# Patient Record
Sex: FEMALE | Race: WHITE | NOT HISPANIC OR LATINO | Employment: OTHER | URBAN - NONMETROPOLITAN AREA
[De-identification: names, ages, dates, MRNs, and addresses within clinical notes are randomized per-mention and may not be internally consistent; named-entity substitution may affect disease eponyms.]

---

## 2017-05-22 ENCOUNTER — OFFICE VISIT (OUTPATIENT)
Dept: PODIATRY | Facility: CLINIC | Age: 82
End: 2017-05-22

## 2017-05-22 VITALS
HEART RATE: 84 BPM | SYSTOLIC BLOOD PRESSURE: 122 MMHG | DIASTOLIC BLOOD PRESSURE: 80 MMHG | HEIGHT: 60 IN | BODY MASS INDEX: 29.84 KG/M2 | WEIGHT: 152 LBS

## 2017-05-22 DIAGNOSIS — M20.5X2 HALLUX LIMITUS, ACQUIRED, LEFT: ICD-10-CM

## 2017-05-22 DIAGNOSIS — E11.9 CONTROLLED TYPE 2 DIABETES MELLITUS WITHOUT COMPLICATION, WITHOUT LONG-TERM CURRENT USE OF INSULIN (HCC): ICD-10-CM

## 2017-05-22 DIAGNOSIS — M20.5X1 HALLUX LIMITUS, ACQUIRED, RIGHT: ICD-10-CM

## 2017-05-22 DIAGNOSIS — G57.62 PLANTAR NEUROMA, LEFT: Primary | ICD-10-CM

## 2017-05-22 PROCEDURE — 99213 OFFICE O/P EST LOW 20 MIN: CPT | Performed by: PODIATRIST

## 2017-05-22 RX ORDER — DILTIAZEM HYDROCHLORIDE 240 MG/1
240 CAPSULE, COATED, EXTENDED RELEASE ORAL DAILY
COMMUNITY

## 2017-08-30 PROCEDURE — G0123 SCREEN CERV/VAG THIN LAYER: HCPCS | Performed by: OBSTETRICS & GYNECOLOGY

## 2017-08-31 ENCOUNTER — LAB REQUISITION (OUTPATIENT)
Dept: LAB | Facility: HOSPITAL | Age: 82
End: 2017-08-31

## 2017-08-31 DIAGNOSIS — Z12.72 ENCOUNTER FOR SCREENING FOR MALIGNANT NEOPLASM OF VAGINA: ICD-10-CM

## 2017-09-05 LAB
GEN CATEG CVX/VAG CYTO-IMP: NORMAL
LAB AP CASE REPORT: NORMAL
LAB AP GYN ADDITIONAL INFORMATION: NORMAL
LAB AP GYN OTHER FINDINGS: NORMAL
Lab: NORMAL
PATH INTERP SPEC-IMP: NORMAL
STAT OF ADQ CVX/VAG CYTO-IMP: NORMAL

## 2018-06-08 ENCOUNTER — TELEPHONE (OUTPATIENT)
Dept: PODIATRY | Facility: CLINIC | Age: 83
End: 2018-06-08

## 2018-06-11 ENCOUNTER — OFFICE VISIT (OUTPATIENT)
Dept: PODIATRY | Facility: CLINIC | Age: 83
End: 2018-06-11

## 2018-06-11 VITALS
WEIGHT: 150 LBS | HEART RATE: 68 BPM | BODY MASS INDEX: 29.45 KG/M2 | DIASTOLIC BLOOD PRESSURE: 76 MMHG | OXYGEN SATURATION: 95 % | SYSTOLIC BLOOD PRESSURE: 120 MMHG | HEIGHT: 60 IN

## 2018-06-11 DIAGNOSIS — B35.3 TINEA PEDIS OF LEFT FOOT: ICD-10-CM

## 2018-06-11 DIAGNOSIS — E11.9 CONTROLLED TYPE 2 DIABETES MELLITUS WITHOUT COMPLICATION, WITHOUT LONG-TERM CURRENT USE OF INSULIN (HCC): Primary | ICD-10-CM

## 2018-06-11 PROCEDURE — 99213 OFFICE O/P EST LOW 20 MIN: CPT | Performed by: PODIATRIST

## 2018-06-11 RX ORDER — PRENATAL VIT 91/IRON/FOLIC/DHA 28-975-200
COMBINATION PACKAGE (EA) ORAL 2 TIMES DAILY
Qty: 36 G | Refills: 2 | Status: SHIPPED | OUTPATIENT
Start: 2018-06-11 | End: 2018-06-13 | Stop reason: SDUPTHER

## 2018-06-11 NOTE — PATIENT INSTRUCTIONS
Diabetes and Foot Care  Diabetes may cause you to have problems because of poor blood supply (circulation) to your feet and legs. This may cause the skin on your feet to become thinner, break easier, and heal more slowly. Your skin may become dry, and the skin may peel and crack. You may also have nerve damage in your legs and feet causing decreased feeling in them. You may not notice minor injuries to your feet that could lead to infections or more serious problems. Taking care of your feet is one of the most important things you can do for yourself.  Follow these instructions at home:  · Wear shoes at all times, even in the house. Do not go barefoot. Bare feet are easily injured.  · Check your feet daily for blisters, cuts, and redness. If you cannot see the bottom of your feet, use a mirror or ask someone for help.  · Wash your feet with warm water (do not use hot water) and mild soap. Then pat your feet and the areas between your toes until they are completely dry. Do not soak your feet as this can dry your skin.  · Apply a moisturizing lotion or petroleum jelly (that does not contain alcohol and is unscented) to the skin on your feet and to dry, brittle toenails. Do not apply lotion between your toes.  · Trim your toenails straight across. Do not dig under them or around the cuticle. File the edges of your nails with an emery board or nail file.  · Do not cut corns or calluses or try to remove them with medicine.  · Wear clean socks or stockings every day. Make sure they are not too tight. Do not wear knee-high stockings since they may decrease blood flow to your legs.  · Wear shoes that fit properly and have enough cushioning. To break in new shoes, wear them for just a few hours a day. This prevents you from injuring your feet. Always look in your shoes before you put them on to be sure there are no objects inside.  · Do not cross your legs. This may decrease the blood flow to your feet.  · If you find a  minor scrape, cut, or break in the skin on your feet, keep it and the skin around it clean and dry. These areas may be cleansed with mild soap and water. Do not cleanse the area with peroxide, alcohol, or iodine.  · When you remove an adhesive bandage, be sure not to damage the skin around it.  · If you have a wound, look at it several times a day to make sure it is healing.  · Do not use heating pads or hot water bottles. They may burn your skin. If you have lost feeling in your feet or legs, you may not know it is happening until it is too late.  · Make sure your health care provider performs a complete foot exam at least annually or more often if you have foot problems. Report any cuts, sores, or bruises to your health care provider immediately.  Contact a health care provider if:  · You have an injury that is not healing.  · You have cuts or breaks in the skin.  · You have an ingrown nail.  · You notice redness on your legs or feet.  · You feel burning or tingling in your legs or feet.  · You have pain or cramps in your legs and feet.  · Your legs or feet are numb.  · Your feet always feel cold.  Get help right away if:  · There is increasing redness, swelling, or pain in or around a wound.  · There is a red line that goes up your leg.  · Pus is coming from a wound.  · You develop a fever or as directed by your health care provider.  · You notice a bad smell coming from an ulcer or wound.  This information is not intended to replace advice given to you by your health care provider. Make sure you discuss any questions you have with your health care provider.  Document Released: 12/15/2001 Document Revised: 05/25/2017 Document Reviewed: 05/27/2014  [employer] Interactive Patient Education © 2017 [employer]

## 2018-06-11 NOTE — PROGRESS NOTES
Psychiatric - PODIATRY    Today's Date: 06/11/18    Patient Name: Davida Echols  MRN: [id number]  CSN: [id number]  PCP: Jer Chang MD  Referring Provider: No ref. provider found    SUBJECTIVE     Chief Complaint   Patient presents with   • Left Foot - Follow-up     PT is here for 1 year f/u. PT c/o new problem with left foot - patient states it feels like a fresh cut in between toes, a burning/itching feeling, admits this has been going on for a few months. PT denies pain at present time.    • Right Foot - Follow-up   • Diabetes     PT does not check blood sugar regularly. PCP: Dr. Jer Chang last visit 05/11/2018     HPI: Davida Echols, a 84 y.o.female, comes to clinic as a(n) established patient presenting for diabetic foot exam and treatment of plantar neuroma of left foot. Pt has h/o Arthritis, Carotid Stenosis, DM2, HLD, HTN, Psoriasis. Patient is NIDDM and unsure of last BG level. Denies pain. Denies significant changes in feet since last visit. Notes occasional itching between her left 4th and 5th toes. Denies numbness to feet. Denies open wounds or sores. Has been applying OTC antifungal with mild improvement. Denies any constitutional symptoms. No other pedal complaints at this time.    Past Medical History:   Diagnosis Date   • Arthritis    • Breast cancer    • Carotid stenosis    • Constipation    • Diabetes mellitus     type   2   • Hyperlipidemia    • Hypertension    • Hyponatremia    • Obesity    • Osteopenia    • Ovarian cyst    • Psoriasis      Past Surgical History:   Procedure Laterality Date   • APPENDECTOMY     • BREAST SURGERY     • CHOLECYSTECTOMY     • COLONOSCOPY     • MASTECTOMY Bilateral      Family History   Problem Relation Age of Onset   • Heart disease Mother    • Heart disease Father    • Cancer Father      Social History     Social History   • Marital status:      Spouse name: N/A   • Number of children: N/A   • Years of education: N/A     Occupational  History   • Not on file.     Social History Main Topics   • Smoking status: Never Smoker   • Smokeless tobacco: Not on file   • Alcohol use No   • Drug use: No   • Sexual activity: Not on file     Other Topics Concern   • Not on file     Social History Narrative   • No narrative on file     Allergies   Allergen Reactions   • Other      Molds and oak trees     Current Outpatient Prescriptions   Medication Sig Dispense Refill   • aspirin  MG tablet Take 325 mg by mouth Every 6 (Six) Hours As Needed.     • Cyanocobalamin 1000 MCG/ML kit Inject  as directed.     • diltiaZEM CD (CARDIZEM CD) 240 MG 24 hr capsule Take 240 mg by mouth Daily.     • fexofenadine (ALLEGRA) 180 MG tablet Take 180 mg by mouth Daily.     • glimepiride (AMARYL) 1 MG tablet Take 1 mg by mouth Every Morning Before Breakfast.     • lisinopril (PRINIVIL,ZESTRIL) 5 MG tablet Take 5 mg by mouth Daily.     • metFORMIN (GLUCOPHAGE) 500 MG tablet Take 500 mg by mouth 2 (Two) Times a Day With Meals. Half a tablet daily.     • Multiple Vitamins-Minerals (MULTIVITAMIN ADULT PO) Take  by mouth. Every other day     • Omega-3 Fatty Acids (FISH OIL PO) Take  by mouth.     • pravastatin (PRAVACHOL) 80 MG tablet Take 80 mg by mouth Daily.     • terbinafine (lamISIL) 1 % cream Apply  topically 2 (Two) Times a Day. 36 g 2     No current facility-administered medications for this visit.      Review of Systems   Constitutional: Negative for chills and fever.   HENT: Negative for congestion.    Respiratory: Negative for shortness of breath.    Cardiovascular: Negative for chest pain and leg swelling.   Gastrointestinal: Negative for constipation, diarrhea, nausea and vomiting.   Musculoskeletal: Positive for arthralgias.   Skin: Negative for wound.   Neurological: Negative for numbness.       OBJECTIVE     Vitals:    06/11/18 1037   BP: 120/76   Pulse: 68   SpO2: 95%       PHYSICAL EXAM  GEN:   A&Ox3, NAD. Pt presents to clinic ambulating without assistance and  wearing Diabetic Shoes.      NEURO:   Protective sensation intact to 10/10 sites Right foot, 10/10 site Left foot using [place] monofilament  Light touch sensation present  No Tinel's or Villeux sign.    VASC:  Skin temperature Warm to Warm proximal to distal nancy  DP pulses 1/4 Right, 1/4 Left  PT pulses 1/4 Right, 1/4 Left  CFT 4 sec nancy  Pedal hair growth absent  no edema noted nancy  Varicosities absent nancy  Spider veins noted nancy    MUSC/SKEL:  Muscle Strength Right foot Dorsiflexors 5/5, Plantarflexors 5/5, Evertors 5/5, Invertors 5/5  Muscle Strength Left foot Dorsiflexors 5/5, Plantarflexors 5/5, Evertors 5/5, Invertors 5/5  Decrease ROM of the 1st MTP with bony enlargement dorsally and medially nancy  ROM of the MTJ is full without pain or crepitus    ROM of the STJ is full without pain or crepitus    ROM of the ankle joint is full without pain or crepitus    Rectus foot type   Gait pattern: Normal  No gross pedal musculoskeletal deformities noted.     DERM:  Pedal nails x10 are within normal limits of length and thickness  Webspaces are Clean, Dry, and Intact  Skin is normal in turgor and texture with no open wounds or sores appreciated.      RADIOLOGY/NUCLEAR:  No results found.    LABORATORY/CULTURE RESULTS:      PATHOLOGY RESULTS:       ASSESSMENT/PLAN     Dvaida was seen today for diabetes, follow-up and follow-up.    Diagnoses and all orders for this visit:    Controlled type 2 diabetes mellitus without complication, without long-term current use of insulin    Tinea pedis of left foot    Other orders  -     terbinafine (lamISIL) 1 % cream; Apply  topically 2 (Two) Times a Day.      Comprehensive lower extremity examination and evaluation was performed.  Discussed findings and treatment plan including risks, benefits, and treatment options with patient in detail. Patient agreed with treatment plan.  Patient states that she feels comfortable cutting her own nails at home  Patient may maintain nails  and calluses at home utilizing emery board or pumice stone between visits as needed  Reviewed at home diabetic foot care including daily foot checks   Use antifungal cream BID x3 weeks or until resolution  An After Visit Summary was printed and given to the patient at discharge, including (if requested) any available informative/educational handouts regarding diagnosis, treatment, or medications. All questions were answered to patient/family satisfaction. Should symptoms fail to improve or worsen they agree to call or return to clinic or to go to the Emergency Department. Discussed the importance of following up with any needed screening tests/labs/specialist appointments and any requested follow-up recommended by me today. Importance of maintaining follow-up discussed and patient accepts that missed appointments can delay diagnosis and potentially lead to worsening of conditions.  Return if symptoms worsen or fail to improve., or sooner if acute issues arise.        This document has been electronically signed by Ethan Arteaga DPM on June 11, 2018 10:59 AM

## 2018-06-13 RX ORDER — PRENATAL VIT 91/IRON/FOLIC/DHA 28-975-200
COMBINATION PACKAGE (EA) ORAL 2 TIMES DAILY
Qty: 36 G | Refills: 2 | Status: SHIPPED | OUTPATIENT
Start: 2018-06-13

## 2018-06-13 NOTE — TELEPHONE ENCOUNTER
Called and notified Davida Echols that her prescription for Terbafine has been sent to [facility] pharmacy in [place], [place] per patient request. Patient gave verbal understanding at this time.

## 2019-11-17 PROBLEM — D09.9 CARCINOMA IN SITU: Status: ACTIVE | Noted: 2019-11-17

## 2019-11-17 PROBLEM — D05.12 BREAST NEOPLASM, TIS (DCIS), LEFT: Status: ACTIVE | Noted: 2019-11-17

## 2019-11-17 PROBLEM — D05.11 BREAST NEOPLASM, TIS (DCIS), RIGHT: Status: ACTIVE | Noted: 2019-11-17

## 2019-11-18 ENCOUNTER — OFFICE VISIT (OUTPATIENT)
Dept: ONCOLOGY | Facility: CLINIC | Age: 84
End: 2019-11-18

## 2019-11-18 VITALS
BODY MASS INDEX: 26.78 KG/M2 | HEART RATE: 80 BPM | HEIGHT: 60 IN | SYSTOLIC BLOOD PRESSURE: 132 MMHG | RESPIRATION RATE: 16 BRPM | OXYGEN SATURATION: 95 % | DIASTOLIC BLOOD PRESSURE: 82 MMHG | WEIGHT: 136.4 LBS | TEMPERATURE: 97.6 F

## 2019-11-18 DIAGNOSIS — D50.9 IRON DEFICIENCY ANEMIA, UNSPECIFIED IRON DEFICIENCY ANEMIA TYPE: Primary | ICD-10-CM

## 2019-11-18 DIAGNOSIS — C50.912 MALIGNANT NEOPLASM OF LEFT BREAST IN FEMALE, ESTROGEN RECEPTOR POSITIVE, UNSPECIFIED SITE OF BREAST (HCC): ICD-10-CM

## 2019-11-18 DIAGNOSIS — Z17.0 MALIGNANT NEOPLASM OF LEFT BREAST IN FEMALE, ESTROGEN RECEPTOR POSITIVE, UNSPECIFIED SITE OF BREAST (HCC): ICD-10-CM

## 2019-11-18 PROCEDURE — 99214 OFFICE O/P EST MOD 30 MIN: CPT | Performed by: NURSE PRACTITIONER

## 2019-11-18 NOTE — PROGRESS NOTES
[facility] ONC [facility]  [facility]  [address] [place] [address]  [phone]    Patient Name: Davida Echols  Encounter Date: 11/18/2019  YOB: 1933  Patient Number: [id number]       REASON FOR VISIT:  Ms. Echols is an 86-year-old female who returns in follow-up of Stage I right breast cancer. 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  It has now been 213 months since definitive right mastectomy, and 150 months since discontinuation of adjuvant hormonal therapy with Arimidex (received 5 years).   The patient is here alone.  History is obtained from the patient who is considered to be a reliable historian.    She presents today feeling well. She has no complaints.  She is tolerating the iron tablets without complication.  She has no N/V/D, shortness of breath or chest pain.  She has no fever or chills.      DIAGNOSTIC ABNORMALITIES:  DCIS left breast. Mammogram, 06/27/2014:  1. There is a small cluster of calcifications in the upper-outer quadrant of the left breast at approximately the 3 o'clock position.  When compared to a more remote magnification view from 2 years earlier, I feel these calcifications have increased in number and heterogeneity and would warrant further evaluation with stereotactic biopsy. This will be discussed with the patient and Dr. Arteaga's office will also be notified by the nurse navigator who discussed the case with them under my direction. 2. ACR BI-RADS category 4B. Increased calcifications within the upper-outer quadrant of the left breast. 8/01/2014. Breast biopsy at [facility] by Dr. Zamora, revealed: IMPRESSION: Apparently successful stereotactic-guided biopsy of microcalcifications in the upper-outer left breast. Biopsy clip deployed in good position. Clip and biopsy in good alignment and position. There are decreased microcalcifications on the post clip mammogram. Pathology received on 08/14/2014, HER-2/CEP17 ratio was  1.0, negative. H&E and estrogen receptor were both positive. (09/29/2014/[facility])\[id number] breast biopsy.  DCIS:  ER + ( 97 %); TN+ (21 %); HER-2: Negative (By FISH Amplification) \[id number]: Left breast specimen type: Left simple mastectomy and left axillary sentinel lymphadenectomy tumor size: 14 mm in largest dimension. Tumor type: Ductal carcinoma in situ differentiation; Intermediate grade, solid type with multifocal necrosis and calcification invasive tumor: Not present DCIS margins: Not Involved (closest margin 16 mm; side - deep) lymph nodes: Sampling type: Left axillary sentinel lymphadenectomy 0/2 (positive/total) pTis pN0 MX  Predictive/Prognostic marker assays (on biopsy specimen.  -I):  ER + ( 97%); TN+ (21 %); HER-2: Negative (By FISH Amplification).  Left simple mastectomy. Pathology, 09/15/2014: 1. Lymph nodes, left axillary sentinel lymphadenectomy: Mild nonspecific reactive lymphoid hyperplasia (negative for metastatic tumor)  2. Breast, left simple mastectomy. Focal ductal carcinoma in situ, intermediate grade, solid type, with multifocal  necrosis and calcification (margins of resection free of involvement) fibroadenoma.  Focal healing wound consistent with previous biopsy site.    PREVIOUS INTERVENTIONS:Left simple mastectomy.  Pathology, 09/15/2014: Lymph nodes, left axillary sentinel lymphadenectomy:  Mild nonspecific reactive lymphoid hyperplasia (negative for metastatic tumor)  Breast, left simple mastectomy, focal ductal carcinoma in situ, intermediate grade, solid type, with multifocal necrosis and calcification (margins of resection free of involvement) fibroadenoma .  Focal healing wound, consistent with previous biopsy site.      DIAGNOSTIC ABNORMALITIES:  IDC right breast.  Mammogram, 01/25/2002. A 15 mm lobular mass in the upper outer region of the right breast. Ultrasound confirmed that the mass was solid. Excisional biopsy, 01/30/2002. Pathology consistent with infiltrating  ductal carcinoma, grade 2, tumor diameter of 1.5 cm. ER 70%, PA 90%, HER-2 negative, aneuploid with a high S-phase fraction. Modified radical mastectomy, 02/21/2002. No residual disease. All margins free, 0/8 axillary nodes. Chest x-ray, bone scan, CT of the chest, abdomen, pelvis, and head, 02/22/2002. All unrevealing for metastases.    PREVIOUS INTERVENTIONS:  IDC right breast.  Modified radical mastectomy, right breast, 02/21/2002. Pathologic AJCC Stage: T1c, N0 (Stage I). Arimidex, 1 mg daily 05/01/2002 through 05/02/2007.    LABS - scanned in chart from [facility]  11/6/19 at [facility] shows WBC 7.8, Hgb 13.7, Hct 42.4, Platelets 243,000. CMP shows a GFR of 53ml/min. Ferritin 55, iron saturation 18%.     PAST MEDICAL HISTORY:  ALLERGIES:  Allergies   Allergen Reactions   • Other      Molds and oak trees     CURRENT MEDICATIONS:  Outpatient Encounter Medications as of 11/18/2019   Medication Sig Dispense Refill   • aspirin  MG tablet Take 325 mg by mouth Every 6 (Six) Hours As Needed.     • diltiaZEM CD (CARDIZEM CD) 240 MG 24 hr capsule Take 240 mg by mouth Daily.     • fexofenadine (ALLEGRA) 180 MG tablet Take 180 mg by mouth Daily.     • glimepiride (AMARYL) 1 MG tablet Take 1 mg by mouth Every Morning Before Breakfast.     • lisinopril (PRINIVIL,ZESTRIL) 5 MG tablet Take 5 mg by mouth Daily.     • metFORMIN (GLUCOPHAGE) 500 MG tablet Take 500 mg by mouth 2 (Two) Times a Day With Meals. Half a tablet daily.     • Multiple Vitamins-Minerals (MULTIVITAMIN ADULT PO) Take  by mouth. Every other day     • Omega-3 Fatty Acids (FISH OIL PO) Take  by mouth.     • pravastatin (PRAVACHOL) 80 MG tablet Take 80 mg by mouth Daily.     • terbinafine (lamISIL) 1 % cream Apply  topically 2 (Two) Times a Day. 36 g 2   • [DISCONTINUED] Cyanocobalamin 1000 MCG/ML kit Inject  as directed.       No facility-administered encounter medications on file as of 11/18/2019.      ADULT ILLNESSES:  Patient  Active Problem List   Diagnosis Code   • Breast neoplasm, Tis (DCIS), right D05.11   • Breast neoplasm, Tis (DCIS), left D05.12     SURGERIES:  Past Surgical History:   Procedure Laterality Date   • APPENDECTOMY     • BREAST SURGERY     • CHOLECYSTECTOMY     • COLONOSCOPY     • MASTECTOMY Bilateral      HEALTH MAINTENANCE ITEMS:  Health Maintenance Due   Topic Date Due   • URINE MICROALBUMIN  09/26/1933   • TDAP/TD VACCINES (1 - Tdap) 09/26/1952   • ZOSTER VACCINE (1 of 2) 09/26/1983   • PNEUMOCOCCAL VACCINES (65+ LOW/MEDIUM RISK) (1 of 2 - PCV13) 09/26/1998   • MEDICARE ANNUAL WELLNESS  05/22/2017   • LIPID PANEL  05/22/2017   • HEMOGLOBIN A1C  05/22/2017   • DIABETIC EYE EXAM  05/22/2017   • DXA SCAN  06/15/2018   • INFLUENZA VACCINE  08/01/2019       <no information>  Last Completed Colonoscopy     Patient has no health maintenance due at this time          There is no immunization history on file for this patient.  Last Completed Mammogram     Not indicated due to bilateral mastectomy          FAMILY HISTORY:  Family History   Problem Relation Age of Onset   • Heart disease Mother    • Heart disease Father    • Cancer Father      SOCIAL HISTORY:  Social History     Socioeconomic History   • Marital status:      Spouse name: Not on file   • Number of children: Not on file   • Years of education: Not on file   • Highest education level: Not on file   Tobacco Use   • Smoking status: Never Smoker   Substance and Sexual Activity   • Alcohol use: No   • Drug use: No       REVIEW OF SYSTEMS:  Review of Systems   Constitutional: Negative for activity change, appetite change, chills, diaphoresis, fatigue, fever and unexpected weight loss.   HENT: Negative for ear pain, nosebleeds, sinus pressure, sore throat and voice change.    Eyes: Negative for blurred vision, double vision, pain and visual disturbance.   Respiratory: Negative for cough and shortness of breath.    Cardiovascular: Negative for chest pain,  "palpitations and leg swelling.   Gastrointestinal: Negative for abdominal pain, anal bleeding, blood in stool, constipation, diarrhea, nausea and vomiting.   Endocrine: Negative for heat intolerance, polydipsia and polyuria.   Genitourinary: Negative for dysuria, frequency, hematuria, urgency and urinary incontinence.   Musculoskeletal: Negative for arthralgias and myalgias.   Skin: Negative for rash and skin lesions.   Neurological: Negative for dizziness, tremors, seizures, syncope, speech difficulty, weakness and headache.   Hematological: Negative for adenopathy. Does not bruise/bleed easily.   Psychiatric/Behavioral: Negative for dysphoric mood, sleep disturbance, suicidal ideas and depressed mood.       /82   Pulse 80   Temp 97.6 °F (36.4 °C) (Temporal)   Resp 16   Ht 152.4 cm (60\")   Wt 61.9 kg (136 lb 6.4 oz)   SpO2 95%   Breastfeeding? No   BMI 26.64 kg/m²  Body surface area is 1.59 meters squared.  Pain Score    11/18/19 0927   PainSc: 0-No pain       Physical Exam:  General:   She is a pleasant, well-nourished, heavyset, well-appearing elderly female in no distress. ECOG PS = 0.  Head/Neck   The patient is anicteric and atraumatic. The trachea is midline. The neck is supple without evidence of jugular venous distention or cervical adenopathy.  Eyes:   The extraocular movements are full. There is no scleral jaundice.  Chest:   The respiratory efforts are normal and unhindered. The chest is clear to auscultation. There are no wheezes, rhonchi, rales, or asymmetry of breath sounds.  Breasts:   The right breast is surgically absent. The left breast is also surgically absent. The underlying chest wall is without nodularity, masses, or tenderness.   Cardiovascular:   The patient has a regular cardiac rate and rhythm without murmurs, rubs, or gallops.  Abdomen:   The belly is soft and obese. There is no rebound, guarding, or tenderness. Bowel sounds are active and of normal character.  Extremities: "   There is no evidence of cyanosis or clubbing or edema. Varicose veins again noted.   Rheumatologic:   There is evidence of osteoarthritic deformities of the hands. There is no sausaging of the fingers. There is no sign of active synovitis. The gait is normal.  Cutaneous:   There are no overt rashes, disseminated lesions, purpura, or petechiae.  Lymphatics:   There is no evidence of adenopathy in the cervical, supraclavicular, or bilateral axillary areas.   Neurologic:   The patient is alert, oriented, cooperative, and pleasant. She is appropriately conversant. She ambulated into the exam room without assistance and transferred from chair to exam table unaided. There is no overt dysfunction of the motor, sensory, or cerebellar systems.  Psych:   Mood and affect are appropriate for circumstance. Eye contact is appropriate.      Patient's Body mass index is 26.64 kg/m². BMI is within normal parameters. No follow-up required..      ASSESSMENT  1. 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 ER + ( 97%); MI+ (21%); HER-2: Negative (FISH Amplification)   Tumor burden: 14 mm in largest dimension. Differentiation; Intermediate grade, solid type with multifocal necrosis and calcification: Invasive tumor: Not present. 0/2 left axillary sentinel nodes.   Tumor status: No evidence of disease since left simple mastectomy.  2. Right breast cancer, infiltrating ductal carcinoma. ER/MI (+), HER-2 (-).   Stage: I (T1c N0 M0).   Tumor Status: No evidence of malignancy.  3. Mildly elevated CEA. Stable, 4.2 on 11/6/2019 (prior range: 2.1 - 4.6).   4. Normocytic anemia. No longer evident, HGB 13.7 on 11/6/2019 (prior range: HGB 11.5 - 14).   5. Iron deficiency (ferritin 15, sat 16%, 03/25/2015). Received Venofer 200 mg x3 beginning 04/06/2015 through 04/27/2015 (600 mg). Last colonoscopy, 07/17/2014. Oral iron started in May 2019. Ferritin now 55 as of 11/6/19 and iron saturation 18%. Hgb stable at 13.7.  6.  "Type 2 diabetes.   7. Hypertension, followed by Dr. Chang   8. Osteopenia, bone density, 04/21/2008. Tolerating Fosamax, calcium (Tums), and vitamin D.   9. Psoriasis, quiescent. Followed by Dr. Etienne.   10. Lower back pains, \"arthritis.\"   11. Chronic kidney disease, Stage III. Baseline GFR = 49 mL/min, 12/10/2012. GFR = 60 mL/min, 05/03/2019; 47 mL/min, 11/01/2018; 53 mL/min, 07/18/2018; 42 mL/min, 07/20/2017, 53ml/min 11/6/19. Followed by Dr. Pink.     PLAN:   1.  Re: Apprise of labs from 11/6/2019 with (stable) heme, current (stable) renal function, stable tumor markers, slightly depressed Fe sat (less than 20%) and ferritin < 100.   2. Fax labs from 11/6/2019 to [facility] (Dr. Pink) Re: Current GFR.   3. Dr Watson previously counseled Re: Ductal carcinoma in situ (DCIS). I noted that many, but not all cases of DCIS progress to invasive carcinoma within a woman's lifetime. The available therapies include:   Mastectomy is curative 98-99% of the time (Given the information available, I feel that this is the procedure the patient will benefit the most from, and the procedure she favors anyway).   Localized DCIS may be treated with breast sparing surgery and irradiation (recurrence rate = 9-17%).   Excision alone may be considered in those with less than 1-2 cm low grade lesions.   Tamoxifen should be considered to reduce (from 13% to 8%) the risk of ipsilateral breast recurrence after breast sparing surgery, and to reduce the risk of contralateral breast cancer in all women.  4. Continue ferrous sulfate 325mg po daily, will increase to bid to see if ferritin will rise closer to 100.  5. Continue currently identified medications.  6. Continue ongoing management per primary care.   7. Advance Directive discussed per Dr Watson, questions answered, forms given - initially discussed on 07/27/2017.  8. Return to [place] office in 6 months with pre-office (at [facility]) CBC, CMP, CA27-29, and " CEA, TIBC, iron, iron sat, ferritin. If stable, will revert back to 12 month intervals.      I have spent a total of  __35__  minutes in face-to-face encounter with the patient, out of which more than 50% was counseling the patient and family regarding coordination of care.  Counseling included but not limited to time spent reviewing labs, treatment plan as well as answering questions.     Velia Levy, APRN  11/18/2019   10:44 AM

## 2020-03-09 ENCOUNTER — OFFICE VISIT (OUTPATIENT)
Dept: FAMILY MEDICINE CLINIC | Age: 85
End: 2020-03-09
Payer: MEDICARE

## 2020-03-09 VITALS
BODY MASS INDEX: 26.5 KG/M2 | RESPIRATION RATE: 16 BRPM | WEIGHT: 135 LBS | HEIGHT: 60 IN | OXYGEN SATURATION: 95 % | DIASTOLIC BLOOD PRESSURE: 76 MMHG | HEART RATE: 105 BPM | SYSTOLIC BLOOD PRESSURE: 132 MMHG | TEMPERATURE: 98.4 F

## 2020-03-09 DIAGNOSIS — I10 ESSENTIAL HYPERTENSION: ICD-10-CM

## 2020-03-09 DIAGNOSIS — E78.2 MIXED HYPERLIPIDEMIA: ICD-10-CM

## 2020-03-09 DIAGNOSIS — E11.9 TYPE 2 DIABETES MELLITUS WITHOUT COMPLICATION, WITHOUT LONG-TERM CURRENT USE OF INSULIN (HCC): ICD-10-CM

## 2020-03-09 LAB
ALBUMIN SERPL-MCNC: 4.3 G/DL (ref 3.5–5.2)
ALP BLD-CCNC: 63 U/L (ref 35–104)
ALT SERPL-CCNC: 18 U/L (ref 5–33)
ANION GAP SERPL CALCULATED.3IONS-SCNC: 14 MMOL/L (ref 7–19)
AST SERPL-CCNC: 25 U/L (ref 5–32)
BASOPHILS ABSOLUTE: 0 K/UL (ref 0–0.2)
BASOPHILS RELATIVE PERCENT: 0.4 % (ref 0–1)
BILIRUB SERPL-MCNC: 0.3 MG/DL (ref 0.2–1.2)
BUN BLDV-MCNC: 12 MG/DL (ref 8–23)
CALCIUM SERPL-MCNC: 10.1 MG/DL (ref 8.8–10.2)
CHLORIDE BLD-SCNC: 102 MMOL/L (ref 98–111)
CHOLESTEROL, TOTAL: 160 MG/DL (ref 160–199)
CO2: 24 MMOL/L (ref 22–29)
CREAT SERPL-MCNC: 0.8 MG/DL (ref 0.5–0.9)
CREATININE URINE: 194.9 MG/DL (ref 4.2–622)
EOSINOPHILS ABSOLUTE: 0 K/UL (ref 0–0.6)
EOSINOPHILS RELATIVE PERCENT: 0.5 % (ref 0–5)
GFR NON-AFRICAN AMERICAN: >60
GLUCOSE BLD-MCNC: 174 MG/DL (ref 74–109)
HBA1C MFR BLD: 6.7 % (ref 4–6)
HCT VFR BLD CALC: 43.9 % (ref 37–47)
HDLC SERPL-MCNC: 80 MG/DL (ref 65–121)
HEMOGLOBIN: 13.7 G/DL (ref 12–16)
IMMATURE GRANULOCYTES #: 0 K/UL
LDL CHOLESTEROL CALCULATED: 59 MG/DL
LYMPHOCYTES ABSOLUTE: 1.6 K/UL (ref 1.1–4.5)
LYMPHOCYTES RELATIVE PERCENT: 21.7 % (ref 20–40)
MCH RBC QN AUTO: 27.7 PG (ref 27–31)
MCHC RBC AUTO-ENTMCNC: 31.2 G/DL (ref 33–37)
MCV RBC AUTO: 88.7 FL (ref 81–99)
MICROALBUMIN UR-MCNC: 2.9 MG/DL (ref 0–19)
MICROALBUMIN/CREAT UR-RTO: 14.9 MG/G
MONOCYTES ABSOLUTE: 0.6 K/UL (ref 0–0.9)
MONOCYTES RELATIVE PERCENT: 8.1 % (ref 0–10)
NEUTROPHILS ABSOLUTE: 5.1 K/UL (ref 1.5–7.5)
NEUTROPHILS RELATIVE PERCENT: 69 % (ref 50–65)
PDW BLD-RTO: 14.7 % (ref 11.5–14.5)
PLATELET # BLD: 235 K/UL (ref 130–400)
PMV BLD AUTO: 10.9 FL (ref 9.4–12.3)
POTASSIUM SERPL-SCNC: 4.5 MMOL/L (ref 3.5–5)
RBC # BLD: 4.95 M/UL (ref 4.2–5.4)
SODIUM BLD-SCNC: 140 MMOL/L (ref 136–145)
TOTAL PROTEIN: 7.3 G/DL (ref 6.6–8.7)
TRIGL SERPL-MCNC: 107 MG/DL (ref 0–149)
WBC # BLD: 7.4 K/UL (ref 4.8–10.8)

## 2020-03-09 PROCEDURE — 4040F PNEUMOC VAC/ADMIN/RCVD: CPT | Performed by: FAMILY MEDICINE

## 2020-03-09 PROCEDURE — 1036F TOBACCO NON-USER: CPT | Performed by: FAMILY MEDICINE

## 2020-03-09 PROCEDURE — G8417 CALC BMI ABV UP PARAM F/U: HCPCS | Performed by: FAMILY MEDICINE

## 2020-03-09 PROCEDURE — 1090F PRES/ABSN URINE INCON ASSESS: CPT | Performed by: FAMILY MEDICINE

## 2020-03-09 PROCEDURE — 1123F ACP DISCUSS/DSCN MKR DOCD: CPT | Performed by: FAMILY MEDICINE

## 2020-03-09 PROCEDURE — G8484 FLU IMMUNIZE NO ADMIN: HCPCS | Performed by: FAMILY MEDICINE

## 2020-03-09 PROCEDURE — G8427 DOCREV CUR MEDS BY ELIG CLIN: HCPCS | Performed by: FAMILY MEDICINE

## 2020-03-09 PROCEDURE — 99204 OFFICE O/P NEW MOD 45 MIN: CPT | Performed by: FAMILY MEDICINE

## 2020-03-09 RX ORDER — CYANOCOBALAMIN (VITAMIN B-12) 500 MCG
500 TABLET ORAL DAILY
Status: ON HOLD | COMMUNITY
End: 2021-02-19 | Stop reason: SDUPTHER

## 2020-03-09 RX ORDER — ASPIRIN 325 MG
325 TABLET, DELAYED RELEASE (ENTERIC COATED) ORAL EVERY 6 HOURS PRN
COMMUNITY
End: 2021-01-14

## 2020-03-09 RX ORDER — GLIMEPIRIDE 1 MG/1
1 TABLET ORAL
COMMUNITY
Start: 2020-01-23 | End: 2020-06-09 | Stop reason: SDUPTHER

## 2020-03-09 RX ORDER — LISINOPRIL 5 MG/1
5 TABLET ORAL DAILY
COMMUNITY
End: 2020-06-09 | Stop reason: SDUPTHER

## 2020-03-09 RX ORDER — DILTIAZEM HYDROCHLORIDE 240 MG/1
240 CAPSULE, COATED, EXTENDED RELEASE ORAL DAILY
COMMUNITY
End: 2020-03-17 | Stop reason: SDUPTHER

## 2020-03-09 RX ORDER — CETIRIZINE HYDROCHLORIDE 10 MG/1
10 TABLET ORAL DAILY
Status: ON HOLD | COMMUNITY
End: 2021-02-19 | Stop reason: SDUPTHER

## 2020-03-09 RX ORDER — ROSUVASTATIN CALCIUM 20 MG/1
20 TABLET, COATED ORAL DAILY
COMMUNITY
Start: 2020-03-03 | End: 2020-06-09 | Stop reason: SDUPTHER

## 2020-03-09 RX ORDER — METFORMIN HYDROCHLORIDE 500 MG/1
500 TABLET, EXTENDED RELEASE ORAL
Qty: 90 TABLET | Refills: 1 | Status: SHIPPED | OUTPATIENT
Start: 2020-03-09 | End: 2020-06-09 | Stop reason: SDUPTHER

## 2020-03-09 SDOH — HEALTH STABILITY: MENTAL HEALTH: HOW OFTEN DO YOU HAVE A DRINK CONTAINING ALCOHOL?: NEVER

## 2020-03-09 ASSESSMENT — PATIENT HEALTH QUESTIONNAIRE - PHQ9
1. LITTLE INTEREST OR PLEASURE IN DOING THINGS: 0
2. FEELING DOWN, DEPRESSED OR HOPELESS: 1
SUM OF ALL RESPONSES TO PHQ9 QUESTIONS 1 & 2: 1
SUM OF ALL RESPONSES TO PHQ QUESTIONS 1-9: 1
SUM OF ALL RESPONSES TO PHQ QUESTIONS 1-9: 1

## 2020-03-09 NOTE — PROGRESS NOTES
cetirizine (ZYRTEC) 10 MG tablet Take 10 mg by mouth daily      metFORMIN (GLUCOPHAGE-XR) 500 MG extended release tablet Take 1 tablet by mouth daily (with breakfast) 90 tablet 1    glimepiride (AMARYL) 1 MG tablet       lisinopril (PRINIVIL;ZESTRIL) 5 MG tablet Take 5 mg by mouth daily      OMEGA-3 FATTY ACIDS PO Take by mouth      aspirin 325 MG EC tablet Take 325 mg by mouth every 6 hours as needed      rosuvastatin (CRESTOR) 20 MG tablet       dilTIAZem (CARDIZEM CD) 240 MG extended release capsule Take 240 mg by mouth daily       No current facility-administered medications for this visit. No Known Allergies    Past Surgical History:   Procedure Laterality Date    APPENDECTOMY      GALLBLADDER SURGERY      MASTECTOMY, BILATERAL         Social History     Tobacco Use    Smoking status: Former Smoker     Types: Cigarettes    Smokeless tobacco: Never Used    Tobacco comment: pt has not smoked in 25 years   Substance Use Topics    Alcohol use: Never     Frequency: Never    Drug use: Never       History reviewed. No pertinent family history. /76 (Site: Right Upper Arm, Position: Sitting, Cuff Size: Medium Adult)   Pulse 105   Temp 98.4 °F (36.9 °C) (Oral)   Resp 16   Ht 5' (1.524 m)   Wt 135 lb (61.2 kg)   SpO2 95%   BMI 26.37 kg/m²     Physical Exam  Vitals signs and nursing note reviewed. Constitutional:       General: She is not in acute distress. Appearance: Normal appearance. She is well-developed. She is not diaphoretic. HENT:      Head: Normocephalic and atraumatic. Right Ear: External ear normal.      Left Ear: External ear normal.      Nose: Nose normal.      Mouth/Throat:      Mouth: Mucous membranes are moist.      Pharynx: Oropharynx is clear. Eyes:      General: No scleral icterus. Right eye: No discharge. Left eye: No discharge.       Conjunctiva/sclera: Conjunctivae normal.   Neck:      Musculoskeletal: Normal range of motion and neck medication. Discussed signs and symptoms requiring medical attention. All questions were answered and patient voiced understanding and agreement with plan as discussed. Orders Placed This Encounter   Procedures    Hemoglobin A1C     Standing Status:   Future     Number of Occurrences:   1     Standing Expiration Date:   3/9/2021    CBC Auto Differential     Standing Status:   Future     Number of Occurrences:   1     Standing Expiration Date:   3/9/2021    Comprehensive Metabolic Panel     Standing Status:   Future     Number of Occurrences:   1     Standing Expiration Date:   3/9/2021    Lipid Panel     Standing Status:   Future     Number of Occurrences:   1     Standing Expiration Date:   3/9/2021     Order Specific Question:   Is Patient Fasting?/# of Hours     Answer:   yes    Microalbumin / Creatinine Urine Ratio     Standing Status:   Future     Number of Occurrences:   1     Standing Expiration Date:   3/9/2021     Orders Placed This Encounter   Medications    metFORMIN (GLUCOPHAGE-XR) 500 MG extended release tablet     Sig: Take 1 tablet by mouth daily (with breakfast)     Dispense:  90 tablet     Refill:  1     Medications Discontinued During This Encounter   Medication Reason    metFORMIN (GLUCOPHAGE) 500 MG tablet      Patient Instructions   Patient given educational handouts and has had all questions answered. Patient voices understanding and agrees to plans along with risks and benefits of plan. Patient is instructed to continue prior meds,diet, and exercise plans as instructed. Patient agrees to follow up as instructed and sooner if needed. Patient agrees to go to ER if condition becomes emergent. Return in about 3 months (around 6/9/2020), or if symptoms worsen or fail to improve.

## 2020-03-11 ASSESSMENT — ENCOUNTER SYMPTOMS
DIARRHEA: 0
NAUSEA: 0
RHINORRHEA: 0
EYE DISCHARGE: 0
ABDOMINAL PAIN: 0
CONSTIPATION: 0
SHORTNESS OF BREATH: 0
COUGH: 0
EYE PAIN: 0
VOMITING: 0
BACK PAIN: 0

## 2020-03-12 NOTE — PATIENT INSTRUCTIONS
Patient Education        Learning About Diabetes Food Guidelines  Your Care Instructions    Meal planning is important to manage diabetes. It helps keep your blood sugar at a target level (which you set with your doctor). You don't have to eat special foods. You can eat what your family eats, including sweets once in a while. But you do have to pay attention to how often you eat and how much you eat of certain foods. You may want to work with a dietitian or a certified diabetes educator (CDE) to help you plan meals and snacks. A dietitian or CDE can also help you lose weight if that is one of your goals. What should you know about eating carbs? Managing the amount of carbohydrate (carbs) you eat is an important part of healthy meals when you have diabetes. Carbohydrate is found in many foods. · Learn which foods have carbs. And learn the amounts of carbs in different foods. ? Bread, cereal, pasta, and rice have about 15 grams of carbs in a serving. A serving is 1 slice of bread (1 ounce), ½ cup of cooked cereal, or 1/3 cup of cooked pasta or rice. ? Fruits have 15 grams of carbs in a serving. A serving is 1 small fresh fruit, such as an apple or orange; ½ of a banana; ½ cup of cooked or canned fruit; ½ cup of fruit juice; 1 cup of melon or raspberries; or 2 tablespoons of dried fruit. ? Milk and no-sugar-added yogurt have 15 grams of carbs in a serving. A serving is 1 cup of milk or 2/3 cup of no-sugar-added yogurt. ? Starchy vegetables have 15 grams of carbs in a serving. A serving is ½ cup of mashed potatoes or sweet potato; 1 cup winter squash; ½ of a small baked potato; ½ cup of cooked beans; or ½ cup cooked corn or green peas. · Learn how much carbs to eat each day and at each meal. A dietitian or CDE can teach you how to keep track of the amount of carbs you eat. This is called carbohydrate counting. · If you are not sure how to count carbohydrate grams, use the Plate Method to plan meals.  It is a good, quick way to make sure that you have a balanced meal. It also helps you spread carbs throughout the day. ? Divide your plate by types of foods. Put non-starchy vegetables on half the plate, meat or other protein food on one-quarter of the plate, and a grain or starchy vegetable in the final quarter of the plate. To this you can add a small piece of fruit and 1 cup of milk or yogurt, depending on how many carbs you are supposed to eat at a meal.  · Try to eat about the same amount of carbs at each meal. Do not \"save up\" your daily allowance of carbs to eat at one meal.  · Proteins have very little or no carbs per serving. Examples of proteins are beef, chicken, turkey, fish, eggs, tofu, cheese, cottage cheese, and peanut butter. A serving size of meat is 3 ounces, which is about the size of a deck of cards. Examples of meat substitute serving sizes (equal to 1 ounce of meat) are 1/4 cup of cottage cheese, 1 egg, 1 tablespoon of peanut butter, and ½ cup of tofu. How can you eat out and still eat healthy? · Learn to estimate the serving sizes of foods that have carbohydrate. If you measure food at home, it will be easier to estimate the amount in a serving of restaurant food. · If the meal you order has too much carbohydrate (such as potatoes, corn, or baked beans), ask to have a low-carbohydrate food instead. Ask for a salad or green vegetables. · If you use insulin, check your blood sugar before and after eating out to help you plan how much to eat in the future. · If you eat more carbohydrate at a meal than you had planned, take a walk or do other exercise. This will help lower your blood sugar. What else should you know? · Limit saturated fat, such as the fat from meat and dairy products. This is a healthy choice because people who have diabetes are at higher risk of heart disease. So choose lean cuts of meat and nonfat or low-fat dairy products.  Use olive or canola oil instead of butter or shortening when cooking. · Don't skip meals. Your blood sugar may drop too low if you skip meals and take insulin or certain medicines for diabetes. · Check with your doctor before you drink alcohol. Alcohol can cause your blood sugar to drop too low. Alcohol can also cause a bad reaction if you take certain diabetes medicines. Follow-up care is a key part of your treatment and safety. Be sure to make and go to all appointments, and call your doctor if you are having problems. It's also a good idea to know your test results and keep a list of the medicines you take. Where can you learn more? Go to https://[employer]web.[employer] org and sign in to your [employer] account. Enter T159 in the [facility] box to learn more about \"Learning About Diabetes Food Guidelines. \"     If you do not have an account, please click on the \"Sign Up Now\" link. Current as of: April 16, 2019  Content Version: 12.3  © 3069-6266 [employer]. Care instructions adapted under license by [facility] ([facility]). If you have questions about a medical condition or this instruction, always ask your healthcare professional. [address] any warranty or liability for your use of this information.

## 2020-03-17 RX ORDER — DILTIAZEM HYDROCHLORIDE 240 MG/1
240 CAPSULE, COATED, EXTENDED RELEASE ORAL DAILY
Qty: 90 CAPSULE | Refills: 0 | Status: SHIPPED | OUTPATIENT
Start: 2020-03-17 | End: 2020-06-09 | Stop reason: SDUPTHER

## 2020-06-09 ENCOUNTER — OFFICE VISIT (OUTPATIENT)
Dept: FAMILY MEDICINE CLINIC | Age: 85
End: 2020-06-09
Payer: MEDICARE

## 2020-06-09 VITALS
OXYGEN SATURATION: 96 % | HEIGHT: 60 IN | DIASTOLIC BLOOD PRESSURE: 72 MMHG | RESPIRATION RATE: 18 BRPM | BODY MASS INDEX: 25.72 KG/M2 | SYSTOLIC BLOOD PRESSURE: 122 MMHG | HEART RATE: 83 BPM | WEIGHT: 131 LBS | TEMPERATURE: 97.4 F

## 2020-06-09 PROCEDURE — G8417 CALC BMI ABV UP PARAM F/U: HCPCS | Performed by: FAMILY MEDICINE

## 2020-06-09 PROCEDURE — 1036F TOBACCO NON-USER: CPT | Performed by: FAMILY MEDICINE

## 2020-06-09 PROCEDURE — 1123F ACP DISCUSS/DSCN MKR DOCD: CPT | Performed by: FAMILY MEDICINE

## 2020-06-09 PROCEDURE — 99214 OFFICE O/P EST MOD 30 MIN: CPT | Performed by: FAMILY MEDICINE

## 2020-06-09 PROCEDURE — 4040F PNEUMOC VAC/ADMIN/RCVD: CPT | Performed by: FAMILY MEDICINE

## 2020-06-09 PROCEDURE — 1090F PRES/ABSN URINE INCON ASSESS: CPT | Performed by: FAMILY MEDICINE

## 2020-06-09 PROCEDURE — G8427 DOCREV CUR MEDS BY ELIG CLIN: HCPCS | Performed by: FAMILY MEDICINE

## 2020-06-09 RX ORDER — GLIMEPIRIDE 1 MG/1
1 TABLET ORAL
Qty: 90 TABLET | Refills: 1 | Status: SHIPPED | OUTPATIENT
Start: 2020-06-09 | End: 2020-11-02

## 2020-06-09 RX ORDER — METFORMIN HYDROCHLORIDE 500 MG/1
500 TABLET, EXTENDED RELEASE ORAL
Qty: 90 TABLET | Refills: 1 | Status: SHIPPED | OUTPATIENT
Start: 2020-06-09 | End: 2020-11-19

## 2020-06-09 RX ORDER — LISINOPRIL 5 MG/1
5 TABLET ORAL DAILY
Qty: 90 TABLET | Refills: 1 | Status: SHIPPED | OUTPATIENT
Start: 2020-06-09 | End: 2020-11-02

## 2020-06-09 RX ORDER — FERROUS SULFATE 325(65) MG
325 TABLET ORAL
Status: ON HOLD | COMMUNITY
End: 2021-02-19 | Stop reason: SDUPTHER

## 2020-06-09 RX ORDER — ROSUVASTATIN CALCIUM 20 MG/1
20 TABLET, COATED ORAL DAILY
Qty: 90 TABLET | Refills: 1 | Status: SHIPPED | OUTPATIENT
Start: 2020-06-09 | End: 2021-01-22

## 2020-06-09 RX ORDER — DILTIAZEM HYDROCHLORIDE 240 MG/1
240 CAPSULE, COATED, EXTENDED RELEASE ORAL DAILY
Qty: 90 CAPSULE | Refills: 1 | Status: SHIPPED | OUTPATIENT
Start: 2020-06-09 | End: 2020-12-07 | Stop reason: SDUPTHER

## 2020-06-09 NOTE — PROGRESS NOTES
(PRINIVIL;ZESTRIL) 5 MG tablet REORDER    rosuvastatin (CRESTOR) 20 MG tablet REORDER     Patient Instructions   Patient given educational handouts and has had all questions answered. Patient voices understanding and agrees to plans along with risks and benefits of plan. Patient is instructed to continue prior meds,diet, and exercise plans as instructed. Patient agrees to follow up as instructed and sooner if needed. Patient agrees to go to ER if condition becomes emergent. Return in about 3 months (around 9/9/2020), or if symptoms worsen or fail to improve.

## 2020-06-30 ASSESSMENT — ENCOUNTER SYMPTOMS
EYE PAIN: 0
COUGH: 0
EYE DISCHARGE: 0
NAUSEA: 0
RHINORRHEA: 0
BACK PAIN: 0
CONSTIPATION: 0
DIARRHEA: 0
VOMITING: 0
SHORTNESS OF BREATH: 0
ABDOMINAL PAIN: 0

## 2020-09-03 ENCOUNTER — OFFICE VISIT (OUTPATIENT)
Dept: FAMILY MEDICINE CLINIC | Age: 85
End: 2020-09-03
Payer: MEDICARE

## 2020-09-03 VITALS
HEART RATE: 101 BPM | OXYGEN SATURATION: 98 % | WEIGHT: 124 LBS | TEMPERATURE: 98 F | BODY MASS INDEX: 24.35 KG/M2 | DIASTOLIC BLOOD PRESSURE: 84 MMHG | SYSTOLIC BLOOD PRESSURE: 130 MMHG | HEIGHT: 60 IN | RESPIRATION RATE: 16 BRPM

## 2020-09-03 DIAGNOSIS — E11.9 TYPE 2 DIABETES MELLITUS WITHOUT COMPLICATION, WITHOUT LONG-TERM CURRENT USE OF INSULIN (HCC): ICD-10-CM

## 2020-09-03 LAB — HBA1C MFR BLD: 5.8 % (ref 4–6)

## 2020-09-03 PROCEDURE — 90714 TD VACC NO PRESV 7 YRS+ IM: CPT | Performed by: FAMILY MEDICINE

## 2020-09-03 PROCEDURE — 96372 THER/PROPH/DIAG INJ SC/IM: CPT | Performed by: FAMILY MEDICINE

## 2020-09-03 PROCEDURE — G8420 CALC BMI NORM PARAMETERS: HCPCS | Performed by: FAMILY MEDICINE

## 2020-09-03 PROCEDURE — 90471 IMMUNIZATION ADMIN: CPT | Performed by: FAMILY MEDICINE

## 2020-09-03 PROCEDURE — 1090F PRES/ABSN URINE INCON ASSESS: CPT | Performed by: FAMILY MEDICINE

## 2020-09-03 PROCEDURE — G0009 ADMIN PNEUMOCOCCAL VACCINE: HCPCS | Performed by: FAMILY MEDICINE

## 2020-09-03 PROCEDURE — 90732 PPSV23 VACC 2 YRS+ SUBQ/IM: CPT | Performed by: FAMILY MEDICINE

## 2020-09-03 PROCEDURE — 1123F ACP DISCUSS/DSCN MKR DOCD: CPT | Performed by: FAMILY MEDICINE

## 2020-09-03 PROCEDURE — G8427 DOCREV CUR MEDS BY ELIG CLIN: HCPCS | Performed by: FAMILY MEDICINE

## 2020-09-03 PROCEDURE — 1036F TOBACCO NON-USER: CPT | Performed by: FAMILY MEDICINE

## 2020-09-03 PROCEDURE — 99214 OFFICE O/P EST MOD 30 MIN: CPT | Performed by: FAMILY MEDICINE

## 2020-09-03 PROCEDURE — 4040F PNEUMOC VAC/ADMIN/RCVD: CPT | Performed by: FAMILY MEDICINE

## 2020-09-03 RX ORDER — TRIAMCINOLONE ACETONIDE 40 MG/ML
60 INJECTION, SUSPENSION INTRA-ARTICULAR; INTRAMUSCULAR ONCE
Status: COMPLETED | OUTPATIENT
Start: 2020-09-03 | End: 2020-09-03

## 2020-09-03 RX ADMIN — TRIAMCINOLONE ACETONIDE 60 MG: 40 INJECTION, SUSPENSION INTRA-ARTICULAR; INTRAMUSCULAR at 11:33

## 2020-09-03 NOTE — PROGRESS NOTES
Rima Montgomery is a 80 y.o. female who presents today for   Chief Complaint   Patient presents with    3 Month Follow-Up    Diabetes       Chief Complaint: Follow-up    HPI  Patient reports that she has been having some left leg pain that is more so on the outside/back of her left hip. 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 She states that the hip pain is running down her leg with some numbness and burning type nerve pain. She denies any specific aggravating or alleviating factors other than seems to be worse with prolonged sitting and improved with getting up and moving around. She is otherwise not tried or done anything in efforts to improve her symptoms. She does have history of type 2 diabetes and reports that she is doing well with her current medications. She denies any issues with use of her medicine. She denies any symptoms from normal blood sugars. She denies any new symptoms associated with her diabetes. She does attempt to watch her diet. She has history of arterial hypertension is doing well with her current medications. She denies any issues with use of her medicine. She denies any symptoms of abnormal blood pressure. She does attempt avoid excess salt intake. She also has hyperlipidemia is tolerating Crestor without any myalgias or GI upsets. She is attempting watch her diet and try to stay physically active. Review of Systems   Constitutional: Negative for activity change, appetite change, fatigue and fever. HENT: Negative for congestion and rhinorrhea. Eyes: Negative for pain and discharge. Respiratory: Negative for cough and shortness of breath. Cardiovascular: Negative for chest pain and palpitations.    Gastrointestinal: Negative for abdominal pain, constipation, diarrhea, nausea and vomiting. Endocrine: Negative for cold intolerance and heat intolerance. Genitourinary: Negative for dysuria and hematuria. Musculoskeletal: Positive for back pain and myalgias. Negative for gait problem and neck pain. Skin: Negative for rash and wound. Neurological: Negative for syncope and weakness. Hematological: Negative for adenopathy. Does not bruise/bleed easily. Psychiatric/Behavioral: Negative for dysphoric mood and sleep disturbance. The patient is not nervous/anxious. Past Medical History:   Diagnosis Date    Diabetes mellitus ([facility] [address]     Hyperlipidemia     Hypertension        Current Outpatient Medications   Medication Sig Dispense Refill    metFORMIN (GLUCOPHAGE-XR) 500 MG extended release tablet Take 1 tablet by mouth daily (with breakfast) 90 tablet 1    dilTIAZem (CARDIZEM CD) 240 MG extended release capsule Take 1 capsule by mouth daily 90 capsule 1    glimepiride (AMARYL) 1 MG tablet Take 1 tablet by mouth every morning (before breakfast) 90 tablet 1    lisinopril (PRINIVIL;ZESTRIL) 5 MG tablet Take 1 tablet by mouth daily 90 tablet 1    rosuvastatin (CRESTOR) 20 MG tablet Take 1 tablet by mouth daily 90 tablet 1    ferrous sulfate (IRON 325) 325 (65 Fe) MG tablet Take 325 mg by mouth daily (with breakfast)      OMEGA-3 FATTY ACIDS PO Take by mouth      aspirin 325 MG EC tablet Take 325 mg by mouth every 6 hours as needed      Iron Combinations (IRON COMPLEX PO) Take by mouth      Cyanocobalamin (VITAMIN B 12) 500 MCG TABS Take 500 mcg by mouth daily       cetirizine (ZYRTEC) 10 MG tablet Take 10 mg by mouth daily       No current facility-administered medications for this visit.            Allergies   Allergen Reactions    Other      Molds and oak trees       Past Surgical History:   Procedure Laterality Date    APPENDECTOMY      GALLBLADDER SURGERY      MASTECTOMY, BILATERAL         Social History     Tobacco Use    Smoking status: Former Smoker     Types: Cigarettes    Smokeless tobacco: Never Used    Tobacco comment: pt has not smoked in 25 years   Substance Use Topics    Alcohol use: Never     Frequency: Never    Drug use: Never       No family history on file. /84 (Site: Right Upper Arm, Position: Sitting, Cuff Size: Medium Adult)   Pulse 101   Temp 98 °F (36.7 °C) (Oral)   Resp 16   Ht 5' (1.524 m)   Wt 124 lb (56.2 kg)   SpO2 98%   BMI 24.22 kg/m²     Physical Exam  Vitals signs and nursing note reviewed. Constitutional:       General: She is not in acute distress. Appearance: Normal appearance. She is well-developed. She is not diaphoretic. HENT:      Head: Normocephalic and atraumatic. Right Ear: External ear normal.      Left Ear: External ear normal.      Mouth/Throat:      Comments: Mask in place  Eyes:      General: No scleral icterus. Right eye: No discharge. Left eye: No discharge. Conjunctiva/sclera: Conjunctivae normal.   Neck:      Musculoskeletal: Normal range of motion and neck supple. No muscular tenderness. Trachea: No tracheal deviation. Cardiovascular:      Rate and Rhythm: Normal rate and regular rhythm. Heart sounds: Normal heart sounds. No murmur. No friction rub. No gallop. Pulmonary:      Effort: Pulmonary effort is normal. No respiratory distress. Breath sounds: Normal breath sounds. No wheezing or rales. Abdominal:      General: Bowel sounds are normal. There is no distension. Palpations: Abdomen is soft. Tenderness: There is no abdominal tenderness. Musculoskeletal:         General: No deformity (No gross deformities of upper or lower extremities) or signs of injury. Right lower leg: No edema. Left lower leg: No edema. Comments: No appreciable tenderness to lateral left hip. She does have some lower back muscle spasms with some tenderness. No appreciable bony tenderness. Lymphadenopathy:      Cervical: No cervical adenopathy. Skin:     General: Skin is warm and dry. Findings: No erythema or rash. Neurological:      Mental Status: She is alert and oriented to person, place, and time. Cranial Nerves: No cranial nerve deficit. Psychiatric:         Behavior: Behavior normal.         Thought Content: Thought content normal.         Assessment:       ICD-10-CM    1. Type 2 diabetes mellitus without complication, without long-term current use of insulin (HCC)  E11.9 Hemoglobin A1C    Discussed importance of DM diet and benefits of exercise. Discussed proper use of medication. Stressed proper foot care and monitoring. Discussed the importance of yearly eye exams. 2. Essential hypertension  I10  controlled current medications. Will continue at this time and continue to monitor. 3. Mixed hyperlipidemia  E78.2  tolerating Crestor. Will continue continue to monitor. 4. Sciatica of left side  M54.32 triamcinolone acetonide (KENALOG-40) injection 60 mg   5. Need for vaccination  Z23 Tetanus vaccine IM     PNEUMOVAX 23 subcutaneous/IM (Pneumococcal polysaccharide vaccine 23-valent >= 1yo)       Plan:  Discussed suggestive diagnosis, expected course, and management options, including OTC medications. Discussed lifestyle modifications that may be able to improve her symptoms. We will continue to monitor and adjust as course dictates. Discussed signs and symptoms requiring medical attention. All questions were answered and patient voiced understanding and agreement with plan as discussed. Orders Placed This Encounter   Procedures    Tetanus vaccine IM    PNEUMOVAX 23 subcutaneous/IM (Pneumococcal polysaccharide vaccine 23-valent >= 1yo)    Hemoglobin A1C     Standing Status:   Future     Number of Occurrences:   1     Standing Expiration Date:   9/3/2021     Orders Placed This Encounter   Medications    triamcinolone acetonide (KENALOG-40) injection 60 mg     There are no discontinued medications.   Patient Instructions   Patient given educational handouts and has had all questions answered. Patient voices understanding and agrees to plans along with risks and benefits of plan. Patient is instructed to continue prior meds,diet, and exercise plans as instructed. Patient agrees to follow up as instructed and sooner if needed. Patient agrees to go to ER if condition becomes emergent. Return in about 3 months (around 12/3/2020), or if symptoms worsen or fail to improve.

## 2020-09-16 ENCOUNTER — TELEPHONE (OUTPATIENT)
Dept: FAMILY MEDICINE CLINIC | Age: 85
End: 2020-09-16

## 2020-09-20 ASSESSMENT — ENCOUNTER SYMPTOMS
NAUSEA: 0
BACK PAIN: 1
SHORTNESS OF BREATH: 0
EYE DISCHARGE: 0
ABDOMINAL PAIN: 0
DIARRHEA: 0
COUGH: 0
CONSTIPATION: 0
RHINORRHEA: 0
EYE PAIN: 0
VOMITING: 0

## 2020-09-21 NOTE — PATIENT INSTRUCTIONS
link.  Current as of: March 2, 2020               Content Version: 12.5  © 6140-0824 [employer]. Care instructions adapted under license by [facility] ([facility]). If you have questions about a medical condition or this instruction, always ask your healthcare professional. [address] any warranty or liability for your use of this information.

## 2020-10-19 ENCOUNTER — NURSE TRIAGE (OUTPATIENT)
Dept: OTHER | Facility: CLINIC | Age: 85
End: 2020-10-19

## 2020-10-19 NOTE — TELEPHONE ENCOUNTER
Pt wanting to be seen tomorrow when daughter comes in symptoms started Saturday    Reason for Disposition   Patient wants to be seen    Answer Assessment - Initial Assessment Questions  1. SYMPTOM: \"What is the main symptom you are concerned about? \" (e.g., weakness, numbness)      Heaviness, weakness    2. ONSET: \"When did this start? \" (minutes, hours, days; while sleeping)      Saturday    3. LAST NORMAL: \"When was the last time you were normal (no symptoms)? \"      Saturday    4. PATTERN \"Does this come and go, or has it been constant since it started? \"  \"Is it present now? \"      *No Answer*  5. CARDIAC SYMPTOMS: \"Have you had any of the following symptoms: chest pain, difficulty breathing, palpitations? \"      *No Answer*  6. NEUROLOGIC SYMPTOMS: \"Have you had any of the following symptoms: headache, dizziness, vision loss, double vision, changes in speech, unsteady on your feet? \"      Headache this morning    7. OTHER SYMPTOMS: \"Do you have any other symptoms? \"      *No Answer*  8. PREGNANCY: \"Is there any chance you are pregnant? \" \"When was your last menstrual period? \"      *No Answer*    Protocols used: NEUROLOGIC DEFICIT-ADULT-[place]

## 2020-10-20 ENCOUNTER — OFFICE VISIT (OUTPATIENT)
Dept: FAMILY MEDICINE CLINIC | Age: 85
End: 2020-10-20
Payer: MEDICARE

## 2020-10-20 VITALS
TEMPERATURE: 96.4 F | RESPIRATION RATE: 16 BRPM | SYSTOLIC BLOOD PRESSURE: 128 MMHG | HEIGHT: 60 IN | HEART RATE: 90 BPM | DIASTOLIC BLOOD PRESSURE: 66 MMHG | OXYGEN SATURATION: 98 % | WEIGHT: 120 LBS | BODY MASS INDEX: 23.56 KG/M2

## 2020-10-20 PROCEDURE — G8427 DOCREV CUR MEDS BY ELIG CLIN: HCPCS | Performed by: FAMILY MEDICINE

## 2020-10-20 PROCEDURE — G0008 ADMIN INFLUENZA VIRUS VAC: HCPCS | Performed by: FAMILY MEDICINE

## 2020-10-20 PROCEDURE — 99214 OFFICE O/P EST MOD 30 MIN: CPT | Performed by: FAMILY MEDICINE

## 2020-10-20 PROCEDURE — 4040F PNEUMOC VAC/ADMIN/RCVD: CPT | Performed by: FAMILY MEDICINE

## 2020-10-20 PROCEDURE — 1123F ACP DISCUSS/DSCN MKR DOCD: CPT | Performed by: FAMILY MEDICINE

## 2020-10-20 PROCEDURE — 90694 VACC AIIV4 NO PRSRV 0.5ML IM: CPT | Performed by: FAMILY MEDICINE

## 2020-10-20 PROCEDURE — G8420 CALC BMI NORM PARAMETERS: HCPCS | Performed by: FAMILY MEDICINE

## 2020-10-20 PROCEDURE — 1090F PRES/ABSN URINE INCON ASSESS: CPT | Performed by: FAMILY MEDICINE

## 2020-10-20 PROCEDURE — 1036F TOBACCO NON-USER: CPT | Performed by: FAMILY MEDICINE

## 2020-10-20 PROCEDURE — G8484 FLU IMMUNIZE NO ADMIN: HCPCS | Performed by: FAMILY MEDICINE

## 2020-10-20 RX ORDER — GABAPENTIN 300 MG/1
300 CAPSULE ORAL NIGHTLY
Qty: 30 CAPSULE | Refills: 2 | Status: SHIPPED | OUTPATIENT
Start: 2020-10-20 | End: 2020-12-09 | Stop reason: SDUPTHER

## 2020-10-20 NOTE — PROGRESS NOTES
Soha Wells is a 80 y.o. female who presents today for   Chief Complaint   Patient presents with    Varicose Veins     both feet    Numbness     left leg    Other     spot on left leg    Constipation       Chief Complaint: Varicose veins    HPI  Patient reports that she is having pain out of her varicose veins on her lower extremities. She states that she has had varicose veins for quite some time now but is only more recently have been pain associated with them on a regular basis. She does also have issues with neuropathy and reports that her neuropathy has gotten to the point where she cannot even feel her feet and know that they were there without touching them. She states that this is also causing her more problems with her legs and for that reason is interested in pursuing management for her neuropathy. She also reports that she has been having some issues with constipation and states that she has been having bowel movements but is not having them as regular as previously and has also had more hard stools. She is not been do anything in efforts to improve her symptoms and denies any recent changes that she contributed to her constipation. She does have a history of type 2 diabetes and reports that she is doing well with her current medication. She denies any issues with use of medicine denies any new symptoms or problems from her diabetes. She has been compliant with her medication and does attempt to watch her diet. She denies any episodes of hypoglycemia. She also has hyperlipidemia tolerating Crestor without any myalgias or GI upsets. She is doing to watch her diet and try to stay physically active. Review of Systems   Constitutional: Negative for activity change, appetite change, fatigue and fever. HENT: Negative for congestion and rhinorrhea. Eyes: Negative for pain and discharge. Respiratory: Negative for cough and shortness of breath.     Cardiovascular: Negative for chest pain and palpitations. Gastrointestinal: Positive for constipation. Negative for abdominal pain, diarrhea, nausea and vomiting. Endocrine: Negative for cold intolerance and heat intolerance. Genitourinary: Negative for dysuria and hematuria. Musculoskeletal: Positive for back pain and myalgias. Negative for gait problem and neck pain. Skin: Negative for rash and wound. Neurological: Positive for numbness. Negative for syncope and weakness. Hematological: Negative for adenopathy. Does not bruise/bleed easily. Psychiatric/Behavioral: Negative for dysphoric mood and sleep disturbance. The patient is not nervous/anxious. Past Medical History:   Diagnosis Date    Diabetes mellitus (HCC)     Hyperlipidemia     Hypertension        Current Outpatient Medications   Medication Sig Dispense Refill    gabapentin (NEURONTIN) 300 MG capsule Take 1 capsule by mouth nightly for 30 days. 30 capsule 2    glimepiride (AMARYL) 1 MG tablet TAKE 1 TABLET EVERY MORNING BEFORE BREAKFAST 90 tablet 3    lisinopril (PRINIVIL;ZESTRIL) 5 MG tablet TAKE 1 TABLET DAILY 90 tablet 3    metFORMIN (GLUCOPHAGE-XR) 500 MG extended release tablet Take 1 tablet by mouth daily (with breakfast) 90 tablet 1    dilTIAZem (CARDIZEM CD) 240 MG extended release capsule Take 1 capsule by mouth daily 90 capsule 1    rosuvastatin (CRESTOR) 20 MG tablet Take 1 tablet by mouth daily 90 tablet 1    ferrous sulfate (IRON 325) 325 (65 Fe) MG tablet Take 325 mg by mouth daily (with breakfast)      OMEGA-3 FATTY ACIDS PO Take by mouth      aspirin 325 MG EC tablet Take 325 mg by mouth every 6 hours as needed      Iron Combinations (IRON COMPLEX PO) Take by mouth      Cyanocobalamin (VITAMIN B 12) 500 MCG TABS Take 500 mcg by mouth daily       cetirizine (ZYRTEC) 10 MG tablet Take 10 mg by mouth daily       No current facility-administered medications for this visit.            Allergies   Allergen Reactions    Other      Molds and oak trees       Past Surgical History:   Procedure Laterality Date    APPENDECTOMY      GALLBLADDER SURGERY      MASTECTOMY, BILATERAL         Social History     Tobacco Use    Smoking status: Former Smoker     Types: Cigarettes    Smokeless tobacco: Never Used    Tobacco comment: pt has not smoked in 25 years   Substance Use Topics    Alcohol use: Never     Frequency: Never    Drug use: Never       No family history on file. /66 (Site: Left Upper Arm, Position: Sitting, Cuff Size: Medium Adult)   Pulse 90   Temp 96.4 °F (35.8 °C) (Oral)   Resp 16   Ht 5' (1.524 m)   Wt 120 lb (54.4 kg)   SpO2 98%   BMI 23.44 kg/m²     Physical Exam  Vitals signs and nursing note reviewed. Constitutional:       General: She is not in acute distress. Appearance: Normal appearance. She is well-developed. She is not diaphoretic. HENT:      Head: Normocephalic and atraumatic. Right Ear: External ear normal.      Left Ear: External ear normal.      Mouth/Throat:      Comments: Mask in place  Eyes:      General: No scleral icterus. Right eye: No discharge. Left eye: No discharge. Conjunctiva/sclera: Conjunctivae normal.   Neck:      Musculoskeletal: Normal range of motion and neck supple. No muscular tenderness. Trachea: No tracheal deviation. Cardiovascular:      Rate and Rhythm: Normal rate and regular rhythm. Heart sounds: Normal heart sounds. No murmur. No friction rub. No gallop. Pulmonary:      Effort: Pulmonary effort is normal. No respiratory distress. Breath sounds: Normal breath sounds. No wheezing or rales. Abdominal:      General: Bowel sounds are normal. There is no distension. Palpations: Abdomen is soft. Tenderness: There is no abdominal tenderness. There is no guarding. Musculoskeletal:         General: No deformity (No gross deformities of upper or lower extremities) or signs of injury. Right lower leg: No edema.       Left lower leg: No edema. Comments: Varicose veins appreciated bilateral lower extremities. Lymphadenopathy:      Cervical: No cervical adenopathy. Skin:     General: Skin is warm and dry. Findings: No erythema or rash. Neurological:      Mental Status: She is alert and oriented to person, place, and time. Cranial Nerves: No cranial nerve deficit. Psychiatric:         Behavior: Behavior normal.         Thought Content: Thought content normal.         Assessment:       ICD-10-CM    1. Varicose veins of bilateral lower extremities with pain  I83.813  discussed lifestyle modifications that may be able to improve her varicose vein pain as well as supplements that may be beneficial.  Discussed that if her symptoms continue or worsen or she has new developing symptoms that we could involve vascular surgery for further evaluation and recommendations. 2. Neuropathy  G62.9 gabapentin (NEURONTIN) 300 MG capsule    We will continue to monitor with use of Neurontin and adjust as course dictates. 3. Constipation, unspecified constipation type  K59.00  discussed management options for constipation. Discussed various options and at this time patient is wishing to pursue a trial of a fiber supplement and continue to monitor. Discussed next step in management with over-the-counter medications and patient voiced understanding that she could try those if the fiber does not help. 4. Need for vaccination  Z23 INFLUENZA, QUADV, ADJUVANTED, 65 YRS =, IM, PF, PREFILL SYR, 0.5ML (FLUAD)   5. Type 2 diabetes mellitus with diabetic polyneuropathy, without long-term current use of insulin (HCC)  E11.42 Discussed importance of DM diet and benefits of exercise. Discussed proper use of medication. Stressed proper foot care and monitoring. Discussed the importance of yearly eye exams. 6. Essential hypertension  I10  controlled current medications. Will continue continue to monitor.    7. Mixed hyperlipidemia

## 2020-11-02 RX ORDER — GLIMEPIRIDE 1 MG/1
TABLET ORAL
Qty: 90 TABLET | Refills: 3 | Status: ON HOLD | OUTPATIENT
Start: 2020-11-02 | End: 2021-02-19 | Stop reason: HOSPADM

## 2020-11-02 RX ORDER — LISINOPRIL 5 MG/1
TABLET ORAL
Qty: 90 TABLET | Refills: 3 | Status: ON HOLD | OUTPATIENT
Start: 2020-11-02 | End: 2021-02-19 | Stop reason: HOSPADM

## 2020-11-08 ASSESSMENT — ENCOUNTER SYMPTOMS
RHINORRHEA: 0
ABDOMINAL PAIN: 0
SHORTNESS OF BREATH: 0
EYE PAIN: 0
EYE DISCHARGE: 0
NAUSEA: 0
VOMITING: 0
BACK PAIN: 1
DIARRHEA: 0
COUGH: 0
CONSTIPATION: 1

## 2020-11-09 NOTE — PATIENT INSTRUCTIONS
Patient Education        Varicose Veins: Care Instructions  Your Care Instructions  Varicose veins are twisted, enlarged veins near the surface of the skin. They develop most often in the legs and ankles. Some people may be more likely than others to get varicose veins because of aging or hormone changes or because a parent has them. Being overweight or pregnant can make varicose veins worse. Jobs that require standing for long periods of time also can make them worse. Follow-up care is a key part of your treatment and safety. Be sure to make and go to all appointments, and call your doctor if you are having problems. It's also a good idea to know your test results and keep a list of the medicines you take. How can you care for yourself at home? · Wear compression stockings during the day to help relieve symptoms. They improve blood flow and are the main treatment for varicose veins. Talk to your doctor about which ones to get and where to get them. · Prop up your legs at or above the level of your heart when possible. This helps keep the blood from pooling in your lower legs and improves blood flow to the rest of your body. · Avoid sitting and standing for long periods. This puts added stress on your veins. · Get regular exercise, and control your weight. Walk, bicycle, or swim to improve blood flow in your legs. · If you bump your leg so hard that you know it is likely to bruise, prop up your leg and put ice or a cold pack on the area for 10 to 20 minutes at a time. Try to do this every 1 to 2 hours for the next 3 days (when you are awake) or until the swelling goes down. Put a thin cloth between the ice and your skin. · If you cut or scratch the skin over a vein, it may bleed a lot. Prop up your leg and apply firm pressure with a clean bandage over the site of the bleeding. Continue to apply pressure for a full 15 minutes. Do not check sooner to see if the bleeding has stopped.  If the bleeding has not stopped after 15 minutes, apply pressure again for another 15 minutes. You can repeat this up to 3 times for a total of 45 minutes. If you have a blood clot in a varicose vein, you may have tenderness and swelling over the vein. The vein may feel firm. Be sure to call your doctor right away if you have these symptoms. If your doctor has told you how to care for the clot, follow his or her instructions. Care may include the following:  · Prop up your leg and apply heat with a warm, damp cloth or a heating pad set on low (put a towel or cloth between your leg and the heating pad to prevent burns). · Ask your doctor if you can take an over-the-counter pain medicine, such as acetaminophen (Tylenol), ibuprofen (Advil, Motrin), or naproxen (Aleve). Be safe with medicines. Read and follow all instructions on the label. When should you call for help? Call 911 anytime you think you may need emergency care. For example, call if:    · You have sudden chest pain and shortness of breath, or you cough up blood. Call your doctor now or seek immediate medical care if:    · You have signs of a blood clot, such as:  ? Pain in your calf, back of the knee, thigh, or groin. ? Redness and swelling in your leg or groin.     · A varicose vein begins to bleed and you cannot stop it.     · You have a tender lump in your leg.     · You get an open sore. Watch closely for changes in your health, and be sure to contact your doctor if:    · Your varicose vein symptoms do not improve with home treatment. Where can you learn more? Go to [email or web address] and sign in to your [employer] account. Enter I195 in the [employer] box to learn more about \"Varicose Veins: Care Instructions. \"     If you do not have an account, please click on the \"Sign Up Now\" link. Current as of: March 4, 2020               Content Version: 12.6  © 3030-2868 [employer].    Care instructions adapted under license by [facility] ([facility]). If you have questions about a medical condition or this instruction, always ask your healthcare professional. [place] [address] any warranty or liability for your use of this information.

## 2020-11-19 RX ORDER — METFORMIN HYDROCHLORIDE 500 MG/1
TABLET, EXTENDED RELEASE ORAL
Qty: 90 TABLET | Refills: 3 | Status: ON HOLD | OUTPATIENT
Start: 2020-11-19 | End: 2021-02-19 | Stop reason: HOSPADM

## 2020-11-24 ENCOUNTER — VIRTUAL VISIT (OUTPATIENT)
Dept: FAMILY MEDICINE CLINIC | Age: 85
End: 2020-11-24
Payer: MEDICARE

## 2020-11-24 PROCEDURE — 99443 PR PHYS/QHP TELEPHONE EVALUATION 21-30 MIN: CPT | Performed by: FAMILY MEDICINE

## 2020-11-24 NOTE — PATIENT INSTRUCTIONS
Patient Education        Neuropathic Pain: Care Instructions  Your Care Instructions     Neuropathic pain is caused by pressure on or damage to your nerves. It's often simply called nerve pain. Some people feel this type of pain all the time. For others, it comes and goes. Diabetes, shingles, or an injury can cause nerve pain. Many people say the pain feels sharp, burning, or stabbing. But some people feel it as a dull ache. In some cases, it makes your skin very sensitive. So touch, pressure, and other sensations that did not hurt before may now cause pain. It's important to know that this kind of pain is real and can affect your quality of life. It's also important to know that treatment can help. Treatment includes pain medicines, exercise, and physical therapy. Medicines can help reduce the number of pain signals that travel over the nerves. This can make the painful areas less sensitive. It can also help you sleep better and improve your mood. But medicines are only one part of successful treatment. Most people do best with more than one kind of treatment. Your doctor may recommend that you try cognitive-behavioral therapy and stress management. Or, if needed, you may decide to try to quit smoking, lower your blood pressure, or better control blood sugar. These kinds of healthy changes can also make a difference. If you feel that your treatment is not working, talk to your doctor. And be sure to tell your doctor if you think you might be depressed or anxious. These are common problems that can also be treated. Follow-up care is a key part of your treatment and safety. Be sure to make and go to all appointments, and call your doctor if you are having problems. It's also a good idea to know your test results and keep a list of the medicines you take. How can you care for yourself at home? · Be safe with medicines. Read and follow all instructions on the label.   ? If the doctor gave you a prescription

## 2020-11-24 NOTE — PROGRESS NOTES
Start time: 9:23 AM CST  Rima Hendricks is a 80 y.o. female evaluated via telephone on 11/24/2020. Chief Complaint   Patient presents with    Peripheral Neuropathy       Consent:  She and/or health care decision maker is aware that that she may receive a bill for this telephone service, depending on her insurance coverage, and has provided verbal consent to proceed: Yes      Documentation:  I communicated with the patient and/or health care decision maker about neuropathy. Patient reports that she is still having issues with her neuropathy in her lower extremities and is unsure if the gabapentin is benefiting her symptoms. She states that she is continued to have some leg pain from her varicose veins and that the pain and/or the neuropathy may be getting a little worse. She denies any specific aggravating or relieving factors for symptoms. She denies any new symptoms or changes. Details of this discussion including any medical advice provided: Discussed concerns with the possibility of her circulation contributing to her symptoms and the recommendation for involving vascular surgery and her care for evaluation of her varicose veins and lower extremity circulation as it pertains to her symptoms. Patient is wanting to hold off at this time continue with the gabapentin and the horse Gurjit and sees how she does over the next few weeks and then further discuss at that time with her daughters come back in town at that time. Discussed lifestyle modifications that may build to improve her symptoms. Discussed signs symptoms that would require medical attention. All questions were answered patient voiced understanding and agreement plan as discussed. ICD-10-CM    1. Neuropathy  G62.9    2.  Varicose veins of bilateral lower extremities with pain  I83.813            I affirm this is a Patient Initiated Episode with an Established Patient who has not had a related appointment within my department in the past 7 days or scheduled within the next 24 hours.     Total Time: minutes: 21-30 minutes    Note: not billable if this call serves to triage the patient into an appointment for the relevant concern      SHAKIR ARMENTA   End time: 9:44 AM

## 2020-12-07 RX ORDER — DILTIAZEM HYDROCHLORIDE 240 MG/1
240 CAPSULE, COATED, EXTENDED RELEASE ORAL DAILY
Qty: 90 CAPSULE | Refills: 3 | Status: ON HOLD | OUTPATIENT
Start: 2020-12-07 | End: 2021-02-19 | Stop reason: HOSPADM

## 2020-12-09 ENCOUNTER — OFFICE VISIT (OUTPATIENT)
Dept: FAMILY MEDICINE CLINIC | Age: 85
End: 2020-12-09
Payer: MEDICARE

## 2020-12-09 ENCOUNTER — HOSPITAL ENCOUNTER (OUTPATIENT)
Dept: GENERAL RADIOLOGY | Age: 85
Discharge: HOME | End: 2020-12-09
Payer: MEDICARE

## 2020-12-09 VITALS
TEMPERATURE: 96.8 F | HEIGHT: 60 IN | RESPIRATION RATE: 16 BRPM | DIASTOLIC BLOOD PRESSURE: 76 MMHG | BODY MASS INDEX: 22.97 KG/M2 | HEART RATE: 98 BPM | SYSTOLIC BLOOD PRESSURE: 110 MMHG | WEIGHT: 117 LBS | OXYGEN SATURATION: 95 %

## 2020-12-09 PROCEDURE — 72100 X-RAY EXAM L-S SPINE 2/3 VWS: CPT

## 2020-12-09 PROCEDURE — G8427 DOCREV CUR MEDS BY ELIG CLIN: HCPCS | Performed by: FAMILY MEDICINE

## 2020-12-09 PROCEDURE — 1123F ACP DISCUSS/DSCN MKR DOCD: CPT | Performed by: FAMILY MEDICINE

## 2020-12-09 PROCEDURE — 4040F PNEUMOC VAC/ADMIN/RCVD: CPT | Performed by: FAMILY MEDICINE

## 2020-12-09 PROCEDURE — G8420 CALC BMI NORM PARAMETERS: HCPCS | Performed by: FAMILY MEDICINE

## 2020-12-09 PROCEDURE — 96372 THER/PROPH/DIAG INJ SC/IM: CPT | Performed by: FAMILY MEDICINE

## 2020-12-09 PROCEDURE — 99214 OFFICE O/P EST MOD 30 MIN: CPT | Performed by: FAMILY MEDICINE

## 2020-12-09 PROCEDURE — 1036F TOBACCO NON-USER: CPT | Performed by: FAMILY MEDICINE

## 2020-12-09 PROCEDURE — G8484 FLU IMMUNIZE NO ADMIN: HCPCS | Performed by: FAMILY MEDICINE

## 2020-12-09 PROCEDURE — 1090F PRES/ABSN URINE INCON ASSESS: CPT | Performed by: FAMILY MEDICINE

## 2020-12-09 RX ORDER — TRIAMCINOLONE ACETONIDE 40 MG/ML
40 INJECTION, SUSPENSION INTRA-ARTICULAR; INTRAMUSCULAR ONCE
Status: COMPLETED | OUTPATIENT
Start: 2020-12-09 | End: 2020-12-09

## 2020-12-09 RX ORDER — POLYETHYLENE GLYCOL 3350 17 G/17G
17 POWDER, FOR SOLUTION ORAL DAILY PRN
Qty: 510 G | Refills: 0 | Status: SHIPPED | OUTPATIENT
Start: 2020-12-09 | End: 2021-01-08

## 2020-12-09 RX ORDER — GABAPENTIN 300 MG/1
300 CAPSULE ORAL NIGHTLY
Qty: 30 CAPSULE | Refills: 2 | Status: ON HOLD | OUTPATIENT
Start: 2020-12-09 | End: 2021-02-19

## 2020-12-09 RX ADMIN — TRIAMCINOLONE ACETONIDE 40 MG: 40 INJECTION, SUSPENSION INTRA-ARTICULAR; INTRAMUSCULAR at 11:17

## 2020-12-09 NOTE — PROGRESS NOTES
Patsy Graham is a 80 y.o. female who presents today for   Chief Complaint   Patient presents with    Peripheral Neuropathy     legs fills heavy        Chief Complaint: Neuropathy    HPI  Patient reports that she is having a heavy leg feeling in her left leg. She is not having a problem with the right leg but is having problems walking because of the leg issues. She reports that the neuronin has helped the pain in her legs and she is not having the pain any longer but is still having the problems walking. She does state that the horse chestnut also seems to be helping. She otherwise denies any specific changes or any other aggravating or alleviating factors for symptoms. She does also note that she has been having some issues with constipation recently. She does have type 2 diabetes that has been well controlled with her current medication. She denies any issues with the use of her medicine or recent changes to her diabetes. She also has hyperlipidemia is tolerating Crestor without any myalgias or GI upsets. She has been to watch her diet and try to stay physically active though has been a little bit harder with the left leg pain weakness and low back pain. She does have arterial hypertension is doing well with her current medications. She denies any issues with use of medicine and denies any symptoms from normal blood pressures. She does attempt avoid excess salt intake        Review of Systems   Constitutional: Negative for activity change, appetite change, fatigue and fever. HENT: Negative for congestion and rhinorrhea. Eyes: Negative for pain and discharge. Respiratory: Negative for cough and shortness of breath. Cardiovascular: Negative for chest pain and palpitations. Gastrointestinal: Positive for constipation. Negative for abdominal pain, diarrhea, nausea and vomiting. Endocrine: Negative for cold intolerance and heat intolerance.    Genitourinary: Negative for dysuria and hematuria. Musculoskeletal: Positive for back pain and myalgias. Negative for gait problem and neck pain. Skin: Negative for rash and wound. Neurological: Positive for numbness. Negative for syncope and weakness. Hematological: Negative for adenopathy. Does not bruise/bleed easily. Psychiatric/Behavioral: Negative for dysphoric mood and sleep disturbance. The patient is not nervous/anxious. Past Medical History:   Diagnosis Date    Diabetes mellitus (HCC)     Hyperlipidemia     Hypertension        Current Outpatient Medications   Medication Sig Dispense Refill    polyethylene glycol (GLYCOLAX) 17 GM/SCOOP powder Take 17 g by mouth daily as needed (constipation) 510 g 0    gabapentin (NEURONTIN) 300 MG capsule Take 1 capsule by mouth nightly for 30 days. 30 capsule 2    dilTIAZem (CARDIZEM CD) 240 MG extended release capsule Take 1 capsule by mouth daily 90 capsule 3    metFORMIN (GLUCOPHAGE-XR) 500 MG extended release tablet TAKE 1 TABLET DAILY WITH BREAKFAST 90 tablet 3    glimepiride (AMARYL) 1 MG tablet TAKE 1 TABLET EVERY MORNING BEFORE BREAKFAST 90 tablet 3    lisinopril (PRINIVIL;ZESTRIL) 5 MG tablet TAKE 1 TABLET DAILY 90 tablet 3    rosuvastatin (CRESTOR) 20 MG tablet Take 1 tablet by mouth daily 90 tablet 1    ferrous sulfate (IRON 325) 325 (65 Fe) MG tablet Take 325 mg by mouth daily (with breakfast)      OMEGA-3 FATTY ACIDS PO Take by mouth      aspirin 325 MG EC tablet Take 325 mg by mouth every 6 hours as needed      Iron Combinations (IRON COMPLEX PO) Take by mouth      Cyanocobalamin (VITAMIN B 12) 500 MCG TABS Take 500 mcg by mouth daily       cetirizine (ZYRTEC) 10 MG tablet Take 10 mg by mouth daily       No current facility-administered medications for this visit.            Allergies   Allergen Reactions    Other      Molds and oak trees       Past Surgical History:   Procedure Laterality Date    APPENDECTOMY      GALLBLADDER SURGERY      MASTECTOMY, BILATERAL Social History     Tobacco Use    Smoking status: Former Smoker     Types: Cigarettes    Smokeless tobacco: Never Used    Tobacco comment: pt has not smoked in 25 years   Substance Use Topics    Alcohol use: Never     Frequency: Never    Drug use: Never       No family history on file. /76 (Site: Left Upper Arm, Position: Sitting, Cuff Size: Medium Adult)   Pulse 98   Temp 96.8 °F (36 °C) (Temporal)   Resp 16   Ht 5' (1.524 m)   Wt 117 lb (53.1 kg)   SpO2 95%   BMI 22.85 kg/m²     Physical Exam  Vitals signs and nursing note reviewed. Constitutional:       General: She is not in acute distress. Appearance: Normal appearance. She is well-developed. She is not diaphoretic. HENT:      Head: Normocephalic and atraumatic. Right Ear: External ear normal.      Left Ear: External ear normal.      Mouth/Throat:      Comments: Mask in place  Eyes:      General: No scleral icterus. Right eye: No discharge. Left eye: No discharge. Conjunctiva/sclera: Conjunctivae normal.   Neck:      Musculoskeletal: Normal range of motion and neck supple. No muscular tenderness. Trachea: No tracheal deviation. Cardiovascular:      Rate and Rhythm: Normal rate and regular rhythm. Heart sounds: Normal heart sounds. No murmur. No friction rub. No gallop. Pulmonary:      Effort: Pulmonary effort is normal. No respiratory distress. Breath sounds: Normal breath sounds. No wheezing or rales. Abdominal:      General: Bowel sounds are normal. There is no distension. Palpations: Abdomen is soft. Tenderness: There is no abdominal tenderness. There is no guarding. Musculoskeletal:         General: No tenderness, deformity (No gross deformities of upper or lower extremities) or signs of injury. Right lower leg: No edema. Left lower leg: No edema. Lymphadenopathy:      Cervical: No cervical adenopathy. Skin:     General: Skin is warm and dry. Findings: No erythema or rash. Neurological:      Mental Status: She is alert and oriented to person, place, and time. Cranial Nerves: No cranial nerve deficit. Psychiatric:         Behavior: Behavior normal.         Thought Content: Thought content normal.         Assessment:       ICD-10-CM    1. Chronic left-sided low back pain, unspecified whether sciatica present  M54.5 XR LUMBAR SPINE (2-3 VIEWS)    G89.29 triamcinolone acetonide (KENALOG-40) injection 40 mg     [address] [place] [address]   2. Weakness of left lower extremity  R29.898 XR LUMBAR SPINE (2-3 VIEWS)     [facility]   3. Left hip pain  M25.552 triamcinolone acetonide (KENALOG-40) injection 40 mg     [facility]   4. Neuropathy  G62.9 gabapentin (NEURONTIN) 300 MG capsule    Benefiting from current use of Neurontin. Will continue at this time continue to monitor. 5. Decreased ambulation status  Z74.09 [facility]   6. Constipation, unspecified constipation type  K59.00 polyethylene glycol (GLYCOLAX) 17 GM/SCOOP powder    Discussed proper use of medication and continued monitoring. Discussed possibility for adjustments moving forward. We will continue to monitor and adjust course dictates. 7. Type 2 diabetes mellitus without complication, without long-term current use of insulin (HCC)  E11.9 Discussed importance of DM diet and benefits of exercise. Discussed proper use of medication. Stressed proper foot care and monitoring. Discussed the importance of yearly eye exams. 8. Essential hypertension  I10  controlled current medications. Will continue continue to monitor. 9. Mixed hyperlipidemia  E78.2  tolerating Lipitor. Will continue adjust course dictates. Plan:  Discussed possible diagnoses and through shared decision making termination was to further evaluate her back as a culprit for her symptoms.   We will also be involving home health for PT OT in efforts to improve her ambulatory ability. Discussed expected course, and proper use of medication, including OTC medications. Discussed lifestyle modifications that may be beneficial for symptoms. Discussed signs and symptoms requiring medical attention. All questions were answered and patient voiced understanding and agreement with plan as discussed. Orders Placed This Encounter   Procedures    XR LUMBAR SPINE (2-3 VIEWS)     Standing Status:   Future     Number of Occurrences:   1     Standing Expiration Date:   12/9/2021     Order Specific Question:   Reason for exam:     Answer:   low back pain with left leg weakness   [address] [facility] [place], [place]     Referral Priority:   Routine     Referral Type:   Home Health Care     Referral Reason:   Specialty Services Required     Requested Specialty:   [address]     Number of Visits Requested:   1     Orders Placed This Encounter   Medications    polyethylene glycol (GLYCOLAX) 17 GM/SCOOP powder     Sig: Take 17 g by mouth daily as needed (constipation)     Dispense:  510 g     Refill:  0    triamcinolone acetonide (KENALOG-40) injection 40 mg    gabapentin (NEURONTIN) 300 MG capsule     Sig: Take 1 capsule by mouth nightly for 30 days. Dispense:  30 capsule     Refill:  2     Medications Discontinued During This Encounter   Medication Reason    gabapentin (NEURONTIN) 300 MG capsule REORDER     Patient Instructions   Patient given educational handouts and has had all questions answered. Patient voices understanding and agrees to plans along with risks and benefits of plan. Patient is instructed to continue prior meds,diet, and exercise plans as instructed. Patient agrees to follow up as instructed and sooner if needed. Patient agrees to go to ER if condition becomes emergent. Return in about 3 months (around 3/9/2021), or if symptoms worsen or fail to improve.

## 2020-12-10 ENCOUNTER — TELEPHONE (OUTPATIENT)
Dept: FAMILY MEDICINE CLINIC | Age: 85
End: 2020-12-10

## 2020-12-10 NOTE — TELEPHONE ENCOUNTER
----- Message from Luciana Glover MD sent at 12/9/2020  5:11 PM CST -----  Please inform patient that the radiologist did note moderate multilevel arthritic changes. This was most significant in her lower back where she was having a little bit more the back pain but could also correlate with the symptoms that she is having. If patient is agreeable we could further evaluate with an MRI of her lower back to determine if it is indeed contributing to her leg weakness.

## 2020-12-16 LAB — HBA1C MFR BLD: 6 % (ref 4–6)

## 2020-12-17 ENCOUNTER — TELEPHONE (OUTPATIENT)
Dept: FAMILY MEDICINE CLINIC | Age: 85
End: 2020-12-17

## 2020-12-17 ENCOUNTER — TELEPHONE (OUTPATIENT)
Dept: NEUROSURGERY | Age: 85
End: 2020-12-17

## 2020-12-17 NOTE — TELEPHONE ENCOUNTER
Patient would like to be referred to Dr Gerardo Sadler for her back. Patient's daughter was provided the clinic number to call if they do not hear from them soon.

## 2020-12-17 NOTE — TELEPHONE ENCOUNTER
[facility] New Patient Questionnaire    1. Diagnosis/Reason for Referral? Left sided lower back pain        2. Who is completing questionnaire? Patient x Caregiver Family      3. Has the patient had any previous spinal/brain surgeries? NO      A. If yes, what is the name of the facility in which the surgery was performed? B. Procedure/Surgery performed? C. Who was the surgeon? D. When was the surgery? MM/YY       E. Did the patient improve after the surgery? 4. Is this a second opinion? If yes, Dr. Kirstin Ogden would like to review patient first before making the appointment. 5. Have MRI Images been obtain within the last year? Yes x No      XR  CT     If yes, where was the imaging performed? If yes, what part of the body? Lumbar x Cervical  Thoracic  Brain     If yes, when was it obtained? 12-11  Note: if the scan was performed at a facility other than [facility], the disc will need to be brought to the appointment or we need to reach out to obtain the disc. A. Was the patient instructed to provide the disc? Yes   No x      8. Has the patient had a NCV/EMG within the last year? Yes  No     If yes, where was it performed and date? 12-11-20 Location:[facility]      9. Has the patient been to Physical Therapy? Yesx  No     If yes, what location, how long attended, and last visit? Location:   [facility]       Therapy Lasted: stared 12-16-20   Date of Last Visit: 12-16-20, 12-17-20      10. Has the patient been to Pain Management? Yes  No x   If yes, what location and last visit     Location:   Last Visit:   Is it helping?

## 2020-12-21 ENCOUNTER — OFFICE VISIT (OUTPATIENT)
Dept: NEUROSURGERY | Age: 85
End: 2020-12-21
Payer: MEDICARE

## 2020-12-21 VITALS
DIASTOLIC BLOOD PRESSURE: 81 MMHG | BODY MASS INDEX: 22.97 KG/M2 | WEIGHT: 117 LBS | HEART RATE: 96 BPM | HEIGHT: 60 IN | SYSTOLIC BLOOD PRESSURE: 130 MMHG

## 2020-12-21 PROBLEM — M48.061 LUMBAR SPINAL STENOSIS: Status: ACTIVE | Noted: 2020-12-21

## 2020-12-21 PROCEDURE — G8427 DOCREV CUR MEDS BY ELIG CLIN: HCPCS | Performed by: NEUROLOGICAL SURGERY

## 2020-12-21 PROCEDURE — G8420 CALC BMI NORM PARAMETERS: HCPCS | Performed by: NEUROLOGICAL SURGERY

## 2020-12-21 PROCEDURE — 1090F PRES/ABSN URINE INCON ASSESS: CPT | Performed by: NEUROLOGICAL SURGERY

## 2020-12-21 PROCEDURE — 99204 OFFICE O/P NEW MOD 45 MIN: CPT | Performed by: NEUROLOGICAL SURGERY

## 2020-12-21 PROCEDURE — 4040F PNEUMOC VAC/ADMIN/RCVD: CPT | Performed by: NEUROLOGICAL SURGERY

## 2020-12-21 PROCEDURE — 1036F TOBACCO NON-USER: CPT | Performed by: NEUROLOGICAL SURGERY

## 2020-12-21 PROCEDURE — G8484 FLU IMMUNIZE NO ADMIN: HCPCS | Performed by: NEUROLOGICAL SURGERY

## 2020-12-21 PROCEDURE — 1123F ACP DISCUSS/DSCN MKR DOCD: CPT | Performed by: NEUROLOGICAL SURGERY

## 2020-12-21 NOTE — PROGRESS NOTES
[facility]  Office Visit        Chief Complaint   Patient presents with   Floydene Ada Consultation     back pain. HISTORY OF PRESENT ILLNESS:      The patient is a 80 y.o. female who presents for neurosurgical evaluation of leg pain and weakness, difficulty walking and back pain. This has been getting worse for some time. She states that she has great difficulty walking without a walker and has significant difficulty lifting her left leg. She feels her left leg is numb as well. She has some difficulty with her right leg as well, but her left leg is far more symptomatic. She has been attempting home health physical therapy but she has difficulty completing the exercises due to pain and weakness. She denies any bowel or bladder incontinence. She states that ~10 years ago she had an exacerbation of back and left leg pain and had a \"steroid shot\" and her pain ultimately resolved. She notes her pain is similar now, but she has not improved with steroids. Of note, she takes aspirin daily but she has never had any prior cardiac events, stenting procedures or strokes. She has undergone an MRI that was ordered by Dr. Philipp Briseno and this showed severe spinal stenosis and she has been referred for neurosurgical assessment. MEDICAL HISTORY:             Past Medical History:   Diagnosis Date    Diabetes mellitus ([facility] [address]     Hyperlipidemia     Hypertension        Past Surgical History:   Procedure Laterality Date    APPENDECTOMY      GALLBLADDER SURGERY      MASTECTOMY, BILATERAL           Current Outpatient Medications:     polyethylene glycol (GLYCOLAX) 17 GM/SCOOP powder, Take 17 g by mouth daily as needed (constipation), Disp: 510 g, Rfl: 0    gabapentin (NEURONTIN) 300 MG capsule, Take 1 capsule by mouth nightly for 30 days. , Disp: 30 capsule, Rfl: 2    dilTIAZem (CARDIZEM CD) 240 MG extended release capsule, Take 1 capsule by mouth daily, Disp: 90 capsule, Rfl: 3    metFORMIN (GLUCOPHAGE-XR) 500 MG extended release tablet, TAKE 1 TABLET DAILY WITH BREAKFAST, Disp: 90 tablet, Rfl: 3    glimepiride (AMARYL) 1 MG tablet, TAKE 1 TABLET EVERY MORNING BEFORE BREAKFAST, Disp: 90 tablet, Rfl: 3    lisinopril (PRINIVIL;ZESTRIL) 5 MG tablet, TAKE 1 TABLET DAILY, Disp: 90 tablet, Rfl: 3    rosuvastatin (CRESTOR) 20 MG tablet, Take 1 tablet by mouth daily, Disp: 90 tablet, Rfl: 1    ferrous sulfate (IRON 325) 325 (65 Fe) MG tablet, Take 325 mg by mouth daily (with breakfast), Disp: , Rfl:     OMEGA-3 FATTY ACIDS PO, Take by mouth, Disp: , Rfl:     aspirin 325 MG EC tablet, Take 325 mg by mouth every 6 hours as needed, Disp: , Rfl:     Iron Combinations (IRON COMPLEX PO), Take by mouth, Disp: , Rfl:     Cyanocobalamin (VITAMIN B 12) 500 MCG TABS, Take 500 mcg by mouth daily , Disp: , Rfl:     cetirizine (ZYRTEC) 10 MG tablet, Take 10 mg by mouth daily, Disp: , Rfl:     Allergies: Other    Social History:   Social History     Tobacco Use   Smoking Status Former Smoker    Types: Cigarettes   Smokeless Tobacco Never Used   Tobacco Comment    pt has not smoked in 25 years     Social History     Substance and Sexual Activity   Alcohol Use Never    Frequency: Never         Family History:   History reviewed. No pertinent family history. REVIEW OF SYSTEMS:    Constitutional: Negative. HENT: Negative. Eyes: Negative. Respiratory: Negative. Cardiovascular: Negative. Gastrointestinal: Negative. Genitourinary: Negative. Musculoskeletal: Positive for back pain. Skin: Negative. Neurological: Negative. Endo/Heme/Allergies: Negative. Psychiatric/Behavioral: Negative. Review of Systems was obtained by the medical assistant and reviewed by myself. PHYSICAL EXAM:    Vitals:    12/21/20 0954   BP: 130/81   Pulse: 96       Constitutional: Appears well-developed and well-nourished.    Eyes - conjunctiva normal.  Pupils react to light  Ear, nose,throat -Normal pinna and tragus, No scars, or patent. ASSESSMENT AND PLAN:  This is a 80 y.o. female who presents for neurosurgical evaluation of difficulty walking, left leg weakness and back pain. She has impressive spinal stenosis at L3/4 and L4/5 that is likely contributing to her symptoms. I discussed treatment options, and despite her advanced age, I have offered her surgery (L3/4 and L4/5 decompressive laminectomy). The procedure and potential benefits were explained to her in detail as were specific risks of bleeding, infection, neurologic injury/paralysis, CSF leak, cardiopulmonary complications and worsened spinal deformity were all explained. She also understands that she will likely require hospitalization after surgery with potential inpatient rehabilitation to return to safe independent living. She voiced understanding and requested we proceed. I instructed her to discontinue her aspirin and will request medical clearance from Dr. Priyanka Basurto in anticipation of surgery.             Riddhi Montes MD

## 2020-12-21 NOTE — H&P
[facility] Neurosurgery  History and Physical        Chief Complaint   Patient presents with    Consultation     back pain. HISTORY OF PRESENT ILLNESS:      The patient is a 80 y.o. female who presents for neurosurgical evaluation of leg pain and weakness, difficulty walking and back pain. This has been getting worse for some time. She states that she has great difficulty walking without a walker and has significant difficulty lifting her left leg. She feels her left leg is numb as well. She has some difficulty with her right leg as well, but her left leg is far more symptomatic. She has been attempting home health physical therapy but she has difficulty completing the exercises due to pain and weakness. She denies any bowel or bladder incontinence. She states that ~10 years ago she had an exacerbation of back and left leg pain and had a \"steroid shot\" and her pain ultimately resolved. She notes her pain is similar now, but she has not improved with steroids. Of note, she takes aspirin daily but she has never had any prior cardiac events, stenting procedures or strokes. She has undergone an MRI that was ordered by Dr. Denisse Waite and this showed severe spinal stenosis and she has been referred for neurosurgical assessment. MEDICAL HISTORY:             Past Medical History:   Diagnosis Date    Diabetes mellitus ([facility] [address]     Hyperlipidemia     Hypertension        Past Surgical History:   Procedure Laterality Date    APPENDECTOMY      GALLBLADDER SURGERY      MASTECTOMY, BILATERAL           Current Outpatient Medications:     polyethylene glycol (GLYCOLAX) 17 GM/SCOOP powder, Take 17 g by mouth daily as needed (constipation), Disp: 510 g, Rfl: 0    gabapentin (NEURONTIN) 300 MG capsule, Take 1 capsule by mouth nightly for 30 days. , Disp: 30 capsule, Rfl: 2    dilTIAZem (CARDIZEM CD) 240 MG extended release capsule, Take 1 capsule by mouth daily, Disp: 90 capsule, Rfl: 3    metFORMIN (GLUCOPHAGE-XR) 500 MG extended release tablet, TAKE 1 TABLET DAILY WITH BREAKFAST, Disp: 90 tablet, Rfl: 3    glimepiride (AMARYL) 1 MG tablet, TAKE 1 TABLET EVERY MORNING BEFORE BREAKFAST, Disp: 90 tablet, Rfl: 3    lisinopril (PRINIVIL;ZESTRIL) 5 MG tablet, TAKE 1 TABLET DAILY, Disp: 90 tablet, Rfl: 3    rosuvastatin (CRESTOR) 20 MG tablet, Take 1 tablet by mouth daily, Disp: 90 tablet, Rfl: 1    ferrous sulfate (IRON 325) 325 (65 Fe) MG tablet, Take 325 mg by mouth daily (with breakfast), Disp: , Rfl:     OMEGA-3 FATTY ACIDS PO, Take by mouth, Disp: , Rfl:     aspirin 325 MG EC tablet, Take 325 mg by mouth every 6 hours as needed, Disp: , Rfl:     Iron Combinations (IRON COMPLEX PO), Take by mouth, Disp: , Rfl:     Cyanocobalamin (VITAMIN B 12) 500 MCG TABS, Take 500 mcg by mouth daily , Disp: , Rfl:     cetirizine (ZYRTEC) 10 MG tablet, Take 10 mg by mouth daily, Disp: , Rfl:     Allergies: Other    Social History:   Social History     Tobacco Use   Smoking Status Former Smoker    Types: Cigarettes   Smokeless Tobacco Never Used   Tobacco Comment    pt has not smoked in 25 years     Social History     Substance and Sexual Activity   Alcohol Use Never    Frequency: Never         Family History:   History reviewed. No pertinent family history. REVIEW OF SYSTEMS:    Constitutional: Negative. HENT: Negative. Eyes: Negative. Respiratory: Negative. Cardiovascular: Negative. Gastrointestinal: Negative. Genitourinary: Negative. Musculoskeletal: Positive for back pain. Skin: Negative. Neurological: Negative. Endo/Heme/Allergies: Negative. Psychiatric/Behavioral: Negative. Review of Systems was obtained by the medical assistant and reviewed by myself. PHYSICAL EXAM:    Vitals:    12/21/20 0954   BP: 130/81   Pulse: 96       Constitutional: Appears well-developed and well-nourished.    Eyes - conjunctiva normal.  Pupils react to light  Ear, nose,throat -Normal pinna and tragus, No scars, or lesions over external nose or ears, no obvious atrophy of tongue  Neck- symmetric, trachea midline, no jugular vein distension  Respiration- chest wall symmetric, normal effort without use of accessory muscles  Musculoskeletal - no significant wasting of muscles noted, no bony deformities, symmetric bulk  Extremities- no clubbing, cyanosis or edema  Skin - warm, dry, and intact. No rash,erythema, or pallor. Psychiatric - mood, affect, and behavior appear normal.       NEUROLOGIC EXAM:    MENTAL STATUS: Alert, oriented, thought content appropriate    CRANIAL NERVES: PERRL, EOMI, symmetric facies, tongue midline    MOTOR:     Right Upper Extremity:    Deltoid: 5/5  Biceps: 5/5  Triceps: 5/5  Wrist Extension: 5/5  Finger Abduction: 5/5    Left Upper Extremity:    Deltoid: 5/5  Biceps: 5/5  Triceps: 5/5  Wrist Extension: 5/5  Finger Abduction: 5/5    Right Lower Extremity:    Hip Flexion: 5/5  Knee Extension: 5/5  Ankle Plantarflexion: 5/5  Ankle Dorsiflexion: 5/5      Left Lower Extremity:    Hip Flexion: 4/5  Knee Extension: 4/5  Ankle Plantarflexion: 4/5  Ankle Dorsiflexion: 2/5  EHL: 2/5      SOMATOSENSORY:     Right Upper Extremity: normal light touch sensation  Left Upper Extremity: normal light touch sensation  Right Lower Extremity: normal light touch sensation  Left Lower Extremity: decreased to light touch throughout      REFLEXES:    Biceps: 2+  Patella: 2+      Rogers's: Negative      COORDINATION and GAIT:    Gait: Unsteady with stooped posture, L foot drop      IMAGING:    My interpretation of imaging studies: X-rays of the lumbar spine show moderate levoscoliosis with degenerative facet arthropathy and spondylosis most prominent from L2/3 - L5/S1    MRI of the lumbar spine reviewed. There is severe spinal stenosis, L>R at L3/4 and L4/5 with disc/osteophyte protrusions and facet arthropathy.   There is degenerative disc disease at L5/S1 but the central canal and lateral recesses are relatively patent. ASSESSMENT AND PLAN:  This is a 80 y.o. female who presents for neurosurgical evaluation of difficulty walking, left leg weakness and back pain. She has impressive spinal stenosis at L3/4 and L4/5 that is likely contributing to her symptoms. I discussed treatment options, and despite her advanced age, I have offered her surgery (L3/4 and L4/5 decompressive laminectomy). The procedure and potential benefits were explained to her in detail as were specific risks of bleeding, infection, neurologic injury/paralysis, CSF leak, cardiopulmonary complications and worsened spinal deformity were all explained. She also understands that she will likely require hospitalization after surgery with potential inpatient rehabilitation to return to safe independent living. She voiced understanding and requested we proceed. I instructed her to discontinue her aspirin and will request medical clearance from Dr. Kami Carr in anticipation of surgery. The patient was counseled at length about the risks of jonathan Covid-19 during their perioperative period and any recovery window from their procedure. The patient was made aware that jonathan Covid-19  may worsen their prognosis for recovering from their procedure  and lend to a higher morbidity and/or mortality risk. All material risks, benefits, and reasonable alternatives including postponing the procedure were discussed. The patient does wish to proceed with the procedure at this time.               Shareen Hodgkin, MD

## 2020-12-22 ENCOUNTER — TELEPHONE (OUTPATIENT)
Dept: NEUROSURGERY | Age: 85
End: 2020-12-22

## 2020-12-22 NOTE — TELEPHONE ENCOUNTER
I called patients unsurance and was able to speak to Henri. I answered all his questions and gave him patients surgery information. This surgery has been approved from 1- until 3-19-21. The approval # is [id number]. Reference # for this call is [id number]. Patient notified.

## 2020-12-29 ASSESSMENT — ENCOUNTER SYMPTOMS
COUGH: 0
SHORTNESS OF BREATH: 0
EYE PAIN: 0
DIARRHEA: 0
ABDOMINAL PAIN: 0
EYE DISCHARGE: 0
RHINORRHEA: 0
NAUSEA: 0
BACK PAIN: 1
VOMITING: 0
CONSTIPATION: 1

## 2020-12-30 ENCOUNTER — TELEPHONE (OUTPATIENT)
Dept: NEUROSURGERY | Age: 85
End: 2020-12-30

## 2020-12-30 NOTE — TELEPHONE ENCOUNTER
I received a call from patients daughter Scarlet Finders. She states family is asking her what does the surgery entail and she realizes she cannot answer that. Is asking in laymans terms, what exactly will occur during the surgery so that she can better explain this to family members?

## 2020-12-30 NOTE — PATIENT INSTRUCTIONS
Patient Education        Learning About How to Have a Healthy Back  What causes back pain? Back pain is often caused by overuse, strain, or injury. For example, people often hurt their backs playing sports or working in the yard, being jolted in a car accident, or lifting something too heavy. Aging plays a part too. Your bones and muscles tend to lose strength as you age, which makes injury more likely. The spongy discs between the bones of the spine (vertebrae) may suffer from wear and tear and no longer provide enough cushion between the bones. A disc that bulges or breaks open (herniated disc) can press on nerves, causing back pain. In some people, back pain is the result of arthritis, broken vertebrae caused by bone loss (osteoporosis), illness, or a spine problem. Although most people have back pain at one time or another, there are steps you can take to make it less likely. How can you have a healthy back? Reduce stress on your back through good posture   Slumping or slouching alone may not cause low back pain. But after the back has been strained or injured, bad posture can make pain worse. · Sleep in a position that maintains your back's normal curves and on a mattress that feels comfortable. Sleep on your side with a pillow between your knees, or sleep on your back with a pillow under your knees. These positions can reduce strain on your back. · Stand and sit up straight. \"Good posture\" generally means your ears, shoulders, and hips are in a straight line. · If you must stand for a long time, put one foot on a stool, ledge, or box. Switch feet every now and then. · Sit in a chair that is low enough to let you place both feet flat on the floor with both knees nearly level with your hips. If your chair or desk is too high, use a footrest to raise your knees. Place a small pillow, a rolled-up towel, or a lumbar roll in the curve of your back if you need extra support.   · Try a kneeling chair, which helps tilt your hips forward. This takes pressure off your lower back. · Try sitting on an exercise ball. It can rock from side to side, which helps keep your back loose. · When driving, keep your knees nearly level with your hips. Sit straight, and drive with both hands on the steering wheel. Your arms should be in a slightly bent position. Reduce stress on your back through careful lifting   · Squat down, bending at the hips and knees only. If you need to, put one knee to the floor and extend your other knee in front of you, bent at a right angle (half kneeling). · Press your chest straight forward. This helps keep your upper back straight while keeping a slight arch in your low back. · Hold the load as close to your body as possible, at the level of your belly button (navel). · Use your feet to change direction, taking small steps. · Lead with your hips as you change direction. Keep your shoulders in line with your hips as you move. · Set down your load carefully, squatting with your knees and hips only. Exercise and stretch your back   · Do some exercise on most days of the week, if your doctor says it is okay. You can walk, run, swim, or cycle. · Stretch your back muscles. Here are a few exercises to try:  ? Lie on your back, and gently pull one bent knee to your chest. Put that foot back on the floor, and then pull the other knee to your chest.  ? Do pelvic tilts. Lie on your back with your knees bent. Tighten your stomach muscles. Pull your belly button (navel) in and up toward your ribs. You should feel like your back is pressing to the floor and your hips and pelvis are slightly lifting off the floor. Hold for 6 seconds while breathing smoothly. ? Sit with your back flat against a wall. · Keep your core muscles strong. The muscles of your back, belly (abdomen), and buttocks support your spine. ? Pull in your belly and imagine pulling your navel toward your spine.  Hold this for 6 seconds, then relax. Remember to keep breathing normally as you tense your muscles. ? Do curl-ups. Always do them with your knees bent. Keep your low back on the floor, and curl your shoulders toward your knees using a smooth, slow motion. Keep your arms folded across your chest. If this bothers your neck, try putting your hands behind your neck (not your head), with your elbows spread apart. ? Lie on your back with your knees bent and your feet flat on the floor. Tighten your belly muscles, and then push with your feet and raise your buttocks up a few inches. Hold this position 6 seconds as you continue to breathe normally, then lower yourself slowly to the floor. Repeat 8 to 12 times. ? If you like group exercise, try Pilates or yoga. These classes have poses that strengthen the core muscles. Lead a healthy lifestyle   · Stay at a healthy weight to avoid strain on your back. · Do not smoke. Smoking increases the risk of osteoporosis, which weakens the spine. If you need help quitting, talk to your doctor about stop-smoking programs and medicines. These can increase your chances of quitting for good. Where can you learn more? Go to https://[employer]. org and sign in to your [employer] account. Enter L315 in the [employer] box to learn more about \"Learning About How to Have a Healthy Back. \"     If you do not have an account, please click on the \"Sign Up Now\" link. Current as of: March 2, 2020               Content Version: 12.6  © 2006-2020 [employer]. Care instructions adapted under license by [facility] ([facility]). If you have questions about a medical condition or this instruction, always ask your healthcare professional. [place] [address] any warranty or liability for your use of this information.

## 2021-01-06 ENCOUNTER — TELEPHONE (OUTPATIENT)
Dept: FAMILY MEDICINE CLINIC | Age: 86
End: 2021-01-06

## 2021-01-06 ENCOUNTER — HOSPITAL ENCOUNTER (OUTPATIENT)
Dept: GENERAL RADIOLOGY | Age: 86
Discharge: HOME | End: 2021-01-06
Payer: MEDICARE

## 2021-01-06 ENCOUNTER — OFFICE VISIT (OUTPATIENT)
Dept: FAMILY MEDICINE CLINIC | Age: 86
End: 2021-01-06
Payer: MEDICARE

## 2021-01-06 VITALS
OXYGEN SATURATION: 98 % | HEART RATE: 78 BPM | TEMPERATURE: 97.1 F | SYSTOLIC BLOOD PRESSURE: 110 MMHG | DIASTOLIC BLOOD PRESSURE: 80 MMHG

## 2021-01-06 DIAGNOSIS — R91.1 LUNG NODULE: ICD-10-CM

## 2021-01-06 DIAGNOSIS — M48.062 SPINAL STENOSIS OF LUMBAR REGION WITH NEUROGENIC CLAUDICATION: ICD-10-CM

## 2021-01-06 DIAGNOSIS — R29.898 WEAKNESS OF LEFT LOWER EXTREMITY: ICD-10-CM

## 2021-01-06 DIAGNOSIS — Z79.899 DRUG THERAPY: ICD-10-CM

## 2021-01-06 DIAGNOSIS — Z01.818 PRE-OP EVALUATION: ICD-10-CM

## 2021-01-06 DIAGNOSIS — M54.16 LUMBAR RADICULOPATHY: Primary | ICD-10-CM

## 2021-01-06 DIAGNOSIS — Z79.899 DRUG THERAPY: Primary | ICD-10-CM

## 2021-01-06 DIAGNOSIS — R93.89 ABNORMAL CHEST X-RAY: Primary | ICD-10-CM

## 2021-01-06 LAB
ABO/RH: NORMAL
ALBUMIN SERPL-MCNC: 4.1 G/DL (ref 3.5–5.2)
ALP BLD-CCNC: 65 U/L (ref 35–104)
ALT SERPL-CCNC: 22 U/L (ref 5–33)
ANION GAP SERPL CALCULATED.3IONS-SCNC: 12 MMOL/L (ref 7–19)
ANTIBODY SCREEN: NORMAL
APTT: 25.5 SEC (ref 26–36.2)
AST SERPL-CCNC: 23 U/L (ref 5–32)
BASOPHILS ABSOLUTE: 0.1 K/UL (ref 0–0.2)
BASOPHILS RELATIVE PERCENT: 0.4 % (ref 0–1)
BILIRUB SERPL-MCNC: 0.3 MG/DL (ref 0.2–1.2)
BUN BLDV-MCNC: 13 MG/DL (ref 8–23)
CALCIUM SERPL-MCNC: 10.1 MG/DL (ref 8.8–10.2)
CHLORIDE BLD-SCNC: 99 MMOL/L (ref 98–111)
CO2: 24 MMOL/L (ref 22–29)
CREAT SERPL-MCNC: 0.7 MG/DL (ref 0.5–0.9)
EOSINOPHILS ABSOLUTE: 0.1 K/UL (ref 0–0.6)
EOSINOPHILS RELATIVE PERCENT: 0.4 % (ref 0–5)
GFR AFRICAN AMERICAN: >59
GFR NON-AFRICAN AMERICAN: >60
GLUCOSE BLD-MCNC: 134 MG/DL (ref 74–109)
HCT VFR BLD CALC: 45.2 % (ref 37–47)
HEMOGLOBIN: 14.3 G/DL (ref 12–16)
IMMATURE GRANULOCYTES #: 0.1 K/UL
INR BLD: 0.93 (ref 0.88–1.18)
LYMPHOCYTES ABSOLUTE: 2 K/UL (ref 1.1–4.5)
LYMPHOCYTES RELATIVE PERCENT: 17.5 % (ref 20–40)
MCH RBC QN AUTO: 29.1 PG (ref 27–31)
MCHC RBC AUTO-ENTMCNC: 31.6 G/DL (ref 33–37)
MCV RBC AUTO: 92.1 FL (ref 81–99)
MONOCYTES ABSOLUTE: 0.9 K/UL (ref 0–0.9)
MONOCYTES RELATIVE PERCENT: 8.2 % (ref 0–10)
NEUTROPHILS ABSOLUTE: 8.2 K/UL (ref 1.5–7.5)
NEUTROPHILS RELATIVE PERCENT: 73.1 % (ref 50–65)
PDW BLD-RTO: 14 % (ref 11.5–14.5)
PLATELET # BLD: 304 K/UL (ref 130–400)
PMV BLD AUTO: 10.1 FL (ref 9.4–12.3)
POTASSIUM REFLEX MAGNESIUM: 4.2 MMOL/L (ref 3.5–5)
PROTHROMBIN TIME: 12.3 SEC (ref 12–14.6)
RBC # BLD: 4.91 M/UL (ref 4.2–5.4)
SODIUM BLD-SCNC: 135 MMOL/L (ref 136–145)
TOTAL PROTEIN: 6.9 G/DL (ref 6.6–8.7)
WBC # BLD: 11.2 K/UL (ref 4.8–10.8)

## 2021-01-06 PROCEDURE — G8427 DOCREV CUR MEDS BY ELIG CLIN: HCPCS | Performed by: FAMILY MEDICINE

## 2021-01-06 PROCEDURE — 93000 ELECTROCARDIOGRAM COMPLETE: CPT | Performed by: FAMILY MEDICINE

## 2021-01-06 PROCEDURE — 1090F PRES/ABSN URINE INCON ASSESS: CPT | Performed by: FAMILY MEDICINE

## 2021-01-06 PROCEDURE — 99214 OFFICE O/P EST MOD 30 MIN: CPT | Performed by: FAMILY MEDICINE

## 2021-01-06 PROCEDURE — 1123F ACP DISCUSS/DSCN MKR DOCD: CPT | Performed by: FAMILY MEDICINE

## 2021-01-06 PROCEDURE — 71046 X-RAY EXAM CHEST 2 VIEWS: CPT

## 2021-01-06 PROCEDURE — 1036F TOBACCO NON-USER: CPT | Performed by: FAMILY MEDICINE

## 2021-01-06 PROCEDURE — G8420 CALC BMI NORM PARAMETERS: HCPCS | Performed by: FAMILY MEDICINE

## 2021-01-06 PROCEDURE — 4040F PNEUMOC VAC/ADMIN/RCVD: CPT | Performed by: FAMILY MEDICINE

## 2021-01-06 PROCEDURE — G8484 FLU IMMUNIZE NO ADMIN: HCPCS | Performed by: FAMILY MEDICINE

## 2021-01-06 ASSESSMENT — PATIENT HEALTH QUESTIONNAIRE - PHQ9
SUM OF ALL RESPONSES TO PHQ QUESTIONS 1-9: 0
2. FEELING DOWN, DEPRESSED OR HOPELESS: 0
SUM OF ALL RESPONSES TO PHQ9 QUESTIONS 1 & 2: 0

## 2021-01-06 ASSESSMENT — ENCOUNTER SYMPTOMS
CONSTIPATION: 1
NAUSEA: 0
RHINORRHEA: 0
VOMITING: 0
SHORTNESS OF BREATH: 0
BACK PAIN: 1
EYE PAIN: 0
COUGH: 0
ABDOMINAL PAIN: 0
DIARRHEA: 0
EYE DISCHARGE: 0

## 2021-01-06 NOTE — PROGRESS NOTES
[address] [facility]  [address] [id number]  Dept: [phone]  Dept Fax: [phone]: [phone]     Visit type: Established patient    Reason for Visit: Other (Surgical clearance )      Assessment and Plan       1. Lumbar radiculopathy  2. Spinal stenosis of lumbar region with neurogenic claudication  3. Weakness of left lower extremity  4. Pre-op evaluation  -     EKG 12 Lead    Based on evaluation and her medical history she would be considered intermediate to high risk for an intermediate risk procedure. We will attempt to review the preoperative labs and imaging and if any issues arise we will be addressing and discussing further with Dr. Scarlet Gaviria but at this time with patient symptoms and her quality of life with the weakness that seemingly associated with the spinal stenosis as well as patient's understanding of potential risk and complications and desire to proceed due to the effect that this is having on her life I feel that it would be reasonable to proceed with surgery at this time assuming no issues arise. Discussed signs symptoms require medical attention. All questions were answered patient voiced understanding and agreement with plan as discussed. Return if symptoms worsen or fail to improve, for next scheduled follow up with PCP. Subjective       HPI     Patient is a type II diabetic with arterial hypertension and hyperlipidemia. Her diabetes is controlled with use of Metformin and glimepiride. Her blood pressure is controlled with her current medications and she is doing well with her current dose of Crestor without any problems from medicine. She has been having some issues walking with some left lower extremity weakness and numbness of her thumb.   She previously noted to have abnormalities on imaging and has seen Dr. Scarlet Gaviria, back specialist for further evaluation and recommendations and has been determined to potentially be a surgical candidate for improving her symptoms. Dr. Real Cap office has ordered all labs that she is needing as well as a chest x-ray and EKG. She is going to be getting those while she is in our office today. Review of Systems   Constitutional: Negative for activity change, appetite change, fatigue and fever. HENT: Negative for congestion and rhinorrhea. Eyes: Negative for pain and discharge. Respiratory: Negative for cough and shortness of breath. Cardiovascular: Negative for chest pain and palpitations. Gastrointestinal: Positive for constipation. Negative for abdominal pain, diarrhea, nausea and vomiting. Endocrine: Negative for cold intolerance and heat intolerance. Genitourinary: Negative for dysuria and hematuria. Musculoskeletal: Positive for back pain and myalgias. Negative for gait problem and neck pain. Skin: Negative for rash and wound. Neurological: Positive for numbness. Negative for syncope and weakness. Hematological: Negative for adenopathy. Does not bruise/bleed easily. Psychiatric/Behavioral: Negative for dysphoric mood and sleep disturbance. The patient is not nervous/anxious. Allergies   Allergen Reactions    Other      Molds and oak trees       Outpatient Medications Prior to Visit   Medication Sig Dispense Refill    polyethylene glycol (GLYCOLAX) 17 GM/SCOOP powder Take 17 g by mouth daily as needed (constipation) 510 g 0    gabapentin (NEURONTIN) 300 MG capsule Take 1 capsule by mouth nightly for 30 days.  30 capsule 2    dilTIAZem (CARDIZEM CD) 240 MG extended release capsule Take 1 capsule by mouth daily 90 capsule 3    metFORMIN (GLUCOPHAGE-XR) 500 MG extended release tablet TAKE 1 TABLET DAILY WITH BREAKFAST 90 tablet 3    glimepiride (AMARYL) 1 MG tablet TAKE 1 TABLET EVERY MORNING BEFORE BREAKFAST 90 tablet 3    lisinopril (PRINIVIL;ZESTRIL) 5 MG tablet TAKE 1 TABLET DAILY 90 tablet 3    rosuvastatin (CRESTOR) 20 MG tablet Take 1 tablet by mouth daily 90 tablet 1    ferrous sulfate (IRON 325) 325 (65 Fe) MG tablet Take 325 mg by mouth daily (with breakfast)      OMEGA-3 FATTY ACIDS PO Take 1 capsule by mouth daily       Cyanocobalamin (VITAMIN B 12) 500 MCG TABS Take 500 mcg by mouth daily       cetirizine (ZYRTEC) 10 MG tablet Take 10 mg by mouth daily      aspirin 325 MG EC tablet Take 325 mg by mouth every 6 hours as needed      Iron Combinations (IRON COMPLEX PO) Take by mouth       No facility-administered medications prior to visit. Past Medical History:   Diagnosis Date    Diabetes mellitus ([address]     Hyperlipidemia     Hypertension         Social History     Tobacco Use    Smoking status: Former Smoker     Types: Cigarettes     Quit date: 1997     Years since quittin.0    Smokeless tobacco: Never Used    Tobacco comment: pt has not smoked in 25 years   Substance Use Topics    Alcohol use: Never     Frequency: Never        Past Surgical History:   Procedure Laterality Date    APPENDECTOMY      CHOLECYSTECTOMY      MASTECTOMY, BILATERAL         No family history on file. Objective       /80 (Site: Right Upper Arm, Position: Sitting, Cuff Size: Large Adult)   Pulse 78   Temp 97.1 °F (36.2 °C) (Infrared)   SpO2 98%   Physical Exam  Vitals signs and nursing note reviewed. Constitutional:       General: She is not in acute distress. Appearance: Normal appearance. She is well-developed. She is not diaphoretic. HENT:      Head: Normocephalic and atraumatic. Right Ear: External ear normal.      Left Ear: External ear normal.      Mouth/Throat:      Comments: Mask in place  Eyes:      General: No scleral icterus. Right eye: No discharge. Left eye: No discharge. Conjunctiva/sclera: Conjunctivae normal.   Neck:      Musculoskeletal: Normal range of motion and neck supple. No muscular tenderness. Trachea: No tracheal deviation.    Cardiovascular:      Rate and Rhythm: Normal rate and regular rhythm. Heart sounds: Normal heart sounds. No murmur. No friction rub. No gallop. Pulmonary:      Effort: Pulmonary effort is normal. No respiratory distress. Breath sounds: Normal breath sounds. No wheezing or rales. Abdominal:      General: Bowel sounds are normal. There is no distension. Palpations: Abdomen is soft. Tenderness: There is no abdominal tenderness. There is no guarding. Musculoskeletal:         General: No deformity (No gross deformities of upper or lower extremities) or signs of injury. Right lower leg: No edema. Left lower leg: No edema. Lymphadenopathy:      Cervical: No cervical adenopathy. Skin:     General: Skin is warm and dry. Findings: No erythema or rash. Neurological:      Mental Status: She is alert and oriented to person, place, and time. Cranial Nerves: No cranial nerve deficit. Psychiatric:         Behavior: Behavior normal.         Thought Content:  Thought content normal.           Data Reviewed and Summarized       Labs:     Imaging/Testing:          Dominic Valerio MD

## 2021-01-06 NOTE — TELEPHONE ENCOUNTER
Discussed patient's chest XR results with Dr Janet Chilel. He is wanting a CT Chest with contrast to be done along with a referral to Pulmonology. He wanted me to ask if she is wanting us to reach out to her hem/onc provider. Called and discussed with the patient. She is wanting to do the scan and the referral but to hold off on discussing with hem/onc. I voiced understanding and will get them ordered and sent for her.

## 2021-01-14 ENCOUNTER — HOSPITAL ENCOUNTER (OUTPATIENT)
Dept: CT IMAGING | Age: 86
Discharge: HOME | End: 2021-01-14
Payer: MEDICARE

## 2021-01-14 ENCOUNTER — HOSPITAL ENCOUNTER (OUTPATIENT)
Dept: PREADMISSION TESTING | Age: 86
Discharge: HOME | End: 2021-01-18
Payer: MEDICARE

## 2021-01-14 VITALS — HEIGHT: 60 IN | BODY MASS INDEX: 23.56 KG/M2 | WEIGHT: 120 LBS

## 2021-01-14 DIAGNOSIS — R91.8 MASS OF RIGHT LUNG: Primary | ICD-10-CM

## 2021-01-14 DIAGNOSIS — R91.1 LUNG NODULE: ICD-10-CM

## 2021-01-14 DIAGNOSIS — R93.89 ABNORMAL CHEST X-RAY: ICD-10-CM

## 2021-01-14 PROCEDURE — 6360000004 HC RX CONTRAST MEDICATION: Performed by: FAMILY MEDICINE

## 2021-01-14 PROCEDURE — 71260 CT THORAX DX C+: CPT

## 2021-01-14 RX ORDER — ASPIRIN 81 MG/1
81 TABLET ORAL DAILY
Status: ON HOLD | COMMUNITY
End: 2021-02-19 | Stop reason: HOSPADM

## 2021-01-14 RX ORDER — FERROUS SULFATE 325(65) MG
325 TABLET ORAL
Status: ON HOLD | COMMUNITY
End: 2021-01-30

## 2021-01-14 RX ADMIN — IOPAMIDOL 90 ML: 755 INJECTION, SOLUTION INTRAVENOUS at 13:16

## 2021-01-15 ENCOUNTER — TELEPHONE (OUTPATIENT)
Dept: ONCOLOGY | Facility: CLINIC | Age: 86
End: 2021-01-15

## 2021-01-15 DIAGNOSIS — D05.11 BREAST NEOPLASM, TIS (DCIS), RIGHT: Primary | ICD-10-CM

## 2021-01-15 NOTE — TELEPHONE ENCOUNTER
Caller: Simona    Relationship to patient: [facility]    Best call back number: [phone]  Pt's daughter Alia Garcia [phone]    Type of visit: Follow up     Requested date: Asap     Additional notes: Mass found on pt's right lung

## 2021-01-15 NOTE — TELEPHONE ENCOUNTER
----- Message from Gagan Watson MD sent at 1/15/2021  9:23 AM CST -----  Abnormal chest CT. pls refer to dr rosas for tissue diagnosis

## 2021-01-15 NOTE — TELEPHONE ENCOUNTER
Spoke with patient daughter, informed her that Dr Watson wants to refer her mother to Dr Aaron Mendoza Pulmonologist, for lung nodule discovered 1/14/21. Daughter v/u of plan

## 2021-01-16 LAB — SARS-COV-2, NAA: NOT DETECTED

## 2021-01-18 ENCOUNTER — TELEPHONE (OUTPATIENT)
Dept: FAMILY MEDICINE CLINIC | Age: 86
End: 2021-01-18

## 2021-01-18 DIAGNOSIS — L89.159 PRESSURE INJURY OF SKIN OF SACRAL REGION, UNSPECIFIED INJURY STAGE: ICD-10-CM

## 2021-01-18 DIAGNOSIS — Z74.09 DECREASED AMBULATION STATUS: Primary | ICD-10-CM

## 2021-01-18 DIAGNOSIS — M48.062 SPINAL STENOSIS OF LUMBAR REGION WITH NEUROGENIC CLAUDICATION: ICD-10-CM

## 2021-01-18 NOTE — TELEPHONE ENCOUNTER
----- Message from Trip San MD sent at 1/14/2021  5:28 PM CST -----  5:19 PM have just spoke with daughter on patient's hippa and patient informing them about the CT results. She is already following with Dr. Edgar Britton and is wanting to see him for this and hold off with other referrals. She is really wanting to proceed with her back surgery to help her leg and try to get around better even with this news.

## 2021-01-18 NOTE — PROGRESS NOTES
Pt's surgery has been rescheduled to 1/26/21. Spoke with her family member, Fay Vyas. Instructed her to take pt Friday 1/22/21 to the [facility] clinic drive through for her routine preop covid test, from 8am-12noon. Verbalizes understanding.

## 2021-01-19 NOTE — PATIENT INSTRUCTIONS
Patient Education        Learning About How to Have a Healthy Back  What causes back pain? Back pain is often caused by overuse, strain, or injury. For example, people often hurt their backs playing sports or working in the yard, being jolted in a car accident, or lifting something too heavy. Aging plays a part too. Your bones and muscles tend to lose strength as you age, which makes injury more likely. The spongy discs between the bones of the spine (vertebrae) may suffer from wear and tear and no longer provide enough cushion between the bones. A disc that bulges or breaks open (herniated disc) can press on nerves, causing back pain. In some people, back pain is the result of arthritis, broken vertebrae caused by bone loss (osteoporosis), illness, or a spine problem. Although most people have back pain at one time or another, there are steps you can take to make it less likely. How can you have a healthy back? Reduce stress on your back through good posture   Slumping or slouching alone may not cause low back pain. But after the back has been strained or injured, bad posture can make pain worse. · Sleep in a position that maintains your back's normal curves and on a mattress that feels comfortable. Sleep on your side with a pillow between your knees, or sleep on your back with a pillow under your knees. These positions can reduce strain on your back. · Stand and sit up straight. \"Good posture\" generally means your ears, shoulders, and hips are in a straight line. · If you must stand for a long time, put one foot on a stool, ledge, or box. Switch feet every now and then. · Sit in a chair that is low enough to let you place both feet flat on the floor with both knees nearly level with your hips. If your chair or desk is too high, use a footrest to raise your knees. Place a small pillow, a rolled-up towel, or a lumbar roll in the curve of your back if you need extra support.   · Try a kneeling chair, which helps tilt your hips forward. This takes pressure off your lower back. · Try sitting on an exercise ball. It can rock from side to side, which helps keep your back loose. · When driving, keep your knees nearly level with your hips. Sit straight, and drive with both hands on the steering wheel. Your arms should be in a slightly bent position. Reduce stress on your back through careful lifting   · Squat down, bending at the hips and knees only. If you need to, put one knee to the floor and extend your other knee in front of you, bent at a right angle (half kneeling). · Press your chest straight forward. This helps keep your upper back straight while keeping a slight arch in your low back. · Hold the load as close to your body as possible, at the level of your belly button (navel). · Use your feet to change direction, taking small steps. · Lead with your hips as you change direction. Keep your shoulders in line with your hips as you move. · Set down your load carefully, squatting with your knees and hips only. Exercise and stretch your back   · Do some exercise on most days of the week, if your doctor says it is okay. You can walk, run, swim, or cycle. · Stretch your back muscles. Here are a few exercises to try:  ? Lie on your back, and gently pull one bent knee to your chest. Put that foot back on the floor, and then pull the other knee to your chest.  ? Do pelvic tilts. Lie on your back with your knees bent. Tighten your stomach muscles. Pull your belly button (navel) in and up toward your ribs. You should feel like your back is pressing to the floor and your hips and pelvis are slightly lifting off the floor. Hold for 6 seconds while breathing smoothly. ? Sit with your back flat against a wall. · Keep your core muscles strong. The muscles of your back, belly (abdomen), and buttocks support your spine. ? Pull in your belly and imagine pulling your navel toward your spine.  Hold this for 6 seconds, then relax. Remember to keep breathing normally as you tense your muscles. ? Do curl-ups. Always do them with your knees bent. Keep your low back on the floor, and curl your shoulders toward your knees using a smooth, slow motion. Keep your arms folded across your chest. If this bothers your neck, try putting your hands behind your neck (not your head), with your elbows spread apart. ? Lie on your back with your knees bent and your feet flat on the floor. Tighten your belly muscles, and then push with your feet and raise your buttocks up a few inches. Hold this position 6 seconds as you continue to breathe normally, then lower yourself slowly to the floor. Repeat 8 to 12 times. ? If you like group exercise, try Pilates or yoga. These classes have poses that strengthen the core muscles. Lead a healthy lifestyle   · Stay at a healthy weight to avoid strain on your back. · Do not smoke. Smoking increases the risk of osteoporosis, which weakens the spine. If you need help quitting, talk to your doctor about stop-smoking programs and medicines. These can increase your chances of quitting for good. Where can you learn more? Go to https://[employer]panOpen. org and sign in to your [employer] [facility] [employer] account. Enter L315 in the [place] box to learn more about \"Learning About How to Have a Healthy Back. \"     If you do not have an account, please click on the \"Sign Up Now\" link. Current as of: March 2, 2020               Content Version: 12.6  © 2006-2020 [employer]. Care instructions adapted under license by [facility] ([facility]). If you have questions about a medical condition or this instruction, always ask your healthcare professional. [place] [address] any warranty or liability for your use of this information.

## 2021-01-21 ENCOUNTER — OFFICE VISIT (OUTPATIENT)
Dept: PULMONOLOGY | Facility: CLINIC | Age: 86
End: 2021-01-21

## 2021-01-21 ENCOUNTER — TELEPHONE (OUTPATIENT)
Dept: ONCOLOGY | Facility: CLINIC | Age: 86
End: 2021-01-21

## 2021-01-21 VITALS
HEART RATE: 92 BPM | BODY MASS INDEX: 23.95 KG/M2 | TEMPERATURE: 97.1 F | OXYGEN SATURATION: 94 % | HEIGHT: 60 IN | WEIGHT: 122 LBS | SYSTOLIC BLOOD PRESSURE: 120 MMHG | DIASTOLIC BLOOD PRESSURE: 82 MMHG

## 2021-01-21 DIAGNOSIS — R91.8 MASS OF LOWER LOBE OF RIGHT LUNG: Primary | ICD-10-CM

## 2021-01-21 DIAGNOSIS — R59.0 HILAR ADENOPATHY: ICD-10-CM

## 2021-01-21 DIAGNOSIS — D05.12 BREAST NEOPLASM, TIS (DCIS), LEFT: ICD-10-CM

## 2021-01-21 PROCEDURE — 99204 OFFICE O/P NEW MOD 45 MIN: CPT | Performed by: INTERNAL MEDICINE

## 2021-01-21 RX ORDER — ASPIRIN 81 MG/1
81 TABLET, CHEWABLE ORAL DAILY
COMMUNITY

## 2021-01-21 RX ORDER — CETIRIZINE HYDROCHLORIDE 10 MG/1
10 TABLET ORAL
COMMUNITY

## 2021-01-21 NOTE — PATIENT INSTRUCTIONS
Flexible Bronchoscopy (with endobronchial ultrasound)    Flexible bronchoscopy is a procedure that is used to examine the passageways in the lungs. During the procedure, a thin, flexible tool with a camera on it (bronchoscope) is passed into the mouth or nose, down through the windpipe (trachea), and into the air tubes (bronchi) in the lungs. This tool allows your health care provider to look at your lungs from the inside and take testing (diagnostic) samples if needed.  Tell a health care provider about:  · Any allergies you have.  · All medicines you are taking, including vitamins, herbs, eye drops, creams, and over-the-counter medicines.  · Any problems you or family members have had with anesthetic medicines.  · Any blood disorders you have.  · Any surgeries you have had.  · Any medical conditions you have.  · Whether you are pregnant or may be pregnant.  What are the risks?  Generally, this is a safe procedure. However, problems may occur, including:  · Infection.  · Bleeding.  · Damage to other structures or organs.  · Allergic reactions to medicines.  · Collapsed lung (pneumothorax).  · Increased need for oxygen or difficulty breathing after the procedure.  What happens before the procedure?  Medicines  Ask your health care provider about:  · Changing or stopping your regular medicines. This is especially important if you are taking diabetes medicines or blood thinners.  · Taking medicines such as aspirin and ibuprofen. These medicines can thin your blood. Do not take these medicines before your procedure if your health care provider instructs you not to.  You may be given antibiotic medicine to help prevent infection.  Staying hydrated  Follow instructions from your health care provider about hydration, which may include:  · Up to 2 hours before the procedure - you may continue to drink clear liquids, such as water, clear fruit juice, black coffee, and plain tea.  Eating and drinking  Follow instructions  from your health care provider about eating and drinking, which may include:  · 8 hours before the procedure - stop eating heavy meals or foods such as meat, fried foods, or fatty foods.  · 6 hours before the procedure - stop eating light meals or foods, such as toast or cereal.  · 6 hours before the procedure - stop drinking milk or drinks that contain milk.  · 2 hours before the procedure - stop drinking clear liquids.  General instructions  · Plan to have someone take you home from the hospital or clinic.  · If you will be going home right after the procedure, plan to have someone with you for 24 hours.  What happens during the procedure?  · To lower your risk of infection:  ? Your health care team will wash or sanitize their hands.  ? Your skin will be washed with soap.  · An IV tube will be inserted into one of your veins.  · You will be given a medicine (local anesthetic) to numb your mouth, nose, throat, and voice box (larynx). You may also be given one or more of the following:  ? A medicine to help you relax (sedative).  ? A medicine to control coughing.  ? A medicine to dry up any fluids in your lungs (secretions).  · A bronchoscope will be passed into your nose or mouth, and into your lungs. Your health care provider will examine your lungs.  · Samples of airway secretions may be collected for testing.  · If abnormal areas are seen in your airways, tissue samples may be removed for examination under a microscope (biopsy).  · If tissue samples are needed from the outer parts of the lung, a type of X-ray (fluoroscopy) may be used to guide the bronchoscope to these areas.  · If bleeding occurs, you may be given medicine to stop or decrease the bleeding.  The procedure may vary among health care providers and hospitals.  What happens after the procedure?  · Do not drive for 24 hours if you were given a sedative.  · Your blood pressure, heart rate, breathing rate, and blood oxygen level will be monitored until  the medicines you were given have worn off.  · You may have a chest X-ray to check for signs of pneumothorax.  · You will not be allowed to eat or drink anything for 2 hours after your procedure.  · If a biopsy was taken, it is up to you to get the results of your procedure. Ask your health care provider, or the department that is doing the procedure, when your results will be ready.  Summary  · Flexible bronchoscopy is a procedure that allows your health care provider to look closely at your lungs from the inside and take testing (diagnostic) samples if needed.  · Risks of flexible bronchoscopy include bleeding, infection, and pneumothorax.  · Before a flexible bronchoscopy, you will be given a medicine (local anesthetic) to numb your mouth, nose, throat, and voice box (larynx). Then, a bronchoscope will be passed into your nose or mouth, and into your lungs.  · After the procedure, your blood pressure, heart rate, breathing rate, and blood oxygen level will be monitored until the medicines you were given have worn off. You may have a chest X-ray to check for signs of pneumothorax.  · You will not be allowed to eat or drink anything for 2 hours after your procedure.  This information is not intended to replace advice given to you by your health care provider. Make sure you discuss any questions you have with your health care provider.  Document Revised: 11/30/2018 Document Reviewed: 01/20/2018  Elsevier Patient Education © 2020 [employer].

## 2021-01-21 NOTE — PROGRESS NOTES
"Background:  Pt with debility due to spinal stenosis, right sided lung mass involving RLL, RML, RUL., right hilar LN 13mm identified 1/14/21.   Chief Complaint  abnormal chest CT    Subjective    History of Present Illness {CC  Problem List  Visit  Diagnosis   Encounters  Notes  Medications  Labs  Result Review Imaging  Media :23}     Davida Echols presents to [facility].  She has developed significant problems with spinal stenosis and needs surgery.  This is planned for next week.  Along the course of her work-up she had imaging of her chest which has shown an asymptomatic mass lesion in the right lung.  It is in the midline and traverses both fissures.  There is a right hilar node.  I have been asked to see her for this.  Her main concern right now is severe back pain and inability to get up and walk around.  She has a prior history of breast cancer and Dr. Pena has been monitoring her for this for a long time.       has a past medical history of Arthritis, Breast cancer (CMS/HCC), Carotid stenosis, Constipation, Diabetes mellitus (CMS/HCC), Hyperlipidemia, Hypertension, Hyponatremia, Obesity, Osteopenia, Ovarian cyst, and Psoriasis.   has a past surgical history that includes Appendectomy; Breast surgery; Cholecystectomy; Mastectomy (Bilateral); and Colonoscopy.  family history includes Cancer in her father; Heart disease in her father and mother.   reports that she quit smoking about 26 years ago. She has a 10.00 pack-year smoking history. She has never used smokeless tobacco. She reports that she does not drink alcohol and does not use drugs.  Allergies   Allergen Reactions   • Other      Molds and oak trees       Objective     Vital Signs:   /82   Pulse 92   Temp 97.1 °F (36.2 °C)   Ht 152.4 cm (60\")   Wt 55.3 kg (122 lb)   SpO2 94% Comment: RA  BMI 23.83 kg/m²   Physical Exam  Constitutional:       Appearance: Normal appearance. " She is not ill-appearing or toxic-appearing.      Interventions: Face mask in place.   HENT:      Head: Normocephalic.      Nose: Nose normal.   Eyes:      Extraocular Movements: Extraocular movements intact.   Pulmonary:      Effort: Pulmonary effort is normal. No respiratory distress.      Breath sounds: Normal breath sounds. No wheezing or rales.   Abdominal:      General: There is no distension.   Skin:     Findings: Erythema and rash present.   Neurological:      Mental Status: She is alert.        Result Review  Data Reviewed:{ Labs  Result Review  Imaging  Med Tab  Media :23}     CT CHEST with contrast 1/14/2021 11:26 AM  HISTORY: Lung nodule  COMPARISON: None   DLP: 247 mGy cm. Automated exposure control was utilized to diminish  patient radiation dose.  TECHNIQUE: Serial helical tomographic images of the chest were  acquired following the infusion of Isovue contrast intravenously. Bone  and soft tissue algorithms were provided.  There is limited  enhancement of the vascular structures. This would suggest  extravasation of contrast although by physical examination there is no  evidence of a discrete collection of the contrast identified within  the extremity at or above the IV line insertion site. The patient does  not complain of any discomfort but states she did experience typical  findings associated with the IV infusion. We discussed with the  patient administering additional contrast from another IV site. The  patient refused additional imaging. I do not feel that it would be  necessary to reimage the patient as the study is diagnostic.  FINDINGS:   Neck base: The imaged portion of the neck and thyroid gland is  unremarkable.   Lungs: A 3 mm subpleural nodule is noted posteriorly within the right  upper lobe on image #28. An additional benign-appearing 2 mm nodule is  noted posteriorly within the right upper lobe on image #35. A 3.8 x  2.5 x 2.5 cm lobular mass is noted predominantly within the  superior  segment of the right lower lobe. This mass extends across the major  fissure to also involve the middle lobe. It also abuts the minor  fissure and extends into the inferior aspect of the right upper lobe.  Subpleural granulomas are noted peripherally within the right lower  lobe. Left lingular atelectasis or scarring is present. Lungs are  otherwise clear. A 13 mm right hilar node is present. There is also  evidence of remote granulomatous disease including a granuloma within  the periphery of the patient's lung neoplasm.. No pleural effusion is  present. The trachea and bronchial tree are patent.   Heart: The heart is normal in size. There is a trace pericardial  effusion. Heavy coronary calcifications are present..   Great vessels: There is atheromatous calcification associated with the  proximal great vessels. No evidence of aneurysm.. The pulmonary  arteries are patent within limits of this study and are normal in  caliber. 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  Lymph nodes: Calcified subcarinal adenopathy is present. No  pathologically enlarged middle is done or axillary adenopathy is  demonstrated by CT criteria..  Skeletal and soft tissues: The osseous structures of the thorax and  surrounding soft tissues are unremarkable.  Upper abdomen: The imaged portion of the upper abdomen demonstrates no  acute process. The adrenals are unremarkable.  IMPRESSION:  1. There is a lobular mass with its epicenter within the right lower  lobe. Unfortunately, this extends to also involve the inferior aspect  of the right upper lobe as well as the right middle lobe crossing both  the major and minor fissure. Measurements are as above. There is an  associated enlarged right hilar lymph node. This represents a primary  lung neoplasm. Some calcification within the periphery of the lesion  represents a granuloma which has  been enveloped by the neoplasm.  2. There are 2 additional benign-appearing nodules in the right upper  lobe. The left lung is fully expanded and clear. There is no effusion  present.  3. Tiny trace pericardial effusion. Heavy coronary calcifications are  present. 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  Signed by Dr Kristopher Ellis on 1/14/2021 1:43 PM               Assessment and Plan {CC Problem List  Visit Diagnosis  ROS  Review (Popup)  Health Maintenance  Quality  BestPractice  Medications  SmartSets  SnapShot Encounters  Media :23}   Problem List Items Addressed This Visit        Pulmonary Problems    Hilar adenopathy    Relevant Medications    cetirizine (zyrTEC) 10 MG tablet    Mass of lower lobe of right lung - Primary    Relevant Medications    cetirizine (zyrTEC) 10 MG tablet       Other    Breast neoplasm, Tis (DCIS), left    Overview     DIAGNOSTIC ABNORMALITIES:  DCIS left breast. Mammogram, 06/27/2014:  1. There is a small cluster of calcifications in the upper-outer quadrant of the left breast at approximately the 3 o'clock position.  When compared to a more remote magnification view from 2 years earlier, I feel these calcifications have increased in number and heterogeneity and would warrant further evaluation with stereotactic biopsy. This will be discussed with the patient and Dr. Arteaga's office will also be notified by the nurse navigator who discussed the case with them under my direction. 2. ACR BI-RADS category 4B. Increased calcifications within the upper-outer quadrant of the left breast. 8/01/2014. Breast biopsy at [facility] by Dr. Zamora, revealed: IMPRESSION: Apparently successful stereotactic-guided biopsy of microcalcifications in the upper-outer left breast. Biopsy clip deployed in good position. Clip and biopsy in good alignment and position. There are decreased microcalcifications on the post clip  mammogram. Pathology received on 08/14/2014, HER-2/CEP17 ratio was 1.0, negative. H&E and estrogen receptor were both positive. (09/29/2014/[facility])\[id number] breast biopsy.  DCIS:  ER + ( 97 %); IN+ (21 %); HER-2: Negative (By FISH Amplification) \[id number]: Left breast specimen type: Left simple mastectomy and left axillary sentinel lymphadenectomy tumor size: 14 mm in largest dimension. Tumor type: Ductal carcinoma in situ differentiation; Intermediate grade, solid type with multifocal necrosis and calcification invasive tumor: Not present DCIS margins: Not Involved (closest margin 16 mm; side - deep) lymph nodes: Sampling type: Left axillary sentinel lymphadenectomy 0/2 (positive/total) pTis pN0 MX  Predictive/Prognostic marker assays (on biopsy specimen.  -Y):  ER + ( 97%); IN+ (21 %); HER-2: Negative (By FISH Amplification).  Left simple mastectomy. Pathology, 09/15/2014: 1. Lymph nodes, left axillary sentinel lymphadenectomy: Mild nonspecific reactive lymphoid hyperplasia (negative for metastatic tumor)  2. Breast, left simple mastectomy. Focal ductal carcinoma in situ, intermediate grade, solid type, with multifocal  necrosis and calcification (margins of resection free of involvement) fibroadenoma.  Focal healing wound consistent with previous biopsy site.    PREVIOUS INTERVENTIONS:Left simple mastectomy.  Pathology, 09/15/2014: Lymph nodes, left axillary sentinel lymphadenectomy:  Mild nonspecific reactive lymphoid hyperplasia (negative for metastatic tumor)  Breast, left simple mastectomy, focal ductal carcinoma in situ, intermediate grade, solid type, with multifocal necrosis and calcification (margins of resection free of involvement) fibroadenoma .  Focal healing wound, consistent with previous biopsy site.                 She is about to have back surgery; will plan follow up after the back situation is stabilized, consider bronchoscopy.  Follow Up {Instructions Charge Capture  Follow-up  Communications :23}   Return in about 2 months (around 3/21/2021).  Patient was given instructions and counseling regarding her condition or for health maintenance advice. Please see specific information pulled into the AVS if appropriate.    Electronically signed by Aaron Mendoza MD, 3/19/2021, 10:39 CDT

## 2021-01-22 ENCOUNTER — OFFICE VISIT (OUTPATIENT)
Age: 86
End: 2021-01-22

## 2021-01-22 VITALS — TEMPERATURE: 97.9 F | OXYGEN SATURATION: 94 % | HEART RATE: 83 BPM

## 2021-01-22 DIAGNOSIS — Z11.59 SCREENING FOR VIRAL DISEASE: Primary | ICD-10-CM

## 2021-01-22 DIAGNOSIS — E78.2 MIXED HYPERLIPIDEMIA: ICD-10-CM

## 2021-01-22 PROCEDURE — 99999 PR OFFICE/OUTPT VISIT,PROCEDURE ONLY: CPT | Performed by: NURSE PRACTITIONER

## 2021-01-22 RX ORDER — ROSUVASTATIN CALCIUM 20 MG/1
TABLET, COATED ORAL
Qty: 90 TABLET | Refills: 3 | Status: ON HOLD | OUTPATIENT
Start: 2021-01-22 | End: 2021-02-19 | Stop reason: HOSPADM

## 2021-01-22 NOTE — TELEPHONE ENCOUNTER
Phone call from Dr. Mendoza regarding the patient's abnormal chest CT scan, 01/14/2021 showing a right lower/upper lobe lung mass with associated right hilar adenopathy representing a primary lung neoplasm.  In this 87-year-old with associated medical problems (including prior history of breast carcinoma), we discussed both the merits and pitfalls of pursuing a tissue diagnosis prior to recommendations for therapy options.  Dr. Mendoza is scheduled to see the patient and we agreed that we need to temper our zeal to aggressively pursue the usual procedures and treatments given the patient's advanced age and general frailty.  This said, we will find out what the patient's preferences are regarding how aggressive she wants to be, then work from there.

## 2021-01-23 LAB — SARS-COV-2, PCR: NOT DETECTED

## 2021-01-26 ENCOUNTER — ANESTHESIA EVENT (OUTPATIENT)
Dept: OPERATING ROOM | Age: 86
DRG: 516 | End: 2021-01-26
Payer: MEDICARE

## 2021-01-26 ENCOUNTER — ANESTHESIA (OUTPATIENT)
Dept: OPERATING ROOM | Age: 86
DRG: 516 | End: 2021-01-26
Payer: MEDICARE

## 2021-01-26 ENCOUNTER — HOSPITAL ENCOUNTER (INPATIENT)
Age: 86
LOS: 4 days | Discharge: NURSING, REHAB OR LONG-TERM CARE | DRG: 516 | End: 2021-01-30
Attending: NEUROLOGICAL SURGERY | Admitting: NEUROLOGICAL SURGERY
Payer: MEDICARE

## 2021-01-26 VITALS
RESPIRATION RATE: 15 BRPM | OXYGEN SATURATION: 95 % | SYSTOLIC BLOOD PRESSURE: 107 MMHG | TEMPERATURE: 97.9 F | DIASTOLIC BLOOD PRESSURE: 55 MMHG

## 2021-01-26 PROBLEM — M48.062 LUMBAR STENOSIS WITH NEUROGENIC CLAUDICATION: Status: ACTIVE | Noted: 2021-01-26

## 2021-01-26 LAB
ALBUMIN SERPL-MCNC: 3.5 G/DL (ref 3.5–5.2)
ALP BLD-CCNC: 55 U/L (ref 35–104)
ALT SERPL-CCNC: 22 U/L (ref 5–33)
ANION GAP SERPL CALCULATED.3IONS-SCNC: 10 MMOL/L (ref 7–19)
AST SERPL-CCNC: 23 U/L (ref 5–32)
BILIRUB SERPL-MCNC: 0.3 MG/DL (ref 0.2–1.2)
BUN BLDV-MCNC: 16 MG/DL (ref 8–23)
CALCIUM SERPL-MCNC: 9 MG/DL (ref 8.8–10.2)
CHLORIDE BLD-SCNC: 103 MMOL/L (ref 98–111)
CO2: 26 MMOL/L (ref 22–29)
CREAT SERPL-MCNC: 0.7 MG/DL (ref 0.5–0.9)
GFR AFRICAN AMERICAN: >59
GFR NON-AFRICAN AMERICAN: >60
GLUCOSE BLD-MCNC: 149 MG/DL (ref 70–99)
GLUCOSE BLD-MCNC: 169 MG/DL (ref 70–99)
GLUCOSE BLD-MCNC: 204 MG/DL (ref 74–109)
GLUCOSE BLD-MCNC: 249 MG/DL (ref 70–99)
HCT VFR BLD CALC: 40.5 % (ref 37–47)
HEMOGLOBIN: 12.8 G/DL (ref 12–16)
MCH RBC QN AUTO: 29.3 PG (ref 27–31)
MCHC RBC AUTO-ENTMCNC: 31.6 G/DL (ref 33–37)
MCV RBC AUTO: 92.7 FL (ref 81–99)
PDW BLD-RTO: 14.1 % (ref 11.5–14.5)
PERFORMED ON: ABNORMAL
PLATELET # BLD: 222 K/UL (ref 130–400)
PMV BLD AUTO: 9.8 FL (ref 9.4–12.3)
POTASSIUM REFLEX MAGNESIUM: 4 MMOL/L (ref 3.5–5)
RBC # BLD: 4.37 M/UL (ref 4.2–5.4)
SODIUM BLD-SCNC: 139 MMOL/L (ref 136–145)
TOTAL PROTEIN: 5.9 G/DL (ref 6.6–8.7)
WBC # BLD: 8.6 K/UL (ref 4.8–10.8)

## 2021-01-26 PROCEDURE — 2709999900 HC NON-CHARGEABLE SUPPLY: Performed by: NEUROLOGICAL SURGERY

## 2021-01-26 PROCEDURE — 2580000003 HC RX 258: Performed by: ANESTHESIOLOGY

## 2021-01-26 PROCEDURE — 63048 LAM FACETEC &FORAMOT EA ADDL: CPT | Performed by: NEUROLOGICAL SURGERY

## 2021-01-26 PROCEDURE — 2500000003 HC RX 250 WO HCPCS: Performed by: NEUROLOGICAL SURGERY

## 2021-01-26 PROCEDURE — 82947 ASSAY GLUCOSE BLOOD QUANT: CPT

## 2021-01-26 PROCEDURE — 6360000002 HC RX W HCPCS

## 2021-01-26 PROCEDURE — 63047 LAM FACETEC & FORAMOT LUMBAR: CPT | Performed by: NEUROLOGICAL SURGERY

## 2021-01-26 PROCEDURE — 6370000000 HC RX 637 (ALT 250 FOR IP): Performed by: NEUROLOGICAL SURGERY

## 2021-01-26 PROCEDURE — 85027 COMPLETE CBC AUTOMATED: CPT

## 2021-01-26 PROCEDURE — 88307 TISSUE EXAM BY PATHOLOGIST: CPT

## 2021-01-26 PROCEDURE — 80053 COMPREHEN METABOLIC PANEL: CPT

## 2021-01-26 PROCEDURE — 6360000002 HC RX W HCPCS: Performed by: NEUROLOGICAL SURGERY

## 2021-01-26 PROCEDURE — 36415 COLL VENOUS BLD VENIPUNCTURE: CPT

## 2021-01-26 PROCEDURE — 6370000000 HC RX 637 (ALT 250 FOR IP): Performed by: HOSPITALIST

## 2021-01-26 PROCEDURE — 88311 DECALCIFY TISSUE: CPT

## 2021-01-26 PROCEDURE — 2780000010 HC IMPLANT OTHER: Performed by: NEUROLOGICAL SURGERY

## 2021-01-26 PROCEDURE — 3600000014 HC SURGERY LEVEL 4 ADDTL 15MIN: Performed by: NEUROLOGICAL SURGERY

## 2021-01-26 PROCEDURE — 2500000003 HC RX 250 WO HCPCS

## 2021-01-26 PROCEDURE — 7100000001 HC PACU RECOVERY - ADDTL 15 MIN: Performed by: NEUROLOGICAL SURGERY

## 2021-01-26 PROCEDURE — 3600000004 HC SURGERY LEVEL 4 BASE: Performed by: NEUROLOGICAL SURGERY

## 2021-01-26 PROCEDURE — 3700000000 HC ANESTHESIA ATTENDED CARE: Performed by: NEUROLOGICAL SURGERY

## 2021-01-26 PROCEDURE — 01NB0ZZ RELEASE LUMBAR NERVE, OPEN APPROACH: ICD-10-PCS | Performed by: NEUROLOGICAL SURGERY

## 2021-01-26 PROCEDURE — 2580000003 HC RX 258: Performed by: NEUROLOGICAL SURGERY

## 2021-01-26 PROCEDURE — 7100000000 HC PACU RECOVERY - FIRST 15 MIN: Performed by: NEUROLOGICAL SURGERY

## 2021-01-26 PROCEDURE — 2720000010 HC SURG SUPPLY STERILE: Performed by: NEUROLOGICAL SURGERY

## 2021-01-26 PROCEDURE — 1210000000 HC MED SURG R&B

## 2021-01-26 PROCEDURE — 3700000001 HC ADD 15 MINUTES (ANESTHESIA): Performed by: NEUROLOGICAL SURGERY

## 2021-01-26 RX ORDER — SENNA AND DOCUSATE SODIUM 50; 8.6 MG/1; MG/1
1 TABLET, FILM COATED ORAL 2 TIMES DAILY
Status: DISCONTINUED | OUTPATIENT
Start: 2021-01-26 | End: 2021-01-30 | Stop reason: HOSPADM

## 2021-01-26 RX ORDER — ROSUVASTATIN CALCIUM 20 MG/1
20 TABLET, COATED ORAL NIGHTLY
Status: DISCONTINUED | OUTPATIENT
Start: 2021-01-26 | End: 2021-01-30 | Stop reason: HOSPADM

## 2021-01-26 RX ORDER — POLYETHYLENE GLYCOL 3350 17 G/17G
17 POWDER, FOR SOLUTION ORAL DAILY PRN
Status: DISCONTINUED | OUTPATIENT
Start: 2021-01-26 | End: 2021-01-30 | Stop reason: HOSPADM

## 2021-01-26 RX ORDER — FENTANYL CITRATE 50 UG/ML
50 INJECTION, SOLUTION INTRAMUSCULAR; INTRAVENOUS
Status: DISCONTINUED | OUTPATIENT
Start: 2021-01-26 | End: 2021-01-26 | Stop reason: HOSPADM

## 2021-01-26 RX ORDER — MEPERIDINE HYDROCHLORIDE 50 MG/ML
12.5 INJECTION INTRAMUSCULAR; INTRAVENOUS; SUBCUTANEOUS EVERY 5 MIN PRN
Status: DISCONTINUED | OUTPATIENT
Start: 2021-01-26 | End: 2021-01-26 | Stop reason: HOSPADM

## 2021-01-26 RX ORDER — ROCURONIUM BROMIDE 10 MG/ML
INJECTION, SOLUTION INTRAVENOUS PRN
Status: DISCONTINUED | OUTPATIENT
Start: 2021-01-26 | End: 2021-01-26 | Stop reason: SDUPTHER

## 2021-01-26 RX ORDER — GABAPENTIN 300 MG/1
300 CAPSULE ORAL NIGHTLY
Status: DISCONTINUED | OUTPATIENT
Start: 2021-01-26 | End: 2021-01-30 | Stop reason: HOSPADM

## 2021-01-26 RX ORDER — SODIUM CHLORIDE, SODIUM LACTATE, POTASSIUM CHLORIDE, CALCIUM CHLORIDE 600; 310; 30; 20 MG/100ML; MG/100ML; MG/100ML; MG/100ML
INJECTION, SOLUTION INTRAVENOUS CONTINUOUS
Status: DISCONTINUED | OUTPATIENT
Start: 2021-01-26 | End: 2021-01-26

## 2021-01-26 RX ORDER — INSULIN GLARGINE 100 [IU]/ML
10 INJECTION, SOLUTION SUBCUTANEOUS NIGHTLY
Status: DISCONTINUED | OUTPATIENT
Start: 2021-01-26 | End: 2021-01-30 | Stop reason: HOSPADM

## 2021-01-26 RX ORDER — LABETALOL HYDROCHLORIDE 5 MG/ML
5 INJECTION, SOLUTION INTRAVENOUS EVERY 10 MIN PRN
Status: DISCONTINUED | OUTPATIENT
Start: 2021-01-26 | End: 2021-01-26 | Stop reason: HOSPADM

## 2021-01-26 RX ORDER — HYDROMORPHONE HYDROCHLORIDE 1 MG/ML
0.5 INJECTION, SOLUTION INTRAMUSCULAR; INTRAVENOUS; SUBCUTANEOUS
Status: DISCONTINUED | OUTPATIENT
Start: 2021-01-26 | End: 2021-01-30 | Stop reason: HOSPADM

## 2021-01-26 RX ORDER — HYDROMORPHONE HYDROCHLORIDE 1 MG/ML
0.25 INJECTION, SOLUTION INTRAMUSCULAR; INTRAVENOUS; SUBCUTANEOUS EVERY 5 MIN PRN
Status: DISCONTINUED | OUTPATIENT
Start: 2021-01-26 | End: 2021-01-26 | Stop reason: HOSPADM

## 2021-01-26 RX ORDER — DILTIAZEM HYDROCHLORIDE 240 MG/1
240 CAPSULE, COATED, EXTENDED RELEASE ORAL DAILY
Status: DISCONTINUED | OUTPATIENT
Start: 2021-01-26 | End: 2021-01-30 | Stop reason: HOSPADM

## 2021-01-26 RX ORDER — SODIUM CHLORIDE, SODIUM LACTATE, POTASSIUM CHLORIDE, CALCIUM CHLORIDE 600; 310; 30; 20 MG/100ML; MG/100ML; MG/100ML; MG/100ML
INJECTION, SOLUTION INTRAVENOUS CONTINUOUS
Status: DISCONTINUED | OUTPATIENT
Start: 2021-01-26 | End: 2021-01-27

## 2021-01-26 RX ORDER — DEXAMETHASONE SODIUM PHOSPHATE 10 MG/ML
INJECTION, SOLUTION INTRAMUSCULAR; INTRAVENOUS PRN
Status: DISCONTINUED | OUTPATIENT
Start: 2021-01-26 | End: 2021-01-26 | Stop reason: SDUPTHER

## 2021-01-26 RX ORDER — LIDOCAINE HYDROCHLORIDE AND EPINEPHRINE 20; 5 MG/ML; UG/ML
INJECTION, SOLUTION EPIDURAL; INFILTRATION; INTRACAUDAL; PERINEURAL PRN
Status: DISCONTINUED | OUTPATIENT
Start: 2021-01-26 | End: 2021-01-26 | Stop reason: HOSPADM

## 2021-01-26 RX ORDER — DEXTROSE MONOHYDRATE 25 G/50ML
12.5 INJECTION, SOLUTION INTRAVENOUS PRN
Status: DISCONTINUED | OUTPATIENT
Start: 2021-01-26 | End: 2021-01-26

## 2021-01-26 RX ORDER — HYDRALAZINE HYDROCHLORIDE 20 MG/ML
5 INJECTION INTRAMUSCULAR; INTRAVENOUS EVERY 10 MIN PRN
Status: DISCONTINUED | OUTPATIENT
Start: 2021-01-26 | End: 2021-01-26 | Stop reason: HOSPADM

## 2021-01-26 RX ORDER — NICOTINE POLACRILEX 4 MG
15 LOZENGE BUCCAL PRN
Status: DISCONTINUED | OUTPATIENT
Start: 2021-01-26 | End: 2021-01-30 | Stop reason: HOSPADM

## 2021-01-26 RX ORDER — SODIUM CHLORIDE 0.9 % (FLUSH) 0.9 %
10 SYRINGE (ML) INJECTION EVERY 12 HOURS SCHEDULED
Status: DISCONTINUED | OUTPATIENT
Start: 2021-01-26 | End: 2021-01-26 | Stop reason: HOSPADM

## 2021-01-26 RX ORDER — DEXTROSE MONOHYDRATE 50 MG/ML
100 INJECTION, SOLUTION INTRAVENOUS PRN
Status: DISCONTINUED | OUTPATIENT
Start: 2021-01-26 | End: 2021-01-26

## 2021-01-26 RX ORDER — SCOLOPAMINE TRANSDERMAL SYSTEM 1 MG/1
1 PATCH, EXTENDED RELEASE TRANSDERMAL ONCE
Status: DISCONTINUED | OUTPATIENT
Start: 2021-01-26 | End: 2021-01-26 | Stop reason: HOSPADM

## 2021-01-26 RX ORDER — LISINOPRIL 5 MG/1
5 TABLET ORAL DAILY
Status: DISCONTINUED | OUTPATIENT
Start: 2021-01-26 | End: 2021-01-26

## 2021-01-26 RX ORDER — SODIUM CHLORIDE 0.9 % (FLUSH) 0.9 %
10 SYRINGE (ML) INJECTION EVERY 12 HOURS SCHEDULED
Status: DISCONTINUED | OUTPATIENT
Start: 2021-01-26 | End: 2021-01-30 | Stop reason: HOSPADM

## 2021-01-26 RX ORDER — SODIUM CHLORIDE 0.9 % (FLUSH) 0.9 %
10 SYRINGE (ML) INJECTION PRN
Status: DISCONTINUED | OUTPATIENT
Start: 2021-01-26 | End: 2021-01-30 | Stop reason: HOSPADM

## 2021-01-26 RX ORDER — METOCLOPRAMIDE HYDROCHLORIDE 5 MG/ML
10 INJECTION INTRAMUSCULAR; INTRAVENOUS
Status: DISCONTINUED | OUTPATIENT
Start: 2021-01-26 | End: 2021-01-26 | Stop reason: HOSPADM

## 2021-01-26 RX ORDER — ONDANSETRON 2 MG/ML
4 INJECTION INTRAMUSCULAR; INTRAVENOUS EVERY 6 HOURS PRN
Status: DISCONTINUED | OUTPATIENT
Start: 2021-01-26 | End: 2021-01-30 | Stop reason: HOSPADM

## 2021-01-26 RX ORDER — DEXTROSE MONOHYDRATE 50 MG/ML
100 INJECTION, SOLUTION INTRAVENOUS PRN
Status: DISCONTINUED | OUTPATIENT
Start: 2021-01-26 | End: 2021-01-30 | Stop reason: HOSPADM

## 2021-01-26 RX ORDER — HYDROMORPHONE HYDROCHLORIDE 1 MG/ML
0.25 INJECTION, SOLUTION INTRAMUSCULAR; INTRAVENOUS; SUBCUTANEOUS
Status: DISCONTINUED | OUTPATIENT
Start: 2021-01-26 | End: 2021-01-30 | Stop reason: HOSPADM

## 2021-01-26 RX ORDER — CETIRIZINE HYDROCHLORIDE 10 MG/1
10 TABLET ORAL DAILY
Status: DISCONTINUED | OUTPATIENT
Start: 2021-01-26 | End: 2021-01-30 | Stop reason: HOSPADM

## 2021-01-26 RX ORDER — BUPIVACAINE HYDROCHLORIDE 5 MG/ML
INJECTION, SOLUTION PERINEURAL PRN
Status: DISCONTINUED | OUTPATIENT
Start: 2021-01-26 | End: 2021-01-26 | Stop reason: HOSPADM

## 2021-01-26 RX ORDER — MORPHINE SULFATE 4 MG/ML
4 INJECTION, SOLUTION INTRAMUSCULAR; INTRAVENOUS
Status: DISCONTINUED | OUTPATIENT
Start: 2021-01-26 | End: 2021-01-26 | Stop reason: HOSPADM

## 2021-01-26 RX ORDER — SUCCINYLCHOLINE CHLORIDE 20 MG/ML
INJECTION INTRAMUSCULAR; INTRAVENOUS PRN
Status: DISCONTINUED | OUTPATIENT
Start: 2021-01-26 | End: 2021-01-26 | Stop reason: SDUPTHER

## 2021-01-26 RX ORDER — SODIUM CHLORIDE 0.9 % (FLUSH) 0.9 %
10 SYRINGE (ML) INJECTION PRN
Status: DISCONTINUED | OUTPATIENT
Start: 2021-01-26 | End: 2021-01-26 | Stop reason: HOSPADM

## 2021-01-26 RX ORDER — ONDANSETRON 2 MG/ML
INJECTION INTRAMUSCULAR; INTRAVENOUS PRN
Status: DISCONTINUED | OUTPATIENT
Start: 2021-01-26 | End: 2021-01-26 | Stop reason: SDUPTHER

## 2021-01-26 RX ORDER — GLIMEPIRIDE 2 MG/1
1 TABLET ORAL
Status: DISCONTINUED | OUTPATIENT
Start: 2021-01-27 | End: 2021-01-26

## 2021-01-26 RX ORDER — CYCLOBENZAPRINE HCL 10 MG
10 TABLET ORAL 3 TIMES DAILY PRN
Status: DISCONTINUED | OUTPATIENT
Start: 2021-01-26 | End: 2021-01-30 | Stop reason: HOSPADM

## 2021-01-26 RX ORDER — ONDANSETRON 4 MG/1
4 TABLET, ORALLY DISINTEGRATING ORAL EVERY 8 HOURS PRN
Status: DISCONTINUED | OUTPATIENT
Start: 2021-01-26 | End: 2021-01-30 | Stop reason: HOSPADM

## 2021-01-26 RX ORDER — MORPHINE SULFATE 4 MG/ML
2 INJECTION, SOLUTION INTRAMUSCULAR; INTRAVENOUS EVERY 5 MIN PRN
Status: DISCONTINUED | OUTPATIENT
Start: 2021-01-26 | End: 2021-01-26 | Stop reason: HOSPADM

## 2021-01-26 RX ORDER — MIDAZOLAM HYDROCHLORIDE 1 MG/ML
2 INJECTION INTRAMUSCULAR; INTRAVENOUS
Status: DISCONTINUED | OUTPATIENT
Start: 2021-01-26 | End: 2021-01-26 | Stop reason: HOSPADM

## 2021-01-26 RX ORDER — EPHEDRINE SULFATE 50 MG/ML
INJECTION, SOLUTION INTRAVENOUS PRN
Status: DISCONTINUED | OUTPATIENT
Start: 2021-01-26 | End: 2021-01-26 | Stop reason: SDUPTHER

## 2021-01-26 RX ORDER — PROPOFOL 10 MG/ML
INJECTION, EMULSION INTRAVENOUS PRN
Status: DISCONTINUED | OUTPATIENT
Start: 2021-01-26 | End: 2021-01-26 | Stop reason: SDUPTHER

## 2021-01-26 RX ORDER — FERROUS SULFATE 325(65) MG
325 TABLET ORAL
Status: DISCONTINUED | OUTPATIENT
Start: 2021-01-27 | End: 2021-01-30 | Stop reason: HOSPADM

## 2021-01-26 RX ORDER — DIPHENHYDRAMINE HYDROCHLORIDE 50 MG/ML
12.5 INJECTION INTRAMUSCULAR; INTRAVENOUS
Status: DISCONTINUED | OUTPATIENT
Start: 2021-01-26 | End: 2021-01-26 | Stop reason: HOSPADM

## 2021-01-26 RX ORDER — FENTANYL CITRATE 50 UG/ML
INJECTION, SOLUTION INTRAMUSCULAR; INTRAVENOUS PRN
Status: DISCONTINUED | OUTPATIENT
Start: 2021-01-26 | End: 2021-01-26 | Stop reason: SDUPTHER

## 2021-01-26 RX ORDER — HYDROMORPHONE HYDROCHLORIDE 1 MG/ML
0.5 INJECTION, SOLUTION INTRAMUSCULAR; INTRAVENOUS; SUBCUTANEOUS EVERY 5 MIN PRN
Status: DISCONTINUED | OUTPATIENT
Start: 2021-01-26 | End: 2021-01-26 | Stop reason: HOSPADM

## 2021-01-26 RX ORDER — PROMETHAZINE HYDROCHLORIDE 25 MG/ML
6.25 INJECTION, SOLUTION INTRAMUSCULAR; INTRAVENOUS
Status: DISCONTINUED | OUTPATIENT
Start: 2021-01-26 | End: 2021-01-26 | Stop reason: HOSPADM

## 2021-01-26 RX ORDER — LIDOCAINE HYDROCHLORIDE 10 MG/ML
1 INJECTION, SOLUTION EPIDURAL; INFILTRATION; INTRACAUDAL; PERINEURAL
Status: DISCONTINUED | OUTPATIENT
Start: 2021-01-26 | End: 2021-01-26 | Stop reason: HOSPADM

## 2021-01-26 RX ORDER — DEXTROSE MONOHYDRATE 25 G/50ML
12.5 INJECTION, SOLUTION INTRAVENOUS PRN
Status: DISCONTINUED | OUTPATIENT
Start: 2021-01-26 | End: 2021-01-30 | Stop reason: HOSPADM

## 2021-01-26 RX ORDER — HYDROCODONE BITARTRATE AND ACETAMINOPHEN 5; 325 MG/1; MG/1
1 TABLET ORAL EVERY 4 HOURS PRN
Status: DISCONTINUED | OUTPATIENT
Start: 2021-01-26 | End: 2021-01-30 | Stop reason: HOSPADM

## 2021-01-26 RX ORDER — NICOTINE POLACRILEX 4 MG
15 LOZENGE BUCCAL PRN
Status: DISCONTINUED | OUTPATIENT
Start: 2021-01-26 | End: 2021-01-26

## 2021-01-26 RX ORDER — CHOLECALCIFEROL (VITAMIN D3) 125 MCG
500 CAPSULE ORAL DAILY
Status: DISCONTINUED | OUTPATIENT
Start: 2021-01-26 | End: 2021-01-30 | Stop reason: HOSPADM

## 2021-01-26 RX ORDER — HYDROCODONE BITARTRATE AND ACETAMINOPHEN 5; 325 MG/1; MG/1
2 TABLET ORAL EVERY 4 HOURS PRN
Status: DISCONTINUED | OUTPATIENT
Start: 2021-01-26 | End: 2021-01-30 | Stop reason: HOSPADM

## 2021-01-26 RX ORDER — LIDOCAINE HYDROCHLORIDE 10 MG/ML
INJECTION, SOLUTION EPIDURAL; INFILTRATION; INTRACAUDAL; PERINEURAL PRN
Status: DISCONTINUED | OUTPATIENT
Start: 2021-01-26 | End: 2021-01-26 | Stop reason: SDUPTHER

## 2021-01-26 RX ORDER — MORPHINE SULFATE 4 MG/ML
4 INJECTION, SOLUTION INTRAMUSCULAR; INTRAVENOUS EVERY 5 MIN PRN
Status: DISCONTINUED | OUTPATIENT
Start: 2021-01-26 | End: 2021-01-26 | Stop reason: HOSPADM

## 2021-01-26 RX ORDER — BISACODYL 10 MG
10 SUPPOSITORY, RECTAL RECTAL DAILY PRN
Status: DISCONTINUED | OUTPATIENT
Start: 2021-01-26 | End: 2021-01-30 | Stop reason: HOSPADM

## 2021-01-26 RX ADMIN — CETIRIZINE HYDROCHLORIDE 10 MG: 10 TABLET, FILM COATED ORAL at 12:54

## 2021-01-26 RX ADMIN — HYDROCODONE BITARTRATE AND ACETAMINOPHEN 1 TABLET: 5; 325 TABLET ORAL at 12:54

## 2021-01-26 RX ADMIN — Medication 2000 MG: at 23:53

## 2021-01-26 RX ADMIN — FENTANYL CITRATE 50 MCG: 50 INJECTION, SOLUTION INTRAMUSCULAR; INTRAVENOUS at 09:39

## 2021-01-26 RX ADMIN — INSULIN LISPRO 2 UNITS: 100 INJECTION, SOLUTION INTRAVENOUS; SUBCUTANEOUS at 21:08

## 2021-01-26 RX ADMIN — FENTANYL CITRATE 100 MCG: 50 INJECTION, SOLUTION INTRAMUSCULAR; INTRAVENOUS at 07:31

## 2021-01-26 RX ADMIN — SODIUM CHLORIDE, SODIUM LACTATE, POTASSIUM CHLORIDE, AND CALCIUM CHLORIDE: 600; 310; 30; 20 INJECTION, SOLUTION INTRAVENOUS at 23:53

## 2021-01-26 RX ADMIN — CYANOCOBALAMIN TAB 500 MCG 500 MCG: 500 TAB at 12:54

## 2021-01-26 RX ADMIN — Medication 2000 MG: at 16:55

## 2021-01-26 RX ADMIN — ONDANSETRON HYDROCHLORIDE 4 MG: 2 INJECTION, SOLUTION INTRAMUSCULAR; INTRAVENOUS at 09:29

## 2021-01-26 RX ADMIN — GABAPENTIN 300 MG: 300 CAPSULE ORAL at 19:48

## 2021-01-26 RX ADMIN — EPHEDRINE SULFATE 15 MG: 50 INJECTION INTRAMUSCULAR; INTRAVENOUS; SUBCUTANEOUS at 07:40

## 2021-01-26 RX ADMIN — SODIUM CHLORIDE, SODIUM LACTATE, POTASSIUM CHLORIDE, AND CALCIUM CHLORIDE: 600; 310; 30; 20 INJECTION, SOLUTION INTRAVENOUS at 07:29

## 2021-01-26 RX ADMIN — Medication 10 UNITS: at 21:08

## 2021-01-26 RX ADMIN — SODIUM CHLORIDE, SODIUM LACTATE, POTASSIUM CHLORIDE, AND CALCIUM CHLORIDE: 600; 310; 30; 20 INJECTION, SOLUTION INTRAVENOUS at 08:50

## 2021-01-26 RX ADMIN — PROPOFOL 120 MG: 10 INJECTION, EMULSION INTRAVENOUS at 07:35

## 2021-01-26 RX ADMIN — SODIUM CHLORIDE, SODIUM LACTATE, POTASSIUM CHLORIDE, AND CALCIUM CHLORIDE: 600; 310; 30; 20 INJECTION, SOLUTION INTRAVENOUS at 12:48

## 2021-01-26 RX ADMIN — ROCURONIUM BROMIDE 50 MG: 10 INJECTION, SOLUTION INTRAVENOUS at 07:45

## 2021-01-26 RX ADMIN — DOCUSATE SODIUM 50MG AND SENNOSIDES 8.6MG 1 TABLET: 8.6; 5 TABLET, FILM COATED ORAL at 12:53

## 2021-01-26 RX ADMIN — ROSUVASTATIN CALCIUM 20 MG: 20 TABLET, FILM COATED ORAL at 19:48

## 2021-01-26 RX ADMIN — Medication 1 G: at 07:41

## 2021-01-26 RX ADMIN — FENTANYL CITRATE 100 MCG: 50 INJECTION, SOLUTION INTRAMUSCULAR; INTRAVENOUS at 10:11

## 2021-01-26 RX ADMIN — SUCCINYLCHOLINE CHLORIDE 80 MG: 20 INJECTION, SOLUTION INTRAMUSCULAR; INTRAVENOUS at 07:35

## 2021-01-26 RX ADMIN — LIDOCAINE HYDROCHLORIDE 50 MG: 10 INJECTION, SOLUTION EPIDURAL; INFILTRATION; INTRACAUDAL; PERINEURAL at 07:35

## 2021-01-26 RX ADMIN — DOCUSATE SODIUM 50MG AND SENNOSIDES 8.6MG 1 TABLET: 8.6; 5 TABLET, FILM COATED ORAL at 19:48

## 2021-01-26 RX ADMIN — ROCURONIUM BROMIDE 20 MG: 10 INJECTION, SOLUTION INTRAVENOUS at 08:48

## 2021-01-26 RX ADMIN — SUGAMMADEX 250 MG: 100 INJECTION, SOLUTION INTRAVENOUS at 09:47

## 2021-01-26 RX ADMIN — PHENYLEPHRINE HYDROCHLORIDE 100 MCG: 10 INJECTION INTRAVENOUS at 08:04

## 2021-01-26 RX ADMIN — EPHEDRINE SULFATE 15 MG: 50 INJECTION INTRAMUSCULAR; INTRAVENOUS; SUBCUTANEOUS at 08:00

## 2021-01-26 RX ADMIN — DILTIAZEM HYDROCHLORIDE 240 MG: 240 CAPSULE, COATED, EXTENDED RELEASE ORAL at 12:54

## 2021-01-26 RX ADMIN — SODIUM CHLORIDE, PRESERVATIVE FREE 10 ML: 5 INJECTION INTRAVENOUS at 12:56

## 2021-01-26 RX ADMIN — DEXAMETHASONE SODIUM PHOSPHATE 10 MG: 10 INJECTION, SOLUTION INTRAMUSCULAR; INTRAVENOUS at 07:43

## 2021-01-26 ASSESSMENT — LIFESTYLE VARIABLES: SMOKING_STATUS: 0

## 2021-01-26 ASSESSMENT — ENCOUNTER SYMPTOMS: SHORTNESS OF BREATH: 0

## 2021-01-26 NOTE — H&P
[facility] Neurosurgery  History and Physical        (copied forward from 12/21 with addendum below)          Chief Complaint   Patient presents with    Consultation       back pain.         HISTORY OF PRESENT ILLNESS:       The patient is a 80 y.o. female who presents for neurosurgical evaluation of leg pain and weakness, difficulty walking and back pain. This has been getting worse for some time. She states that she has great difficulty walking without a walker and has significant difficulty lifting her left leg. She feels her left leg is numb as well. She has some difficulty with her right leg as well, but her left leg is far more symptomatic. She has been attempting home health physical therapy but she has difficulty completing the exercises due to pain and weakness. She denies any bowel or bladder incontinence. She states that ~10 years ago she had an exacerbation of back and left leg pain and had a \"steroid shot\" and her pain ultimately resolved. She notes her pain is similar now, but she has not improved with steroids. Of note, she takes aspirin daily but she has never had any prior cardiac events, stenting procedures or strokes. She has undergone an MRI that was ordered by Dr. Rochelle Valdivia and this showed severe spinal stenosis and she has been referred for neurosurgical assessment.        MEDICAL HISTORY:                  Past Medical History:   Diagnosis Date    Diabetes mellitus ([address]      Hyperlipidemia      Hypertension                 Past Surgical History:   Procedure Laterality Date    APPENDECTOMY        GALLBLADDER SURGERY        MASTECTOMY, BILATERAL                Current Outpatient Medications:     polyethylene glycol (GLYCOLAX) 17 GM/SCOOP powder, Take 17 g by mouth daily as needed (constipation), Disp: 510 g, Rfl: 0    gabapentin (NEURONTIN) 300 MG capsule, Take 1 capsule by mouth nightly for 30 days. , Disp: 30 capsule, Rfl: 2   dilTIAZem (CARDIZEM CD) 240 MG extended release capsule, Take 1 capsule by mouth daily, Disp: 90 capsule, Rfl: 3    metFORMIN (GLUCOPHAGE-XR) 500 MG extended release tablet, TAKE 1 TABLET DAILY WITH BREAKFAST, Disp: 90 tablet, Rfl: 3    glimepiride (AMARYL) 1 MG tablet, TAKE 1 TABLET EVERY MORNING BEFORE BREAKFAST, Disp: 90 tablet, Rfl: 3    lisinopril (PRINIVIL;ZESTRIL) 5 MG tablet, TAKE 1 TABLET DAILY, Disp: 90 tablet, Rfl: 3    rosuvastatin (CRESTOR) 20 MG tablet, Take 1 tablet by mouth daily, Disp: 90 tablet, Rfl: 1    ferrous sulfate (IRON 325) 325 (65 Fe) MG tablet, Take 325 mg by mouth daily (with breakfast), Disp: , Rfl:     OMEGA-3 FATTY ACIDS PO, Take by mouth, Disp: , Rfl:     aspirin 325 MG EC tablet, Take 325 mg by mouth every 6 hours as needed, Disp: , Rfl:     Iron Combinations (IRON COMPLEX PO), Take by mouth, Disp: , Rfl:     Cyanocobalamin (VITAMIN B 12) 500 MCG TABS, Take 500 mcg by mouth daily , Disp: , Rfl:     cetirizine (ZYRTEC) 10 MG tablet, Take 10 mg by mouth daily, Disp: , Rfl:      Allergies: Other     Social History:   Social History           Tobacco Use   Smoking Status Former Smoker    Types: Cigarettes   Smokeless Tobacco Never Used   Tobacco Comment     pt has not smoked in 25 years      Social History           Substance and Sexual Activity   Alcohol Use Never    Frequency: Never            Family History:   History reviewed. No pertinent family history.     REVIEW OF SYSTEMS:     Constitutional: Negative.    HENT: Negative.    Eyes: Negative.    Respiratory: Negative.    Cardiovascular: Negative.    Gastrointestinal: Negative.    Genitourinary: Negative.    Musculoskeletal: Positive for back pain.    Skin: Negative.    Neurological: Negative.    Endo/Heme/Allergies: Negative.    Psychiatric/Behavioral: Negative.       Review of Systems was obtained by the medical assistant and reviewed by myself.     PHYSICAL EXAM:         Vitals:     12/21/20 0954   BP: 130/81 Pulse: 96         Constitutional: Appears well-developed and well-nourished. Eyes  conjunctiva normal.  Pupils react to light  Ear, nose,throat -Normal pinna and tragus, No scars, or lesions over external nose or ears, no obvious atrophy of tongue  Neck- symmetric, trachea midline, no jugular vein distension  Respiration- chest wall symmetric, normal effort without use of accessory muscles  Musculoskeletal  no significant wasting of muscles noted, no bony deformities, symmetric bulk  Extremities- no clubbing, cyanosis or edema  Skin  warm, dry, and intact. No rash,erythema, or pallor.   Psychiatric  mood, affect, and behavior appear normal.         NEUROLOGIC EXAM:     MENTAL STATUS: Alert, oriented, thought content appropriate     CRANIAL NERVES: PERRL, EOMI, symmetric facies, tongue midline     MOTOR:      Right Upper Extremity:     Deltoid: 5/5  Biceps: 5/5  Triceps: 5/5  Wrist Extension: 5/5  Finger Abduction: 5/5     Left Upper Extremity:     Deltoid: 5/5  Biceps: 5/5  Triceps: 5/5  Wrist Extension: 5/5  Finger Abduction: 5/5     Right Lower Extremity:     Hip Flexion: 5/5  Knee Extension: 5/5  Ankle Plantarflexion: 5/5  Ankle Dorsiflexion: 5/5        Left Lower Extremity:     Hip Flexion: 4/5  Knee Extension: 4/5  Ankle Plantarflexion: 4/5  Ankle Dorsiflexion: 2/5  EHL: 2/5        SOMATOSENSORY:      Right Upper Extremity: normal light touch sensation  Left Upper Extremity: normal light touch sensation  Right Lower Extremity: normal light touch sensation  Left Lower Extremity: decreased to light touch throughout        REFLEXES:     Biceps: 2+  Patella: 2+        Rogers's: Negative        COORDINATION and GAIT:     Gait: Unsteady with stooped posture, L foot drop        IMAGING:     My interpretation of imaging studies: X-rays of the lumbar spine show moderate levoscoliosis with degenerative facet arthropathy and spondylosis most prominent from L2/3 - L5/S1    MRI of the lumbar spine reviewed. There is severe spinal stenosis, L>R at L3/4 and L4/5 with disc/osteophyte protrusions and facet arthropathy. There is degenerative disc disease at L5/S1 but the central canal and lateral recesses are relatively patent.        ASSESSMENT AND PLAN:  This is a 80 y.o. female who presents for neurosurgical evaluation of difficulty walking, left leg weakness and back pain. She has impressive spinal stenosis at L3/4 and L4/5 that is likely contributing to her symptoms. I discussed treatment options, and despite her advanced age, I have offered her surgery (L3/4 and L4/5 decompressive laminectomy). The procedure and potential benefits were explained to her in detail as were specific risks of bleeding, infection, neurologic injury/paralysis, CSF leak, cardiopulmonary complications and worsened spinal deformity were all explained. She also understands that she will likely require hospitalization after surgery with potential inpatient rehabilitation to return to safe independent living. She voiced understanding and requested we proceed. I instructed her to discontinue her aspirin and will request medical clearance from Dr. Victor M Case in anticipation of surgery. ADDENDUM:    Subsequent preop workup revealed a pulmonary mass that has been further evaluated by CT and she has seen Dr. Mindy Lopez with pulmonology and a bronchoscopy and biopsy is planned, however she is quite debilitated from her spinal stenosis and all providers agree that she would benefit from the proposed surgery to improve functional mobility and activity tolerance before considering next steps for her lung mass.   She has requested that we proceed with surgery.    The patient was counseled at length about the risks of jonathan Covid-19 during their perioperative period and any recovery window from their procedure.  The patient was made aware that jonathan Covid-19  may worsen their prognosis for recovering from their procedure  and lend to a higher morbidity and/or mortality risk.  All material risks, benefits, and reasonable alternatives including postponing the procedure were discussed. The patient does wish to proceed with the procedure at this time.

## 2021-01-26 NOTE — ANESTHESIA PRE PROCEDURE
 Lumbar spinal stenosis M48.061       Past Medical History:        Diagnosis Date    Diabetes mellitus ([facility] [address]     Hyperlipidemia     Hypertension        Past Surgical History:        Procedure Laterality Date    APPENDECTOMY      CHOLECYSTECTOMY      MASTECTOMY, BILATERAL         Social History:    Social History     Tobacco Use    Smoking status: Former Smoker     Types: Cigarettes     Quit date: 1997     Years since quittin.0    Smokeless tobacco: Never Used    Tobacco comment: pt has not smoked in 25 years   Substance Use Topics    Alcohol use: Never     Frequency: Never                                Counseling given: Not Answered  Comment: pt has not smoked in 25 years      Vital Signs (Current):   Vitals:    21 0628   BP: 115/74   Pulse: 89   Resp: 12   Temp: 98.1 °F (36.7 °C)   TempSrc: Tympanic   SpO2: 92%   Weight: 120 lb (54.4 kg)   Height: 5' (1.524 m)                                              BP Readings from Last 3 Encounters:   21 115/74   21 110/80   20 130/81       NPO Status: Time of last liquid consumption:                         Time of last solid consumption: 1800                        Date of last liquid consumption: 21                        Date of last solid food consumption: 20    BMI:   Wt Readings from Last 3 Encounters:   21 120 lb (54.4 kg)   21 120 lb (54.4 kg)   20 117 lb (53.1 kg)     Body mass index is 23.44 kg/m².     CBC:   Lab Results   Component Value Date    WBC 11.2 2021    RBC 4.91 2021    HGB 14.3 2021    HCT 45.2 2021    MCV 92.1 2021    RDW 14.0 2021     2021       CMP:   Lab Results   Component Value Date     2021    K 4.2 2021    CL 99 2021    CO2 24 2021    BUN 13 2021    CREATININE 0.7 2021    GFRAA >59 2021    LABGLOM >60 2021    GLUCOSE 134 2021    PROT 6.9 2021

## 2021-01-26 NOTE — BRIEF OP NOTE
Brief Postoperative Note      Patient: Rima Mancia  YOB: 1933  MRN: [id number]    Date of Procedure: 1/26/2021    Pre-Op Diagnosis: Spinal stenosis with neurogenic claudication and foot drop    Post-Op Diagnosis: Same       Procedure(s):  LAMINECTOMY L3-4/ L4-5    Surgeon(s):  Luis Mantilla MD    Assistant:  * No surgical staff found *    Anesthesia: General    Estimated Blood Loss (mL): less than 177     Complications: None    Specimens:   ID Type Source Tests Collected by Time Destination   A : Spinous Process of L3 and L4 Bone Back SURGICAL PATHOLOGY Luis Mantilla MD 1/26/2021 0609        Implants:  * No implants in log *      Drains:   Closed/Suction Drain Inferior;Medial Back Accordion 10 Nepali (Active)       Findings: Multilevel spinal stenosis and facet arthropathy. Decompression confirmed spanning from the L2/3 disc space to below the level of the L5 pedicle with fluoroscopy. Incompetence of the superior articular process of the L5 facet noted. No CSF leak, no complications.   See operative dictation, [id number]    Electronically signed by Curtis Hirsch MD on 1/26/2021 at 10:03 AM

## 2021-01-26 NOTE — CONSULTS
Consult Note            Date:1/26/2021        Patient Ivette Stephens     YOB: 1933     Age:87 y.o. Consults: Medical Management    Referring: Dr Sarina Mendez    Chief Complaint   No chief complaint on file. Back Pain    History Obtained From   patient    History of Present Illness     Patient is a 40-year-old pleasant lady with a history of prediabetes, hypertension, hyperlipidemia, presenting to the hospital with concerns of difficulty walking as well as back pain. Patient has been having issues for quite a while now, and bilateral lower extremity both more severe on the left. Patient stated she attempted physical therapy, however pain has become more intense. She denied any urinary incontinence or bowel incontinence. Patient also received steroid shot outpatient however did not help with her pain. An MRI of the spine was obtained outpatient on 12/9/2020 which showed moderate to multilevel degenerative disc, endplate and facet disease. Most significant extent endplate changes at T5-4. Patient was referred to see neurosurgery outpatient recommendations were made for surgical intervention. Patient was cleared to proceed with surgery by PCP outpatient. Medicine was consulted for medical management      Past Medical History     Past Medical History:   Diagnosis Date    Diabetes mellitus ([address]     Hyperlipidemia     Hypertension         Past Surgical History     Past Surgical History:   Procedure Laterality Date    APPENDECTOMY      CHOLECYSTECTOMY      MASTECTOMY, BILATERAL          Medications     Prior to Admission medications    Medication Sig Start Date End Date Taking?  Authorizing Provider   rosuvastatin (CRESTOR) 20 MG tablet TAKE 1 TABLET DAILY 1/22/21  Yes Sarmad Trinidad MD   aspirin 81 MG EC tablet Take 81 mg by mouth daily   Yes Historical Provider, MD ferrous sulfate (IRON 325) 325 (65 Fe) MG tablet Take 325 mg by mouth daily (with breakfast)   Yes Historical Provider, MD   dilTIAZem (CARDIZEM CD) 240 MG extended release capsule Take 1 capsule by mouth daily 12/7/20  Yes Ian Allen MD   metFORMIN (GLUCOPHAGE-XR) 500 MG extended release tablet TAKE 1 TABLET DAILY WITH BREAKFAST 11/19/20  Yes Ian Allen MD   glimepiride (AMARYL) 1 MG tablet TAKE 1 TABLET EVERY MORNING BEFORE BREAKFAST 11/2/20  Yes Ian Allen MD   lisinopril (PRINIVIL;ZESTRIL) 5 MG tablet TAKE 1 TABLET DAILY 11/2/20  Yes Ian Allen MD   ferrous sulfate (IRON 325) 325 (65 Fe) MG tablet Take 325 mg by mouth daily (with breakfast)   Yes Historical Provider, MD   OMEGA-3 FATTY ACIDS PO Take 1 capsule by mouth daily    Yes Historical Provider, MD   Cyanocobalamin (VITAMIN B 12) 500 MCG TABS Take 500 mcg by mouth daily    Yes Historical Provider, MD   cetirizine (ZYRTEC) 10 MG tablet Take 10 mg by mouth daily   Yes Historical Provider, MD   gabapentin (NEURONTIN) 300 MG capsule Take 1 capsule by mouth nightly for 30 days.  12/9/20 1/14/21  Ian Allen MD            cetirizine (ZYRTEC) tablet 10 mg, Daily      vitamin B-12 (CYANOCOBALAMIN) tablet 500 mcg, Daily      dilTIAZem (CARDIZEM CD) extended release capsule 240 mg, Daily      [START ON 1/27/2021] ferrous sulfate (IRON 325) tablet 325 mg, Daily with breakfast      gabapentin (NEURONTIN) capsule 300 mg, Nightly      rosuvastatin (CRESTOR) tablet 20 mg, Nightly      sodium chloride flush 0.9 % injection 10 mL, 2 times per day      sodium chloride flush 0.9 % injection 10 mL, PRN      lactated ringers infusion, Continuous      ceFAZolin (ANCEF) 2000 mg in 0.9% sodium chloride 50 mL IVPB, Q8H      sennosides-docusate sodium (SENOKOT-S) 8.6-50 MG tablet 1 tablet, BID      polyethylene glycol (GLYCOLAX) packet 17 g, Daily PRN      bisacodyl (DULCOLAX) suppository 10 mg, Daily PRN   ondansetron (ZOFRAN-ODT) disintegrating tablet 4 mg, Q8H PRN    Or      ondansetron (ZOFRAN) injection 4 mg, Q6H PRN      HYDROcodone-acetaminophen (NORCO) 5-325 MG per tablet 1 tablet, Q4H PRN    Or      HYDROcodone-acetaminophen (NORCO) 5-325 MG per tablet 2 tablet, Q4H PRN      HYDROmorphone HCl PF (DILAUDID) injection 0.25 mg, Q3H PRN    Or      HYDROmorphone HCl PF (DILAUDID) injection 0.5 mg, Q3H PRN      cyclobenzaprine (FLEXERIL) tablet 10 mg, TID PRN      insulin lispro (HUMALOG) injection vial 4 Units, TID WC      insulin lispro (HUMALOG) injection vial 0-12 Units, TID WC      insulin lispro (HUMALOG) injection vial 0-6 Units, Nightly      insulin glargine (LANTUS) injection vial 10 Units, Nightly      glucose (GLUTOSE) 40 % oral gel 15 g, PRN      dextrose 50 % IV solution, PRN      glucagon (rDNA) injection 1 mg, PRN      dextrose 5 % solution, PRN        Allergies   Other    Social History     Social History     Tobacco History     Smoking Status  Former Smoker Quit date  1/14/1997 Smoking Tobacco Type  Cigarettes    Smokeless Tobacco Use  Never Used    Tobacco Comment  pt has not smoked in 25 years          Alcohol History     Alcohol Use Status  Never          Drug Use     Drug Use Status  Never          Sexual Activity     Sexually Active  Not Currently Partners  Male                Family History   History reviewed. No pertinent family history. Review of Systems   Review of Systems: 16 point system reviewed and negative except listed above. Physical Exam   /61   Pulse 94   Temp 97 °F (36.1 °C) (Temporal)   Resp 18   Ht 5' (1.524 m)   Wt 120 lb (54.4 kg)   SpO2 90%   BMI 23.44 kg/m²      Physical Exam  General: Alert, well-developed, no acute distress lying comfortably in bed. HEENT: Atraumatic normocephalic, range of motion normal, no JVD, no tracheal deviation noted. Cardiac: Normal S1-S2 no murmurs rub or gallop. Respiratory: Effort normal, breath sounds normal, clear To auscultation bilaterally, no rhonchi or rales, no wheezing  Abdomen: Soft, positive bowel sounds in all quadrants, no distention, nontender to palpation, no organomegaly noted, no rebound noted  MSK/extremities: Some tenderness noted of the lower back region with minimal movement to lower extremities. Patient able to wiggle right toe not so much in the left, no edema, no deformity. Skin: Warm, no erythema noted, no bruising and no lesions noted. Psych: Affect normal and good eye contact, behavioral normal, thought content normal and judgment normal  Neurological: No focal deficits, alert and conversant, no formal disorientation noted. No sensory deficits, no abnormal coordination. Labs    CBC:No results for input(s): WBC, RBC, HGB, HCT, MCV, RDW, PLT in the last 72 hours. CHEMISTRIES:No results for input(s): NA, K, CL, CO2, BUN, CREATININE, GLUCOSE, PHOS, MG in the last 72 hours. Invalid input(s): CA  PT/INR:No results for input(s): PROTIME, INR in the last 72 hours. APTT:No results for input(s): APTT in the last 72 hours. LIVER PROFILE:No results for input(s): AST, ALT, BILIDIR, BILITOT, ALKPHOS in the last 72 hours. Imaging/Diagnostics   No results found. Assessment      Hospital Problems           Last Modified POA    Lumbar stenosis with neurogenic claudication 1/26/2021 Yes          Plan       Lumbar stenosis with neurogenic claudication  Status post laminectomy L3-4/L4-5  Management per primary team  Pain control  Bowel regimen to prevent constipation. PT OT evaluation. History of hypertension however currently hypotensive  Hold lisinopril for now. History of type 2 diabetes  Lantus 10 units at night with lispro 4 units mealtime coverage. Hypoglycemia protocol. Hyperlipidemia: Continue statin.       Obtain baseline BMP as well as CBC (both ordered) Thank you for letting me to participate in patient care will continue to follow with patient in-house.       Electronically signed by Belvie Goltz, MD on 1/26/21 at 11:52 AM CST

## 2021-01-26 NOTE — ANESTHESIA POSTPROCEDURE EVALUATION
Department of Anesthesiology  Postprocedure Note    Patient: Rima Azar  MRN: [id number]  YOB: 1933  Date of evaluation: 1/26/2021  Time:  10:12 AM     Procedure Summary     Date: 01/26/21 Room / Location: [address]    Anesthesia Start: 0730 Anesthesia Stop: 6056    Procedure: LAMINECTOMY L3-4/ L4-5 (N/A ) Diagnosis: (BACK PAIN)    Surgeons: Jerry Moeller MD Responsible Provider: GONZALO Nunn CRNA    Anesthesia Type: general ASA Status: 3          Anesthesia Type: general    Alejandro Phase I: Alejandro Score: 10    Alejandro Phase II:      Last vitals: Reviewed and per EMR flowsheets.        Anesthesia Post Evaluation

## 2021-01-26 NOTE — CARE COORDINATION
Patient is current with [facility] for Nursing, OT, [facility]. Will follow. Please advise when patient discharges. WILL NEED JONATAN ORDERS TO RESUME HH SERVICES WHEN PATIENT DISCHARGES. Thank you. [address] [place] [address] [phone]. [phone].     Naomi Ashby RN, Home Care Liaison  [phone] P  Electronically signed by Naomi Ashby on 1/26/2021 at 11:05 AM

## 2021-01-27 LAB
ANION GAP SERPL CALCULATED.3IONS-SCNC: 9 MMOL/L (ref 7–19)
BASOPHILS ABSOLUTE: 0 K/UL (ref 0–0.2)
BASOPHILS RELATIVE PERCENT: 0 % (ref 0–1)
BUN BLDV-MCNC: 12 MG/DL (ref 8–23)
CALCIUM SERPL-MCNC: 8.9 MG/DL (ref 8.8–10.2)
CHLORIDE BLD-SCNC: 105 MMOL/L (ref 98–111)
CO2: 24 MMOL/L (ref 22–29)
CREAT SERPL-MCNC: 0.6 MG/DL (ref 0.5–0.9)
EOSINOPHILS ABSOLUTE: 0 K/UL (ref 0–0.6)
EOSINOPHILS RELATIVE PERCENT: 0 % (ref 0–5)
GFR AFRICAN AMERICAN: >59
GFR NON-AFRICAN AMERICAN: >60
GLUCOSE BLD-MCNC: 123 MG/DL (ref 70–99)
GLUCOSE BLD-MCNC: 124 MG/DL (ref 70–99)
GLUCOSE BLD-MCNC: 145 MG/DL (ref 70–99)
GLUCOSE BLD-MCNC: 165 MG/DL (ref 74–109)
GLUCOSE BLD-MCNC: 167 MG/DL (ref 70–99)
HCT VFR BLD CALC: 36.6 % (ref 37–47)
HEMOGLOBIN: 11.6 G/DL (ref 12–16)
IMMATURE GRANULOCYTES #: 0 K/UL
LYMPHOCYTES ABSOLUTE: 0.8 K/UL (ref 1.1–4.5)
LYMPHOCYTES RELATIVE PERCENT: 9.9 % (ref 20–40)
MCH RBC QN AUTO: 29 PG (ref 27–31)
MCHC RBC AUTO-ENTMCNC: 31.7 G/DL (ref 33–37)
MCV RBC AUTO: 91.5 FL (ref 81–99)
MONOCYTES ABSOLUTE: 0.6 K/UL (ref 0–0.9)
MONOCYTES RELATIVE PERCENT: 7.6 % (ref 0–10)
NEUTROPHILS ABSOLUTE: 6.4 K/UL (ref 1.5–7.5)
NEUTROPHILS RELATIVE PERCENT: 82 % (ref 50–65)
PDW BLD-RTO: 13.8 % (ref 11.5–14.5)
PERFORMED ON: ABNORMAL
PLATELET # BLD: 190 K/UL (ref 130–400)
PMV BLD AUTO: 9.9 FL (ref 9.4–12.3)
POTASSIUM REFLEX MAGNESIUM: 4 MMOL/L (ref 3.5–5)
RBC # BLD: 4 M/UL (ref 4.2–5.4)
SODIUM BLD-SCNC: 138 MMOL/L (ref 136–145)
WBC # BLD: 7.9 K/UL (ref 4.8–10.8)

## 2021-01-27 PROCEDURE — 6370000000 HC RX 637 (ALT 250 FOR IP): Performed by: HOSPITALIST

## 2021-01-27 PROCEDURE — 97530 THERAPEUTIC ACTIVITIES: CPT

## 2021-01-27 PROCEDURE — 97535 SELF CARE MNGMENT TRAINING: CPT

## 2021-01-27 PROCEDURE — 99024 POSTOP FOLLOW-UP VISIT: CPT | Performed by: NEUROLOGICAL SURGERY

## 2021-01-27 PROCEDURE — 80048 BASIC METABOLIC PNL TOTAL CA: CPT

## 2021-01-27 PROCEDURE — 97161 PT EVAL LOW COMPLEX 20 MIN: CPT

## 2021-01-27 PROCEDURE — 6370000000 HC RX 637 (ALT 250 FOR IP): Performed by: INTERNAL MEDICINE

## 2021-01-27 PROCEDURE — 1210000000 HC MED SURG R&B

## 2021-01-27 PROCEDURE — 82947 ASSAY GLUCOSE BLOOD QUANT: CPT

## 2021-01-27 PROCEDURE — 85025 COMPLETE CBC W/AUTO DIFF WBC: CPT

## 2021-01-27 PROCEDURE — 97165 OT EVAL LOW COMPLEX 30 MIN: CPT

## 2021-01-27 PROCEDURE — 6370000000 HC RX 637 (ALT 250 FOR IP): Performed by: NEUROLOGICAL SURGERY

## 2021-01-27 PROCEDURE — 36415 COLL VENOUS BLD VENIPUNCTURE: CPT

## 2021-01-27 PROCEDURE — 2580000003 HC RX 258: Performed by: NEUROLOGICAL SURGERY

## 2021-01-27 RX ORDER — LISINOPRIL 5 MG/1
5 TABLET ORAL DAILY
Status: DISCONTINUED | OUTPATIENT
Start: 2021-01-27 | End: 2021-01-30 | Stop reason: HOSPADM

## 2021-01-27 RX ADMIN — FERROUS SULFATE TAB 325 MG (65 MG ELEMENTAL FE) 325 MG: 325 (65 FE) TAB at 09:00

## 2021-01-27 RX ADMIN — HYDROCODONE BITARTRATE AND ACETAMINOPHEN 1 TABLET: 5; 325 TABLET ORAL at 10:39

## 2021-01-27 RX ADMIN — CYANOCOBALAMIN TAB 500 MCG 500 MCG: 500 TAB at 09:00

## 2021-01-27 RX ADMIN — DOCUSATE SODIUM 50MG AND SENNOSIDES 8.6MG 1 TABLET: 8.6; 5 TABLET, FILM COATED ORAL at 09:00

## 2021-01-27 RX ADMIN — SODIUM CHLORIDE, PRESERVATIVE FREE 10 ML: 5 INJECTION INTRAVENOUS at 19:57

## 2021-01-27 RX ADMIN — ROSUVASTATIN CALCIUM 20 MG: 20 TABLET, FILM COATED ORAL at 19:57

## 2021-01-27 RX ADMIN — LISINOPRIL 5 MG: 5 TABLET ORAL at 10:43

## 2021-01-27 RX ADMIN — Medication 10 UNITS: at 20:00

## 2021-01-27 RX ADMIN — DOCUSATE SODIUM 50MG AND SENNOSIDES 8.6MG 1 TABLET: 8.6; 5 TABLET, FILM COATED ORAL at 19:57

## 2021-01-27 RX ADMIN — GABAPENTIN 300 MG: 300 CAPSULE ORAL at 19:57

## 2021-01-27 RX ADMIN — INSULIN LISPRO 2 UNITS: 100 INJECTION, SOLUTION INTRAVENOUS; SUBCUTANEOUS at 09:15

## 2021-01-27 RX ADMIN — INSULIN LISPRO 4 UNITS: 100 INJECTION, SOLUTION INTRAVENOUS; SUBCUTANEOUS at 09:22

## 2021-01-27 RX ADMIN — LIDOCAINE HYDROCHLORIDE: 20 SOLUTION ORAL; TOPICAL at 10:43

## 2021-01-27 RX ADMIN — DILTIAZEM HYDROCHLORIDE 240 MG: 240 CAPSULE, COATED, EXTENDED RELEASE ORAL at 08:59

## 2021-01-27 RX ADMIN — CETIRIZINE HYDROCHLORIDE 10 MG: 10 TABLET, FILM COATED ORAL at 09:00

## 2021-01-27 ASSESSMENT — PAIN SCALES - GENERAL
PAINLEVEL_OUTOF10: 0
PAINLEVEL_OUTOF10: 4

## 2021-01-27 NOTE — PROGRESS NOTES
Occupational Therapy   Occupational Therapy Initial Assessment  Date: 2021   Patient Name: Albania Young  MRN: [id number]     : 1933    Date of Service: 2021    Discharge Recommendations:  Patient would benefit from continued therapy after discharge       Assessment   Assessment: The patient would benefit from further therapy to upgrade functional status. Not safe to mobilize or complete ADL on her own  Treatment Diagnosis: L3-4, L4-5 decompressive laminectomy  REQUIRES OT FOLLOW UP: Yes  Activity Tolerance  Activity Tolerance: Patient Tolerated treatment well  Safety Devices  Safety Devices in place: Yes  Type of devices: Call light within reach; Chair alarm in place           Patient Diagnosis(es): There were no encounter diagnoses. has a past medical history of Diabetes mellitus ([facility] [address] Hyperlipidemia, and Hypertension. has a past surgical history that includes Appendectomy; Mastectomy, bilateral; Cholecystectomy; and Lumbar spine surgery (N/A, 2021). Treatment Diagnosis: L3-4, L4-5 decompressive laminectomy      Restrictions  Restrictions/Precautions  Restrictions/Precautions: Fall Risk, Surgical Protocols  Required Braces or Orthoses?: Yes  Required Braces or Orthoses  Spinal Other: LSO  Left Lower Extremity Brace:  Dee Foot Orthotics  Position Activity Restriction  Spinal Precautions: No Bending, No Lifting, No Twisting  Other position/activity restrictions: AFO in room but has not been sized to patient and it does not fit in current shoe    Subjective   General  Chart Reviewed: Yes  Patient assessed for rehabilitation services?: Yes  Family / Caregiver Present: Yes(dtr)  Patient Currently in Pain: Yes  Pain Assessment  Pain Assessment: 0-10  Pain Level: 4  Pain Location: Back  Non-Pharmaceutical Pain Intervention(s): Ambulation/Increased Activity;Repositioned  Response to Pain Intervention: Patient Satisfied  Vital Signs  Temp: 97.5 °F (36.4 °C)  Temp Source: Temporal  Pulse: 89 Heart Rate Source: Monitor  Resp: 17  BP: 111/68  BP Location: Right Arm  Level of Consciousness: Alert (0)  Patient Currently in Pain: Yes  Oxygen Therapy  SpO2: 92 %  O2 Device: None (Room air)  Social/Functional History  Social/Functional History  Bathroom Equipment: 3-in-1 commode  Home Equipment: Rolling walker, Lift chair(transport wheelchair)  ADL Assistance: Needs assistance  Ambulation Assistance: Needs assistance(hasn't been able to walk for the last couple of weeks)  Transfer Assistance: Needs assistance  Additional Comments: Dtr. began staying with patient in early December. The patient sleeps/sits primarily in her lift recliner and uses BSC from there. Family bathes her in the bed       Objective        Orientation  Overall Orientation Status: Within Functional Limits     Balance  Sitting Balance: Supervision  Standing Balance: Moderate assistance(with safety bar)  Toilet Transfers  Toilet - Technique: Stand step  Toilet Transfer: Maximum assistance  Toilet Transfers Comments: Excellent with Corky Ewings  ADL  Feeding: Independent  Grooming: Independent;Setup  UE Bathing: Independent;Setup  LE Bathing: Moderate assistance;Maximum assistance  UE Dressing: Minimal assistance  LE Dressing: Moderate assistance;Maximum assistance  Toileting: Moderate assistance;Maximum assistance           Transfers  Stand Step Transfers: Maximum assistance(knee giovanna upon stepping)  Transfer Comments: Excellent candidate for the Tanner Ewings     Cognition  Overall Cognitive Status: WFL                 LUE PROM (degrees)  LUE PROM: WFL  LUE AROM (degrees)  LUE AROM : WFL  RUE PROM (degrees)  RUE PROM: WFL  RUE AROM (degrees)  RUE AROM : WFL                    Tx initiated:   Movement strategies, positioning, restrictions, AdL techniques (30 mins)    Plan   Plan  Times per week: 3-5    G-Code     OutComes Score                                                  AM-PAC Score             Goals  Short term goals Short term goal 1: Perform transfers with min A  Short term goal 2: Perform dressing with min A  Short term goal 3: Perform toileting with min A  Short term goal 4: Supervision to restate/demo back protocols  Short term goal 5:  Independent with beginning level therapeutic       Therapy Time   Individual Concurrent Group Co-treatment   Time In           Time Out           Minutes                   Yarelis Mcdaniels OT Electronically signed by Yarelis Mcdaniels OT on 1/27/2021 at 11:17 AM

## 2021-01-27 NOTE — OP NOTE
[facility] [address] [facility]                                OPERATIVE REPORT    PATIENT NAME: Carlotta Mittal                        :        1933  MED REC NO:   [id number]                              ROOM:       [facility]  ACCOUNT NO:   [de-identified]                           ADMIT DATE: 2021  PROVIDER:     Lizbeth Law. Sebastien Meng MD, PhD    [place]:  2021    NEUROSURGICAL OPERATIVE REPORT    ATTENDING SURGEON:  Lizbeth Law. Sebastien Meng MD, PhD    PREOPERATIVE DIAGNOSES:  Lumbar spinal stenosis with neurogenic  claudication and left foot drop. POSTOPERATIVE DIAGNOSES:  Lumbar spinal stenosis with neurogenic  claudication and left foot drop. PROCEDURE PERFORMED:  1. L3-L4 and L4-L5 decompressive laminectomy. 2.  Intraoperative fluoroscopy with interpretation. ESTIMATED BLOOD LOSS:  Less than 100 mL. INDICATIONS:  Please see the medical record for full details. Briefly,  the patient is an 51-year-old female who presented to neurosurgical  attention complaining of progressive back pain, difficulty ambulating,  and left lower extremity pain and weakness. On examination, she was  found to have a left foot drop, difficulty while standing upright with  upright posture, and inability to walk more than a few feet without  stopping to rest.  An MRI scan of her lumbar spine was performed that  demonstrated lumbar degenerative scoliosis as well as disk osteophyte  formation and advanced facet arthropathy mostly at L3-L4 and L4-L5, that  were causing significant central canal and bilateral lateral recess  stenosis, left greater than right. Given her imaging findings and her  presenting complaints, I offered the patient the above mentioned surgery  understanding with her advanced age that she would be at elevated risk  for complications related to surgery.   She voiced understanding and requested that we proceed. 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 Her surgery was delayed for one week while a CT scan of  her chest was obtained and she was referred to Pulmonology and  Pulmonology agreed that given her limited mobility that the spinal  surgery should still occur in order to increase her functional mobility  and hopefully, her activity level to tolerate potential treatment for  her malignancy. PROCEDURE IN DETAIL:  The patient was identified in the preoperative  area, consent for surgery was confirmed, and the site for surgery was  marked. She was transferred to the operating room by the Anesthesia  team where general endotracheal anesthesia was induced without  difficulty. She was then flipped into the prone position on a Alfredito  frame and meticulous fluoroscopy was used to localize the operative  levels given her significant scoliosis and also the presence of  prominent S1-S2 disk. Once we had localized the levels of the operation  using AP and lateral fluoroscopy, a midline incision was planned and  marked on the skin. Her back was prepped and draped in the usual  aseptic fashion. A team pause was held according to the preformatted  script, all were in agreement, and the decision was made to proceed with  surgery. The planned incision was infiltrated with local anesthetic and opened  sharply with a skin knife. Monopolar cautery and Perry elevators were  used to elevate the paraspinous musculature off the spinous processes of  L3, L4, and a portion of L2 and L5. Quin and cerebellar  self-retaining retractors were then placed. Once we had adequate  exposure, we placed a Kocher clamp in our exposure and confirmed that we  were approaching appropriate operative levels. With the confirmation of  our levels complete, we decided to proceed with the decompressive  portion of the operation. Using [employer], we removed the spinous processes of L3 and L4  and a portion of the L2 and L5 spinous processes. Given her concern for  malignancy and no tissue diagnosis, the bony tissue was sent for  pathology for permanent analysis given the possibility of bony  metastases. Once we had removed the spinous processes, the matchstick  jamir on the high-speed drill was used to eggshell the L3 and L4 lamina  as well as a portion of the rostral L5 and the inferior L2 lamina. We  then proceeded to perform a meticulous decompression once we had  identified the epidural space. We passed a [facility] under the  ligamentum flavum and then using a 3 mm Kerrison punch, we performed a  generous lateral recess decompression bilaterally with special focus on  her very symptomatic left side. During the decompression at L4-L5, we  did note that the articular process of the L4-L5 facet joint was  incompetent and the superior articular process was required to be  removed. Once we had completed a generous bilateral decompression and  there was no additional compressive facet capsule or ligamentum flavum,  we were able to pass a [employer] out the neuroforamen  bilaterally at L2, L3, L4, and L5. Once we had done this, we again  placed the [employer] at the L5 neuroforamen and then at the  level of the L2 neuroforamen and confirmed that our decompression  spanned from the level of the L2-L3 disk space to below the L5 pedicle. With an adequate decompression achieved, we removed the [facility] and the  nerve hook. We then irrigated the field copiously. We placed FloSeal  hemostatic agent in the epidural space to assist with hemostasis. This  was allowed to [facility] and then irrigated away. We confirmed epidural  hemostasis at this time. We then removed our self-retaining retractors  and turned our attention to wound closure.   A medium Hemovac drain was left in the subfascial plane and tunneled superiorly from the incision. The fascia was then closed with interrupted 0 Vicryl sutures. Exparel  long-acting local anesthetic was then injected into the paraspinous  muscle and subcutaneous tissue bilaterally. Once the Exparel was  instilled, we placed several sutures in the subcutaneous fat to provide  additional wound closure and then the dermis was closed with inverted  interrupted 3-0 Vicryl sutures. The wound was then cleaned and dried. A 2-0 Vicryl suture was used to secure the drain to the skin and the  drain was connected to the suction reservoir. Once the wound was dried,  the skin was then sealed with Dermabond skin glue. Once the Dermabond  had dried, the drapes were removed and the patient was flipped into the  supine position and control of the operation was returned to Anesthesia  for extubation and for transport to Recovery. All sponge, needle, and  instrument counts were correct on two iterations at the end of the  procedure and there were no apparent complications. Yael Mejias MD, PhD    D: 01/26/2021 11:35:06      T: 01/26/2021 16:52:04     [email or web address]  Job#: [id number]     Doc#: [id number]    CC:

## 2021-01-27 NOTE — PROGRESS NOTES
Hospitalist Consult Progress Note  1/27/2021 10:05 AM  Subjective:   Admit Date: 1/26/2021  PCP: Reyna Rodriguez MD    Reason for consult: medical management    Subjective: Therapy just got her up to the chair and she feels better. Had some choking on her breakfast but thinks sitting up will help. Would like something for acid reflux. Pain currently controlled. Denies CV, resp, GI complaints. ROS: Six point review of systems is negative except as specifically addressed above. DIET CARB CONTROL;     Intake/Output Summary (Last 24 hours) at 1/27/2021 1005  Last data filed at 1/27/2021 0346  Gross per 24 hour   Intake 1602 ml   Output 655 ml   Net 947 ml     Medications:   dextrose       Current Facility-Administered Medications   Medication Dose Route Frequency Provider Last Rate Last Admin    aluminum & magnesium hydroxide-simethicone (MAALOX) 30 mL, lidocaine viscous hcl (XYLOCAINE) 5 mL (GI COCKTAIL)   Oral Once Inell MD Con        cetirizine (ZYRTEC) tablet 10 mg  10 mg Oral Daily Dora Small MD   10 mg at 01/27/21 0900    vitamin B-12 (CYANOCOBALAMIN) tablet 500 mcg  500 mcg Oral Daily Dora Small MD   500 mcg at 01/27/21 0900    dilTIAZem (CARDIZEM CD) extended release capsule 240 mg  240 mg Oral Daily Dora Small MD   240 mg at 01/27/21 0859    ferrous sulfate (IRON 325) tablet 325 mg  325 mg Oral Daily with breakfast Dora Small MD   325 mg at 01/27/21 0900    gabapentin (NEURONTIN) capsule 300 mg  300 mg Oral Nightly Dora Small MD   300 mg at 01/26/21 1948    rosuvastatin (CRESTOR) tablet 20 mg  20 mg Oral Nightly Dora Small MD   20 mg at 01/26/21 1948    sodium chloride flush 0.9 % injection 10 mL  10 mL Intravenous 2 times per day Dora Small MD   10 mL at 01/26/21 1256    sodium chloride flush 0.9 % injection 10 mL  10 mL Intravenous PRN Dora Small MD  sennosides-docusate sodium (SENOKOT-S) 8.6-50 MG tablet 1 tablet  1 tablet Oral BID Mita Echols MD   1 tablet at 01/27/21 0900    polyethylene glycol (GLYCOLAX) packet 17 g  17 g Oral Daily PRN Mita Echols MD        bisacodyl (DULCOLAX) suppository 10 mg  10 mg Rectal Daily PRN Mita Echols MD        ondansetron (ZOFRAN-ODT) disintegrating tablet 4 mg  4 mg Oral Q8H PRN Mita Echols MD        Or    ondansetron [facility] injection 4 mg  4 mg Intravenous Q6H PRN Mita Echols MD        HYDROcodone-acetaminophen [facility]) 5-325 MG per tablet 1 tablet  1 tablet Oral Q4H PRN Mita Echols MD   1 tablet at 01/26/21 1254    Or    HYDROcodone-acetaminophen (NORCO) 5-325 MG per tablet 2 tablet  2 tablet Oral Q4H PRN Mita Echols MD        HYDROmorphone HCl PF (DILAUDID) injection 0.25 mg  0.25 mg Intravenous Q3H PRN Mita Echols MD        Or    HYDROmorphone HCl PF (DILAUDID) injection 0.5 mg  0.5 mg Intravenous Q3H PRN Mita Echols MD        cyclobenzaprine (FLEXERIL) tablet 10 mg  10 mg Oral TID PRN Mita Echols MD        insulin lispro (HUMALOG) injection vial 4 Units  0.08 Units/kg Subcutaneous TID  Mita Echols MD   4 Units at 01/27/21 0245    insulin lispro (HUMALOG) injection vial 0-12 Units  0-12 Units Subcutaneous TID  Mita Echols MD   2 Units at 01/27/21 0915    insulin lispro (HUMALOG) injection vial 0-6 Units  0-6 Units Subcutaneous Nightly Mita Echols MD   2 Units at 01/26/21 2108    insulin glargine (LANTUS) injection vial 10 Units  10 Units Subcutaneous Nightly Tami Haywood MD   10 Units at 01/26/21 2108    glucose (GLUTOSE) 40 % oral gel 15 g  15 g Oral PRN Tami Haywood MD        dextrose 50 % IV solution  12.5 g Intravenous PRN Tami Haywood MD        glucagon (rDNA) injection 1 mg  1 mg Intramuscular PRN Tami Haywood MD        dextrose 5 % solution  100 mL/hr Intravenous PRN Tami Haywood MD            Labs: Recent Labs     01/26/21  1155 01/27/21  0331   WBC 8.6 7.9   RBC 4.37 4.00*   HGB 12.8 11.6*   HCT 40.5 36.6*   MCV 92.7 91.5   MCH 29.3 29.0   MCHC 31.6* 31.7*    190     Recent Labs     01/26/21  1155 01/27/21  0331    138   K 4.0 4.0   ANIONGAP 10 9    105   CO2 26 24   BUN 16 12   CREATININE 0.7 0.6   GLUCOSE 204* 165*   CALCIUM 9.0 8.9     No results for input(s): MG, PHOS in the last 72 hours. Recent Labs     01/26/21  1155   AST 23   ALT 22   BILITOT 0.3   ALKPHOS 55     ABGs:No results for input(s): PH, PO2, PCO2, HCO3, BE, O2SAT in the last 72 hours. Troponin T: No results for input(s): TROPONINI in the last 72 hours. INR: No results for input(s): INR in the last 72 hours. Lactic Acid: No results for input(s): LACTA in the last 72 hours. Objective:   Vitals: BP (!) 153/77   Pulse 80   Temp 98.2 °F (36.8 °C) (Temporal)   Resp 17   Ht 5' (1.524 m)   Wt 111 lb 14.4 oz (50.8 kg)   SpO2 96%   BMI 21.85 kg/m²   24HR INTAKE/OUTPUT:      Intake/Output Summary (Last 24 hours) at 1/27/2021 1005  Last data filed at 1/27/2021 0346  Gross per 24 hour   Intake 1602 ml   Output 655 ml   Net 947 ml     General appearance: sitting up in cardiac chair in NAD   Head: NC/AT   Eyes: normal sclera  Mouth: MMM   Lungs: no increased work of breathing  Heart: regular rate and rhythm, S1, S2 normal, no murmur, click, rub or gallop  Abdomen: soft, non-tender; bowel sounds normal; no masses,  no organomegaly  Extremities: no LE edema  abd binder and hemovac drain in place   Neurologic: alert and oriented  Psych: affect and mood appropriate  Skin: no overt rashes    Assessment and Plan: Active Problems:    Lumbar stenosis with neurogenic claudication  Resolved Problems:    * No resolved hospital problems.  * Lumbar stenosis with neurogenic claudication- s/p L3/4 and L4/5 decompressive laminectomy on 1/26/2021 - per Neurosurgery. Also defer pain control, diet, activity, dispo, DVT ppx to them as primary team.     Reflux- notes one time occurrence, improved. Will monitor for recurrence and order SLP as needed. Trial GI cocktail x1. History of hypertension - BP better. restart lisinopril.     History of type 2 diabetes  Holding home orals. Lantus 10 units at night with lispro 4 units mealtime coverage. Hypoglycemia protocol.     Hyperlipidemia: Continue statin. Advance Directive: Full Code    Signed:   Nakul Collazo MD 1/27/2021 10:05 AM  Hospitalist

## 2021-01-27 NOTE — PROGRESS NOTES
Physical Therapy  Name: Anant Dubois  MRN:  [id number]  Date of service:  1/27/2021 01/27/21 1519   Subjective   Subjective Attempt: Pt BTB with nursing assist, resting at this time.        Electronically signed by Giovanni Jackson PTA on 1/27/2021 at 3:19 PM

## 2021-01-27 NOTE — CARE COORDINATION
The [address] at [facility]  Notification of Admission Decision      [] Patient has been accepted for admit to [facility] on :       Please write discharge orders and summary prior to discharge. [] Patient acceptance to Rehab pending the following :    [x] Eval in progress       [] Patient determined to be ineligible for services at [facility] because : We recommend you consider        Thank you for your referral, we appreciate you.     Electronically Signed by Geovanna Pandey, Admissions Coordinator 1/27/2021 2:27 PM

## 2021-01-27 NOTE — PROGRESS NOTES
PHYSICAL THERAPY  Daily Treatment Note    DATE:  2021  NAME:  Rima Mancia  :  1933  (87 y.o.,female)  MRN:  [id number]      HOME LIVING:       RESTRICTIONS/PRECAUTIONS:         OVERALL  ORIENTATION STATUS:  Overall Orientation Status: Within Normal Limits    STRENGTH     Strength LLE  Comment: Grossly 2/5    ROM   PROM RLE (degrees)  RLE PROM: WFL  PROM LLE (degrees)  LLE PROM: WFL     BALANCE  Balance  Posture: Fair  Sitting - Static: Good  Sitting - Dynamic: Good  Standing - Static: Poor  Standing - Dynamic: Poor    BED MOBILITY  Bed Mobility  Scooting: Minimal assistance    TRANSFERS  Transfers  Sit to Stand:  Moderate Assistance  Stand to sit: Minimal Assistance  Bed to Chair: Maximum assistance  Stand Pivot Transfers: Maximum Assistance    AMBULATION  Device: Rolling Walker  Assistance: Maximum assistance  Distance: 4 steps to chair    STAIRS         COMMENTS:  Returned to chair at patient request  Call light within reach  Patient instructed to request assistance before getting up  Nursing updated        Electronically signed by Viet Verde PT on 2021 at 9:58 AM

## 2021-01-27 NOTE — PROGRESS NOTES
Physical Therapy    Facility/Department: [facility] SURG SERVICES  Initial Assessment    NAME: Rima Martinez  : 1933  MRN: [id number]    Date of Service: 2021    Discharge Recommendations:  24 hour supervision or assist, IP Rehab, Subacute/Skilled Nursing Facility, ECF with PT        Assessment   Body structures, Functions, Activity limitations: Decreased functional mobility   Assessment: Patient will benefit from continuing skilled physical therapy to improve mobility  Treatment Diagnosis: Difficulty walking  Prognosis: Good  Decision Making: Low Complexity  PT Education: Transfer Training  REQUIRES PT FOLLOW UP: Yes  Activity Tolerance  Activity Tolerance: Patient limited by endurance       Patient Diagnosis(es): There were no encounter diagnoses. has a past medical history of Diabetes mellitus ([facility] [address] Hyperlipidemia, and Hypertension. has a past surgical history that includes Appendectomy; Mastectomy, bilateral; Cholecystectomy; and Lumbar spine surgery (N/A, 2021). Restrictions     Vision/Hearing        Subjective  General  Chart Reviewed: Yes  Patient assessed for rehabilitation services?: Yes  Family / Caregiver Present: Yes  Diagnosis: Spinal stenosis  Follows Commands: Within Functional Limits  Subjective  Subjective: States that she was using a walker for mobility until early january but has not been able to walk in the last few weeks  Pain Screening  Patient Currently in Pain: No  Vital Signs  Patient Currently in Pain: No       Orientation  Orientation  Overall Orientation Status: Within Normal Limits  Social/Functional History     Cognition        Objective          PROM RLE (degrees)  RLE PROM: WFL  PROM LLE (degrees)  LLE PROM: WFL  Strength LLE  Comment: Grossly 2/5  Strength RUE  Comment: Grossly 4/5        Bed mobility  Supine to Sit: Moderate assistance  Sit to Supine: Moderate assistance  Scooting: Minimal assistance  Transfers  Sit to Stand:  Moderate Assistance Stand to sit: Minimal Assistance  Bed to Chair: Maximum assistance  Stand Pivot Transfers: Maximum Assistance  Ambulation  Ambulation?: Yes(Use LSO)  WB Status: WBAT  Ambulation 1  Device: Rolling Walker  Other Apparatus: AFO  Assistance: Maximum assistance  Quality of Gait: Unsteady and unsafe  Distance: 4 steps to chair     Balance  Posture: Fair  Sitting - Static: Good  Sitting - Dynamic: Good  Standing - Static: Poor  Standing - Dynamic: Poor        Plan   Plan  Times per week: 7  Times per day: Daily  Plan weeks: 2  Current Treatment Recommendations: Strengthening, Balance Training, Functional Mobility Training, Transfer Training, Gait Training  Safety Devices  Type of devices: Call light within reach, Gait belt, Left in chair, Nurse notified    G-Code       OutComes Score                                                  AM-PAC Score             Goals  Short term goals  Time Frame for Short term goals: 2 weeks  Short term goal 1: Independent with supine to edge of bed  Short term goal 2: Transfer bed to chair with assistive device and CGA  Short term goal 3: Ambulate 50 feet with assistive device and minimum assist of 1  Short term goal 4: Patient / family will be independent with LSO management       Therapy Time   Individual Concurrent Group Co-treatment   Time In           Time Out           Minutes                   Amrit Wells PT    Electronically signed by Amrit Wells PT on 1/27/2021 at 9:57 AM

## 2021-01-28 ENCOUNTER — APPOINTMENT (OUTPATIENT)
Dept: MRI IMAGING | Age: 86
DRG: 516 | End: 2021-01-28
Attending: NEUROLOGICAL SURGERY
Payer: MEDICARE

## 2021-01-28 LAB
GLUCOSE BLD-MCNC: 112 MG/DL (ref 70–99)
GLUCOSE BLD-MCNC: 122 MG/DL (ref 70–99)
GLUCOSE BLD-MCNC: 271 MG/DL (ref 70–99)
PERFORMED ON: ABNORMAL

## 2021-01-28 PROCEDURE — 1210000000 HC MED SURG R&B

## 2021-01-28 PROCEDURE — 97530 THERAPEUTIC ACTIVITIES: CPT

## 2021-01-28 PROCEDURE — 99024 POSTOP FOLLOW-UP VISIT: CPT | Performed by: NEUROLOGICAL SURGERY

## 2021-01-28 PROCEDURE — 6370000000 HC RX 637 (ALT 250 FOR IP): Performed by: NEUROLOGICAL SURGERY

## 2021-01-28 PROCEDURE — 70553 MRI BRAIN STEM W/O & W/DYE: CPT

## 2021-01-28 PROCEDURE — 82947 ASSAY GLUCOSE BLOOD QUANT: CPT

## 2021-01-28 PROCEDURE — 2580000003 HC RX 258: Performed by: NEUROLOGICAL SURGERY

## 2021-01-28 PROCEDURE — 6360000004 HC RX CONTRAST MEDICATION: Performed by: NEUROLOGICAL SURGERY

## 2021-01-28 PROCEDURE — A9577 INJ MULTIHANCE: HCPCS | Performed by: NEUROLOGICAL SURGERY

## 2021-01-28 PROCEDURE — 6370000000 HC RX 637 (ALT 250 FOR IP): Performed by: INTERNAL MEDICINE

## 2021-01-28 RX ADMIN — ROSUVASTATIN CALCIUM 20 MG: 20 TABLET, FILM COATED ORAL at 20:54

## 2021-01-28 RX ADMIN — CYANOCOBALAMIN TAB 500 MCG 500 MCG: 500 TAB at 08:37

## 2021-01-28 RX ADMIN — DILTIAZEM HYDROCHLORIDE 240 MG: 240 CAPSULE, COATED, EXTENDED RELEASE ORAL at 08:37

## 2021-01-28 RX ADMIN — GABAPENTIN 300 MG: 300 CAPSULE ORAL at 20:54

## 2021-01-28 RX ADMIN — HYDROCODONE BITARTRATE AND ACETAMINOPHEN 1 TABLET: 5; 325 TABLET ORAL at 08:37

## 2021-01-28 RX ADMIN — SODIUM CHLORIDE, PRESERVATIVE FREE 10 ML: 5 INJECTION INTRAVENOUS at 20:56

## 2021-01-28 RX ADMIN — CETIRIZINE HYDROCHLORIDE 10 MG: 10 TABLET, FILM COATED ORAL at 08:37

## 2021-01-28 RX ADMIN — HYDROCODONE BITARTRATE AND ACETAMINOPHEN 1 TABLET: 5; 325 TABLET ORAL at 12:58

## 2021-01-28 RX ADMIN — DOCUSATE SODIUM 50MG AND SENNOSIDES 8.6MG 1 TABLET: 8.6; 5 TABLET, FILM COATED ORAL at 20:54

## 2021-01-28 RX ADMIN — GADOBENATE DIMEGLUMINE 9 ML: 529 INJECTION, SOLUTION INTRAVENOUS at 14:57

## 2021-01-28 RX ADMIN — DOCUSATE SODIUM 50MG AND SENNOSIDES 8.6MG 1 TABLET: 8.6; 5 TABLET, FILM COATED ORAL at 08:37

## 2021-01-28 RX ADMIN — INSULIN LISPRO 6 UNITS: 100 INJECTION, SOLUTION INTRAVENOUS; SUBCUTANEOUS at 12:58

## 2021-01-28 RX ADMIN — LISINOPRIL 5 MG: 5 TABLET ORAL at 08:37

## 2021-01-28 ASSESSMENT — PAIN SCALES - GENERAL
PAINLEVEL_OUTOF10: 4
PAINLEVEL_OUTOF10: 1
PAINLEVEL_OUTOF10: 0
PAINLEVEL_OUTOF10: 4

## 2021-01-28 ASSESSMENT — PAIN DESCRIPTION - ORIENTATION: ORIENTATION: LOWER

## 2021-01-28 NOTE — PROGRESS NOTES
01/28/21 1421   General Comment   Comments Attempt in PM patient out to MRI   Physical Therapy    Electronically signed by Beth Gorman PTA on 1/28/2021 at 2:22 PM

## 2021-01-28 NOTE — PROGRESS NOTES
NEUROSURGERY POSTOPERATIVE PROGRESS NOTE      Chief Complaint: Back pain, difficulty walking    Date of Surgery: 1/26/2021    Procedure Performed: L3/4 and L4/5 decompressive laminectomy      Interval Update: No overnight events. Pain well-controlled when still but increases with movement. HMV drain output tapering. She required maximal assistance but was able to get OOB to the chair yesterday. LSO brace and AFO at bedside. HPI: 55-year-old female who presented to neurosurgical attention complaining of progressive back pain, difficulty ambulating, and left lower extremity pain and weakness. On examination, she was found to have a left foot drop, difficulty while standing upright with upright posture, and inability to walk more than a few feet without stopping to rest.  An MRI scan of her lumbar spine was performed that demonstrated lumbar degenerative scoliosis as well as disk osteophyte formation and advanced facet arthropathy mostly at L3-L4 and L4-L5, that were causing significant central canal and bilateral lateral recess stenosis, left greater than right. Given her imaging findings and her presenting complaints, I offered the patient the surgery (L3/4 and L4/5 laminectomy) understanding with her advanced age that she would be at elevated risk for complications related to surgery. She voiced understanding and requested that we proceed. 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 Her surgery was delayed for one week while a CT scan of her chest was obtained and she was referred to Pulmonology and Pulmonology agreed that given her limited mobility that the spinal surgery should still occur in order to increase her functional mobility and hopefully, her activity level to tolerate potential treatment for her malignancy.       Objective: /66   Pulse 72   Temp 97.1 °F (36.2 °C) (Temporal)   Resp 14   Ht 5' (1.524 m)   Wt 111 lb 12.8 oz (50.7 kg)   SpO2 92%   BMI 21.83 kg/m²     HMV: 85 mL x24h (15mL overnight)    Physical Exam:    General: alert, cooperative, no distress  Cardiorespiratory: unlabored breathing  Wound: clean, dry, flat, no erythema or fluctuance      Neurologic Exam:    Mental Status: Alert, oriented, thought content appropriate  Cranial Nerves: PERRL, EOMI, symmetric facies, tongue midline  Motor: 5/5 RLE with prompting, 4/5 LLE in HF, KE, 0/5 ADF/APF  Somatosensory: normal light touch sensation        Assessment and Plan:  81 y/o F s/p L3/4 and L4/5 decompressive laminectomy on 1/26/2021. Her pain is controlled and her leg pain has improved.   She still demonstrates significant LLE weakness postop.    - LSO brace when OOB, AFO to be used with ambulation  - DC hemovac drain  - Obtain MRI brain w/wo contrast given persistent LLE weakness and known lung mass  - Appreciate hospitalist assistance  - Continue PT/OT, rehab eval pending        Electronically signed by Quentin Salazar MD on 1/28/2021 at 8:49 AM

## 2021-01-28 NOTE — PROGRESS NOTES
01/28/21 1113   Restrictions/Precautions   Restrictions/Precautions Fall Risk;Surgical Protocols   Required Braces or Orthoses? Yes   Required Braces or Orthoses   Spinal Other LSO   Left Lower Extremity Brace Dee Foot Orthotics   Position Activity Restriction   Spinal Precautions No Bending; No Lifting; No Twisting   Other position/activity restrictions AFO in room but has not been sized to patient and it does not fit in current shoe   General   Chart Reviewed Yes   Family / Caregiver Present No   Pain Screening   Patient Currently in Pain Yes   Pain Assessment   Pain Assessment 0-10   Pain Level 3  (Post TX)   Pain Location Back   Vital Signs   Level of Consciousness Alert (0)   Oxygen Therapy   O2 Device None (Room air)   Bed Mobility   Supine to Sit Minimal assistance   Scooting Minimal assistance   Transfers   Sit to Stand Maximum Assistance;2 Person Assistance   Stand to sit Moderate Assistance;2 Person Assistance   Bed to Chair Moderate assistance;2 Person Assistance   Ambulation   Ambulation? Yes   WB Status WBAT   Ambulation 1   Device Rolling Walker   Assistance Moderate assistance;2 Person assistance   Quality of Gait Slow difficult moving L LE due to lack of DF   Gait Deviations Slow Griselda;Decreased step length;Decreased step height   Distance 2'   Comments Steps to chair   Balance   Posture Fair   Sitting - Static Good   Sitting - Dynamic Good   Standing - Static Fair;-   Standing - Dynamic Fair;-   Conditions Requiring Skilled Therapeutic Intervention   Body structures, Functions, Activity limitations Decreased functional mobility    Activity Tolerance   Activity Tolerance Patient limited by endurance   Safety Devices   Type of devices Left in chair;Call light within reach; Chair alarm in place   Physical Therapy    Electronically signed by Zaid Fung PTA on 1/28/2021 at 11:22 AM

## 2021-01-28 NOTE — PROGRESS NOTES
Hospitalist Consult Progress Note  1/28/2021 7:51 AM  Subjective:   Admit Date: 1/26/2021  PCP: Jeff Lopes MD    Reason for consult: medical management    Subjective: Feeling okay today. Tells me she has a very poor memory. Says left lower extremity is \"like a log\". Reports no more acid reflux symptoms since yesterday morning, attributes this to not sitting up for meals. Pain currently controlled and denies any CV, respiratory, GI symptoms. Neurosurgery has ordered brain MRI given continued left lower extremity weakness. hemoVAC drain to be removed today. ROS: Six point review of systems is negative except as specifically addressed above. DIET CARB CONTROL;     Intake/Output Summary (Last 24 hours) at 1/28/2021 0751  Last data filed at 1/28/2021 6324  Gross per 24 hour   Intake 400 ml   Output 835 ml   Net -435 ml     Medications:   dextrose       Current Facility-Administered Medications   Medication Dose Route Frequency Provider Last Rate Last Admin    lisinopril (PRINIVIL;ZESTRIL) tablet 5 mg  5 mg Oral Daily Jhon Thapa MD   5 mg at 01/27/21 1043    cetirizine (ZYRTEC) tablet 10 mg  10 mg Oral Daily Annamaria Torres MD   10 mg at 01/27/21 0900    vitamin B-12 (CYANOCOBALAMIN) tablet 500 mcg  500 mcg Oral Daily Annamaria Torres MD   500 mcg at 01/27/21 0900    dilTIAZem (CARDIZEM CD) extended release capsule 240 mg  240 mg Oral Daily Annamaria Torres MD   240 mg at 01/27/21 0859    ferrous sulfate (IRON 325) tablet 325 mg  325 mg Oral Daily with breakfast Annamaria Torres MD   325 mg at 01/27/21 0900    gabapentin (NEURONTIN) capsule 300 mg  300 mg Oral Nightly Annamaria Torres MD   300 mg at 01/27/21 1957    rosuvastatin (CRESTOR) tablet 20 mg  20 mg Oral Nightly Annamaria Torres MD   20 mg at 01/27/21 1957    sodium chloride flush 0.9 % injection 10 mL  10 mL Intravenous 2 times per day Annamaria Torres MD   10 mL at 01/27/21 1957  sodium chloride flush 0.9 % injection 10 mL  10 mL Intravenous PRN Joyce Acosta MD        sennosides-docusate sodium (SENOKOT-S) 8.6-50 MG tablet 1 tablet  1 tablet Oral BID Joyce Acosta MD   1 tablet at 01/27/21 1957    polyethylene glycol (GLYCOLAX) packet 17 g  17 g Oral Daily PRN Joyce Acosta MD        bisacodyl (DULCOLAX) suppository 10 mg  10 mg Rectal Daily PRN Joyce Acosta MD        ondansetron (ZOFRAN-ODT) disintegrating tablet 4 mg  4 mg Oral Q8H PRN Joyce Acosta MD        Or    ondansetron [facility]) injection 4 mg  4 mg Intravenous Q6H PRN Joyce Acosta MD        HYDROcodone-acetaminophen [facility]) 5-325 MG per tablet 1 tablet  1 tablet Oral Q4H PRN Joyce Acosta MD   1 tablet at 01/27/21 1039    Or    HYDROcodone-acetaminophen (NORCO) 5-325 MG per tablet 2 tablet  2 tablet Oral Q4H PRN Joyce Acosta MD        HYDROmorphone HCl PF (DILAUDID) injection 0.25 mg  0.25 mg Intravenous Q3H PRN Joyce Acosta MD        Or    HYDROmorphone HCl PF (DILAUDID) injection 0.5 mg  0.5 mg Intravenous Q3H PRN Joyce Acosta MD        cyclobenzaprine (FLEXERIL) tablet 10 mg  10 mg Oral TID PRN Joyce Acosta MD        insulin lispro (HUMALOG) injection vial 4 Units  0.08 Units/kg Subcutaneous TID  Joyce Acosta MD   4 Units at 01/27/21 7575    insulin lispro (HUMALOG) injection vial 0-12 Units  0-12 Units Subcutaneous TID  Joyce Acosta MD   2 Units at 01/27/21 0915    insulin lispro (HUMALOG) injection vial 0-6 Units  0-6 Units Subcutaneous Nightly Joyce Acosta MD   2 Units at 01/26/21 2108    insulin glargine (LANTUS) injection vial 10 Units  10 Units Subcutaneous Nightly Dolores Cheng MD   10 Units at 01/27/21 2000    glucose (GLUTOSE) 40 % oral gel 15 g  15 g Oral PRN Dolores Cheng MD        dextrose 50 % IV solution  12.5 g Intravenous PRN Dolores Cheng MD        glucagon (rDNA) injection 1 mg  1 mg Intramuscular PRN Dolores Cheng MD  dextrose 5 % solution  100 mL/hr Intravenous JEFFREY Funk MD            Labs:     Recent Labs     01/26/21  1155 01/27/21  0331   WBC 8.6 7.9   RBC 4.37 4.00*   HGB 12.8 11.6*   HCT 40.5 36.6*   MCV 92.7 91.5   MCH 29.3 29.0   MCHC 31.6* 31.7*    190     Recent Labs     01/26/21  1155 01/27/21  0331    138   K 4.0 4.0   ANIONGAP 10 9    105   CO2 26 24   BUN 16 12   CREATININE 0.7 0.6   GLUCOSE 204* 165*   CALCIUM 9.0 8.9     No results for input(s): MG, PHOS in the last 72 hours. Recent Labs     01/26/21  1155   AST 23   ALT 22   BILITOT 0.3   ALKPHOS 55     ABGs:No results for input(s): PH, PO2, PCO2, HCO3, BE, O2SAT in the last 72 hours. Troponin T: No results for input(s): TROPONINI in the last 72 hours. INR: No results for input(s): INR in the last 72 hours. Lactic Acid: No results for input(s): LACTA in the last 72 hours. Objective:   Vitals: /66   Pulse 72   Temp 97.1 °F (36.2 °C) (Temporal)   Resp 14   Ht 5' (1.524 m)   Wt 111 lb 12.8 oz (50.7 kg)   SpO2 92%   BMI 21.83 kg/m²   24HR INTAKE/OUTPUT:      Intake/Output Summary (Last 24 hours) at 1/28/2021 0751  Last data filed at 1/28/2021 8642  Gross per 24 hour   Intake 400 ml   Output 835 ml   Net -435 ml     General appearance: sitting up in cardiac chair in NAD   Head: NC/AT   Eyes: normal sclera  Mouth: MMM   Lungs: no increased work of breathing  Heart: regular rate and rhythm, S1, S2 normal, no murmur, click, rub or gallop  Abdomen: soft, non-tender; bowel sounds normal; no masses,  no organomegaly  Extremities: no LE edema  abd binder and hemovac drain in place   Neurologic: alert and oriented  Psych: affect and mood appropriate  Skin: no overt rashes    Assessment and Plan: Active Problems:    Lumbar stenosis with neurogenic claudication  Resolved Problems:    * No resolved hospital problems.  * Lumbar stenosis with neurogenic claudication- s/p L3/4 and L4/5 decompressive laminectomy on 1/26/2021 - per Neurosurgery. Also defer pain control, diet, activity, dispo, DVT ppx to them as primary team.  Rehab referral pending. History of hypertension - BP better. Continue current regimen.      History of type 2 diabetes  - Holding home orals. Lantus 10 units at night with lispro 4 units mealtime coverage. -Controlled today. - Hypoglycemia protocol.     Hyperlipidemia: Continue statin. Right-sided lung neoplasmrecently diagnosed. - Follow-up with pulmonology as scheduled as outpatient.  - Note neurosurgery has ordered brain MRI today given persistent left lower extremity weakness and known lung mass      Advance Directive: Full Code    Signed:   Rudi Velázquez MD 1/28/2021 7:51 AM  Hospitalist

## 2021-01-28 NOTE — PROGRESS NOTES
Occupational Therapy   Occupational Therapy Initial Assessment  Date: 2021   Patient Name: Marbella Juarez  MRN: [id number]     : 1933    Date of Service: 2021    Discharge Recommendations:  Patient would benefit from continued therapy after discharge       Assessment   Assessment: Pt. with improved transfers today and was able to use RW for short distance pivot. Activity Tolerance  Activity Tolerance: Patient Tolerated treatment well           Patient Diagnosis(es): There were no encounter diagnoses. has a past medical history of Diabetes mellitus ([facility] [address] Hyperlipidemia, and Hypertension. has a past surgical history that includes Appendectomy; Mastectomy, bilateral; Cholecystectomy; and Lumbar spine surgery (N/A, 2021). Treatment Diagnosis: L3-4, L4-5 decompressive laminectomy      Restrictions  Restrictions/Precautions  Restrictions/Precautions: Fall Risk, Surgical Protocols  Required Braces or Orthoses?: Yes  Required Braces or Orthoses  Spinal Other: LSO  Left Lower Extremity Brace: Dee Foot Orthotics  Position Activity Restriction  Spinal Precautions: No Bending, No Lifting, No Twisting  Other position/activity restrictions: AFO in room but has not been sized to patient and it does not fit in current shoe    Subjective   General  Chart Reviewed: Yes  Patient assessed for rehabilitation services?: Yes  Family / Caregiver Present: Yes(dtr)  General Comment  Comments: Pt. states she is doing better today.   Patient Currently in Pain: Yes  Pain Assessment  Pain Assessment: 0-10  Pain Level: 3  Pain Location: Back  Pain Orientation: Lower  Pain Descriptors: Aching  Vital Signs  Temp: 98.7 °F (37.1 °C)  Temp Source: Temporal  Pulse: 89  Heart Rate Source: Monitor  Resp: 14  BP: 100/63  BP Location: Right Arm  Level of Consciousness: Alert (0)  Patient Currently in Pain: Yes  Oxygen Therapy  SpO2: 92 %  O2 Device: None (Room air)  Social/Functional History  Social/Functional History Bathroom Equipment: 3-in-1 commode  Home Equipment: Rolling walker, Lift chair(transport wheelchair)  ADL Assistance: Needs assistance  Ambulation Assistance: Needs assistance(hasn't been able to walk for the last couple of weeks)  Transfer Assistance: Needs assistance  Additional Comments: Dtr. began staying with patient in early December. The patient sleeps/sits primarily in her lift recliner and uses BSC from there. Family bathes her in the bed       Objective              Balance  Sitting Balance: Supervision  Standing Balance: Moderate assistance           Bed mobility  Supine to Sit: Moderate assistance  Transfers  Stand Step Transfers: Moderate assistance;2 Person assistance  Sit to stand: Maximum assistance;2 Person assistance  Transfer Comments: Max A of 1-2 to stand. Once up to RW, pt. was Mod A of  2 to step over to recliner. Plan   Plan  Times per week: 3-5    G-Code     OutComes Score                                                  AM-PAC Score             Goals  Short term goals  Short term goal 1: Perform transfers with min A  Short term goal 2: Perform dressing with min A  Short term goal 3: Perform toileting with min A  Short term goal 4: Supervision to restate/demo back protocols  Short term goal 5:  Independent with beginning level therapeutic       Therapy Time   Individual Concurrent Group Co-treatment   Time In           Time Out           Minutes                   BERNARD Koch/L

## 2021-01-28 NOTE — CARE COORDINATION
The [address] at [facility]  Notification of Admission Decision      [] Patient has been accepted for admit to [facility] on :       Please write discharge orders and summary prior to discharge. [x] Patient acceptance to Rehab pending the following : insurance approval  [] Eval in progress       [] Patient determined to be ineligible for services at [facility] because : We recommend you consider        Thank you for your referral, we appreciate you.     Electronically Signed by Jacek Loco, Admissions Coordinator 1/28/2021 1:05 PM

## 2021-01-29 ENCOUNTER — TELEPHONE (OUTPATIENT)
Dept: HEMATOLOGY | Age: 86
End: 2021-01-29

## 2021-01-29 PROBLEM — R91.8 LUNG MASS: Status: ACTIVE | Noted: 2021-01-29

## 2021-01-29 PROBLEM — Z51.5 PALLIATIVE CARE PATIENT: Status: ACTIVE | Noted: 2021-01-29

## 2021-01-29 PROBLEM — G93.89 CEREBELLAR MASS: Status: ACTIVE | Noted: 2021-01-29

## 2021-01-29 PROBLEM — E11.9 TYPE 2 DIABETES MELLITUS WITHOUT COMPLICATION, WITHOUT LONG-TERM CURRENT USE OF INSULIN (HCC): Status: ACTIVE | Noted: 2021-01-29

## 2021-01-29 LAB
GLUCOSE BLD-MCNC: 126 MG/DL (ref 70–99)
GLUCOSE BLD-MCNC: 129 MG/DL (ref 70–99)
GLUCOSE BLD-MCNC: 134 MG/DL (ref 70–99)
GLUCOSE BLD-MCNC: 149 MG/DL (ref 70–99)
PERFORMED ON: ABNORMAL

## 2021-01-29 PROCEDURE — C9113 INJ PANTOPRAZOLE SODIUM, VIA: HCPCS | Performed by: INTERNAL MEDICINE

## 2021-01-29 PROCEDURE — 97530 THERAPEUTIC ACTIVITIES: CPT

## 2021-01-29 PROCEDURE — 99222 1ST HOSP IP/OBS MODERATE 55: CPT | Performed by: INTERNAL MEDICINE

## 2021-01-29 PROCEDURE — 2580000003 HC RX 258: Performed by: NEUROLOGICAL SURGERY

## 2021-01-29 PROCEDURE — 6370000000 HC RX 637 (ALT 250 FOR IP): Performed by: HOSPITALIST

## 2021-01-29 PROCEDURE — 6370000000 HC RX 637 (ALT 250 FOR IP): Performed by: NEUROLOGICAL SURGERY

## 2021-01-29 PROCEDURE — 2580000003 HC RX 258: Performed by: INTERNAL MEDICINE

## 2021-01-29 PROCEDURE — 99024 POSTOP FOLLOW-UP VISIT: CPT | Performed by: NEUROLOGICAL SURGERY

## 2021-01-29 PROCEDURE — 92610 EVALUATE SWALLOWING FUNCTION: CPT

## 2021-01-29 PROCEDURE — 82947 ASSAY GLUCOSE BLOOD QUANT: CPT

## 2021-01-29 PROCEDURE — 6360000002 HC RX W HCPCS: Performed by: INTERNAL MEDICINE

## 2021-01-29 PROCEDURE — 6370000000 HC RX 637 (ALT 250 FOR IP): Performed by: INTERNAL MEDICINE

## 2021-01-29 PROCEDURE — 1210000000 HC MED SURG R&B

## 2021-01-29 PROCEDURE — 97110 THERAPEUTIC EXERCISES: CPT

## 2021-01-29 RX ORDER — SODIUM CHLORIDE 9 MG/ML
10 INJECTION INTRAVENOUS DAILY
Status: DISCONTINUED | OUTPATIENT
Start: 2021-01-29 | End: 2021-01-30

## 2021-01-29 RX ORDER — PANTOPRAZOLE SODIUM 40 MG/10ML
40 INJECTION, POWDER, LYOPHILIZED, FOR SOLUTION INTRAVENOUS DAILY
Status: DISCONTINUED | OUTPATIENT
Start: 2021-01-29 | End: 2021-01-30

## 2021-01-29 RX ADMIN — DILTIAZEM HYDROCHLORIDE 240 MG: 240 CAPSULE, COATED, EXTENDED RELEASE ORAL at 11:17

## 2021-01-29 RX ADMIN — GABAPENTIN 300 MG: 300 CAPSULE ORAL at 21:08

## 2021-01-29 RX ADMIN — HYDROCODONE BITARTRATE AND ACETAMINOPHEN 1 TABLET: 5; 325 TABLET ORAL at 14:31

## 2021-01-29 RX ADMIN — DOCUSATE SODIUM 50MG AND SENNOSIDES 8.6MG 1 TABLET: 8.6; 5 TABLET, FILM COATED ORAL at 14:18

## 2021-01-29 RX ADMIN — Medication 10 UNITS: at 21:09

## 2021-01-29 RX ADMIN — SODIUM CHLORIDE, PRESERVATIVE FREE 10 ML: 5 INJECTION INTRAVENOUS at 11:18

## 2021-01-29 RX ADMIN — LISINOPRIL 5 MG: 5 TABLET ORAL at 11:17

## 2021-01-29 RX ADMIN — ROSUVASTATIN CALCIUM 20 MG: 20 TABLET, FILM COATED ORAL at 21:08

## 2021-01-29 RX ADMIN — FERROUS SULFATE TAB 325 MG (65 MG ELEMENTAL FE) 325 MG: 325 (65 FE) TAB at 11:17

## 2021-01-29 RX ADMIN — DOCUSATE SODIUM 50MG AND SENNOSIDES 8.6MG 1 TABLET: 8.6; 5 TABLET, FILM COATED ORAL at 21:08

## 2021-01-29 RX ADMIN — POLYETHYLENE GLYCOL 3350 17 G: 17 POWDER, FOR SOLUTION ORAL at 14:18

## 2021-01-29 RX ADMIN — PANTOPRAZOLE SODIUM 40 MG: 40 INJECTION, POWDER, FOR SOLUTION INTRAVENOUS at 11:16

## 2021-01-29 RX ADMIN — CETIRIZINE HYDROCHLORIDE 10 MG: 10 TABLET, FILM COATED ORAL at 14:19

## 2021-01-29 RX ADMIN — SODIUM CHLORIDE, PRESERVATIVE FREE 10 ML: 5 INJECTION INTRAVENOUS at 21:08

## 2021-01-29 ASSESSMENT — PAIN DESCRIPTION - DESCRIPTORS: DESCRIPTORS: ACHING

## 2021-01-29 ASSESSMENT — PAIN DESCRIPTION - LOCATION: LOCATION: BACK

## 2021-01-29 ASSESSMENT — PAIN SCALES - GENERAL: PAINLEVEL_OUTOF10: 3

## 2021-01-29 ASSESSMENT — PAIN DESCRIPTION - ORIENTATION
ORIENTATION: LOWER
ORIENTATION: LOWER

## 2021-01-29 NOTE — PROGRESS NOTES
Occupational Therapy   Occupational Therapy Initial Assessment  Date: 2021   Patient Name: Miguel Angel Barillas  MRN: [id number]     : 1933    Date of Service: 2021    Discharge Recommendations:  Patient would benefit from continued therapy after discharge       Assessment   Assessment: Pt. with increased difficulty standing today and stepping to recliner. Patient Diagnosis(es): There were no encounter diagnoses. has a past medical history of Diabetes mellitus ([facility] [address] Hyperlipidemia, Hypertension, and Palliative care patient. has a past surgical history that includes Appendectomy; Mastectomy, bilateral; Cholecystectomy; and Lumbar spine surgery (N/A, 2021). Treatment Diagnosis: L3-4, L4-5 decompressive laminectomy      Restrictions  Restrictions/Precautions  Restrictions/Precautions: Fall Risk, Surgical Protocols  Required Braces or Orthoses?: Yes  Required Braces or Orthoses  Spinal Other: LSO  Left Lower Extremity Brace:  Dee Foot Orthotics  Position Activity Restriction  Spinal Precautions: No Bending, No Lifting, No Twisting  Other position/activity restrictions: AFO in room but has not been sized to patient and it does not fit in current shoe    Subjective   General  Chart Reviewed: Yes  Patient assessed for rehabilitation services?: Yes  Family / Caregiver Present: Yes(dtr)  General Comment  Comments: Pt. on commode, states she feels a little worse today  Patient Currently in Pain: Yes  Pain Assessment  Pain Assessment: 0-10  Pain Level: 3  Pain Location: Back  Pain Orientation: Lower  Pain Descriptors: Aching  Vital Signs  Temp: 98.4 °F (36.9 °C)  Temp Source: Temporal  Pulse: 98  Heart Rate Source: Monitor  Resp: 14  BP: 137/68  BP Location: Left Arm  Patient Currently in Pain: Yes  Oxygen Therapy  SpO2: 92 %  O2 Device: None (Room air)  Social/Functional History  Social/Functional History  Bathroom Equipment: 3-in-1 commode Home Equipment: Rolling walker, Lift chair(transport wheelchair)  ADL Assistance: Needs assistance  Ambulation Assistance: Needs assistance(hasn't been able to walk for the last couple of weeks)  Transfer Assistance: Needs assistance  Additional Comments: Dtr. began staying with patient in early December. The patient sleeps/sits primarily in her lift recliner and uses BSC from there. Family bathes her in the bed       Objective                             Transfers  Stand Step Transfers: Maximum assistance;2 Person assistance  Sit to stand: Maximum assistance;2 Person assistance  Transfer Comments: Pt. tfered Max A of 2 with RW from [facility] to recliner with half standing posture                                              Plan   Plan  Times per week: 3-5    G-Code     OutComes Score                                                  AM-PAC Score             Goals  Short term goals  Short term goal 1: Perform transfers with min A  Short term goal 2: Perform dressing with min A  Short term goal 3: Perform toileting with min A  Short term goal 4: Supervision to restate/demo back protocols  Short term goal 5:  Independent with beginning level therapeutic       Therapy Time   Individual Concurrent Group Co-treatment   Time In           Time Out           Minutes                   BERNARD Becerra/KHANH

## 2021-01-29 NOTE — PROGRESS NOTES
Occupational Therapy  Facility/Department: [facility] SURG SERVICES  Daily Treatment Note  NAME: Rima Mancia  : 1933  MRN: [id number]    Date of Service: 2021    Discharge Recommendations:  Patient would benefit from continued therapy after discharge       Assessment   Assessment: Pt. with increased difficulty standing today and stepping to recliner. Patient Diagnosis(es): There were no encounter diagnoses. has a past medical history of Diabetes mellitus ([facility] [address] Hyperlipidemia, Hypertension, and Palliative care patient. has a past surgical history that includes Appendectomy; Mastectomy, bilateral; Cholecystectomy; and Lumbar spine surgery (N/A, 2021). Restrictions  Restrictions/Precautions  Restrictions/Precautions: Fall Risk, Surgical Protocols  Required Braces or Orthoses?: Yes  Required Braces or Orthoses  Spinal Other: LSO  Left Lower Extremity Brace:  Dee Foot Orthotics  Position Activity Restriction  Spinal Precautions: No Bending, No Lifting, No Twisting  Other position/activity restrictions: AFO in room but has not been sized to patient and it does not fit in current shoe  Subjective   General  Chart Reviewed: Yes  Patient assessed for rehabilitation services?: Yes  Family / Caregiver Present: Yes(dtr)  General Comment  Comments: Pt. on commode, states she feels a little worse today      Orientation     Objective                   Transfers  Stand Step Transfers: Maximum assistance;2 Person assistance  Sit to stand: Maximum assistance;2 Person assistance  Transfer Comments: Pt. tfered Max A of 2 with RW from [facility] to recliner with half standing posture                                                                 Plan   Plan  Times per week: 3-5  G-Code     OutComes Score                                                  AM-PAC Score             Goals  Short term goals  Short term goal 1: Perform transfers with min A  Short term goal 2: Perform dressing with min A Short term goal 3: Perform toileting with min A  Short term goal 4: Supervision to restate/demo back protocols  Short term goal 5:  Independent with beginning level therapeutic       Therapy Time   Individual Concurrent Group Co-treatment   Time In           Time Out           Minutes                   Lorraine Tesfaye, OT

## 2021-01-29 NOTE — PROGRESS NOTES
NEUROSURGERY POSTOPERATIVE PROGRESS NOTE      Chief Complaint: Back pain, difficulty walking    Date of Surgery: 1/26/2021    Procedure Performed: L3/4 and L4/5 decompressive laminectomy      Interval Update: No overnight events. She was able to get up to the chair for ~3 hrs yesterday morning. She reports improving proximal strength in her LLE but her foot drop persists. MRI completed yesterday and showed a ~2cm solitary lesion in the RIGHT cerebellum which likely explains her balance difficulty but would not explain her weakness, which appears to be slowly improving after her laminectomy. HPI: 66-year-old female who presented to neurosurgical attention complaining of progressive back pain, difficulty ambulating, and left lower extremity pain and weakness. On examination, she was found to have a left foot drop, difficulty while standing upright with upright posture, and inability to walk more than a few feet without stopping to rest.  An MRI scan of her lumbar spine was performed that demonstrated lumbar degenerative scoliosis as well as disk osteophyte formation and advanced facet arthropathy mostly at L3-L4 and L4-L5, that were causing significant central canal and bilateral lateral recess stenosis, left greater than right. Given her imaging findings and her presenting complaints, I offered the patient the surgery (L3/4 and L4/5 laminectomy) understanding with her advanced age that she would be at elevated risk for complications related to surgery. She voiced understanding and requested that we proceed. 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 Her surgery was delayed for one week while a CT scan of her chest was obtained and she was referred to Pulmonology and Pulmonology agreed that given her limited mobility that the spinal surgery should still occur in order to increase her functional mobility and hopefully, her activity level to tolerate potential treatment for her malignancy.       Objective:    /67   Pulse 84   Temp 98.9 °F (37.2 °C) (Temporal)   Resp 14   Ht 5' (1.524 m)   Wt 118 lb 8 oz (53.8 kg)   SpO2 92%   BMI 23.14 kg/m²       Physical Exam:    General: alert, cooperative, no distress  Cardiorespiratory: unlabored breathing  Wound: clean, dry, flat, no erythema or fluctuance      Neurologic Exam:    Mental Status: Alert, oriented, thought content appropriate  Cranial Nerves: PERRL, EOMI, symmetric facies, tongue midline  Motor: 5/5 RLE with prompting, 4/5 LLE in HF, KE, 0/5 ADF/APF  Somatosensory: normal light touch sensation        Assessment and Plan: 79 y/o F s/p L3/4 and L4/5 decompressive laminectomy on 1/26/2021. Her pain is controlled and her leg pain has improved. She still demonstrates significant LLE weakness postop, though this appears to be improving. 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 I discussed the MRI findings with the patient and her daughter at the bedside. I asked directly whether the patient would be willing to undergo chemotherapy or radiation treatments to treat lung cancer and consider surgery for the cerebellar mass. At this point in time, the patient says that she likely would not want chemotherapy or radiation. I will place a consult to palliative care/hospice to further assist the patient and her daughter in determining her goals of treatment.     - Continue LSO brace when OOB, AFO to be used with ambulation  - Consult palliative care/hospice  - Patient is already established with Dr. Miranda Rea with oncology at [facility] hospitalist assistance  - Continue PT/OT, rehab eval pending        Electronically signed by Raul Desai MD on 1/29/2021 at 8:29 AM

## 2021-01-29 NOTE — PROGRESS NOTES
Speech Language Pathology  Facility/Department: [facility] SURG SERVICES   CLINICAL BEDSIDE SWALLOW EVALUATION    NAME: Rima Concepcion  : 1933  MRN: [id number]  ADMISSION DATE: 2021   Date of Eval: 2021  Evaluating Therapist: Noe Sandoval MS CCC-SLP      ADMITTING DIAGNOSIS:   has Lumbar spinal stenosis; Lumbar stenosis with neurogenic claudication; Cerebellar mass; Type 2 diabetes mellitus without complication, without long-term current use of insulin ([address] Lung mass; and Palliative care patient on their problem list.    History of Present Illness:  Per physician: Brain MRI concerning for metastatic lesion. Her daughter is at bedside. Patient tells me that she is not interested in any aggressive therapy. Had a family member who is in and out of the hospital in a similar situation, and she wants to focus on her quality of life. Daughter is tearful, wondering how she will care for patient at home and supplies that she will need. Palliative care consult is pending. Patient actively spitting up while trying to eat breakfast on my exam.  Daughter says this is a recurrent issue at home. I have ordered SLP evaluation.     Recent Chest Xray/CT of Chest:   Narrative   EXAMINATION:  XR CHEST (2 VW)  2021 2:07 PM   HISTORY: Preprocedure chest x-ray. Spinal stenosis. Hypertension. Diabetes. COMPARISON: No comparison study. TECHNIQUE: 2 views were obtained. FINDINGS: Ana Valeria is a 2.9 cm nodule in the right midlung field with   probable right hilar lymphadenopathy. There is a calcified granuloma   in the right lower lung zone. There is mild bronchial wall thickening. The heart is normal in size. The thoracic aorta is atheromatous. There   are surgical clips in the right axilla likely related to prior lymph   node sampling.       Impression   1. There is a 2.9 cm pulmonary nodule in the right midlung field with   probable right hilar adenopathy.  Findings worrisome for bronchogenic malignancy. Follow-up chest CT recommended. 2. Old granulomatous disease. Results were discussed with Dr. Sawyer Tesfaye at 2:14 PM on today's date. Signed by Dr Compa Nicole on 1/6/2021 2:14 PM             Current Diet level:  Current Diet : Regular  Current Liquid Diet : Thin      Pain:  Pain Assessment  Pain Assessment: 0-10  Pain Level: 3  Pain Location: Back  Pain Orientation: Lower  Pain Descriptors: Aching  Non-Pharmaceutical Pain Intervention(s): Ambulation/Increased Activity, Repositioned  Response to Pain Intervention: Patient Satisfied    Reason for Referral  Rima Cruz was referred for a bedside swallow evaluation to assess the efficiency of her swallow function, identify signs and symptoms of aspiration and make recommendations regarding safe dietary consistencies, effective compensatory strategies, and safe eating environment. Impression  Dysphagia Impression : Oral and pharyngeal phases were within functional limits. Patient tolerated thin liquids via straw and via cup and a solid cracker with no over s/s of aspiration or penetration. However, patient did report experiencing frequent belching and esophageal backflow after meals. She is recommended to continue a carb control diet, thin liquids. Meds whole in applesauce or pudding. Tray set up. SLP will return to insure safety with oral intake. Dysphagia Outcome Severity Scale: Level 6: Within functional limits/Modified independence     Treatment Plan  Requires SLP Intervention: Yes  Duration/Frequency of Treatment: 1x/day, 3-5x/week  D/C Recommendations: To be determined       Recommended Diet and Intervention  Diet Solids Recommendation: Regular  Liquid Consistency Recommendation: Thin  Recommended Form of Meds: Whole with puree     Therapeutic Interventions: Oral care; Therapeutic PO trials with SLP; Diet tolerance monitoring;Patient/Family education    Compensatory Swallowing Strategies Compensatory Swallowing Strategies: Upright as possible for all oral intake;Remain upright for 30-45 minutes after meals; Alternate solids and liquids    Treatment/Goals  Short-term Goals  Goal 1: The patient will tolerate a regular diet wtih thin liquids with minimal overt s/s of aspiration. Goal 2: SLP will return to reassess swallow function and insure safety with all oral intake. Goal 3: SLP will complete full speech/language cognitive evaluation. Goal 4: Patient/staff will complete daily oral hygiene to prevent aspriation of oral bacteria. General  Behavior/Cognition: Alert; Cooperative;Pleasant mood  Temperature Spikes Noted: Yes  Respiratory Status: O2 via nasual cannula  O2 Device: Nasal cannula  Liters of Oxygen: 4 L  Communication Observation: Functional  Follows Directions: Simple  Prior Dysphagia History: Pt's daughter reported no history of dysphagia. She was consuming a regular diet with thin liquids at home. She had no difficulty swallowing medications, liquids or solids. WBC: 7.9      Oral Motor Deficits  Oral/Motor  Oral Motor: Exceptions to WFL(Natural dentition)    Oral Phase Dysfunction  Oral Phase  Oral Phase - Comment: Oral phase of the swallow was within functional limits. Pt presented with good mastication, no labial spillage, no oral residue observed     Indicators of Pharyngeal Phase Dysfunction   Pharyngeal Phase   Pharyngeal: Pharyngeal phase was within functional limits. Pt presented with strong hyolarngeal elevation and good swallow respose time. No overt s/s of aspiration observed with thin liquids via cup or straw or solids.     Prognosis  Good    Education  Patient Education Response: Verbalizes understanding          1/29/2021 2:22 PM      Electronically Signed By:  Dagoberto Edgar  1/29/2021,2:25 PM.

## 2021-01-29 NOTE — PROGRESS NOTES
01/29/21 1111   Restrictions/Precautions   Restrictions/Precautions Fall Risk;Surgical Protocols   Required Braces or Orthoses? Yes   Required Braces or Orthoses   Spinal Other LSO   Left Lower Extremity Brace Dee Foot Orthotics   Position Activity Restriction   Spinal Precautions No Bending; No Lifting; No Twisting   Other position/activity restrictions AFO in room but has not been sized to patient and it does not fit in current shoe   General   Chart Reviewed Yes   Subjective   Subjective Patient in bed agrrees to TR to [facility] and chair   Pain Screening   Patient Currently in Pain Yes   Pain Assessment   Pain Assessment 0-10   Pain Level 3   Pain Location Back   Pain Orientation Lower   Pain Descriptors Aching   Oxygen Therapy   O2 Device None (Room air)   Bed Mobility   Supine to Sit Moderate assistance   Scooting Moderate assistance   Comment Decreased mobility assist x 2 for TX   Transfers   Sit to Stand Maximum Assistance;2 Person Assistance   Stand to sit Maximum Assistance;2 Person Assistance   Bed to Chair Maximum assistance;2 Person Assistance   Comment Patient took steps to [facility]  toileted  and then took  steps to chair   Activity Tolerance   Activity Tolerance Patient limited by endurance   Safety Devices   Type of devices Left in chair;Call light within reach; Chair alarm in place  (Daughter present)   Physical Therapy    Electronically signed by Kristen Reddy PTA on 1/29/2021 at 11:17 AM

## 2021-01-29 NOTE — PROGRESS NOTES
Hospitalist Consult Progress Note  1/29/2021 9:30 AM  Subjective:   Admit Date: 1/26/2021  PCP: Emmanuel Graf MD    Reason for consult: medical management    Subjective:  Brain MRI concerning for metastatic lesion. Her daughter is at bedside. Patient tells me that she is not interested in any aggressive therapy. Had a family member who is in and out of the hospital in a similar situation, and she wants to focus on her quality of life. Daughter is tearful, wondering how she will care for patient at home and supplies that she will need. Palliative care consult is pending. Patient actively spitting up while trying to eat breakfast on my exam.  Daughter says this is a recurrent issue at home. I have ordered SLP evaluation. ROS: Six point review of systems is negative except as specifically addressed above. DIET CARB CONTROL;     Intake/Output Summary (Last 24 hours) at 1/29/2021 0930  Last data filed at 1/28/2021 1900  Gross per 24 hour   Intake 210 ml   Output 550 ml   Net -340 ml     Medications:   dextrose       Current Facility-Administered Medications   Medication Dose Route Frequency Provider Last Rate Last Admin    pantoprazole (PROTONIX) injection 40 mg  40 mg Intravenous Daily Jose Miguel Moeller MD        And    sodium chloride (PF) 0.9 % injection 10 mL  10 mL Intravenous Daily Jose Miguel Moeller MD        lisinopril (PRINIVIL;ZESTRIL) tablet 5 mg  5 mg Oral Daily Jose Miguel Moeller MD   5 mg at 01/28/21 5063    cetirizine (ZYRTEC) tablet 10 mg  10 mg Oral Daily Emmanuel Jorge MD   10 mg at 01/28/21 2573    vitamin B-12 (CYANOCOBALAMIN) tablet 500 mcg  500 mcg Oral Daily Emmanuel Jorge MD   500 mcg at 01/28/21 1628    dilTIAZem (CARDIZEM CD) extended release capsule 240 mg  240 mg Oral Daily Emmanuel Jorge MD   240 mg at 01/28/21 1122    ferrous sulfate (IRON 325) tablet 325 mg  325 mg Oral Daily with breakfast Emmanuel Jorge MD   325 mg at 01/27/21 0900  gabapentin (NEURONTIN) capsule 300 mg  300 mg Oral Nightly Li Arthur MD   300 mg at 01/28/21 2054    rosuvastatin (CRESTOR) tablet 20 mg  20 mg Oral Nightly Li Arthur MD   20 mg at 01/28/21 2054    sodium chloride flush 0.9 % injection 10 mL  10 mL Intravenous 2 times per day Li Arthur MD   10 mL at 01/28/21 2056    sodium chloride flush 0.9 % injection 10 mL  10 mL Intravenous PRN Li Arthur MD        sennosides-docusate sodium (SENOKOT-S) 8.6-50 MG tablet 1 tablet  1 tablet Oral BID Li Arthur MD   1 tablet at 01/28/21 2054    polyethylene glycol (GLYCOLAX) packet 17 g  17 g Oral Daily PRN Li Arthur MD        bisacodyl (DULCOLAX) suppository 10 mg  10 mg Rectal Daily PRN Li Arthur MD        ondansetron (ZOFRAN-ODT) disintegrating tablet 4 mg  4 mg Oral Q8H PRN Li Arthur MD        Or    ondansetron [facility]) injection 4 mg  4 mg Intravenous Q6H PRN Li Arthur MD        HYDROcodone-acetaminophen [facility]) 5-325 MG per tablet 1 tablet  1 tablet Oral Q4H PRN Li Arthur MD   1 tablet at 01/28/21 1258    Or    HYDROcodone-acetaminophen (NORCO) 5-325 MG per tablet 2 tablet  2 tablet Oral Q4H PRN Li Arthur MD        HYDROmorphone HCl PF (DILAUDID) injection 0.25 mg  0.25 mg Intravenous Q3H PRN Li Arthur MD        Or    HYDROmorphone HCl PF (DILAUDID) injection 0.5 mg  0.5 mg Intravenous Q3H PRN Li Arthur MD        cyclobenzaprine (FLEXERIL) tablet 10 mg  10 mg Oral TID PRN Li Arthur MD        insulin lispro (HUMALOG) injection vial 4 Units  0.08 Units/kg Subcutaneous TID DELIA Arthur MD   4 Units at 01/27/21 0922    insulin lispro (HUMALOG) injection vial 0-12 Units  0-12 Units Subcutaneous TID DELIA Arthur MD   6 Units at 01/28/21 1258    insulin lispro (HUMALOG) injection vial 0-6 Units  0-6 Units Subcutaneous Nightly Li Arthur MD   2 Units at 01/26/21 4532  insulin glargine (LANTUS) injection vial 10 Units  10 Units Subcutaneous Nightly Huy Dietrich MD   10 Units at 01/27/21 2000    glucose (GLUTOSE) 40 % oral gel 15 g  15 g Oral PRN Huy Dietrich MD        dextrose 50 % IV solution  12.5 g Intravenous PRN Huy Dietrich MD        glucagon (rDNA) injection 1 mg  1 mg Intramuscular PRN Huy Dietrich MD        dextrose 5 % solution  100 mL/hr Intravenous PRN Huy Dietrich MD            Labs:     Recent Labs     01/26/21  1155 01/27/21  0331   WBC 8.6 7.9   RBC 4.37 4.00*   HGB 12.8 11.6*   HCT 40.5 36.6*   MCV 92.7 91.5   MCH 29.3 29.0   MCHC 31.6* 31.7*    190     Recent Labs     01/26/21  1155 01/27/21  0331    138   K 4.0 4.0   ANIONGAP 10 9    105   CO2 26 24   BUN 16 12   CREATININE 0.7 0.6   GLUCOSE 204* 165*   CALCIUM 9.0 8.9     No results for input(s): MG, PHOS in the last 72 hours. Recent Labs     01/26/21  1155   AST 23   ALT 22   BILITOT 0.3   ALKPHOS 55     ABGs:No results for input(s): PH, PO2, PCO2, HCO3, BE, O2SAT in the last 72 hours. Troponin T: No results for input(s): TROPONINI in the last 72 hours. INR: No results for input(s): INR in the last 72 hours. Lactic Acid: No results for input(s): LACTA in the last 72 hours. Objective:   Vitals: /67   Pulse 84   Temp 98.9 °F (37.2 °C) (Temporal)   Resp 14   Ht 5' (1.524 m)   Wt 118 lb 8 oz (53.8 kg)   SpO2 92%   BMI 23.14 kg/m²   24HR INTAKE/OUTPUT:      Intake/Output Summary (Last 24 hours) at 1/29/2021 0930  Last data filed at 1/28/2021 1900  Gross per 24 hour   Intake 210 ml   Output 550 ml   Net -340 ml     General appearance: sitting up in bed.  Spitting up clear sputum into tissue   Head: NC/AT   Eyes: normal sclera  Mouth: MMM   Lungs: no increased work of breathing  Heart: regular rate and rhythm, S1, S2 normal, no murmur, click, rub or gallop  Abdomen: soft, non-tender; bowel sounds normal; no masses,  no organomegaly

## 2021-01-29 NOTE — CARE COORDINATION
The [address] at [facility]  Notification of Admission Decision      [x] Patient has been accepted for admit to [facility] on : 1/30/21 Room 827      Please write discharge orders and summary prior to discharge. [] Patient acceptance to Rehab pending the following :    [] Eval in progress       [] Patient determined to be ineligible for services at [facility] because : We recommend you consider        Thank you for your referral, we appreciate you.     Electronically Signed by Phuc Desouza Admissions Coordinator 1/29/2021 10:47 AM

## 2021-01-29 NOTE — PROGRESS NOTES
Consult received, palliative care RN has met with the patient/family and initiated discussions on goals. Patient echoed previous desire of not pursuing aggressive workup/treatment. Plans to pursue rehab and focus on quality of life, would like to receive outpatient supportive care at discharge. Code status addressed and patient has chosen to change code status to DNI, order updated. I will follow along and assist as needed. Thank you for consulting palliative care.

## 2021-01-29 NOTE — CONSULTS
MEDICAL ONCOLOGY CONSULTATION    Pt Name: Terri Sales  MRN: [id number]  YOB: 1933  Date of evaluation: 1/29/2021    REASON FOR CONSULTATION: Lung mass/brain metastasis  REQUESTING PHYSICIAN: Hospitalist    History Obtained From:    patient, electronic medical record    HISTORY OF PRESENT ILLNESS:    Rima Gomez was first seen by me on 1/29/2021. I was consulted for findings of a lung mass and brain metastasis. The patient is a very pleasant 80years old male who was found to have an incidental finding of a lung mass doing preoperative work-up for a laminectomy. She has been diagnosed with severe spinal stenosis. She is currently being seen by Dr. Raul Desai. He was referred and seen by pulmonary, Dr. Sarah Miranda for the findings of lung mass. The patient is a patient of Dr. Maximus Sapp and has been seen in the past for DCIS, stage 0. She was last seen in September 2019. A CT of the chest performed at [facility] on 1/14/2021 showed a lobular mass in the superior segment of the right lower lobe measuring 3.8 x 2.5 x 2.5 cm. The mass extends across the major fissure and also involve the middle lobe. 13 mm right hilar lymph node. 2 additional benign-appearing nodules in the right upper lobe. No evidence of pleural effusion. MRI of the brain was performed and showed a 2.4 cm intra-axial enhancing lesion located inferior paramedian right cerebellar hemisphere concerning for metastatic disease. Dr. Raul Desai discussed with the family and they opted for no further work-up or treatment. Hospital has been consulted.         Past Medical History:    Past Medical History:   Diagnosis Date    Diabetes mellitus ([address]     Hyperlipidemia     Hypertension     Palliative care patient 01/29/2021       Past Surgical History:    Past Surgical History:   Procedure Laterality Date    APPENDECTOMY      CHOLECYSTECTOMY      LUMBAR SPINE SURGERY N/A 1/26/2021 LAMINECTOMY L3-4/ L4-5 performed by Alexandra Church MD at [address], BILATERAL         Social History: Former smokerquit many years ago    Family History:   History reviewed. No pertinent family history.     Current Hospital Medications:    Current Facility-Administered Medications   Medication Dose Route Frequency Provider Last Rate Last Admin    lisinopril (PRINIVIL;ZESTRIL) tablet 5 mg  5 mg Oral Daily Douglas Darling MD   5 mg at 01/28/21 2778    cetirizine (ZYRTEC) tablet 10 mg  10 mg Oral Daily Alexandra Church MD   10 mg at 01/28/21 2611    vitamin B-12 (CYANOCOBALAMIN) tablet 500 mcg  500 mcg Oral Daily Alexandra Church MD   500 mcg at 01/28/21 5829    dilTIAZem (CARDIZEM CD) extended release capsule 240 mg  240 mg Oral Daily Alexandra Church MD   240 mg at 01/28/21 7652    ferrous sulfate (IRON 325) tablet 325 mg  325 mg Oral Daily with breakfast Alexandra Church MD   325 mg at 01/27/21 0900    gabapentin (NEURONTIN) capsule 300 mg  300 mg Oral Nightly Alexandra Church MD   300 mg at 01/28/21 2054    rosuvastatin (CRESTOR) tablet 20 mg  20 mg Oral Nightly Alexandra Church MD   20 mg at 01/28/21 2054    sodium chloride flush 0.9 % injection 10 mL  10 mL Intravenous 2 times per day Alexandra Church MD   10 mL at 01/28/21 2056    sodium chloride flush 0.9 % injection 10 mL  10 mL Intravenous PRN Alexandra Church MD        sennosides-docusate sodium (SENOKOT-S) 8.6-50 MG tablet 1 tablet  1 tablet Oral BID Alexandra Church MD   1 tablet at 01/28/21 2054    polyethylene glycol (GLYCOLAX) packet 17 g  17 g Oral Daily PRN Alexandra Church MD        bisacodyl (DULCOLAX) suppository 10 mg  10 mg Rectal Daily PRN Alexandra Church MD        ondansetron (ZOFRAN-ODT) disintegrating tablet 4 mg  4 mg Oral Q8H PRN Alexandra Church MD        Or    ondansetron [facility]) injection 4 mg  4 mg Intravenous Q6H PRN Alexandra Church MD  HYDROcodone-acetaminophen (NORCO) 5-325 MG per tablet 1 tablet  1 tablet Oral Q4H PRN Nasir Ray MD   1 tablet at 01/28/21 1258    Or    HYDROcodone-acetaminophen (NORCO) 5-325 MG per tablet 2 tablet  2 tablet Oral Q4H PRN Nasir Ray MD        HYDROmorphone HCl PF (DILAUDID) injection 0.25 mg  0.25 mg Intravenous Q3H PRN Nasir Ray MD        Or    HYDROmorphone HCl PF (DILAUDID) injection 0.5 mg  0.5 mg Intravenous Q3H PRN Nasir Ray MD        cyclobenzaprine (FLEXERIL) tablet 10 mg  10 mg Oral TID PRN Nasir Ray MD        insulin lispro (HUMALOG) injection vial 4 Units  0.08 Units/kg Subcutaneous TID DELIA Ray MD   4 Units at 01/27/21 5670    insulin lispro (HUMALOG) injection vial 0-12 Units  0-12 Units Subcutaneous TID DELIA Ray MD   6 Units at 01/28/21 1258    insulin lispro (HUMALOG) injection vial 0-6 Units  0-6 Units Subcutaneous Nightly Nasir Ray MD   2 Units at 01/26/21 2108    insulin glargine (LANTUS) injection vial 10 Units  10 Units Subcutaneous Nightly Gia Smyth MD   10 Units at 01/27/21 2000    glucose (GLUTOSE) 40 % oral gel 15 g  15 g Oral PRN Gia Smyth MD        dextrose 50 % IV solution  12.5 g Intravenous PRN Gia Smyth MD        glucagon (rDNA) injection 1 mg  1 mg Intramuscular PRN Gia Smyth MD        dextrose 5 % solution  100 mL/hr Intravenous PRN Gia Smyth MD           Allergies:    Allergies   Allergen Reactions    Other      Molds and oak trees         Subjective   REVIEW OF SYSTEMS:   CONSTITUTIONAL: no fever, no night sweats,  fatigue;  HEENT: no blurring of vision, no double vision, no hearing difficulty, no tinnitus, no ulceration, no dysplasia, no epistaxis;  LUNGS: no cough, no hemoptysis, no wheeze,  no shortness of breath;  CARDIOVASCULAR: no palpitation, no chest pain, no shortness of breath;  GI: no abdominal pain, no nausea, no vomiting, no diarrhea, no constipation; AST 23 01/26/2021    ALT 22 01/26/2021    LABGLOM >60 01/27/2021    GFRAA >59 01/27/2021       Lab Results   Component Value Date    INR 0.93 01/06/2021    PROTIME 12.3 01/06/2021       RADIOLOGY STUDIES REPORT/REVIEWED AND INTERPRETED BY ME:  Xr Chest (2 Vw)    Result Date: 1/6/2021  EXAMINATION:  XR CHEST (2 VW)  1/6/2021 2:07 PM HISTORY: Preprocedure chest x-ray. Spinal stenosis. Hypertension. Diabetes. COMPARISON: No comparison study. TECHNIQUE: 2 views were obtained. FINDINGS:  There is a 2.9 cm nodule in the right midlung field with probable right hilar lymphadenopathy. There is a calcified granuloma in the right lower lung zone. There is mild bronchial wall thickening. The heart is normal in size. The thoracic aorta is atheromatous. There are surgical clips in the right axilla likely related to prior lymph node sampling. 1. There is a 2.9 cm pulmonary nodule in the right midlung field with probable right hilar adenopathy. Findings worrisome for bronchogenic malignancy. Follow-up chest CT recommended. 2. Old granulomatous disease. Results were discussed with Dr. Johnnie Pastor at 2:14 PM on today's date.  Signed by Dr Gab Luna on 1/6/2021 2:14 PM    Ct Chest W Contrast    Result Date: 1/14/2021 CT CHEST with contrast 1/14/2021 11:26 AM HISTORY: Lung nodule COMPARISON: None DLP: 247 mGy cm. Automated exposure control was utilized to diminish patient radiation dose. TECHNIQUE: Serial helical tomographic images of the chest were acquired following the infusion of Isovue contrast intravenously. Bone and soft tissue algorithms were provided. There is limited enhancement of the vascular structures. This would suggest extravasation of contrast although by physical examination there is no evidence of a discrete collection of the contrast identified within the extremity at or above the IV line insertion site. The patient does not complain of any discomfort but states she did experience typical findings associated with the IV infusion. We discussed with the patient administering additional contrast from another IV site. The patient refused additional imaging. I do not feel that it would be necessary to reimage the patient as the study is diagnostic. FINDINGS: Neck base: The imaged portion of the neck and thyroid gland is unremarkable. Lungs: A 3 mm subpleural nodule is noted posteriorly within the right upper lobe on image #28. An additional benign-appearing 2 mm nodule is noted posteriorly within the right upper lobe on image #35. A 3.8 x 2.5 x 2.5 cm lobular mass is noted predominantly within the superior segment of the right lower lobe. This mass extends across the major fissure to also involve the middle lobe. It also abuts the minor fissure and extends into the inferior aspect of the right upper lobe. Subpleural granulomas are noted peripherally within the right lower lobe. Left lingular atelectasis or scarring is present. Lungs are otherwise clear. A 13 mm right hilar node is present. There is also evidence of remote granulomatous disease including a granuloma within the periphery of the patient's lung neoplasm. Moses Rogers No pleural effusion is present. The trachea and bronchial tree are patent. Heart:  The heart is normal in size. There is a trace pericardial effusion. Heavy coronary calcifications are present. Veneda Dash vessels: There is atheromatous calcification associated with the proximal great vessels. No evidence of aneurysm. . The pulmonary arteries are patent within limits of this study and are normal in caliber. 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 Lymph nodes: Calcified subcarinal adenopathy is present. No pathologically enlarged middle is done or axillary adenopathy is demonstrated by CT criteria. . Skeletal and soft tissues: The osseous structures of the thorax and surrounding soft tissues are unremarkable. Upper abdomen: The imaged portion of the upper abdomen demonstrates no acute process. The adrenals are unremarkable. 1. There is a lobular mass with its epicenter within the right lower lobe. Unfortunately, this extends to also involve the inferior aspect of the right upper lobe as well as the right middle lobe crossing both the major and minor fissure. Measurements are as above. There is an associated enlarged right hilar lymph node. This represents a primary lung neoplasm. Some calcification within the periphery of the lesion represents a granuloma which has been enveloped by the neoplasm. 2. There are 2 additional benign-appearing nodules in the right upper lobe. The left lung is fully expanded and clear. There is no effusion present. 3. Tiny trace pericardial effusion. Heavy coronary calcifications are present. There is mild aneurysmal dilatation of the ascending thoracic aorta with a transverse dimension of 4.1 cm. . Signed by Dr Renu Souza on 1/14/2021 1:43 PM    Mri Brain W Wo Contrast    Result Date: 1/28/2021 MRI BRAIN W WO CONTRAST 1/28/2021 1:43 PM HISTORY: Left leg weakness, history of lung mass Comparison: None. Technique: Multiplanar imaging of the brain was performed in a routine fashion before and after the intravenous injection of gadolinium contrast. 9 mL MultiHance IV contrast. Findings: There is a 2.4 x 2.3 x 2.2 cm rim-enhancing intra-axial lesion located within the inferior paramedian right cerebellar hemisphere (series 401-image 32 and series 301-image 32). No significant mass effect on the fourth ventricle. No other lesions are identified. There is no diffusion signal abnormality to suggest acute infarct. There is no intra-axial or extra-axial hemorrhage. Underlying mild to moderate chronic small vessel ischemic change. Ventricles are nondilated. Empty sella. The major intracranial flow voids are preserved. The orbits are unremarkable. The paranasal sinuses and mastoids are clear. Marrow signal in the calvarium and skull base appears normal.    Impression: 1. Solitary 2.4 cm intra-axial rim-enhancing lesion located in the inferior paramedian right cerebellar hemisphere, concerning for metastatic disease. No other lesions are identified. No significant mass effect on the fourth ventricle. Signed by Dr Cinda Neely on 1/28/2021 4:20 PM        My interpretation: Enhancing lesion in the right cerebellar hemisphere. This is concerning for metastatic disease. 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 #History of DCIS of the breast stage 0  #Severe spinal stenosis  #Poor prognosis  #Frailty  #Normocytic anemia    PLAN:  Agree with hospice  Discussed with patient  Will sign off.     Brenda Porras MD    01/29/21  8:52 AM

## 2021-01-29 NOTE — CONSULTS
Palliative Care:     Initiated for support, goals of care and ACP. Pleasant 80year old lady who presented with progressive difficulty walking as well as back pain. Found to have severe spinal stenosis, neurosurgery consultation and she elected to have intervention. S/P laminectomy. Subsequent discovery of lung and brain mass. Pt has voiced not wanting any aggressive treatment, would like to return home and focus on quality of life. Met with pt and her daughter,  accompanied on visit. Good conversation with pt and her daughter about goals. Pt has been accepted to 8th floor for rehab. They are hopeful to progress her mobility prior to returning home. We discussed options of care, after discharge, home health, outpt palliative care and hospice. Reviewed all and answered questions. Pt's daughter is interested in outpt palliative to follow once home to help navigate the next plan of care. Pt is making her own decisions and supported by family. Will follow on 8th floor and refer out at discharge.  prayed and sang with pt, who recalls the words to old liana. She is a elijah, speaks of her love of the [address] and states, \"I'm not afraid to die. \"    Past Medical History:        Past Medical History:   Diagnosis Date    Diabetes mellitus ([facility] [address]     Hyperlipidemia     Hypertension     Palliative care patient 01/29/2021       Advance Directives:   No advance directive on file. Verified wishes, pt agrees to CPR but does not wish to be intubated. Limited code status      Pain/Other Symptoms:    Reported pain and asked for pain intervention, nurse aware and preparing. We assisted pt to reposition in bed. Activity:    As tolerated. Therapies involved. Psychological/Spiritual:     Excellent spiritiual support. Plan:    Continue medical treatments and transfer to inpt rehab to continue therapy. Patient/family discussion r/t goals:  Daughter reports pt has a rollator, BSC and a transport chair in the home. Review of any needed services:  Referral to out pt palliative care at time of discharge home.     Palliative Care will continue to follow        Electronically signed by Doris Ugalde RN on 1/29/2021 at 2:40 PM

## 2021-01-29 NOTE — ACP (ADVANCE CARE PLANNING)
Advance Care Planning     Advance Care Planning Activator (Inpatient)  Conversation Note      Date of ACP Conversation: 1/29/2021    Conversation Conducted with: Pt, Rima Mancia and her daughter Luther Watkins    ACP Activator: [email or web address] [place]/Go [place]ay Decision Maker:     Current Designated Health Care Decision Maker:     Primary Decision Maker: trenton caro - Child - [phone]    Supplemental (Other) Decision Maker: orion mancia - Niece/Nephew - [phone]      Care Preferences    Ventilation: \"If you were in your present state of health and suddenly became very ill and were unable to breathe on your own, what would your preference be about the use of a ventilator (breathing machine) if it were available to you? \"      Would the patient desire the use of ventilator (breathing machine)?:   NO          Resuscitation  \"CPR works best to restart the heart when there is a sudden event, like a heart attack, in someone who is otherwise healthy. Unfortunately, CPR does not typically restart the heart for people who have serious health conditions or who are very sick. \"    \"In the event your heart stopped as a result of an underlying serious health condition, would you want attempts to be made to restart your heart (answer \"yes\" for attempt to resuscitate) or would you prefer a natural death (answer \"no\" for do not attempt to resuscitate)? \"   YES      Conversation Outcomes:  [x] ACP discussion completed      Electronically signed by Irma Keating RN on 1/29/2021 at 3:12 PM

## 2021-01-29 NOTE — TELEPHONE ENCOUNTER
ATIYA CALLING CONSULT FOR THIS PT. DX LUNG CA WITH NEW METS BRAIN LESION. [address] [facility] [id number].

## 2021-01-30 ENCOUNTER — HOSPITAL ENCOUNTER (INPATIENT)
Age: 86
LOS: 21 days | Discharge: HOSPICE | DRG: 560 | End: 2021-02-20
Attending: PSYCHIATRY & NEUROLOGY | Admitting: PSYCHIATRY & NEUROLOGY
Payer: MEDICARE

## 2021-01-30 VITALS
BODY MASS INDEX: 22.38 KG/M2 | HEIGHT: 60 IN | OXYGEN SATURATION: 90 % | TEMPERATURE: 98.5 F | HEART RATE: 85 BPM | WEIGHT: 114 LBS | DIASTOLIC BLOOD PRESSURE: 72 MMHG | SYSTOLIC BLOOD PRESSURE: 129 MMHG | RESPIRATION RATE: 15 BRPM

## 2021-01-30 DIAGNOSIS — G62.9 NEUROPATHY: ICD-10-CM

## 2021-01-30 PROBLEM — R13.10 DYSPHAGIA: Status: ACTIVE | Noted: 2021-01-30

## 2021-01-30 PROBLEM — D64.9 ANEMIA: Status: ACTIVE | Noted: 2021-01-30

## 2021-01-30 PROBLEM — R53.1 WEAKNESS: Status: ACTIVE | Noted: 2021-01-30

## 2021-01-30 PROBLEM — M54.50 ACUTE MIDLINE LOW BACK PAIN WITHOUT SCIATICA: Status: ACTIVE | Noted: 2021-01-30

## 2021-01-30 PROBLEM — Z98.890 S/P LUMBAR LAMINECTOMY: Status: ACTIVE | Noted: 2021-01-30

## 2021-01-30 PROBLEM — I10 ESSENTIAL HYPERTENSION: Status: ACTIVE | Noted: 2021-01-30

## 2021-01-30 PROBLEM — Z98.890 S/P LAMINECTOMY: Status: ACTIVE | Noted: 2021-01-30

## 2021-01-30 LAB
BACTERIA: ABNORMAL /HPF
BILIRUBIN URINE: NEGATIVE
BLOOD, URINE: ABNORMAL
CLARITY: ABNORMAL
COLOR: YELLOW
CRYSTALS, UA: ABNORMAL /HPF
EPITHELIAL CELLS, UA: 3 /HPF (ref 0–5)
GLUCOSE BLD-MCNC: 112 MG/DL (ref 70–99)
GLUCOSE BLD-MCNC: 97 MG/DL (ref 70–99)
GLUCOSE URINE: NEGATIVE MG/DL
HYALINE CASTS: 15 /HPF (ref 0–8)
INR BLD: 1.01 (ref 0.88–1.18)
KETONES, URINE: 15 MG/DL
LEUKOCYTE ESTERASE, URINE: ABNORMAL
NITRITE, URINE: NEGATIVE
PERFORMED ON: ABNORMAL
PERFORMED ON: NORMAL
PH UA: 5.5 (ref 5–8)
PREALBUMIN: 17 MG/DL (ref 20–40)
PROTEIN UA: 100 MG/DL
PROTHROMBIN TIME: 13.2 SEC (ref 12–14.6)
RBC UA: 2 /HPF (ref 0–4)
SARS-COV-2, NAAT: NOT DETECTED
SPECIFIC GRAVITY UA: 1.02 (ref 1–1.03)
TSH REFLEX FT4: 0.67 UIU/ML (ref 0.35–5.5)
UROBILINOGEN, URINE: 1 E.U./DL
VITAMIN B-12: >2000 PG/ML (ref 211–946)
WBC UA: 155 /HPF (ref 0–5)

## 2021-01-30 PROCEDURE — 6370000000 HC RX 637 (ALT 250 FOR IP): Performed by: NEUROLOGICAL SURGERY

## 2021-01-30 PROCEDURE — 82607 VITAMIN B-12: CPT

## 2021-01-30 PROCEDURE — 6370000000 HC RX 637 (ALT 250 FOR IP): Performed by: INTERNAL MEDICINE

## 2021-01-30 PROCEDURE — 82947 ASSAY GLUCOSE BLOOD QUANT: CPT

## 2021-01-30 PROCEDURE — 87086 URINE CULTURE/COLONY COUNT: CPT

## 2021-01-30 PROCEDURE — 84443 ASSAY THYROID STIM HORMONE: CPT

## 2021-01-30 PROCEDURE — 84134 ASSAY OF PREALBUMIN: CPT

## 2021-01-30 PROCEDURE — 6360000002 HC RX W HCPCS: Performed by: NEUROLOGICAL SURGERY

## 2021-01-30 PROCEDURE — C9113 INJ PANTOPRAZOLE SODIUM, VIA: HCPCS | Performed by: INTERNAL MEDICINE

## 2021-01-30 PROCEDURE — 87186 SC STD MICRODIL/AGAR DIL: CPT

## 2021-01-30 PROCEDURE — 2580000003 HC RX 258: Performed by: NEUROLOGICAL SURGERY

## 2021-01-30 PROCEDURE — 1180000000 HC REHAB R&B

## 2021-01-30 PROCEDURE — 2580000003 HC RX 258: Performed by: INTERNAL MEDICINE

## 2021-01-30 PROCEDURE — 99024 POSTOP FOLLOW-UP VISIT: CPT | Performed by: NEUROLOGICAL SURGERY

## 2021-01-30 PROCEDURE — 81001 URINALYSIS AUTO W/SCOPE: CPT

## 2021-01-30 PROCEDURE — 85610 PROTHROMBIN TIME: CPT

## 2021-01-30 PROCEDURE — 36415 COLL VENOUS BLD VENIPUNCTURE: CPT

## 2021-01-30 PROCEDURE — 6360000002 HC RX W HCPCS: Performed by: INTERNAL MEDICINE

## 2021-01-30 PROCEDURE — U0002 COVID-19 LAB TEST NON-CDC: HCPCS

## 2021-01-30 RX ORDER — HYDROMORPHONE HYDROCHLORIDE 1 MG/ML
0.25 INJECTION, SOLUTION INTRAMUSCULAR; INTRAVENOUS; SUBCUTANEOUS
Status: DISCONTINUED | OUTPATIENT
Start: 2021-01-30 | End: 2021-01-30

## 2021-01-30 RX ORDER — INSULIN GLARGINE 100 [IU]/ML
10 INJECTION, SOLUTION SUBCUTANEOUS NIGHTLY
Status: CANCELLED | OUTPATIENT
Start: 2021-01-30

## 2021-01-30 RX ORDER — CYCLOBENZAPRINE HCL 10 MG
10 TABLET ORAL 3 TIMES DAILY PRN
Status: CANCELLED | OUTPATIENT
Start: 2021-01-30

## 2021-01-30 RX ORDER — HYDROCODONE BITARTRATE AND ACETAMINOPHEN 5; 325 MG/1; MG/1
1 TABLET ORAL EVERY 4 HOURS PRN
Status: CANCELLED | OUTPATIENT
Start: 2021-01-30

## 2021-01-30 RX ORDER — ONDANSETRON 4 MG/1
4 TABLET, ORALLY DISINTEGRATING ORAL EVERY 8 HOURS PRN
Status: CANCELLED | OUTPATIENT
Start: 2021-01-30

## 2021-01-30 RX ORDER — HYDROCODONE BITARTRATE AND ACETAMINOPHEN 5; 325 MG/1; MG/1
2 TABLET ORAL EVERY 4 HOURS PRN
Status: DISCONTINUED | OUTPATIENT
Start: 2021-01-30 | End: 2021-01-31

## 2021-01-30 RX ORDER — PANTOPRAZOLE SODIUM 40 MG/1
40 TABLET, DELAYED RELEASE ORAL
Status: DISCONTINUED | OUTPATIENT
Start: 2021-01-31 | End: 2021-01-30 | Stop reason: HOSPADM

## 2021-01-30 RX ORDER — HYDROMORPHONE HYDROCHLORIDE 1 MG/ML
0.5 INJECTION, SOLUTION INTRAMUSCULAR; INTRAVENOUS; SUBCUTANEOUS
Status: DISCONTINUED | OUTPATIENT
Start: 2021-01-30 | End: 2021-01-30

## 2021-01-30 RX ORDER — POLYETHYLENE GLYCOL 3350 17 G/17G
17 POWDER, FOR SOLUTION ORAL DAILY PRN
Status: CANCELLED | OUTPATIENT
Start: 2021-01-30

## 2021-01-30 RX ORDER — DEXTROSE MONOHYDRATE 25 G/50ML
12.5 INJECTION, SOLUTION INTRAVENOUS PRN
Status: DISCONTINUED | OUTPATIENT
Start: 2021-01-30 | End: 2021-02-20 | Stop reason: HOSPADM

## 2021-01-30 RX ORDER — DEXTROSE MONOHYDRATE 25 G/50ML
12.5 INJECTION, SOLUTION INTRAVENOUS PRN
Status: CANCELLED | OUTPATIENT
Start: 2021-01-30

## 2021-01-30 RX ORDER — BISACODYL 10 MG
10 SUPPOSITORY, RECTAL RECTAL DAILY PRN
Status: CANCELLED | OUTPATIENT
Start: 2021-01-30

## 2021-01-30 RX ORDER — SIMETHICONE 80 MG
80 TABLET,CHEWABLE ORAL EVERY 6 HOURS PRN
Status: DISCONTINUED | OUTPATIENT
Start: 2021-01-30 | End: 2021-01-30 | Stop reason: HOSPADM

## 2021-01-30 RX ORDER — CETIRIZINE HYDROCHLORIDE 10 MG/1
10 TABLET ORAL DAILY
Status: CANCELLED | OUTPATIENT
Start: 2021-01-31

## 2021-01-30 RX ORDER — LISINOPRIL 5 MG/1
5 TABLET ORAL DAILY
Status: CANCELLED | OUTPATIENT
Start: 2021-01-31

## 2021-01-30 RX ORDER — SIMETHICONE 80 MG
80 TABLET,CHEWABLE ORAL EVERY 6 HOURS PRN
Status: CANCELLED | OUTPATIENT
Start: 2021-01-30

## 2021-01-30 RX ORDER — HYDROMORPHONE HYDROCHLORIDE 1 MG/ML
0.25 INJECTION, SOLUTION INTRAMUSCULAR; INTRAVENOUS; SUBCUTANEOUS
Status: CANCELLED | OUTPATIENT
Start: 2021-01-30

## 2021-01-30 RX ORDER — ROSUVASTATIN CALCIUM 20 MG/1
20 TABLET, COATED ORAL NIGHTLY
Status: CANCELLED | OUTPATIENT
Start: 2021-01-30

## 2021-01-30 RX ORDER — ONDANSETRON 4 MG/1
4 TABLET, ORALLY DISINTEGRATING ORAL EVERY 8 HOURS PRN
Status: DISCONTINUED | OUTPATIENT
Start: 2021-01-30 | End: 2021-02-20 | Stop reason: HOSPADM

## 2021-01-30 RX ORDER — SENNA AND DOCUSATE SODIUM 50; 8.6 MG/1; MG/1
1 TABLET, FILM COATED ORAL 2 TIMES DAILY
Status: CANCELLED | OUTPATIENT
Start: 2021-01-30

## 2021-01-30 RX ORDER — PANTOPRAZOLE SODIUM 40 MG/1
40 TABLET, DELAYED RELEASE ORAL
Status: DISCONTINUED | OUTPATIENT
Start: 2021-01-31 | End: 2021-02-14

## 2021-01-30 RX ORDER — ONDANSETRON 2 MG/ML
4 INJECTION INTRAMUSCULAR; INTRAVENOUS EVERY 6 HOURS PRN
Status: CANCELLED | OUTPATIENT
Start: 2021-01-30

## 2021-01-30 RX ORDER — ONDANSETRON 2 MG/ML
4 INJECTION INTRAMUSCULAR; INTRAVENOUS EVERY 6 HOURS PRN
Status: DISCONTINUED | OUTPATIENT
Start: 2021-01-30 | End: 2021-02-20 | Stop reason: HOSPADM

## 2021-01-30 RX ORDER — CHOLECALCIFEROL (VITAMIN D3) 125 MCG
500 CAPSULE ORAL DAILY
Status: CANCELLED | OUTPATIENT
Start: 2021-01-31

## 2021-01-30 RX ORDER — FERROUS SULFATE 325(65) MG
325 TABLET ORAL
Status: CANCELLED | OUTPATIENT
Start: 2021-01-31

## 2021-01-30 RX ORDER — CHOLECALCIFEROL (VITAMIN D3) 125 MCG
500 CAPSULE ORAL DAILY
Status: DISCONTINUED | OUTPATIENT
Start: 2021-01-31 | End: 2021-02-20 | Stop reason: HOSPADM

## 2021-01-30 RX ORDER — HYDROCODONE BITARTRATE AND ACETAMINOPHEN 5; 325 MG/1; MG/1
1 TABLET ORAL EVERY 4 HOURS PRN
Status: DISCONTINUED | OUTPATIENT
Start: 2021-01-30 | End: 2021-01-31

## 2021-01-30 RX ORDER — LISINOPRIL 5 MG/1
5 TABLET ORAL DAILY
Status: DISCONTINUED | OUTPATIENT
Start: 2021-01-31 | End: 2021-02-20 | Stop reason: HOSPADM

## 2021-01-30 RX ORDER — DEXTROSE MONOHYDRATE 50 MG/ML
100 INJECTION, SOLUTION INTRAVENOUS PRN
Status: DISCONTINUED | OUTPATIENT
Start: 2021-01-30 | End: 2021-02-20 | Stop reason: HOSPADM

## 2021-01-30 RX ORDER — GABAPENTIN 300 MG/1
300 CAPSULE ORAL NIGHTLY
Status: CANCELLED | OUTPATIENT
Start: 2021-01-30

## 2021-01-30 RX ORDER — SODIUM CHLORIDE 0.9 % (FLUSH) 0.9 %
10 SYRINGE (ML) INJECTION EVERY 12 HOURS SCHEDULED
Status: DISCONTINUED | OUTPATIENT
Start: 2021-01-30 | End: 2021-01-31

## 2021-01-30 RX ORDER — INSULIN GLARGINE 100 [IU]/ML
10 INJECTION, SOLUTION SUBCUTANEOUS NIGHTLY
Status: DISCONTINUED | OUTPATIENT
Start: 2021-01-30 | End: 2021-02-20 | Stop reason: HOSPADM

## 2021-01-30 RX ORDER — NICOTINE POLACRILEX 4 MG
15 LOZENGE BUCCAL PRN
Status: DISCONTINUED | OUTPATIENT
Start: 2021-01-30 | End: 2021-02-20 | Stop reason: HOSPADM

## 2021-01-30 RX ORDER — SIMETHICONE 80 MG
80 TABLET,CHEWABLE ORAL EVERY 6 HOURS PRN
Status: DISCONTINUED | OUTPATIENT
Start: 2021-01-30 | End: 2021-02-20 | Stop reason: HOSPADM

## 2021-01-30 RX ORDER — GABAPENTIN 300 MG/1
300 CAPSULE ORAL NIGHTLY
Status: DISCONTINUED | OUTPATIENT
Start: 2021-01-30 | End: 2021-02-20 | Stop reason: HOSPADM

## 2021-01-30 RX ORDER — POLYETHYLENE GLYCOL 3350 17 G/17G
17 POWDER, FOR SOLUTION ORAL DAILY PRN
Status: DISCONTINUED | OUTPATIENT
Start: 2021-01-30 | End: 2021-01-30 | Stop reason: SDUPTHER

## 2021-01-30 RX ORDER — SODIUM CHLORIDE 0.9 % (FLUSH) 0.9 %
10 SYRINGE (ML) INJECTION EVERY 12 HOURS SCHEDULED
Status: CANCELLED | OUTPATIENT
Start: 2021-01-30

## 2021-01-30 RX ORDER — HYDROMORPHONE HYDROCHLORIDE 1 MG/ML
0.5 INJECTION, SOLUTION INTRAMUSCULAR; INTRAVENOUS; SUBCUTANEOUS
Status: CANCELLED | OUTPATIENT
Start: 2021-01-30

## 2021-01-30 RX ORDER — SENNA AND DOCUSATE SODIUM 50; 8.6 MG/1; MG/1
1 TABLET, FILM COATED ORAL 2 TIMES DAILY
Status: DISCONTINUED | OUTPATIENT
Start: 2021-01-30 | End: 2021-02-11

## 2021-01-30 RX ORDER — CETIRIZINE HYDROCHLORIDE 10 MG/1
10 TABLET ORAL DAILY
Status: DISCONTINUED | OUTPATIENT
Start: 2021-01-31 | End: 2021-02-20 | Stop reason: HOSPADM

## 2021-01-30 RX ORDER — DILTIAZEM HYDROCHLORIDE 240 MG/1
240 CAPSULE, COATED, EXTENDED RELEASE ORAL DAILY
Status: DISCONTINUED | OUTPATIENT
Start: 2021-01-31 | End: 2021-02-20 | Stop reason: HOSPADM

## 2021-01-30 RX ORDER — ACETAMINOPHEN 325 MG/1
650 TABLET ORAL EVERY 4 HOURS PRN
Status: DISCONTINUED | OUTPATIENT
Start: 2021-01-30 | End: 2021-02-20 | Stop reason: HOSPADM

## 2021-01-30 RX ORDER — HYDROCODONE BITARTRATE AND ACETAMINOPHEN 5; 325 MG/1; MG/1
2 TABLET ORAL EVERY 4 HOURS PRN
Status: CANCELLED | OUTPATIENT
Start: 2021-01-30

## 2021-01-30 RX ORDER — DEXTROSE MONOHYDRATE 50 MG/ML
100 INJECTION, SOLUTION INTRAVENOUS PRN
Status: CANCELLED | OUTPATIENT
Start: 2021-01-30

## 2021-01-30 RX ORDER — POLYETHYLENE GLYCOL 3350 17 G/17G
17 POWDER, FOR SOLUTION ORAL DAILY PRN
Status: DISCONTINUED | OUTPATIENT
Start: 2021-01-30 | End: 2021-02-20 | Stop reason: HOSPADM

## 2021-01-30 RX ORDER — SODIUM CHLORIDE 0.9 % (FLUSH) 0.9 %
10 SYRINGE (ML) INJECTION PRN
Status: DISCONTINUED | OUTPATIENT
Start: 2021-01-30 | End: 2021-01-31

## 2021-01-30 RX ORDER — ROSUVASTATIN CALCIUM 20 MG/1
20 TABLET, COATED ORAL NIGHTLY
Status: DISCONTINUED | OUTPATIENT
Start: 2021-01-30 | End: 2021-02-20 | Stop reason: HOSPADM

## 2021-01-30 RX ORDER — BISACODYL 10 MG
10 SUPPOSITORY, RECTAL RECTAL DAILY PRN
Status: DISCONTINUED | OUTPATIENT
Start: 2021-01-30 | End: 2021-02-20 | Stop reason: HOSPADM

## 2021-01-30 RX ORDER — NICOTINE POLACRILEX 4 MG
15 LOZENGE BUCCAL PRN
Status: CANCELLED | OUTPATIENT
Start: 2021-01-30

## 2021-01-30 RX ORDER — PANTOPRAZOLE SODIUM 40 MG/1
40 TABLET, DELAYED RELEASE ORAL
Status: CANCELLED | OUTPATIENT
Start: 2021-01-31

## 2021-01-30 RX ORDER — FERROUS SULFATE 325(65) MG
325 TABLET ORAL
Status: DISCONTINUED | OUTPATIENT
Start: 2021-01-31 | End: 2021-02-20 | Stop reason: HOSPADM

## 2021-01-30 RX ORDER — CYCLOBENZAPRINE HCL 10 MG
10 TABLET ORAL 3 TIMES DAILY PRN
Status: DISCONTINUED | OUTPATIENT
Start: 2021-01-30 | End: 2021-02-20 | Stop reason: HOSPADM

## 2021-01-30 RX ORDER — SODIUM CHLORIDE 0.9 % (FLUSH) 0.9 %
10 SYRINGE (ML) INJECTION PRN
Status: CANCELLED | OUTPATIENT
Start: 2021-01-30

## 2021-01-30 RX ORDER — DILTIAZEM HYDROCHLORIDE 240 MG/1
240 CAPSULE, COATED, EXTENDED RELEASE ORAL DAILY
Status: CANCELLED | OUTPATIENT
Start: 2021-01-31

## 2021-01-30 RX ADMIN — DOCUSATE SODIUM 50MG AND SENNOSIDES 8.6MG 1 TABLET: 8.6; 5 TABLET, FILM COATED ORAL at 09:11

## 2021-01-30 RX ADMIN — BISACODYL 10 MG: 10 SUPPOSITORY RECTAL at 11:00

## 2021-01-30 RX ADMIN — CETIRIZINE HYDROCHLORIDE 10 MG: 10 TABLET, FILM COATED ORAL at 09:11

## 2021-01-30 RX ADMIN — GABAPENTIN 300 MG: 300 CAPSULE ORAL at 21:06

## 2021-01-30 RX ADMIN — LISINOPRIL 5 MG: 5 TABLET ORAL at 09:11

## 2021-01-30 RX ADMIN — CYANOCOBALAMIN TAB 500 MCG 500 MCG: 500 TAB at 09:11

## 2021-01-30 RX ADMIN — DILTIAZEM HYDROCHLORIDE 240 MG: 240 CAPSULE, COATED, EXTENDED RELEASE ORAL at 09:11

## 2021-01-30 RX ADMIN — SODIUM CHLORIDE, PRESERVATIVE FREE 10 ML: 5 INJECTION INTRAVENOUS at 09:11

## 2021-01-30 RX ADMIN — ROSUVASTATIN CALCIUM 20 MG: 20 TABLET, FILM COATED ORAL at 21:06

## 2021-01-30 RX ADMIN — ENOXAPARIN SODIUM 40 MG: 40 INJECTION SUBCUTANEOUS at 16:48

## 2021-01-30 RX ADMIN — PANTOPRAZOLE SODIUM 40 MG: 40 INJECTION, POWDER, FOR SOLUTION INTRAVENOUS at 09:11

## 2021-01-30 RX ADMIN — DOCUSATE SODIUM 50 MG AND SENNOSIDES 8.6 MG 1 TABLET: 8.6; 5 TABLET, FILM COATED ORAL at 21:06

## 2021-01-30 NOTE — DISCHARGE SUMMARY
[facility]  Discharge Summary     Patient:   Ouida Duane  MR#:    [id number]      YOB: 1933  Date of Evaluation:  1/30/2021  Time of Note:                          10:52 AM  Primary/Referring Physician:  Emilie Tamayo MD   Note Author:    Sonya Tam MD        Admission Date: 1/26/2021    Discharge Date: 1/30/2021    Final Diagnoses: Lumbar stenosis with neurogenic claudication [M48.062], Lung Mass, Cerebellar Mass    Procedures: L3/4, L4/5 decompressive laminectomy    Consultations: Hospital Medicine, Oncology, Palliative Care    Reason for Admission: Postoperative care, limited mobility She underwent an uncomplicated Y5/4 and P3/5 laminectomy on 1/26. She was admitted to the floor after surgery and began PT/OT. Hospital medicine was consulted for assistance with medical management and hyperglycemia and she was well-controlled on subcutaneous insulin. She did experience some difficulty with dysphagia/dyspepsia and SLP was consulted and found no evidence of aspiration. She continued to demonstrate significant balance difficulty after surgery though her leg weakness began to improve. Due to her balance difficulty, an MRI scan of her brain was obtained that did demonstrate a ~2cm ring-enhancing mass in the right cerebellar hemisphere consistent with metastasis from her known lung lesion. Oncology and palliative care were consulted and the patient has elected not to pursue further treatment for her malignancy, however she would like to pursue rehabilitation to regain strength and mobility after her back surgery. She was approved for inpatient rehabilitation. Patient Condition: Stable    Disposition: Rehab    Wound Instructions: Able to shower. DO NOT SOAK WOUND. Diet: diabetic diet    Discharge Medications:      Medication List      ASK your doctor about these medications    aspirin 81 MG EC tablet     cetirizine 10 MG tablet  Commonly known as: ZYRTEC     dilTIAZem 240 MG extended release capsule  Commonly known as: CARDIZEM CD  Take 1 capsule by mouth daily     * ferrous sulfate 325 (65 Fe) MG tablet  Commonly known as: IRON 325     * ferrous sulfate 325 (65 Fe) MG tablet  Commonly known as: IRON 325     gabapentin 300 MG capsule  Commonly known as: Neurontin  Take 1 capsule by mouth nightly for 30 days.      glimepiride 1 MG tablet  Commonly known as: AMARYL  TAKE 1 TABLET EVERY MORNING BEFORE BREAKFAST     lisinopril 5 MG tablet  Commonly known as: PRINIVIL;ZESTRIL  TAKE 1 TABLET DAILY     metFORMIN 500 MG extended release tablet  Commonly known as: Pratik Brown

## 2021-01-30 NOTE — PROGRESS NOTES
Iain Beltrán arrived to room # 917 [id number]  . Presented with: L 3/4 & L4/5 laminectomy  Mental Status: Patient is oriented, alert, coherent, thought processes intact and able to concentrate and follow conversation. Vitals:    01/30/21 1523   BP: 113/64   Pulse: 82   Resp: 16   Temp: 97.5 °F (36.4 °C)   SpO2: 94%     Patient safety contract and falls prevention contract reviewed with patient Yes. Oriented Patient to room. Call light within reach. Yes.   Needs, issues or concerns expressed at this time: no.      Electronically signed by Jeannie Flores RN on 1/30/2021 at 4:09 PM

## 2021-01-30 NOTE — PLAN OF CARE
[address] TREATMENT PLAN      Rima Aparicio Officer    : 1933  Acct #: [de-identified]  MRN: [id number]   PHYSICIAN:  Margot Merida MD  Primary Problem    Patient Active Problem List   Diagnosis    Lumbar spinal stenosis    Lumbar stenosis with neurogenic claudication    Cerebellar mass    Type 2 diabetes mellitus without complication, without long-term current use of insulin ([facility] [address]    Lung mass    Palliative care patient    Dysphagia    S/P laminectomy    Acute midline low back pain without sciatica    Weakness    Essential hypertension    Anemia    S/P lumbar laminectomy    Neuropathy    Other hyperlipidemia    Acute cystitis without hematuria       Rehabilitation Diagnosis:     S/P lumbar laminectomy [Z98.890]       ADMIT DATE:2021   CARE PLAN     NURSING:  Rima Aparicio Officer while on this unit will:     [x] Be continent of bowel and bladder      [x] Have an adequate number of bowel movements   [x] Urinate with no urinary retention >300ml in bladder   [] Complete bladder protocol with powell removal   [x] Maintain O2 SATs at _>90__%   [x] Have pain managed while on ARU        [x] Be pain free by discharge    [x] Have no skin breakdown while on ARU   [x] Have improved skin integrity via wound measurements   [x] Have no signs/symptoms of infection at the wound site  [x] Be free from injury during hospitalization   [x] Complete education with patient/family with understanding demonstrated for:  [x] Adjustment   [x] Other: Nursing interventions may include bowel/bladder training, education for medical assistive devices, medication education, O2 saturation management, energy conservation, stress management techniques, fall prevention, alarms protocol, seating and positioning, skin/wound care, pressure relief instruction,dressing changes,  infection protection, DVT prophylaxis, and/or assistance with in room safety with transfers to bed, toilet, wheelchair, shower as well as bathroom activities and hygiene. Patient/caregiver education for:   [x] Disease/sustained injury/management      [x] Medication Use   [x] Surgical intervention   [x] Safety   [x] Body mechanics and or joint protection   [x] Health maintenance       IRF-STEVE  Bladder and Bowel Continence  Bladder Continence: Incontinent daily  Bowel Continence: Always continent       PHYSICAL THERAPY:  Goals:                  Short term goals  Time Frame for Short term goals: 1 WEEK  Short term goal 1: AMB CGA W/ RW 50FT   Short term goal 2: SBA WITH ALL BED MOBILITY  Short term goal 3: SBA W/ W/C PROPULSION 76 FT W/ TURNS   Short term goal 4: MIN A WITH STAND STEP TF TO/FROM SURFACES W/ RW   Short term goal 5: CAREGIVER SBA FOR ASSISTING PT WITH DONNING/DOFFING TLSO             Long term goals  Time Frame for Long term goals : 2 WEEKS   Long term goal 1: AMB 100FT W/ RW SBA   Long term goal 2: IND WITH STAND STEP TF TO/FROM SURFACES USING RW   Long term goal 3: IND W/ W/C PROPULSION FOR 150FT, INCLUDING TURNS  Long term goal 4: CAREGIVER AND PT IND WITH DONNING/DOFFING TLSO   Long term goal 5: PT IND WITH HOME EX PROGRAM AT D/C   These goals were reviewed with this patient at the time of assessment and Rima Mckeon is in agreement.      Plan of Care: Frequency:  [] 5 days per week, 90 minutes per day                             []  5 days per week, 60 minutes per day Reason if not Attempted: Not attempted due to medical condition or safety concerns  CARE Score: 88  Discharge Goal: Independent  12 Steps  Reason if not Attempted: Not attempted due to medical condition or safety concerns  CARE Score: 88  Discharge Goal: Supervision or touching assistance  Wheel 50 Feet with Two Turns  Assistance Needed: Supervision or touching assistance  CARE Score: 4  Discharge Goal: Independent  Wheel 150 Feet  Reason if not Attempted: Not attempted due to medical condition or safety concerns  CARE Score: 88  Discharge Goal: Independent    OCCUPATIONAL THERAPY:  Goals:             Short term goals  Time Frame for Short term goals: 1 week  Short term goal 1: Min A with clothing management/hygiene for toileting. Short term goal 2: Mod A with toilet transfers. Short term goal 3: Mod A with LB dressing, using AE. Short term goal 4: Mod A with donning/doffing socks and shoes using AE. Short term goal 5: Min A with bathing hygiene. Short term goal 6: Min A with donning/doffing LSO. :  Long term goals  Time Frame for Long term goals : 3-4 weeks  Long term goal 1: Min A with clothing management/hygiene for toileting. Long term goal 2: Min A with toilet transfers. Long term goal 3: Min A with LB dressing, using AE. Long term goal 4: Min A with donning/doffing socks and shoes using AE. Long term goal 5: Supervision with bathing hygiene. Long term goals 6: Patient verbalize DME needs. Long term goal 7:  Mod A with donning/doffing LSO. :    These goals were reviewed with this patient at the time of assessment and Rima Cramer is in agreement    Plan of Care: Frequency:   [] 5 days per week, 90 minutes per day     [x] 5 days per week, 60 minutes per day                Plan LTG Goal 1: The patient will develop functional, cognitive linguistic based skills and utilize compensatory strategies to communicate wants and needs effectively to different conversational partners, maintain safety, and participate socially in functional living enviroment. LTG 2: The patient will tolerate least restrictive diet with min/no overt s/s of aspiration. Short-term Goals  Timeframe for Short-term Goals: 2-3 weeks  Goal 1: The patient will demonstrate recall of functional information following a/an (immediate, shortterm,long-term) delay with min cues in order to increase functional integration into environment. Goal 2: The patient will complete executive function tasks (planning, self-monitoring, organizing, etc) with min verbal cues at 80% accuracy to improve safety awareness with functional ADL's  Goal 3: The patient will demonstrate functional problem solving and safety awareness with min verbal cues at 80% accuracy for daily living tasks in order to increase safe interaction with environment and decreased assistance from caregivers. Timeframe for Short-term Goals: 2-3 weeks    IRF-STEVE  Hearing, Speech, and Vision  Expression of Ideas and Wants: Without difficulty  Understanding Verbal and Non-Verbal Content: Understands  Cognitive Patterns  Cognitive Pattern Assessment Used: BIMS  Repetition of Three Words (First Attempt): 3  Temporal Orientation: Year: Correct  Temporal Orientation: Month:  Accurate within 5 days  Temporal Orientation: Day: Correct  Able to recall \"sock: Yes, no cue required  Able to recall \"blue\": Yes, no cue required  Able to recall \"bed\": Yes, no cue required  BIMS Summary Score: 15          Plan of Care:  Frequency:   [x] 5 days per week, 60 minutes per day                            [] Not appropriate for Speech Therapy Treatments may include speech/language/communication therapy, cognitive training, group therapy, education, and/or dysphagia therapy based on the above goals. These goals were reviewed with this patient at the time of assessment and Rima Baeza is in agreement. CASE MANAGEMENT:  Goals:   Assist patient/family with discharge planning, patient/family counseling,  and coordination with insurance during ARU stay. Patient Goals: get stronger, do as much as I can for myself, pain management               Activities Prior to Admit:      Active : No(Since last Relleeann Joe (nephew) has been doing her grocery shopping.)     Occupation: Retired  Leisure & Hobbies: Sewing, Reading,  had [address] which her nephew keeps up now since her  passed away last year. Isidro Alarcon will be seen a minimum of 3 hours of therapy per day/a minimum of 5 out of 7 days per week. [] In this rare instance due to the nature of this patient's medical involvement, this patient will be seen 15 hours per week (900 minutes within a 7 day period). Treatments may include therapeutic exercises, gait training, neuromuscular re-ed, transfer training, community reintegration, bed mobility, w/c mobility and training, self care, home mgmt, cognitive training, energy conservation,dysphagia tx, speech/language/communication therapy, group therapy, and patient/family education. In addition, dietician/nutritionist may monitor calorie count as well as intake and collaboratively work with SLP on dietary upgrades. Neuropsychology/Psychology may evaluate and provide necessary support.     Medical issues being managed closely and that require 24 hour availability of a physician:   [] Swallowing Precautions  [x] Bowel/Bladder Fx  [x] Weight bearing precautions   [x] Wound Care    [x] Pain Mgmt   [x] Infection Protection   [x] DVT Prophylaxis   [x] Fall Precautions  [] Fluid/Electrolyte/Nutrition Balance [] Voice Protection   [] Respiratory  [] Other:    Medical Prognosis: [] Good  [x] Fair    [] Guarded   Total expected IRF days:  15  Anticipated discharge destination:    [] Home Independently   [x] Home with supervision    []SNF     [] Other                                           Physician anticipated functional outcomes:  Ambulate household distances independently with assistive device. IPOC brief synthesis: Acute inpatient rehabilitation with occupational   physical therapy 180 minutes, 5 every 7   days will address basic and advancing mobility with self-care   instruction and adaptive equipment training. Caregiver education will   be offered. Expected length of stay prior to the supervised level of   functional discharge to home with home care is 15 days. Assessment and Plan:  1. S/P lumbar laminectomy-Pain control/mobilzation  2. HTN-on meds monitor  3. DVT prophylaxis-Lovenox  4. Anemia-on iron   5. Neuropathy-on Neurontin  6. DM-on insulin-monitor BS  7. GI-PPI/bowel regimen  8. Hyperlipidemia-on statin  9. Pain control-Norco PRN  11. Lung/cerebellar mass-monitor   12. PT/OT  13.  UTI-complete abx            Case Mgmt: Electronically signed by RADHA Trujillo on 2/1/2021 at 9:04 AM    OT: Electronically signed by Chaparrita Babin OT on 2/1/2021 at 1:04 PM    PT: Electronically signed by Patsy Feliciano PT on 2/2/21 at 7:41 AM CST    RN: Electronically signed by Luann Victoria RN on 1/30/21 at 3:12 PM CST    ST: Electronically signed by BARBIE Zapata on 2/1/2021 at 3:16 PM

## 2021-01-30 NOTE — PROGRESS NOTES
Hospitalist Consult Progress Note  1/30/2021 10:23 AM  Subjective:   Admit Date: 1/26/2021  PCP: Jose Alberto Hoff MD    Reason for consult: medical management    Subjective: Tells me her brain feels \"fuzzy\" this morning but denies confusion. Had SLP eval yesterday and no overt signs symptoms of aspiration or penetration. However she still feels like food is getting stuck in her chest with swallowing solids or liquids. Causes her a lot of coughing to try and eat anything but reports tolerating puddings and yogurts okay. No other acute complaints; Is being discharged to inpatient rehab today. ROS: Six point review of systems is negative except as specifically addressed above. DIET CARB CONTROL;     Intake/Output Summary (Last 24 hours) at 1/30/2021 1023  Last data filed at 1/29/2021 2108  Gross per 24 hour   Intake 10 ml   Output 450 ml   Net -440 ml     Medications:   dextrose       Current Facility-Administered Medications   Medication Dose Route Frequency Provider Last Rate Last Admin    pantoprazole (PROTONIX) injection 40 mg  40 mg Intravenous Daily Amirah Fontaine MD   40 mg at 01/30/21 6767    And    sodium chloride (PF) 0.9 % injection 10 mL  10 mL Intravenous Daily Amirah Fontaine MD   10 mL at 01/30/21 0911    lisinopril (PRINIVIL;ZESTRIL) tablet 5 mg  5 mg Oral Daily Amirah Fontaine MD   5 mg at 01/30/21 0911    cetirizine (ZYRTEC) tablet 10 mg  10 mg Oral Daily Leeanna Vogel MD   10 mg at 01/30/21 0911    vitamin B-12 (CYANOCOBALAMIN) tablet 500 mcg  500 mcg Oral Daily Leeanna Vogel MD   500 mcg at 01/30/21 0911    dilTIAZem (CARDIZEM CD) extended release capsule 240 mg  240 mg Oral Daily Leeanna Vogel MD   240 mg at 01/30/21 8814    ferrous sulfate (IRON 325) tablet 325 mg  325 mg Oral Daily with breakfast Leeanna Vogel MD   325 mg at 01/29/21 1117    gabapentin (NEURONTIN) capsule 300 mg  300 mg Oral Nightly Leeanna Vogel MD   300 mg at 01/29/21 2108  rosuvastatin (CRESTOR) tablet 20 mg  20 mg Oral Nightly Marcia Mancilla MD   20 mg at 01/29/21 2108    sodium chloride flush 0.9 % injection 10 mL  10 mL Intravenous 2 times per day Marcia Mancilla MD   10 mL at 01/30/21 0911    sodium chloride flush 0.9 % injection 10 mL  10 mL Intravenous PRN Marcia Mancilla MD        sennosides-docusate sodium (SENOKOT-S) 8.6-50 MG tablet 1 tablet  1 tablet Oral BID Marcia Mancilla MD   1 tablet at 01/30/21 0911    polyethylene glycol (GLYCOLAX) packet 17 g  17 g Oral Daily PRN Marcia Mancilla MD   17 g at 01/29/21 1418    bisacodyl (DULCOLAX) suppository 10 mg  10 mg Rectal Daily PRN Marcia Mancilla MD        ondansetron (ZOFRAN-ODT) disintegrating tablet 4 mg  4 mg Oral Q8H PRN Marcia Mancilla MD        Or    ondansetron [facility]) injection 4 mg  4 mg Intravenous Q6H PRN Marcia Mancilla MD        HYDROcodone-acetaminophen [facility]) 5-325 MG per tablet 1 tablet  1 tablet Oral Q4H PRN Marcia Mancilla MD   1 tablet at 01/29/21 1431    Or    HYDROcodone-acetaminophen (NORCO) 5-325 MG per tablet 2 tablet  2 tablet Oral Q4H PRN Marcia Mancilla MD        HYDROmorphone HCl PF (DILAUDID) injection 0.25 mg  0.25 mg Intravenous Q3H PRN Marcia Mancilla MD        Or    HYDROmorphone HCl PF (DILAUDID) injection 0.5 mg  0.5 mg Intravenous Q3H PRN Marcia Mancilla MD        cyclobenzaprine (FLEXERIL) tablet 10 mg  10 mg Oral TID PRN Marcia Mancilla MD        insulin lispro (HUMALOG) injection vial 4 Units  0.08 Units/kg Subcutaneous TID  Marcia Mancilla MD   4 Units at 01/27/21 0922    insulin lispro (HUMALOG) injection vial 0-12 Units  0-12 Units Subcutaneous TID DELIA Mancilla MD   6 Units at 01/28/21 1258    insulin lispro (HUMALOG) injection vial 0-6 Units  0-6 Units Subcutaneous Nightly Marcia Mancilla MD   2 Units at 01/26/21 0951  insulin glargine (LANTUS) injection vial 10 Units  10 Units Subcutaneous Nightly Go Reyes MD   10 Units at 01/29/21 2109    glucose (GLUTOSE) 40 % oral gel 15 g  15 g Oral JEFFREY Reyes MD        dextrose 50 % IV solution  12.5 g Intravenous JEFFREY Reyes MD        glucagon (rDNA) injection 1 mg  1 mg Intramuscular JEFFREY Reyes MD        dextrose 5 % solution  100 mL/hr Intravenous JEFFREY Reyes MD            Labs:     No results for input(s): WBC, RBC, HGB, HCT, MCV, MCH, MCHC, PLT in the last 72 hours. No results for input(s): NA, K, ANIONGAP, CL, CO2, BUN, CREATININE, GLUCOSE, CALCIUM, GFR in the last 72 hours. No results for input(s): MG, PHOS in the last 72 hours. No results for input(s): AST, ALT, ALB, BILITOT, ALKPHOS, ALB in the last 72 hours. ABGs:No results for input(s): PH, PO2, PCO2, HCO3, BE, O2SAT in the last 72 hours. Troponin T: No results for input(s): TROPONINI in the last 72 hours. INR: No results for input(s): INR in the last 72 hours. Lactic Acid: No results for input(s): LACTA in the last 72 hours. Objective:   Vitals: /72   Pulse 85   Temp 98.5 °F (36.9 °C) (Temporal)   Resp 15   Ht 5' (1.524 m)   Wt 114 lb (51.7 kg)   SpO2 90%   BMI 22.26 kg/m²   24HR INTAKE/OUTPUT:      Intake/Output Summary (Last 24 hours) at 1/30/2021 1023  Last data filed at 1/29/2021 2108  Gross per 24 hour   Intake 10 ml   Output 450 ml   Net -440 ml     General appearance: sitting up in bed. Spitting up clear sputum into tissue   Head: NC/AT   Eyes: normal sclera  Mouth: MMM   Lungs: no increased work of breathing  Heart: regular rate and rhythm, S1, S2 normal, no murmur, click, rub or gallop  Abdomen: soft, non-tender; bowel sounds normal; no masses,  no organomegaly  Extremities: no LE edema  Neurologic: alert and oriented  Psych: affect and mood appropriate  Skin: no overt rashes    Assessment and Plan:    Active Problems: Lumbar stenosis with neurogenic claudication    Cerebellar mass    Type 2 diabetes mellitus without complication, without long-term current use of insulin (HCC)    Lung mass    Palliative care patient  Resolved Problems:    * No resolved hospital problems. *    Lumbar stenosis with neurogenic claudication- s/p L3/4 and L4/5 decompressive laminectomy on 1/26/2021 - per Neurosurgery. Also defer pain control, diet, activity, dispo, DVT ppx to them as primary team.  Discharging to inpatient rehab today    Dysphagia - SLP; Oral and pharyngeal phases were within functional limits she still continues to complain of symptoms and has coughing with both solids and liquids. I do see any evidence of esophageal involvement or compression on her CT 2/2 malignancy. -As she is being discharged to inpatient rehab today, would recommend esophagram and/or GI evaluation there pending GOC  - as she tells me she can tolerate yogurt and puddings will change her to puree diet to see if it helps    History of hypertension - BP better. Continue current regimen.      History of type 2 diabetes  - Holding home orals. Lantus 10 units at night with lispro 4 units mealtime coverage. Doing well with this today  - Hypoglycemia protocol.     Hyperlipidemia: Continue statin. Right-sided lung neoplasm w/ brain metsrecently diagnosed. Saw pulm as outpatient; SP was scheduled after back surgery but has not been completed. - Palliative care and oncology consulted. Patient does not wish for any further work-up or treatment    Dispo per primary team - I am told she is being discharged to inpatient rehab today. Signed:   Panfilo Joseph MD 1/30/2021 10:23 AM  Hospitalist

## 2021-01-30 NOTE — PROGRESS NOTES
Isidro Alarcon arrived to room # 917 [id number]. Presented with: S/p back surgery  Mental Status: Patient is oriented and alert. There were no vitals filed for this visit. Patient safety contract and falls prevention contract reviewed with patient Yes. Oriented Patient to room. Call light within reach. Yes.   Needs, issues or concerns expressed at this time: no.      Electronically signed by Carey Bliss RN on 1/30/2021 at 3:22 PM

## 2021-01-31 PROBLEM — N30.00 ACUTE CYSTITIS WITHOUT HEMATURIA: Status: ACTIVE | Noted: 2021-01-31

## 2021-01-31 PROBLEM — E78.49 OTHER HYPERLIPIDEMIA: Status: ACTIVE | Noted: 2021-01-31

## 2021-01-31 PROBLEM — G62.9 NEUROPATHY: Status: ACTIVE | Noted: 2021-01-31

## 2021-01-31 LAB
ALBUMIN SERPL-MCNC: 3.1 G/DL (ref 3.5–5.2)
ALP BLD-CCNC: 74 U/L (ref 35–104)
ALT SERPL-CCNC: 10 U/L (ref 5–33)
ANION GAP SERPL CALCULATED.3IONS-SCNC: 16 MMOL/L (ref 7–19)
AST SERPL-CCNC: 23 U/L (ref 5–32)
BILIRUB SERPL-MCNC: 0.5 MG/DL (ref 0.2–1.2)
BUN BLDV-MCNC: 18 MG/DL (ref 8–23)
CALCIUM SERPL-MCNC: 9.1 MG/DL (ref 8.8–10.2)
CHLORIDE BLD-SCNC: 97 MMOL/L (ref 98–111)
CO2: 22 MMOL/L (ref 22–29)
CREAT SERPL-MCNC: 0.6 MG/DL (ref 0.5–0.9)
GFR AFRICAN AMERICAN: >59
GFR NON-AFRICAN AMERICAN: >60
GLUCOSE BLD-MCNC: 123 MG/DL (ref 70–99)
GLUCOSE BLD-MCNC: 144 MG/DL (ref 70–99)
GLUCOSE BLD-MCNC: 168 MG/DL (ref 70–99)
GLUCOSE BLD-MCNC: 91 MG/DL (ref 74–109)
GLUCOSE BLD-MCNC: 92 MG/DL (ref 70–99)
HCT VFR BLD CALC: 37.4 % (ref 37–47)
HEMOGLOBIN: 11.7 G/DL (ref 12–16)
MCH RBC QN AUTO: 28.7 PG (ref 27–31)
MCHC RBC AUTO-ENTMCNC: 31.3 G/DL (ref 33–37)
MCV RBC AUTO: 91.7 FL (ref 81–99)
PDW BLD-RTO: 13.8 % (ref 11.5–14.5)
PERFORMED ON: ABNORMAL
PERFORMED ON: NORMAL
PLATELET # BLD: 263 K/UL (ref 130–400)
PMV BLD AUTO: 10.2 FL (ref 9.4–12.3)
POTASSIUM SERPL-SCNC: 3.6 MMOL/L (ref 3.5–5)
PREALBUMIN: 16 MG/DL (ref 20–40)
RBC # BLD: 4.08 M/UL (ref 4.2–5.4)
SODIUM BLD-SCNC: 135 MMOL/L (ref 136–145)
TOTAL PROTEIN: 5.6 G/DL (ref 6.6–8.7)
WBC # BLD: 8.8 K/UL (ref 4.8–10.8)

## 2021-01-31 PROCEDURE — 6370000000 HC RX 637 (ALT 250 FOR IP): Performed by: NEUROLOGICAL SURGERY

## 2021-01-31 PROCEDURE — 84134 ASSAY OF PREALBUMIN: CPT

## 2021-01-31 PROCEDURE — 1180000000 HC REHAB R&B

## 2021-01-31 PROCEDURE — 6360000002 HC RX W HCPCS: Performed by: NEUROLOGICAL SURGERY

## 2021-01-31 PROCEDURE — 36415 COLL VENOUS BLD VENIPUNCTURE: CPT

## 2021-01-31 PROCEDURE — 80053 COMPREHEN METABOLIC PANEL: CPT

## 2021-01-31 PROCEDURE — 99223 1ST HOSP IP/OBS HIGH 75: CPT | Performed by: PSYCHIATRY & NEUROLOGY

## 2021-01-31 PROCEDURE — 6370000000 HC RX 637 (ALT 250 FOR IP): Performed by: PSYCHIATRY & NEUROLOGY

## 2021-01-31 PROCEDURE — 82947 ASSAY GLUCOSE BLOOD QUANT: CPT

## 2021-01-31 PROCEDURE — 85027 COMPLETE CBC AUTOMATED: CPT

## 2021-01-31 RX ORDER — HYDROCODONE BITARTRATE AND ACETAMINOPHEN 10; 325 MG/1; MG/1
1 TABLET ORAL EVERY 4 HOURS PRN
Status: DISCONTINUED | OUTPATIENT
Start: 2021-01-31 | End: 2021-02-20 | Stop reason: HOSPADM

## 2021-01-31 RX ORDER — NITROFURANTOIN 25; 75 MG/1; MG/1
100 CAPSULE ORAL EVERY 12 HOURS SCHEDULED
Status: COMPLETED | OUTPATIENT
Start: 2021-01-31 | End: 2021-02-05

## 2021-01-31 RX ADMIN — DILTIAZEM HYDROCHLORIDE 240 MG: 240 CAPSULE, COATED, EXTENDED RELEASE ORAL at 08:59

## 2021-01-31 RX ADMIN — DOCUSATE SODIUM 50 MG AND SENNOSIDES 8.6 MG 1 TABLET: 8.6; 5 TABLET, FILM COATED ORAL at 20:38

## 2021-01-31 RX ADMIN — HYDROCODONE BITARTRATE AND ACETAMINOPHEN 1 TABLET: 5; 325 TABLET ORAL at 17:43

## 2021-01-31 RX ADMIN — CYANOCOBALAMIN TAB 500 MCG 500 MCG: 500 TAB at 08:59

## 2021-01-31 RX ADMIN — CETIRIZINE HYDROCHLORIDE 10 MG: 10 TABLET, FILM COATED ORAL at 08:58

## 2021-01-31 RX ADMIN — GABAPENTIN 300 MG: 300 CAPSULE ORAL at 20:38

## 2021-01-31 RX ADMIN — DOCUSATE SODIUM 50 MG AND SENNOSIDES 8.6 MG 1 TABLET: 8.6; 5 TABLET, FILM COATED ORAL at 08:58

## 2021-01-31 RX ADMIN — PANTOPRAZOLE SODIUM 40 MG: 40 TABLET, DELAYED RELEASE ORAL at 05:50

## 2021-01-31 RX ADMIN — SIMETHICONE 80 MG: 80 TABLET, CHEWABLE ORAL at 16:44

## 2021-01-31 RX ADMIN — ROSUVASTATIN CALCIUM 20 MG: 20 TABLET, FILM COATED ORAL at 20:38

## 2021-01-31 RX ADMIN — ENOXAPARIN SODIUM 40 MG: 40 INJECTION SUBCUTANEOUS at 14:55

## 2021-01-31 RX ADMIN — NITROFURANTOIN MONOHYDRATE/MACROCRYSTALLINE 100 MG: 25; 75 CAPSULE ORAL at 20:38

## 2021-01-31 ASSESSMENT — PAIN DESCRIPTION - DESCRIPTORS: DESCRIPTORS: ACHING;SHARP

## 2021-01-31 ASSESSMENT — PAIN DESCRIPTION - FREQUENCY: FREQUENCY: INTERMITTENT

## 2021-01-31 ASSESSMENT — PAIN DESCRIPTION - ORIENTATION: ORIENTATION: MID

## 2021-01-31 ASSESSMENT — PAIN SCALES - GENERAL: PAINLEVEL_OUTOF10: 2

## 2021-01-31 NOTE — H&P
[facility]   History and Physical        Patient:   Rima Ying  MR#:    [id number]  Account Number:                   [id number]      Room:    27/827-01   YOB: 1933  Date of Progress Note: 1/31/2021  Time of Note                           5:53 PM  Attending Physician:  Deric Mishra MD        Admitting diagnosis:Spinal stenosis, lumbar region with neurogenic claudication    Secondary diagnoses:Essential (primary) hypertension,Hyperlipidemia, unspecified,Type 2 diabetes mellitus with hyperglycemia,Unspecified urinary incontinence,Muscle weakness (generalized),Gastro-esophageal reflux disease without esophagitis,Foot drop, left foot,Other nonspecific abnormal finding of lung field,Disorder of brain, unspecified    CHIEF COMPLAINT:  S/p lumbar laminectomy        HISTORY OF PRESENT ILLNESS: This 80 y.o. female with history of diabetes mellitus, hyperlipidemia,known lung mass and HTN. Since Dec. 2020 patient has had difficulty walking, left foot drop and back pain. It had worsened to the point that she hasn't been able to walk for the past couple of weakness or care for herself. Her daughter came in December to stay with her. Prior to this patient lived at home independently. She had an MRI done as outpatient that revealed severe spinal stenosis at L3/4 and L4/5. She was referred to neurosurgeon Dr. Sebastien Meng, who recommended L3/4 & L4/5 decompressive laminectomy. She was in agreement and was admitted to [facility] on 1/26/21 for surgery. She tolerated the procedure well. She will be required to wear a TLSO brace when out of bed and follow spine precautions. She continues to have significant lower extremity weakness, worse on the left. In regards to her known lung mass, patient has been seen prior to admit by pulmonologist Dr. Mike Jacob who plans to a bronchoscopy with biopsy at some point. He had recommended her getting her back surgery and then rehab to get her stronger prior to having the procedure done. Dr. Sebastien Meng ordered a MRI of the head d/t her persistent lower extremity weakness and known lung mass. 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 Dr. Sebastien Meng felt this would explain balance difficulty but now her LE weakness. Oncology and Palliative Care have been consulted. Patient at this point is stating she doesn't want any chemo or radiation. She is participating in both PT/OT. She is felt to need a stay on Rehab to work towards her goal of returning home with her daughter. REVIEW OF SYSTEMS:  Constitutional  No fever or chills. No diaphoresis or significant fatigue. HENT   No tinnitus or significant hearing loss.   Eyes  no sudden vision change or eye pain  Respiratory  no significant shortness of breath or cough Cardiovascular  no chest pain No palpitations or significant leg swelling  Gastrointestinal  no abdominal swelling or pain. Genitourinary  No difficulty urinating, dysuria  Musculoskeletal  no back pain or myalgia. Skin  no color change or rash  Neurologic  No seizures. No lateralizing weakness. Hematologic  no easy bruising or excessive bleeding. Psychiatric  no severe anxiety or nervousness. All other review of systems are negative.       Past Medical History:      Diagnosis Date    Diabetes mellitus ([facility] [address]     Hyperlipidemia     Hypertension     Palliative care patient 01/29/2021       Past Surgical History:      Procedure Laterality Date    APPENDECTOMY      CHOLECYSTECTOMY      LUMBAR SPINE SURGERY N/A 1/26/2021    LAMINECTOMY L3-4/ L4-5 performed by Lyric Bosch MD at [address], BILATERAL         Medications in Hospital:      Current Facility-Administered Medications:     HYDROcodone-acetaminophen (NORCO) 5-325 MG per tablet 1 tablet, 1 tablet, Oral, Q4H PRN, Evgeny Posada MD, 1 tablet at 01/31/21 1743    HYDROcodone-acetaminophen (NORCO) 5-325 MG per tablet 2 tablet, 2 tablet, Oral, Q4H PRN, Evgeny Posada MD    simethicone (MYLICON) chewable tablet 80 mg, 80 mg, Oral, Q6H PRN, Lyric Bosch MD, 80 mg at 01/31/21 1644    bisacodyl (DULCOLAX) suppository 10 mg, 10 mg, Rectal, Daily PRN, Lyric Bosch MD    ondansetron (ZOFRAN-ODT) disintegrating tablet 4 mg, 4 mg, Oral, Q8H PRN **OR** ondansetron (ZOFRAN) injection 4 mg, 4 mg, Intravenous, Q6H PRN, Lyric Bosch MD    sennosides-docusate sodium (SENOKOT-S) 8.6-50 MG tablet 1 tablet, 1 tablet, Oral, BID, Lyric Bosch MD, 1 tablet at 01/31/21 0858    HYDROcodone-acetaminophen (NORCO) 5-325 MG per tablet 1 tablet, 1 tablet, Oral, Q4H PRN **OR** HYDROcodone-acetaminophen (NORCO) 5-325 MG per tablet 2 tablet, 2 tablet, Oral, Q4H PRN, Lyric Bosch MD   polyethylene glycol (GLYCOLAX) packet 17 g, 17 g, Oral, Daily PRN, Becki Fitzgerald MD    Allergies: Other    Social History:   TOBACCO:   reports that she quit smoking about 24 years ago. Her smoking use included cigarettes. She has never used smokeless tobacco.  ETOH:   reports no history of alcohol use. Family History:   History reviewed. No pertinent family history. Physical Exam:    Vitals: /66   Pulse 89   Temp 97.5 °F (36.4 °C) (Temporal)   Resp 20   Ht 5' (1.524 m)   Wt 120 lb (54.4 kg)   SpO2 92%   BMI 23.44 kg/m²     Constitutional  well developed, well nourished. Eyes  conjunctiva normal.  Pupils not tested  Ear, nose, throat -hearing intact to finger rub No scars, masses, or lesions over external nose or ears, no atrophy of tongue  Neck-symmetric, no masses noted, no jugular vein distension  Respiration- chest wall appears symmetric, good expansion,   normal effort without use of accessory muscles  Musculoskeletal  no significant wasting of muscles noted, no bony deformities  Extremities-no clubbing, cyanosis or edema  Skin  warm, dry, and intact. No rash, erythema, or pallor.   Psychiatric  mood, affect, and behavior appear normal.      Neurological exam  Awake, alert, fluent oriented x 3 appropriate affect  Attention and concentration appear appropriate  Recent and remote memory appears unremarkable  Speech normal without dysarthria  No clear issues with language of fund of knowledge    Cranial Nerve Exam   CN II- Visual fields grossly unremarkable  CN III, IV,VI-EOMI, No nystagmus, conjugate eye movements, no ptosis  CN V-sensation intact to LT over face  CN VII-no facial assymetry  CN VIII-Hearing intact to finger rub  CN IX and X- Palate not tested  CN XI-not test shoulder shrug  CN XII-Tongue midline with no fasciculations or fibrillations    Motor Exam  Antigravity throughout upper and lower extremities bilaterally, no cogwheeling, normal tone  Left foot drop Sensory Exam  Sensation reduced left leg    Reflexes   Not tested    Tremors- no tremors in hands or head noted    Gait  Not tested    Coordination  Finger to nose-unremarkable        CBC:   Recent Labs     01/31/21  0406   WBC 8.8   HGB 11.7*          BMP:    Recent Labs     01/31/21  0406   *   K 3.6   CL 97*   CO2 22   BUN 18   CREATININE 0.6   GLUCOSE 91       Hepatic:   Recent Labs     01/31/21  0406   AST 23   ALT 10   BILITOT 0.5   ALKPHOS 74       Lipids: No results for input(s): CHOL, HDL in the last 72 hours. Invalid input(s): LDLCALCU    INR:   Recent Labs     01/30/21  1527   INR 1.01           Assessment and Plan     1. S/P lumbar laminectomy-Pain control/mobilzation  2. HTN-on meds monitor  3. DVT prophylaxis-Lovenox  4. Anemia-on iron   5. Neuropathy-on Neurontin  6. DM-on insulin-monitor BS  7. GI-PPI/bowel regimen  8. Hyperlipidemia-on statin  9. Pain control-Norco PRN  11. Lung/cerebellar mass-monitor   12. PT/OT  13. UTI-complete abx          Patient's functional assessment  Prior to hospitalization the patient was continent of bowel and incontinent of bladder    Current therapy  Requires PT, OT and/or speech therapy    Anticipated discharge approximately 15 days    Functional assessment  All notes from reehab data were reviewed regarding the patient's functional status.         Anticipated discharge setting  Home with home care    No clear barriers to discharge    The patient appears to be an appropriate candidate for inpatient rehabilitation    Sufficiently stable: Patient's condition is sufficiently stable at the time of admission to allow the patient to actively participate in an intensive rehabilitation program Close medical supervision: A rehabilitation physician, or other licensed treating physician with specialized training and experience in inpatient rehabilitation, will conduct face-to-face visits with the patient a minimum of at least 3 days per week throughout the patient's stay    This patient requires close medical supervision for the active management of the ongoing conditions and potential complications stated in the admission note    Intensive rehabilitation nursing: The patient demonstrates the need for 24-hour rehabilitation nursing care for active management of the multiple medical issues documented in the admission note    Appropriate therapy needed: The patient requires the active and ongoing therapeutic intervention of at least 2 therapeutic disciplines, one of which must be physical or occupational therapy and/or speech therapy    Intensive therapy: The patient requires and is reasonably expected to actively participate in at least 3 hours of therapy per day at least 5 days per week, and expected to make measurable improvements that will be of practical value to improve the patient's functional capacity or adaptation to impairments.  In addition, therapy treatments will begin within 36 hours from midnight of the day of the patient's admission to the inpatient rehabilitation facility    Expected duration and frequency therapy: 180 minutes per day, 5 days per week    Interdisciplinary team: The patient demonstrates the need for an interdisciplinary team for active management of the following medical issues including ataxia, motor planning, balance, disease management, elimination, endurance, family training, education, independent ADLs, pain management, precautions, range of motion, safety, strength, and transfers

## 2021-01-31 NOTE — PLAN OF CARE
Problem: SAFETY  Goal: Free from accidental physical injury  Outcome: Ongoing   Up with 2 assist with alejo spears

## 2021-01-31 NOTE — PLAN OF CARE
Problem: SAFETY  Goal: Free from accidental physical injury  1/30/2021 2240 by Oneil Zhao RN  Outcome: Ongoing  1/30/2021 1524 by Nahun Figueroa RN  Outcome: Ongoing  Goal: Free from intentional harm  1/30/2021 2240 by Oneil Zhao RN  Outcome: Ongoing  1/30/2021 1524 by Nahun Figueroa RN  Outcome: Ongoing     Problem: DAILY CARE  Goal: Daily care needs are met  1/30/2021 2240 by Oneil Zhao RN  Outcome: Ongoing  1/30/2021 1524 by Nahun Figueroa RN  Outcome: Ongoing     Problem: PAIN  Goal: Patient's pain/discomfort is manageable  1/30/2021 2240 by Oneil Zhao RN  Outcome: Ongoing  1/30/2021 1524 by Nahun Figueroa RN  Outcome: Ongoing     Problem: SKIN INTEGRITY  Goal: Skin integrity is maintained or improved  1/30/2021 2240 by Oneil Zhao RN  Outcome: Ongoing  1/30/2021 1524 by Nahun Figueroa RN  Outcome: Ongoing

## 2021-02-01 ENCOUNTER — TELEPHONE (OUTPATIENT)
Dept: NEUROSURGERY | Age: 86
End: 2021-02-01

## 2021-02-01 LAB
ALBUMIN SERPL-MCNC: 3.4 G/DL (ref 3.5–5.2)
ALP BLD-CCNC: 79 U/L (ref 35–104)
ALT SERPL-CCNC: 9 U/L (ref 5–33)
ANION GAP SERPL CALCULATED.3IONS-SCNC: 16 MMOL/L (ref 7–19)
AST SERPL-CCNC: 21 U/L (ref 5–32)
BASOPHILS ABSOLUTE: 0 K/UL (ref 0–0.2)
BASOPHILS RELATIVE PERCENT: 0.2 % (ref 0–1)
BILIRUB SERPL-MCNC: 0.5 MG/DL (ref 0.2–1.2)
BUN BLDV-MCNC: 18 MG/DL (ref 8–23)
CALCIUM SERPL-MCNC: 8.9 MG/DL (ref 8.8–10.2)
CHLORIDE BLD-SCNC: 96 MMOL/L (ref 98–111)
CO2: 24 MMOL/L (ref 22–29)
CREAT SERPL-MCNC: 0.5 MG/DL (ref 0.5–0.9)
EOSINOPHILS ABSOLUTE: 0 K/UL (ref 0–0.6)
EOSINOPHILS RELATIVE PERCENT: 0.2 % (ref 0–5)
GFR AFRICAN AMERICAN: >59
GFR NON-AFRICAN AMERICAN: >60
GLUCOSE BLD-MCNC: 127 MG/DL (ref 74–109)
GLUCOSE BLD-MCNC: 149 MG/DL (ref 70–99)
GLUCOSE BLD-MCNC: 149 MG/DL (ref 70–99)
GLUCOSE BLD-MCNC: 159 MG/DL (ref 70–99)
GLUCOSE BLD-MCNC: 169 MG/DL (ref 70–99)
HCT VFR BLD CALC: 35.8 % (ref 37–47)
HEMOGLOBIN: 11.7 G/DL (ref 12–16)
IMMATURE GRANULOCYTES #: 0.1 K/UL
LYMPHOCYTES ABSOLUTE: 1.4 K/UL (ref 1.1–4.5)
LYMPHOCYTES RELATIVE PERCENT: 13.8 % (ref 20–40)
MCH RBC QN AUTO: 29.5 PG (ref 27–31)
MCHC RBC AUTO-ENTMCNC: 32.7 G/DL (ref 33–37)
MCV RBC AUTO: 90.4 FL (ref 81–99)
MONOCYTES ABSOLUTE: 0.8 K/UL (ref 0–0.9)
MONOCYTES RELATIVE PERCENT: 8 % (ref 0–10)
NEUTROPHILS ABSOLUTE: 7.7 K/UL (ref 1.5–7.5)
NEUTROPHILS RELATIVE PERCENT: 77.1 % (ref 50–65)
PDW BLD-RTO: 13.7 % (ref 11.5–14.5)
PERFORMED ON: ABNORMAL
PLATELET # BLD: 277 K/UL (ref 130–400)
PMV BLD AUTO: 10 FL (ref 9.4–12.3)
POTASSIUM REFLEX MAGNESIUM: 3.6 MMOL/L (ref 3.5–5)
RBC # BLD: 3.96 M/UL (ref 4.2–5.4)
SODIUM BLD-SCNC: 136 MMOL/L (ref 136–145)
TOTAL PROTEIN: 5.2 G/DL (ref 6.6–8.7)
WBC # BLD: 10 K/UL (ref 4.8–10.8)

## 2021-02-01 PROCEDURE — 85025 COMPLETE CBC W/AUTO DIFF WBC: CPT

## 2021-02-01 PROCEDURE — 97535 SELF CARE MNGMENT TRAINING: CPT

## 2021-02-01 PROCEDURE — 82947 ASSAY GLUCOSE BLOOD QUANT: CPT

## 2021-02-01 PROCEDURE — 1180000000 HC REHAB R&B

## 2021-02-01 PROCEDURE — 80053 COMPREHEN METABOLIC PANEL: CPT

## 2021-02-01 PROCEDURE — 6360000002 HC RX W HCPCS: Performed by: NEUROLOGICAL SURGERY

## 2021-02-01 PROCEDURE — 92610 EVALUATE SWALLOWING FUNCTION: CPT

## 2021-02-01 PROCEDURE — 99024 POSTOP FOLLOW-UP VISIT: CPT | Performed by: NEUROLOGICAL SURGERY

## 2021-02-01 PROCEDURE — 97161 PT EVAL LOW COMPLEX 20 MIN: CPT

## 2021-02-01 PROCEDURE — 97530 THERAPEUTIC ACTIVITIES: CPT

## 2021-02-01 PROCEDURE — 92522 EVALUATE SPEECH PRODUCTION: CPT

## 2021-02-01 PROCEDURE — 99232 SBSQ HOSP IP/OBS MODERATE 35: CPT | Performed by: PSYCHIATRY & NEUROLOGY

## 2021-02-01 PROCEDURE — 96125 COGNITIVE TEST BY HC PRO: CPT

## 2021-02-01 PROCEDURE — 36415 COLL VENOUS BLD VENIPUNCTURE: CPT

## 2021-02-01 PROCEDURE — 97110 THERAPEUTIC EXERCISES: CPT

## 2021-02-01 PROCEDURE — 6370000000 HC RX 637 (ALT 250 FOR IP): Performed by: NEUROLOGICAL SURGERY

## 2021-02-01 PROCEDURE — 6370000000 HC RX 637 (ALT 250 FOR IP): Performed by: PSYCHIATRY & NEUROLOGY

## 2021-02-01 PROCEDURE — 97165 OT EVAL LOW COMPLEX 30 MIN: CPT

## 2021-02-01 RX ADMIN — ENOXAPARIN SODIUM 40 MG: 40 INJECTION SUBCUTANEOUS at 15:17

## 2021-02-01 RX ADMIN — NITROFURANTOIN MONOHYDRATE/MACROCRYSTALLINE 100 MG: 25; 75 CAPSULE ORAL at 07:38

## 2021-02-01 RX ADMIN — ONDANSETRON 4 MG: 4 TABLET, ORALLY DISINTEGRATING ORAL at 15:16

## 2021-02-01 RX ADMIN — Medication 10 UNITS: at 19:56

## 2021-02-01 RX ADMIN — CYANOCOBALAMIN TAB 500 MCG 500 MCG: 500 TAB at 07:39

## 2021-02-01 RX ADMIN — ROSUVASTATIN CALCIUM 20 MG: 20 TABLET, FILM COATED ORAL at 19:56

## 2021-02-01 RX ADMIN — LISINOPRIL 5 MG: 5 TABLET ORAL at 07:39

## 2021-02-01 RX ADMIN — DILTIAZEM HYDROCHLORIDE 240 MG: 240 CAPSULE, COATED, EXTENDED RELEASE ORAL at 07:39

## 2021-02-01 RX ADMIN — FERROUS SULFATE TAB 325 MG (65 MG ELEMENTAL FE) 325 MG: 325 (65 FE) TAB at 07:39

## 2021-02-01 RX ADMIN — HYDROCODONE BITARTRATE AND ACETAMINOPHEN 1 TABLET: 10; 325 TABLET ORAL at 19:55

## 2021-02-01 RX ADMIN — SIMETHICONE 80 MG: 80 TABLET, CHEWABLE ORAL at 07:38

## 2021-02-01 RX ADMIN — CETIRIZINE HYDROCHLORIDE 10 MG: 10 TABLET, FILM COATED ORAL at 07:39

## 2021-02-01 RX ADMIN — PANTOPRAZOLE SODIUM 40 MG: 40 TABLET, DELAYED RELEASE ORAL at 05:46

## 2021-02-01 RX ADMIN — DOCUSATE SODIUM 50 MG AND SENNOSIDES 8.6 MG 1 TABLET: 8.6; 5 TABLET, FILM COATED ORAL at 19:56

## 2021-02-01 RX ADMIN — GABAPENTIN 300 MG: 300 CAPSULE ORAL at 19:56

## 2021-02-01 RX ADMIN — NITROFURANTOIN MONOHYDRATE/MACROCRYSTALLINE 100 MG: 25; 75 CAPSULE ORAL at 19:56

## 2021-02-01 ASSESSMENT — PAIN - FUNCTIONAL ASSESSMENT: PAIN_FUNCTIONAL_ASSESSMENT: PREVENTS OR INTERFERES SOME ACTIVE ACTIVITIES AND ADLS

## 2021-02-01 ASSESSMENT — PAIN SCALES - GENERAL: PAINLEVEL_OUTOF10: 2

## 2021-02-01 ASSESSMENT — PAIN DESCRIPTION - PROGRESSION: CLINICAL_PROGRESSION: GRADUALLY IMPROVING

## 2021-02-01 ASSESSMENT — PAIN DESCRIPTION - FREQUENCY: FREQUENCY: INTERMITTENT

## 2021-02-01 ASSESSMENT — PAIN DESCRIPTION - LOCATION: LOCATION: BACK

## 2021-02-01 ASSESSMENT — PAIN DESCRIPTION - PAIN TYPE: TYPE: SURGICAL PAIN

## 2021-02-01 NOTE — PROGRESS NOTES
[facility]  SPEECH THERAPY  [facility] 8 [facility] UNIT  Speech  Language  Cognitive Evaluation    TIME    6945-4506       Name: Iain Bletrán  YOB: 1933  Gender: female  Referring Physician: Roland Lynch MD  Prior Level of Functioning: Pt lived at home with her daughter. She managed her meds, but her daughter would look over them. Pt's daughter manages her finances. Pt was completing most ADLs independently. Precautions: Fall/aspiration    ADMITTING DIAGNOSIS:   has Lumbar spinal stenosis; Lumbar stenosis with neurogenic claudication; Cerebellar mass; Type 2 diabetes mellitus without complication, without long-term current use of insulin ([address] Lung mass; and Palliative care patient on their problem list.     History of Present Illness:  Per physician: Cruz Alonzo concerning for metastatic lesion. Her daughter is at bedside. Myah Roberts tells me that she is not interested in any aggressive therapy. Pauline Mcneilck a family member who is in and out of the hospital in a similar situation, and she wants to focus on her quality of life. Viola Olivera is tearful, wondering how she will care for patient at home and supplies that she will need.  Palliative care consult is pending. Patient actively spitting up while trying to eat breakfast on my exam. Viola Olivera says this is a recurrent issue at home.  I have ordered SLP evaluation. Subjective:  Pt was alert and cooperative. She was seen bedside during evaluation. Pt began to fatigue near the end of the evaluation. Overall Impression: Pt's auditory functioning is [facility]. Mild-mod deficits were noted for expressive language in areas of convergent/divergent naming, and though organization. Patient presenting with mild/moderate cognitive deficits in areas of problem solving/reasoning, safety awareness, memory, attention and executive functions. Recommend speech services 5/6x a week for 60 minute sessions.        Assessment: Attention/concentration: Attention: Exceptions to WFL(A portion of the attention subtest has been completed; however, not enough to obtain a total score. Do believe patient will score mild.)  Pt was read a story and asked to retell the story to assess auditory comprehension, language output, and working memory. Patient completed this activity with a score of 6/10 overall. Pt was also given 3 words to remember during the session. After a small delay and distraction, patient was able to recall 3 words. Cognitive/ Linguistic:   Convergent Naming: Moderate  Divergent Naming: Moderate - Pt was given two divergent tasks to complete. In both tasks, patient was given one minute to name as many items in a given category as she could. Pt provided 16 responses in one of these tasks, but 6 of them were preservations. Pt provided 4 responses in the second task and no preservations. Problem Solving:   Problem Solving: (A portion of the executive functioning subtest has been completed; however, not enough to obtain a total score. Do believe patient will score mild-moderate.)  Safety Awareness:   Safety/Judgement: (To be assessed further)    Pragmatics: Pragmatics: Within functional limits    Writing:   Dominant Hand: Right    VISION/HEARING:    Vision: Impaired   Hearing: Within functional limits    REHAB POTENTIAL: [] Excellent [x] Good [] Fair  [] Poor    EDUCATION:   Learner: [x]Patient [] Significant other [] Son/Daughter [] Parent     [] Other:   Education: [x] Reviewed results and recommendations of this evaluation     [] Reviewed diet and strategies     [] Reviewed signs, symptoms and risk of aspiration     [] Demonstrated how to thick liquid appropriately.      [] Reviewed goals and Plan of Care     [] OTHER:   Method: [x] Discussion [] Demonstration [] Hand-out     [] OTHER:   Evaluation of Education:     [x] Verbalizes understanding [] Demonstrates with assistance [] Demonstrates without assistance []Needs further instruction     [] No evidence of learning  [] Family not present    PLAN / TREATMENT RECOMMENDATIONS:  [] No further speech therapy services indicated. [x]  Further Speech therapy services reccommended       See POC for Goals.

## 2021-02-01 NOTE — PROGRESS NOTES
Speech Language Pathology  Facility/Department: [facility] 8 REHAB UNIT   CLINICAL BEDSIDE SWALLOW EVALUATION    NAME: Rima Armendariz  : 1933  MRN: [id number]    ADMISSION DATE: 2021  ADMITTING DIAGNOSIS: has Lumbar spinal stenosis; Lumbar stenosis with neurogenic claudication; Cerebellar mass; Type 2 diabetes mellitus without complication, without long-term current use of insulin ([address] Lung mass; Palliative care patient; Dysphagia; S/P laminectomy; Acute midline low back pain without sciatica; Weakness; Essential hypertension; Anemia; S/P lumbar laminectomy; Neuropathy; Other hyperlipidemia; and Acute cystitis without hematuria on their problem list.        Date of Eval: 2021  Evaluating Therapist: Buffy Cisneros    Current Diet level:  Current Diet : Dysphagia Soft and Bite-Sized (Dysphagia III)  Current Liquid Diet : Thin      Primary Complaint  Swallowing, belching and backflow after meals    Pain:  Pain Assessment  Pain Assessment: 0-10  Pain Level: 3  Patient's Stated Pain Goal: No pain  Pain Type: Surgical pain  Pain Location: Back  Pain Orientation: Mid  Pain Descriptors: Aching, Discomfort  Pain Frequency: Intermittent  Pain Onset: Gradual  Clinical Progression: Gradually improving  Functional Pain Assessment: Prevents or interferes some active activities and ADLs  Non-Pharmaceutical Pain Intervention(s): Rest  Response to Pain Intervention: Patient Satisfied    Reason for Referral  Rima Armendariz was referred for a bedside swallow evaluation to assess the efficiency of her swallow function, identify signs and symptoms of aspiration and make recommendations regarding safe dietary consistencies, effective compensatory strategies, and safe eating environment.     Impression  Dysphagia Diagnosis: Mild pharyngeal stage dysphagia(Suspected esophpgeal dysphagia secondary to belching, backflow, and spitting up.) Vision Exceptions: Wears glasses at all times  Hearing  Hearing: Within functional limits    Oral Motor Deficits  Oral/Motor  Oral Motor: Within functional limits    Oral Phase Dysfunction  Oral Phase  Oral Phase: WNL  Oral Phase  Oral Phase - Comment: Oral phase within normal limits. No oral or facial weakness was observed. No oral residue or buccal cavity pocketing was noted. No anterior spillage. Good mastacation and timely oral transit. Indicators of Pharyngeal Phase Dysfunction   Pharyngeal Phase  Pharyngeal Phase: Exceptions  Indicators of Pharyngeal Phase Dysfunction  Delayed Swallow: Ice chips;Puree - teaspoon  Decreased Laryngeal Elevation: All  Cough - Delayed: Thin - straw;Puree - teaspoon(Significantly delayed cough. Suspected this could be secondary to patients COPD.)  Pharyngeal Phase   Pharyngeal: Pharynegeal presents with mild pharyngeal phase dysarthria. Patient's laryngeal elevation was slightly decreased per laryngeal palpation during swallow. Patient tolerated solids, puree, thin liquids via cup and straw, and ice chips with no significant overt s/s of aspiration or penetration. A significantly delayed cough was observed on 2 occasions, after thin via straw and applesauce. However, patient's COPD could likely be the cause. Multiple swallows were also noted with an ice chip and applesauce via tsp.     Prognosis  Good    Education  Patient Education Response: Verbalizes understanding             Therapy Time  SLP Individual Minutes  Time In: 0800  Time Out: 0900  Minutes: RANDI Jesus 41  2/1/2021 11:01 AM     Electronically signed by Jose Riggins, Graduate Clinician, on 2/1/2021 at 11:07 AM

## 2021-02-01 NOTE — PROGRESS NOTES
02/01/21 1400   Restrictions/Precautions   Restrictions/Precautions Fall Risk;Weight Bearing;Surgical Protocols; Modified Diet   Required Braces or Orthoses? Yes   Lower Extremity Weight Bearing Restrictions   Right Lower Extremity Weight Bearing Weight Bearing As Tolerated   Left Lower Extremity Weight Bearing Weight Bearing As Tolerated   Required Braces or Orthoses   Spinal Other TLSO   Left Lower Extremity Brace Dee Foot Orthotics   Position Activity Restriction   Spinal Precautions No Bending; No Lifting; No Twisting   Other position/activity restrictions AFO in room but has not been sized to patient and it does not fit in current shoe   General   Additional Pertinent Hx HTN; HYPERLIPIDEMIA; TYPE 2 DM; GERD; L FOOT DROP; RECENTLY FOUND LUNG MASS   Diagnosis S/P L3-4 AND L4-5 DECOMPRESSIVE LAMINECTOMY ON 1/26/2021   Follows Commands WFL   General Comment   Comments PT SITTING UP IN CHAIR STATING SHE IS FATIGUED VERY UNCOMFORTABLE AND WANTS TO GET OUT OF W/C. TOLD PT SHE CAN USE CALLLIGHT IN FUTURE IF SHE'D LIKE TO BE TF. PLACED PILLOW BEHIND PT'S BACK IN W/C WHICH RELEIVED HER DISCOMFORT. Subjective   Subjective PT STATED DIZZINESS FROM FIRST SESSION SUBSIDED BUT THEN STARTED AGAIN WHEN HER BACK BECAME UNCOMFORTABLE AFTER SITTING UP FOR LUNCH. Pain Assessment   Pain Assessment 0-10   Pain Level 2   Patient's Stated Pain Goal No pain   Pain Type Surgical pain   Pain Location Back   Pain Orientation Mid   Bed Mobility   Sit to Supine Moderate assistance;Maximal assistance  (MOD A X2/MAX AS FOR SIT TO SUPINE)   Scooting Stand by assistance  (SBA FOR SCOOTING FWD/BWD IN W/C )   Transfers   Sit to Stand Moderate Assistance;2 Person Assistance   Stand to sit Minimal Assistance; Moderate Assistance   Bed to Chair Moderate assistance;2 Person Assistance  (STAND STEP TF W/ RW TO THE L )   Ambulation   Ambulation?  No   WB Status WBAT BILATERAL LE    Wheelchair Activities   Propulsion No   Other exercises Other exercises? Yes   Other exercises 1 1 STS IN II BARS; MOD A X2; THERAPIST BLOCKING BOTH OF PT KNEES TO PREVENT BUCKLING. PT REQUIRED VC TO LOCK KNEES AS WELL AS MANUAL PRESSURE FROM THERAPIST BLOCKING KNEES. PT STOOD FOR 45 SEC BEFORE NEEDING TO SIT D/T FATIGUE. Other exercises 2 SUPINE THER EX, 1X15 ANKLE PUMPS EA SIDE (PT USES RLE TO ASSIST L ANKLE INTO DF); QUAD SETS 1X10 EA SIDE    Conditions Requiring Skilled Therapeutic Intervention   Assessment PT ABLE TO STAND WITH MOD A X2; HOWEVER, PT DEMONSTRATED APPREHENSION AND FEAR D/T WEAKNESS AND LENGTH OF TIME IT HAS BEEN SINCE SHE HAS STOOD/WALKED REGULARLY. PT PT PERFORMED STAND STEP TF BACK TO BED, MOD A X2, GOING TO L. PT MOD/MAX A FOR SUPINE<>SITTING. Activity Tolerance   Activity Tolerance Patient limited by fatigue;Patient limited by pain; Patient limited by endurance   Safety Devices   Type of devices Bed alarm in place;Call light within reach; Left in bed;Patient at risk for falls

## 2021-02-01 NOTE — PROGRESS NOTES
Long term goal 4: Min A with donning/doffing socks and shoes using AE. Long term goal 5: Supervision with bathing hygiene. Long term goals 6: Patient verbalize DME needs. Long term goal 7: Mod A with donning/doffing LSO. Patient Goals   Patient goals : Increase her independence with ADLs.        Therapy Time   Individual Concurrent Group Co-treatment   Time In 6098         Time Out 1515         Minutes 30         Timed Code Treatment Minutes: Karo Bolton 95, OT

## 2021-02-01 NOTE — PLAN OF CARE
Nutrition Problem #1: Increased nutrient needs, Inadequate oral intake  Intervention: Food and/or Nutrient Delivery: Continue Current Diet, Start Oral Nutrition Supplement  Nutritional Goals: Pt will consume >50% of meals and ONS and maintain wt.

## 2021-02-01 NOTE — PATIENT CARE CONFERENCE
[address] ACUTE INPATIENT REHABILITATION  TEAM CONFERENCE NOTE    Date: 2021  Patient Name: Iain Beltrán        MRN: [id number]    : 1933  (80 y.o.)  Gender: female      Diagnosis: S/P L3-4 AND L4-5 DECOMPRESSIVE LAMINECTOMY ON 2021      PHYSICAL THERAPY  STRENGTH  Strength RLE  Comment: GROSSLY 3 TO 3-/5   Strength LLE  Comment: HIP AND KNEE GROSSLY 3 TO 3-/5, ANKLE DF 1/5   ROM  AROM RLE (degrees)  RLE General AROM: HIP AND ANKLE WFL, MIN DEFICIT IN RLE KNEE EXT AROM/UNABLE TO ATTAIN FULL ROM AGAINST GRAVITY    AROM LLE (degrees)  LLE General AROM: NO L ANKLE DF AGAINST GRAVITY, HIP WFL, AND L KNEE MIN DEFICITS/UNABLE TO ATTAIN FULL KNEE EXT AGAINST GRAVITY   BED MOBILITY  Bed Mobility  Rolling: Moderate assistance(MOD A TO ROLL TO R AND L FOR CLEAN UP; PT USED BEDRAILS )  Supine to Sit: Moderate assistance, Maximal assistance(FROM L S/L TO SITTING EOB, ASSIST AT TRUNK AND WITH LE )  Sit to Supine: Moderate assistance, Maximal assistance(MOD A X2/MAX AS FOR SIT TO SUPINE)  Scooting: Stand by assistance(SBA FOR SCOOTING FWD/BWD IN W/C )  TRANSFERS  Transfers  Sit to Stand: Moderate Assistance, 2 Person Assistance  Stand to sit: Minimal Assistance, Moderate Assistance  Bed to Chair: Moderate assistance, 2 Person Assistance(STAND STEP TF W/ RW TO THE L )  Comment: PT REQUIRED MOD A X2/MAX A FOR STAND STEP TF TO THE R W/ RW; MAX A WITH DONNING TLSO BRACE PRIOR TO TF. ONCE PT SITTING UP C/O OF NAUSEA AND DIZZINESS, BP TAKEN AT 96/64.    WHEELCHAIR PROPULSION  Propulsion 1  Propulsion: Manual  Level: Level Tile  Method: ADRIA CHAO  Level of Assistance: Stand by assistance  Description/ Details: TURNS [employer]PORATED, SLOW PACE, AND 1 VC TO USE LUE   Distance: 50 FT   AMBULATION     STAIRS     GOALS:  Short term goals  Time Frame for Short term goals: 1 WEEK  Short term goal 1: AMB CGA W/ RW 50FT   Short term goal 2: SBA WITH ALL BED MOBILITY  Short term goal 3: SBA W/ W/C PROPULSION 75 FT W/ TURNS Short term goal 4: MIN A WITH STAND STEP TF TO/FROM SURFACES W/ RW   Short term goal 5: CAREGIVER SBA FOR ASSISTING PT WITH DONNING/DOFFING TLSO     Long term goals  Time Frame for Long term goals : 2 WEEKS   Long term goal 1: AMB 100FT W/ RW SBA   Long term goal 2: IND WITH STAND STEP TF TO/FROM SURFACES USING RW   Long term goal 3: IND W/ W/C PROPULSION FOR 150FT, INCLUDING TURNS  Long term goal 4: CAREGIVER AND PT IND WITH DONNING/DOFFING TLSO   Long term goal 5: PT IND WITH HOME EX PROGRAM AT D/C     ASSESSMENT:  Assessment: PT ABLE TO STAND WITH MOD A X2; HOWEVER, PT DEMONSTRATED APPREHENSION AND FEAR D/T WEAKNESS AND LENGTH OF TIME IT HAS BEEN SINCE SHE HAS STOOD/WALKED REGULARLY. PT PT PERFORMED STAND STEP TF BACK TO BED, MOD A X2, GOING TO L. PT MOD/MAX A FOR SUPINE<>SITTING. SPEECH THERAPY  Patient presenting with mild/mod deficits in areas of expressive language, and cognition. Expressive language deficits in areas of naming, thought organization, and responsiveness in conversational discourse. Cognitive deficits in areas of executive functions, attention, problem solving/reasoning, and safety awareness. Recommended Diet and Intervention  Diet Solids Recommendation: Dysphagia Soft and Bite-Sized (Dysphagia III)  Liquid Consistency Recommendation: Thin  Recommended Form of Meds: Whole with puree  Therapeutic Interventions: Oral motor exercises; Tongue base strengthening;Patient/Family education;Effortful swallow;Kathy; Laryngeal exercises; Therapeutic PO trials with SLP;Oral care;Diet tolerance monitoring      Short-term Goals  Timeframe for Short-term Goals: 2-3 weeks  Goal 1: The patient will demonstrate recall of functional information following a/an (immediate, shortterm,long-term) delay with min cues in order to increase functional integration into environment. Goal 2: The patient will complete executive function tasks (planning, self-monitoring, organizing, etc) with min verbal cues at 80% accuracy to improve safety awareness with functional ADL's  Goal 3: The patient will demonstrate functional problem solving and safety awareness with min verbal cues at 80% accuracy for daily living tasks in order to increase safe interaction with environment and decreased assistance from caregivers. Long-term Goals  Goal 1: The patient will develop functional, cognitive linguistic based skills and utilize compensatory strategies to communicate wants and needs effectively to different conversational partners, maintain safety, and participate socially in functional living enviroment. GOALS MET: 0 STg 0 LTG    OCCUPATIONAL THERAPY    CURRENT IRF-STEVE SCORES  Eating: CARE Score: 5  Oral Hygiene: CARE Score: 5  Toileting: CARE Score: 1  Shower/Bathe: CARE Score: 2  Upper Body Dressing: CARE Score: 2  Lower Body Dressing: CARE Score: 1  Footwear: CARE Score: 1  Toilet Transfers: CARE Score: 2  Picking Up Object:  CARE Score: 1      UE Functioning:  WFLs    Pain Assessment:          STGs:  Short term goals  Time Frame for Short term goals: 1 week  Short term goal 1: Min A with clothing management/hygiene for toileting. Short term goal 2: Mod A with toilet transfers. Short term goal 3: Mod A with LB dressing, using AE. Short term goal 4: Mod A with donning/doffing socks and shoes using AE. Short term goal 5: Min A with bathing hygiene. Short term goal 6: Min A with donning/doffing LSO. LTGs:  Long term goals  Time Frame for Long term goals : 3-4 weeks  Long term goal 1: Min A with clothing management/hygiene for toileting. Long term goal 2: Min A with toilet transfers. Long term goal 3: Min A with LB dressing, using AE. Long term goal 4: Min A with donning/doffing socks and shoes using AE. Long term goal 5: Supervision with bathing hygiene. Long term goals 6: Patient verbalize DME needs. Long term goal 7: Mod A with donning/doffing LSO. Assessment:  Decreased functional mobility ; Decreased strength; Decreased endurance; Decreased balance; Decreased high-level IADLs    Current tx recommendations:  Strengthening; Balance Training; Functional Mobility Training; Endurance Training; Safety Education & Training; Patient/Caregiver Education & Training; Equipment Evaluation, Education, & procurement; Self-Care / ADL; Home Management Training        NUTRITION  Current Wt: Weight: 120 lb (54.4 kg) / Body mass index is 23.44 kg/m². Admission Wt: 120 lb  Oral Diet Orders: Carb Control (4 choices/meal); soft and bite sized. Oral Nutrition Supplement (ONS) Orders: Glucerna TID    Pt nutritionally at risk AEB PU and poor po intake >5 days. Pt weight appears stable since admission. Pt reports emesis and nausea after eating, agreeable to Glucerna shakes TID. Will start ONS with meals. Please see nutrition note for further details. NURSING    Wounds/Incisions/Ulcers: Incision healing well     Jaylon Scale Score: 16    Pain: Norco 10 mg q 4 hrs PRN    Consultations/Labs/X-rays:     Family Education: Need to make contact with family to initiate education    Fall Risk:  Mccall Score: 48    Fall in the last week? NONE      Other Nursing Issues: Alert and oriented x4, incont of bowel and bladder, meds consumed with water, assist x2 with alejo steady, left foot drop, Macrobid for UTI, LSO brace when OOB, BM 31, accu cheks QID, Carb control diet        SOCIAL WORK/CASE MANAGEMENT  Assessment: Daughter has been in (from her own home) to assist in care for her mother.  Expectations after this surgery were much better than achieve-hoping to make it easier to care for her    Discharge Plan   Estimated Length of Stay: 2/20/21- [facility] [place], [facility] manages medicare benefit  Destination: undetermined at this time    Pass: No Services at Discharge: continued rehabilitative and support care    Equipment at Discharge: Has a [facility], RW, Lift chair, transport chair. Additional needs to be determined closer to discharge. Progress made in the prior week:  1. Recently evaluated- 2.1.21  2.  3.  4.  5.      Goals for following week:  1. Overall Mod A for toileting, including t/f  2.   3.   4.   5.     Factors facilitating achievement of predicted outcomes: Cooperative and Pleasant    Barriers to the achievement of predicted outcomes: Pain, Upper extremity weakness and Lower extremity weakness    Team Members Present at Conference:  : Marivel Tejeda   Occupational Therapist: Jerry Orozco OTR/L  Physical Therapist: Ginger Mcnair PT,DPT  Speech Therapist: Jet Muro MS,CCC-SLP  Nurse: Trae Kan RN,BSN   Nurse Manager:  Trae Kan RN, BSN  Dietitian:  Azalea Villalpando MS, RD, LD  Rehab Director:  Chrissy Merida approve the established interdisciplinary plan of care as documented within the medical record of Rima Mancia.

## 2021-02-01 NOTE — PROGRESS NOTES
S:This 80 y.o. female  with history of diabetes mellitus, hyperlipidemia,known lung mass and HTN. Since Dec. 2020 patient has had difficulty walking, left foot drop and back pain. It had worsened to the point that she hasn't been able to walk for the past couple of weakness or care for herself. Her daughter came in December to stay with her. Prior to this patient lived at home independently. She had an MRI done as outpatient that revealed severe spinal stenosis at L3/4 and L4/5. She was referred to neurosurgeon Dr. Pao Diaz, who recommended L3/4 & L4/5 decompressive laminectomy. She was in agreement and was admitted to [facility] on 1/26/21 for surgery. She tolerated the procedure well. She will be required to wear a TLSO brace when out of bed and follow spine precautions. She continues to have significant lower extremity weakness, worse on the left. In regards to her known lung mass, patient has been seen prior to admit by pulmonologist Dr. Servando Bradley who plans to a bronchoscopy with biopsy at some point. He had recommended her getting her back surgery and then rehab to get her stronger prior to having the procedure done. Dr. Pao Diaz ordered a MRI of the head d/t her persistent lower extremity weakness and known lung mass. 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 Dr. Pao Diaz felt this would explain balance difficulty but now her LE weakness. Oncology and Palliative Care have been consulted. Patient at this point is stating she doesn't want any chemo or radiation. She is participating in both PT/OT. She is felt to need a stay on Rehab to work towards her goal of returning home with her daughter.  No new issues.       REVIEW OF SYSTEMS:  Constitutional: No fevers No chills  Neck:No stiffness  Respiratory: No shortness of breath  Cardiovascular: No chest pain No palpitations  Gastrointestinal: No abdominal pain    Genitourinary: No Dysuria  Neurological: No headache, no confusion    Past Medical History: Diagnosis Date    Diabetes mellitus ([facility] [address]     Hyperlipidemia     Hypertension     Palliative care patient 01/29/2021       Past Surgical History:      Procedure Laterality Date    APPENDECTOMY      CHOLECYSTECTOMY      LUMBAR SPINE SURGERY N/A 1/26/2021    LAMINECTOMY L3-4/ L4-5 performed by Jeniffer Joaquin MD at [address], BILATERAL         Medications in Hospital:      Current Facility-Administered Medications:     HYDROcodone-acetaminophen (NORCO)  MG per tablet 1 tablet, 1 tablet, Oral, Q4H PRN, Wicho Duncan MD    nitrofurantoin (macrocrystal-monohydrate) (MACROBID) capsule 100 mg, 100 mg, Oral, 2 times per day, Wicho Duncan MD, 100 mg at 02/01/21 0738    simethicone (MYLICON) chewable tablet 80 mg, 80 mg, Oral, Q6H PRN, Jeniffer Joaquin MD, 80 mg at 02/01/21 0738    bisacodyl (DULCOLAX) suppository 10 mg, 10 mg, Rectal, Daily PRN, Jeniffer Joaquin MD    ondansetron (ZOFRAN-ODT) disintegrating tablet 4 mg, 4 mg, Oral, Q8H PRN **OR** ondansetron (ZOFRAN) injection 4 mg, 4 mg, Intravenous, Q6H PRN, Jeniffer Joaquin MD    sennosides-docusate sodium (SENOKOT-S) 8.6-50 MG tablet 1 tablet, 1 tablet, Oral, BID, Jeniffer Joaquin MD, 1 tablet at 01/31/21 2038    insulin lispro (HUMALOG) injection vial 4 Units, 0.08 Units/kg, Subcutaneous, TID Jeniffer MD    insulin lispro (HUMALOG) injection vial 0-12 Units, 0-12 Units, Subcutaneous, TID WCJeniffer MD    insulin lispro (HUMALOG) injection vial 0-6 Units, 0-6 Units, Subcutaneous, Nightly, Jeniffer Joaquin MD    cetirizine (ZYRTEC) tablet 10 mg, 10 mg, Oral, Daily, Jeniffer Joaquin MD, 10 mg at 02/01/21 0739    cyclobenzaprine (FLEXERIL) tablet 10 mg, 10 mg, Oral, TID PRN, Jeniffer Joaquin MD    dextrose 5 % solution, 100 mL/hr, Intravenous, PRN, Jeniffer Joaquin MD    dextrose 50 % IV solution, 12.5 g, Intravenous, PRN, Jeniffer Joaquin MD   dilTIAZem (CARDIZEM CD) extended release capsule 240 mg, 240 mg, Oral, Daily, Lyric Bosch MD, 240 mg at 02/01/21 0739    enoxaparin (LOVENOX) injection 40 mg, 40 mg, Subcutaneous, Daily, Lyric Bosch MD, 40 mg at 01/31/21 1455    ferrous sulfate (IRON 325) tablet 325 mg, 325 mg, Oral, Daily with breakfast, Lyric Bosch MD, 325 mg at 02/01/21 0739    gabapentin (NEURONTIN) capsule 300 mg, 300 mg, Oral, Nightly, Lyric Bosch MD, 300 mg at 01/31/21 2038    glucagon (rDNA) injection 1 mg, 1 mg, Intramuscular, PRN, Lyric Bosch MD    glucose (GLUTOSE) 40 % oral gel 15 g, 15 g, Oral, PRN, Lyric Bosch MD    insulin glargine (LANTUS) injection vial 10 Units, 10 Units, Subcutaneous, Nightly, Lyric Bosch MD    lisinopril (PRINIVIL;ZESTRIL) tablet 5 mg, 5 mg, Oral, Daily, Lyric Bosch MD, 5 mg at 02/01/21 0739    pantoprazole (PROTONIX) tablet 40 mg, 40 mg, Oral, QAM AC, Lyric Bosch MD, 40 mg at 02/01/21 0546    rosuvastatin (CRESTOR) tablet 20 mg, 20 mg, Oral, Nightly, Lyric Bosch MD, 20 mg at 01/31/21 2038    vitamin B-12 (CYANOCOBALAMIN) tablet 500 mcg, 500 mcg, Oral, Daily, Lyric Bosch MD, 500 mcg at 02/01/21 0739    acetaminophen (TYLENOL) tablet 650 mg, 650 mg, Oral, Q4H PRN, Evgeny Posada MD    polyethylene glycol (GLYCOLAX) packet 17 g, 17 g, Oral, Daily PRN, Evgeny Posada MD    Allergies: Other    Social History:   TOBACCO:   reports that she quit smoking about 24 years ago. Her smoking use included cigarettes. She has never used smokeless tobacco.  ETOH:   reports no history of alcohol use. Family History:   History reviewed. No pertinent family history. PHYSICAL EXAM:  BP 96/64 Comment: SITTING UP EOB   Pulse 74   Temp 97.2 °F (36.2 °C) (Temporal)   Resp 16   Ht 5' (1.524 m)   Wt 120 lb (54.4 kg)   SpO2 93%   BMI 23.44 kg/m²       Constitutional  well developed, well nourished.    Eyes  conjunctiva normal. Ear, nose, throat - No scars, masses, or lesions over external nose or ears, no atrophy of tongue  Neck-symmetric, no masses noted, no jugular vein distension  Respiration- chest wall appears symmetric, good expansion,   normal effort without use of accessory muscles  Musculoskeletal  no significant wasting of muscles noted, no bony deformities  Extremities-no clubbing, cyanosis or edema  Skin  warm, dry, and intact. No rash, erythema, or pallor. Psychiatric  mood, affect, and behavior appear normal.      Neurological exam  Awake, alert, fluent oriented slow  Attention and concentration appear appropriate  Recent and remote memory appears unremarkable  Speech normal without dysarthria  No clear issues with language of fund of knowledge     Cranial Nerve Exam     CN III, IV,VI-EOMI, No nystagmus, conjugate eye movements, no ptosis    CN VII-no facial assymetry       Motor Exam  antigravity throughout upper and lower extremities bilaterally      Tremors- no tremors in hands or head noted     Gait  Not tested      Nursing/pcp notes, imaging,labs and vitals reviewed.      PT,OT and/or speech notes reviewed    Lab Results   Component Value Date    WBC 10.0 02/01/2021    HGB 11.7 (L) 02/01/2021    HCT 35.8 (L) 02/01/2021    MCV 90.4 02/01/2021     02/01/2021     Lab Results   Component Value Date     02/01/2021    K 3.6 02/01/2021    CL 96 (L) 02/01/2021    CO2 24 02/01/2021    BUN 18 02/01/2021    CREATININE 0.5 02/01/2021    GLUCOSE 127 (H) 02/01/2021    CALCIUM 8.9 02/01/2021    PROT 5.2 (L) 02/01/2021    LABALBU 3.4 (L) 02/01/2021    BILITOT 0.5 02/01/2021    ALKPHOS 79 02/01/2021    AST 21 02/01/2021    ALT 9 02/01/2021    LABGLOM >60 02/01/2021    GFRAA >59 02/01/2021     Lab Results   Component Value Date    INR 1.01 01/30/2021    INR 0.93 01/06/2021    PROTIME 13.2 01/30/2021    PROTIME 12.3 01/06/2021       Tressa Guardado   Student Speech and Language Pathologist   Speech Therapy   Progress Notes Consistencies Administered: Reg solid; Thin;Ice Chips; Thin - straw; Thin - cup;Pudding - cup        Vision/Hearing  Vision  Vision: Impaired  Vision Exceptions: Wears glasses at all times  Hearing  Hearing: Within functional limits     Oral Motor Deficits  Oral/Motor  Oral Motor: Within functional limits     Oral Phase Dysfunction  Oral Phase  Oral Phase: WNL  Oral Phase  Oral Phase - Comment: Oral phase within normal limits. No oral or facial weakness was observed. No oral residue or buccal cavity pocketing was noted. No anterior spillage. Good mastacation and timely oral transit. Indicators of Pharyngeal Phase Dysfunction   Pharyngeal Phase  Pharyngeal Phase: Exceptions  Indicators of Pharyngeal Phase Dysfunction  Delayed Swallow: Ice chips;Puree - teaspoon  Decreased Laryngeal Elevation: All  Cough - Delayed: Thin - straw;Puree - teaspoon(Significantly delayed cough. Suspected this could be secondary to patients COPD.)  Pharyngeal Phase   Pharyngeal: Pharynegeal presents with mild pharyngeal phase dysarthria. Patient's laryngeal elevation was slightly decreased per laryngeal palpation during swallow. Patient tolerated solids, puree, thin liquids via cup and straw, and ice chips with no significant overt s/s of aspiration or penetration. A significantly delayed cough was observed on 2 occasions, after thin via straw and applesauce. However, patient's COPD could likely be the cause. Multiple swallows were also noted with an ice chip and applesauce via tsp.     Prognosis  Good     Education  Patient Education Response: Verbalizes understanding       Therapy Time  SLP Individual Minutes  Time In: 0800  Time Out: 0900  Minutes: 60        Mary Haas  2/1/2021 11:01 AM      Electronically signed by Nathan Us, Graduate Clinician, on 2/1/2021 at 11:07 AM                        Cosigned by:  Regine Leon, SLP at 2/1/2021 11:11 AM   Revision History RECORD REVIEW: Previous medical records, medications were reviewed at today's visit    IMPRESSION:   1. S/P lumbar laminectomy-Pain control/mobilzation  2. HTN-on meds monitor  3. DVT prophylaxis-Lovenox  4. Anemia-on iron   5. Neuropathy-on Neurontin  6. DM-on insulin-monitor BS  7. GI-PPI/bowel regimen  8. Hyperlipidemia-on statin  9. Pain control-Norco PRN  11. Lung/cerebellar mass-monitor   12. PT/OT  13.  UTI-complete abx     continue care    Expected duration and frequency therapy: 180 minutes per day, 5 days per week    [address] [facility]  [phone] CELL  Dr Vickie Haynes

## 2021-02-01 NOTE — PROGRESS NOTES
NEUROSURGERY POSTOPERATIVE PROGRESS NOTE      Chief Complaint: Back pain, difficulty walking    Date of Surgery: 1/26/2021    Procedure Performed: L3/4 and L4/5 decompressive laminectomy      Interval Update: Patient now on rehab floor. Her main complaint this AM is belching/emesis when she attempts to eat. This has been a problem for her in the past according to her daughter. She continues to endorse weakness in her legs and difficulty mobilizing. No new numbness or weakness. She feel the sensation and movement in her left leg is improving after surgery. HPI: 63-year-old female who presented to neurosurgical attention complaining of progressive back pain, difficulty ambulating, and left lower extremity pain and weakness. On examination, she was found to have a left foot drop, difficulty while standing upright with upright posture, and inability to walk more than a few feet without stopping to rest.  An MRI scan of her lumbar spine was performed that demonstrated lumbar degenerative scoliosis as well as disk osteophyte formation and advanced facet arthropathy mostly at L3-L4 and L4-L5, that were causing significant central canal and bilateral lateral recess stenosis, left greater than right. Given her imaging findings and her presenting complaints, I offered the patient the surgery (L3/4 and L4/5 laminectomy) understanding with her advanced age that she would be at elevated risk for complications related to surgery. She voiced understanding and requested that we proceed. 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 Her surgery was delayed for one week while a CT scan of her chest was obtained and she was referred to Pulmonology and Pulmonology agreed that given her limited mobility that the spinal surgery should still occur in order to increase her functional mobility and hopefully, her activity level to tolerate potential treatment for her malignancy.       Objective:    /66   Pulse 74   Temp 97.2 °F (36.2 °C) (Temporal)   Resp 16   Ht 5' (1.524 m)   Wt 120 lb (54.4 kg)   SpO2 93%   BMI 23.44 kg/m²       Physical Exam:    General: alert, cooperative, no distress  Cardiorespiratory: unlabored breathing  Wound: clean, dry, flat, no erythema or fluctuance      Neurologic Exam:    Mental Status: Alert, oriented, thought content appropriate  Cranial Nerves: PERRL, EOMI, symmetric facies, tongue midline  Motor: 5/5 RLE with prompting, 4/5 LLE in HF, KE, 0/5 ADF/APF  Somatosensory: normal light touch sensation        Assessment and Plan: 81 y/o F s/p L3/4 and L4/5 decompressive laminectomy on 1/26/2021. Her pain is controlled and her leg pain has improved. She still demonstrates significant LLE weakness postop, though this appears to be improving. 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 I discussed the MRI findings with the patient and her daughter at the bedside. In subsequent discussions with oncology and palliative care she has decided against further treatment for her malignancy, but wishes to pursue rehab and therapy with the hope of improving her mobility to return home.     - Continue LSO brace when OOB, AFO to be used with ambulation  - PT/OT/Speech therapy  - OK for prophylactic lovenox, dc SCDs (patient feels they impair mobility)          Electronically signed by Quentin Salazar MD on 2/1/2021 at 8:13 AM

## 2021-02-01 NOTE — PROGRESS NOTES
· Increased nutrient needs, Inadequate oral intake related to acute injury/trauma, increase demand for energy/nutrients as evidenced by intake 0-25%, intake 26-50%, wounds, poor intake prior to admission, nausea, vomiting    Nutrition Interventions:   Food and/or Nutrient Delivery:  Continue Current Diet, Start Oral Nutrition Supplement  Nutrition Education/Counseling:  No recommendation at this time   Coordination of Nutrition Care:  Continue to monitor while inpatient    Goals:  Pt will consume >50% of meals and ONS and maintain wt.        Nutrition Monitoring and Evaluation:   Food/Nutrient Intake Outcomes:  Food and Nutrient Intake, Supplement Intake  Physical Signs/Symptoms Outcomes:  Biochemical Data, Nausea or Vomiting, Nutrition Focused Physical Findings, Skin, Weight     Electronically signed by Farhad Adan, MS, RD, LD on 2/1/21 at 2:32 PM CST    Contact: [phone]

## 2021-02-01 NOTE — TELEPHONE ENCOUNTER
Nicolas Mckoy from the 8th floor rehab called and voiced that this patient will be in rehab room 827 on Feb 3rd and will not make her wound check appointment in office, but to let the provider know that her wound can be checked up there while she is admitted. I voiced to vinicius that I would let the provider know.

## 2021-02-01 NOTE — PLAN OF CARE
Problem: SAFETY  Goal: Free from accidental physical injury  1/31/2021 2159 by Blu Jessica RN  Outcome: Ongoing  1/31/2021 1304 by Jayson Acevedo RN  Outcome: Ongoing  Goal: Free from intentional harm  1/31/2021 2159 by lBu Jessica RN  Outcome: Ongoing  1/31/2021 1304 by Jasyon Acevedo RN  Outcome: Ongoing     Problem: DAILY CARE  Goal: Daily care needs are met  1/31/2021 2159 by Blu Jessica RN  Outcome: Ongoing  1/31/2021 1304 by Jayson Acevedo RN  Outcome: Ongoing     Problem: PAIN  Goal: Patient's pain/discomfort is manageable  1/31/2021 2159 by Blu Jessica RN  Outcome: Ongoing  1/31/2021 1304 by Jayson Acevedo RN  Outcome: Ongoing     Problem: SKIN INTEGRITY  Goal: Skin integrity is maintained or improved  1/31/2021 2159 by Blu Jessica RN  Outcome: Ongoing  1/31/2021 1304 by Jayson Acevedo RN  Outcome: Ongoing

## 2021-02-01 NOTE — PROGRESS NOTES
Occupational Therapy   Occupational Therapy Initial Assessment  Date: 2021   Patient Name: Bobbi Estrada  MRN: [id number]     : 1933    Date of Service: 2021    Discharge Recommendations:  Continue to assess pending progress       Assessment   Performance deficits / Impairments: Decreased functional mobility ; Decreased strength;Decreased endurance;Decreased balance;Decreased high-level IADLs  Assessment: Patient requires increased assistance with ADLs due to increased pain in back and LLE weakness with high risk for buckling. She requires skilled OT to address the above deficits to increase her independence with ADLs. Treatment Diagnosis: L3-4, L4-5 decompressive laminectomy, 2 cm lesion on R cerebellum, lung mass  Prognosis: Good  Decision Making: Low Complexity  History: Lung mass, 2 cm lesion on R cerebellum  OT Education: OT Role;Plan of Care;Precautions; ADL Adaptive Strategies;Transfer Training  Safety Devices  Safety Devices in place: Yes  Type of devices: Call light within reach; Chair alarm in place           Patient Diagnosis(es): There were no encounter diagnoses. has a past medical history of Diabetes mellitus ([facility] [address] Hyperlipidemia, Hypertension, and Palliative care patient. has a past surgical history that includes Appendectomy; Mastectomy, bilateral; Cholecystectomy; and Lumbar spine surgery (N/A, 2021). Treatment Diagnosis: L3-4, L4-5 decompressive laminectomy, 2 cm lesion on R cerebellum, lung mass      Restrictions  Restrictions/Precautions  Restrictions/Precautions: Fall Risk, Weight Bearing, Surgical Protocols, Modified Diet  Required Braces or Orthoses?: Yes  Lower Extremity Weight Bearing Restrictions  Right Lower Extremity Weight Bearing: Weight Bearing As Tolerated  Left Lower Extremity Weight Bearing: Weight Bearing As Tolerated  Required Braces or Orthoses  Spinal Other: LSO  Left Lower Extremity Brace:  Dee Foot Orthotics  Position Activity Restriction Spinal Precautions: No Bending, No Lifting, No Twisting  Other position/activity restrictions: AFO in room but has not been sized to patient and it does not fit in current shoe    Subjective   General  Chart Reviewed: Yes  Patient assessed for rehabilitation services?: Yes  Family / Caregiver Present: No  Diagnosis: L3-L5 decompressive laminectomy   Social Functional      02/01/21 1445   Social/Functional History   Lives With Alone  (Daughter has been living with her for the last 2 months, but lives in [facility].)   Type of [address] [facility] Two [facility]; Able to Live on Main level with bedroom/bathroom  (Elevator to go upstairs and down to basement. 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 [place] while her  was in school. 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 Additional Comments Son is retiring 2/29/21 (lives in [place]), so may be able to come in and check on her. Daughter lives in [address] [facility], but has been living with the patient for 2 months to take care of her and she has been writing (working from the pt's home). [address] daughter helped for one week. Pt's nephew who lives across from her assists her at times.      Objective        Orientation  Overall Orientation Status: Within Normal Limits Balance  Sitting Balance: Stand by assistance  Standing Balance: Moderate assistance  Toilet Transfers  Toilet - Technique: Stand pivot  Equipment Used: Grab bars  Toilet Transfer: Maximum assistance  Toilet Transfers Comments: Blocking L knee, high risk for buckling.    Cognition  Overall Cognitive Status: WFL        LUE AROM (degrees)  LUE AROM : WFL  Left Hand AROM (degrees)  Left Hand AROM: WFL  RUE AROM (degrees)  RUE AROM : WFL  Right Hand AROM (degrees)  Right Hand AROM: WFL  LUE Strength  LUE Strength Comment: Proximally/distally 4/5  RUE Strength  RUE Strength Comment: Proximally/distally 4/5       Plan   Plan  Current Treatment Recommendations: Strengthening, Balance Training, Functional Mobility Training, Endurance Training, Safety Education & Training, Patient/Caregiver Education & Training, Equipment Evaluation, Education, & procurement, Self-Care / ADL, Home Management Training         OutComes Score    Eating  Assistance Needed: Setup or clean-up assistance  CARE Score: 5  Discharge Goal: Independent    Oral Hygiene  Assistance Needed: Setup or clean-up assistance  CARE Score: 5  Discharge Goal: Independent     Toileting Hygiene  Assistance Needed: Dependent  Comment: 2 person assist, one person assist with balance and 2nd person with clothing managment  CARE Score: 1  Discharge Goal: Partial/moderate assistance     Shower/Bathe Self  Assistance Needed: Substantial/maximal assistance  CARE Score: 2  Discharge Goal: Supervision or touching assistance     Upper Body Dressing  Assistance Needed: Substantial/maximal assistance  Comment: Min A with UB dressing, Max A with LSO  CARE Score: 2  Discharge Goal: Independent     Lower Body Dressing  Assistance Needed: Dependent  CARE Score: 1  Discharge Goal: Partial/moderate assistance     Putting On/Taking Off Footwear  Assistance Needed: Dependent  CARE Score: 1  Discharge Goal: Partial/moderate assistance     Toilet Transfer Assistance Needed: Substantial/maximal assistance  CARE Score: 2  Discharge Goal: Partial/moderate assistance     Picking Up Object  Assistance Needed: Dependent  CARE Score: 1  Discharge Goal: Substantial/maximal assistance          Goals  Short term goals  Time Frame for Short term goals: 1 week  Short term goal 1: Min A with clothing management/hygiene for toileting. Short term goal 2: Mod A with toilet transfers. Short term goal 3: Mod A with LB dressing, using AE. Short term goal 4: Mod A with donning/doffing socks and shoes using AE. Short term goal 5: Min A with bathing hygiene. Short term goal 6: Min A with donning/doffing LSO. Long term goals  Time Frame for Long term goals : 3-4 weeks  Long term goal 1: Min A with clothing management/hygiene for toileting. Long term goal 2: Min A with toilet transfers. Long term goal 3: Min A with LB dressing, using AE. Long term goal 4: Min A with donning/doffing socks and shoes using AE. Long term goal 5: Supervision with bathing hygiene. Long term goals 6: Patient verbalize DME needs. Long term goal 7: Mod A with donning/doffing LSO. Patient Goals   Patient goals : Increase her independence with ADLs.        Therapy Time   Individual Concurrent Group Co-treatment   Time In 1100         Time Out 1200         Minutes 60         Timed Code Treatment Minutes: [address] [facility],

## 2021-02-01 NOTE — PROGRESS NOTES
Physical Therapy EVALUATION Note  DATE:  2021  NAME:  Katelyn Obrien  :  1933  (87 y.o.,female)  MRN:  [id number]    HEIGHT:  Height: 5' (152.4 cm)  WEIGHT:  Weight: 120 lb (54.4 kg)    PATIENT DIAGNOSIS(ES):    Diagnosis: S/P L3-4 AND L4-5 DECOMPRESSIVE LAMINECTOMY ON 2021    Additional Pertinent Hx: HTN; HYPERLIPIDEMIA; TYPE 2 DM; GERD; L FOOT DROP; RECENTLY FOUND LUNG MASS  RESTRICTIONS/PRECAUTIONS:    Restrictions/Precautions  Restrictions/Precautions: Fall Risk, Weight Bearing, Surgical Protocols, Modified Diet  Required Braces or Orthoses?: Yes  Position Activity Restriction  Spinal Precautions: No Bending, No Lifting, No Twisting  Other position/activity restrictions: AFO in room but has not been sized to patient and it does not fit in current shoe  OVERALL  ORIENTATION STATUS:     PAIN:  Pain Level: 3  Pain Type: Surgical pain    Pain Location: Back     Pain Orientation: Mid      NEUROLOGICAL                          STRENGTH  Strength RLE  Comment: GROSSLY 3 TO 3-/5   Strength LLE  Comment: HIP AND KNEE GROSSLY 3 TO 3-/5, ANKLE DF 1/5   ROM  AROM RLE (degrees)  RLE General AROM: HIP AND ANKLE WFL, MIN DEFICIT IN RLE KNEE EXT AROM/UNABLE TO ATTAIN FULL ROM AGAINST GRAVITY    AROM LLE (degrees)  LLE General AROM: NO L ANKLE DF AGAINST GRAVITY, HIP WFL, AND L KNEE MIN DEFICITS/UNABLE TO ATTAIN FULL KNEE EXT AGAINST GRAVITY   POSTURE/BALANCE  Balance  Sitting - Static: Fair  Sitting - Dynamic: Fair, -  Standing - Static: Fair, -  Standing - Dynamic: Fair, -       ACTIVITY TOLERANCE  Activity Tolerance  Activity Tolerance: Patient limited by fatigue, Patient limited by pain, Patient limited by endurance      BED MOBILITY  Bed Mobility  Rolling:  Moderate assistance(MOD A TO ROLL TO R AND L FOR CLEAN UP; PT USED BEDRAILS )  Supine to Sit: Moderate assistance, Maximal assistance(FROM L S/L TO SITTING EOB, ASSIST AT TRUNK AND WITH LE )  TRANSFERS Assistance Needed: Substantial/maximal assistance  CARE Score: 2  Discharge Goal: Independent    Sit to Stand  Assistance Needed: Dependent  CARE Score: 1  Discharge Goal: Independent    Chair/Bed-to-Chair Transfer  Assistance Needed: Dependent  CARE Score: 1  Discharge Goal: Independent    Car Transfer  Reason if not Attempted: Not attempted due to medical condition or safety concerns  CARE Score: 88  Discharge Goal: Independent    Walk 10 Feet  Reason if not Attempted: Not attempted due to medical condition or safety concerns  CARE Score: 88  Discharge Goal: Independent    Walk 50 Feet with Two Turns  Reason if not Attempted: Not attempted due to medical condition or safety concerns  CARE Score: 88  Discharge Goal: Independent    Walk 150 Feet  Reason if not Attempted: Not attempted due to medical condition or safety concerns  CARE Score: 88  Discharge Goal: Supervision or touching assistance    Walking 10 Feet on Uneven Surfaces  Reason if not Attempted: Not attempted due to medical condition or safety concerns  CARE Score: 88  Discharge Goal: Supervision or touching assistance    1 Step (Curb)  Reason if not Attempted: Not attempted due to medical condition or safety concerns  CARE Score: 88  Discharge Goal: Independent    4 Steps  Reason if not Attempted: Not attempted due to medical condition or safety concerns  CARE Score: 88  Discharge Goal: Independent    12 Steps  Reason if not Attempted: Not attempted due to medical condition or safety concerns  CARE Score: 88  Discharge Goal: Supervision or touching assistance    Wheel 50 Feet with Two Turns  Assistance Needed: Supervision or touching assistance  CARE Score: 4  Discharge Goal: Independent    Wheel 150 Feet  Reason if not Attempted: Not attempted due to medical condition or safety concerns  CARE Score: 88  Discharge Goal: Independent      LAST TREATMENT TIME  PT Individual Minutes  Time In: 1000  Time Out: 1100  Minutes: 60

## 2021-02-02 ENCOUNTER — APPOINTMENT (OUTPATIENT)
Dept: GENERAL RADIOLOGY | Age: 86
DRG: 560 | End: 2021-02-02
Attending: PSYCHIATRY & NEUROLOGY
Payer: MEDICARE

## 2021-02-02 LAB
GLUCOSE BLD-MCNC: 126 MG/DL (ref 70–99)
GLUCOSE BLD-MCNC: 134 MG/DL (ref 70–99)
GLUCOSE BLD-MCNC: 184 MG/DL (ref 70–99)
GLUCOSE BLD-MCNC: 194 MG/DL (ref 70–99)
ORGANISM: ABNORMAL
ORGANISM: ABNORMAL
PERFORMED ON: ABNORMAL
URINE CULTURE, ROUTINE: ABNORMAL

## 2021-02-02 PROCEDURE — 97110 THERAPEUTIC EXERCISES: CPT

## 2021-02-02 PROCEDURE — 92526 ORAL FUNCTION THERAPY: CPT

## 2021-02-02 PROCEDURE — 99233 SBSQ HOSP IP/OBS HIGH 50: CPT | Performed by: PSYCHIATRY & NEUROLOGY

## 2021-02-02 PROCEDURE — 92611 MOTION FLUOROSCOPY/SWALLOW: CPT

## 2021-02-02 PROCEDURE — 97129 THER IVNTJ 1ST 15 MIN: CPT

## 2021-02-02 PROCEDURE — 97535 SELF CARE MNGMENT TRAINING: CPT

## 2021-02-02 PROCEDURE — 6370000000 HC RX 637 (ALT 250 FOR IP): Performed by: NEUROLOGICAL SURGERY

## 2021-02-02 PROCEDURE — 82947 ASSAY GLUCOSE BLOOD QUANT: CPT

## 2021-02-02 PROCEDURE — 74230 X-RAY XM SWLNG FUNCJ C+: CPT

## 2021-02-02 PROCEDURE — 6360000002 HC RX W HCPCS: Performed by: NEUROLOGICAL SURGERY

## 2021-02-02 PROCEDURE — 1180000000 HC REHAB R&B

## 2021-02-02 PROCEDURE — 6370000000 HC RX 637 (ALT 250 FOR IP): Performed by: PSYCHIATRY & NEUROLOGY

## 2021-02-02 PROCEDURE — 97130 THER IVNTJ EA ADDL 15 MIN: CPT

## 2021-02-02 RX ORDER — ONDANSETRON 4 MG/1
4 TABLET, FILM COATED ORAL
Status: DISCONTINUED | OUTPATIENT
Start: 2021-02-02 | End: 2021-02-14

## 2021-02-02 RX ORDER — SUCRALFATE 1 G/1
1 TABLET ORAL EVERY 6 HOURS SCHEDULED
Status: DISCONTINUED | OUTPATIENT
Start: 2021-02-02 | End: 2021-02-04

## 2021-02-02 RX ADMIN — HYDROCODONE BITARTRATE AND ACETAMINOPHEN 1 TABLET: 10; 325 TABLET ORAL at 20:40

## 2021-02-02 RX ADMIN — DOCUSATE SODIUM 50 MG AND SENNOSIDES 8.6 MG 1 TABLET: 8.6; 5 TABLET, FILM COATED ORAL at 20:41

## 2021-02-02 RX ADMIN — CETIRIZINE HYDROCHLORIDE 10 MG: 10 TABLET, FILM COATED ORAL at 08:08

## 2021-02-02 RX ADMIN — NITROFURANTOIN MONOHYDRATE/MACROCRYSTALLINE 100 MG: 25; 75 CAPSULE ORAL at 08:08

## 2021-02-02 RX ADMIN — FERROUS SULFATE TAB 325 MG (65 MG ELEMENTAL FE) 325 MG: 325 (65 FE) TAB at 08:08

## 2021-02-02 RX ADMIN — PANTOPRAZOLE SODIUM 40 MG: 40 TABLET, DELAYED RELEASE ORAL at 05:58

## 2021-02-02 RX ADMIN — SUCRALFATE 1 G: 1 TABLET ORAL at 11:06

## 2021-02-02 RX ADMIN — NITROFURANTOIN MONOHYDRATE/MACROCRYSTALLINE 100 MG: 25; 75 CAPSULE ORAL at 20:41

## 2021-02-02 RX ADMIN — DILTIAZEM HYDROCHLORIDE 240 MG: 240 CAPSULE, COATED, EXTENDED RELEASE ORAL at 08:08

## 2021-02-02 RX ADMIN — LISINOPRIL 5 MG: 5 TABLET ORAL at 08:08

## 2021-02-02 RX ADMIN — Medication 10 UNITS: at 20:41

## 2021-02-02 RX ADMIN — CYCLOBENZAPRINE 10 MG: 10 TABLET, FILM COATED ORAL at 20:40

## 2021-02-02 RX ADMIN — ONDANSETRON 4 MG: 4 TABLET, FILM COATED ORAL at 11:06

## 2021-02-02 RX ADMIN — ENOXAPARIN SODIUM 40 MG: 40 INJECTION SUBCUTANEOUS at 16:33

## 2021-02-02 RX ADMIN — SUCRALFATE 1 G: 1 TABLET ORAL at 16:34

## 2021-02-02 RX ADMIN — GABAPENTIN 300 MG: 300 CAPSULE ORAL at 20:40

## 2021-02-02 RX ADMIN — DOCUSATE SODIUM 50 MG AND SENNOSIDES 8.6 MG 1 TABLET: 8.6; 5 TABLET, FILM COATED ORAL at 08:08

## 2021-02-02 RX ADMIN — ROSUVASTATIN CALCIUM 20 MG: 20 TABLET, FILM COATED ORAL at 20:41

## 2021-02-02 RX ADMIN — ONDANSETRON 4 MG: 4 TABLET, FILM COATED ORAL at 16:34

## 2021-02-02 RX ADMIN — INSULIN LISPRO 1 UNITS: 100 INJECTION, SOLUTION INTRAVENOUS; SUBCUTANEOUS at 20:42

## 2021-02-02 RX ADMIN — CYANOCOBALAMIN TAB 500 MCG 500 MCG: 500 TAB at 08:08

## 2021-02-02 ASSESSMENT — PAIN SCALES - GENERAL: PAINLEVEL_OUTOF10: 5

## 2021-02-02 NOTE — PROGRESS NOTES
Patient:   Abhijit Rodriguez  MR#:    [id number]   Room:    0827/827-01   YOB: 1933  Date of Progress Note: 2/2/2021  Time of Note                           8:30 AM  Consulting Physician:   Evgeny Posada M.D.   Attending Physician:  Evgeny Posada MD     Chief complaint S/p lumbar laminectomy S:This 80 y.o. female  with history of diabetes mellitus, hyperlipidemia,known lung mass and HTN. Since Dec. 2020 patient has had difficulty walking, left foot drop and back pain. It had worsened to the point that she hasn't been able to walk for the past couple of weakness or care for herself. Her daughter came in December to stay with her. Prior to this patient lived at home independently. She had an MRI done as outpatient that revealed severe spinal stenosis at L3/4 and L4/5. She was referred to neurosurgeon Dr. Rosalie Charlton, who recommended L3/4 & L4/5 decompressive laminectomy. She was in agreement and was admitted to [facility] on 1/26/[facility] for surgery. She tolerated the procedure well. She will be required to wear a TLSO brace when out of bed and follow spine precautions. She continues to have significant lower extremity weakness, worse on the left. In regards to her known lung mass, patient has been seen prior to admit by pulmonologist Dr. Artie Alva who plans to a bronchoscopy with biopsy at some point. He had recommended her getting her back surgery and then rehab to get her stronger prior to having the procedure done. Dr. Rosalie Charlton ordered a MRI of the head d/t her persistent lower extremity weakness and known lung mass. 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 Dr. Rosalie Charlton felt this would explain balance difficulty but now her LE weakness. Oncology and Palliative Care have been consulted. Patient at this point is stating she doesn't want any chemo or radiation. She is participating in both PT/OT. She is felt to need a stay on Rehab to work towards her goal of returning home with her daughter.  No acute issues.       REVIEW OF SYSTEMS:  Constitutional: No fevers No chills  Neck:No stiffness  Respiratory: No shortness of breath  Cardiovascular: No chest pain No palpitations  Gastrointestinal: No abdominal pain    Genitourinary: No Dysuria  Neurological: No headache, no confusion    Past Medical History: Diagnosis Date    Diabetes mellitus ([facility] [address]     Hyperlipidemia     Hypertension     Palliative care patient 01/29/2021       Past Surgical History:      Procedure Laterality Date    APPENDECTOMY      CHOLECYSTECTOMY      LUMBAR SPINE SURGERY N/A 1/26/2021    LAMINECTOMY L3-4/ L4-5 performed by Joyce Acosta MD at [address], BILATERAL         Medications in Hospital:      Current Facility-Administered Medications:     HYDROcodone-acetaminophen (NORCO)  MG per tablet 1 tablet, 1 tablet, Oral, Q4H PRN, Carmen Marie MD, 1 tablet at 02/01/21 1955    nitrofurantoin (macrocrystal-monohydrate) (MACROBID) capsule 100 mg, 100 mg, Oral, 2 times per day, Carmen Marie MD, 100 mg at 02/02/21 0808    simethicone (MYLICON) chewable tablet 80 mg, 80 mg, Oral, Q6H PRN, Joyce Acosta MD, 80 mg at 02/01/21 0738    bisacodyl (DULCOLAX) suppository 10 mg, 10 mg, Rectal, Daily PRN, Joyce Acosta MD    ondansetron (ZOFRAN-ODT) disintegrating tablet 4 mg, 4 mg, Oral, Q8H PRN, 4 mg at 02/01/21 1516 **OR** ondansetron (ZOFRAN) injection 4 mg, 4 mg, Intravenous, Q6H PRN, Joyce Acosta MD    sennosides-docusate sodium (SENOKOT-S) 8.6-50 MG tablet 1 tablet, 1 tablet, Oral, BID, Joyce Acosta MD, 1 tablet at 02/02/21 0808    insulin lispro (HUMALOG) injection vial 4 Units, 0.08 Units/kg, Subcutaneous, TID Joyce MD    insulin lispro (HUMALOG) injection vial 0-12 Units, 0-12 Units, Subcutaneous, TID , Joyce Acosta MD    insulin lispro (HUMALOG) injection vial 0-6 Units, 0-6 Units, Subcutaneous, Nightly, Joyce Acosta MD    cetirizine (ZYRTEC) tablet 10 mg, 10 mg, Oral, Daily, Joyce Acosta MD, 10 mg at 02/02/21 0808    cyclobenzaprine (FLEXERIL) tablet 10 mg, 10 mg, Oral, TID PRN, Joyce Acosta MD    dextrose 5 % solution, 100 mL/hr, Intravenous, PRN, Joyce Acosta MD    dextrose 50 % IV solution, 12.5 g, Intravenous, PRN, Joyce Acosta MD   dilTIAZem (CARDIZEM CD) extended release capsule 240 mg, 240 mg, Oral, Daily, Narinder Frost MD, 240 mg at 02/02/21 0808    enoxaparin (LOVENOX) injection 40 mg, 40 mg, Subcutaneous, Daily, Narinder Frost MD, 40 mg at 02/01/21 1517    ferrous sulfate (IRON 325) tablet 325 mg, 325 mg, Oral, Daily with breakfast, Narinder Frost MD, 325 mg at 02/02/21 0808    gabapentin (NEURONTIN) capsule 300 mg, 300 mg, Oral, Nightly, Narinder Frost MD, 300 mg at 02/01/21 1956    glucagon (rDNA) injection 1 mg, 1 mg, Intramuscular, PRN, Narinder Frost MD    glucose (GLUTOSE) 40 % oral gel 15 g, 15 g, Oral, PRN, Narinder Frost MD    insulin glargine (LANTUS) injection vial 10 Units, 10 Units, Subcutaneous, Nightly, Narinder Frost MD, 10 Units at 02/01/21 1956    lisinopril (PRINIVIL;ZESTRIL) tablet 5 mg, 5 mg, Oral, Daily, Narinder Frost MD, 5 mg at 02/02/21 0808    pantoprazole (PROTONIX) tablet 40 mg, 40 mg, Oral, QAM AC, Narinder Frost MD, 40 mg at 02/02/21 0558    rosuvastatin (CRESTOR) tablet 20 mg, 20 mg, Oral, Nightly, Narinder Frost MD, 20 mg at 02/01/21 1956    vitamin B-12 (CYANOCOBALAMIN) tablet 500 mcg, 500 mcg, Oral, Daily, Narinder Frost MD, 500 mcg at 02/02/21 0808    acetaminophen (TYLENOL) tablet 650 mg, 650 mg, Oral, Q4H PRN, Addi Araiza MD    polyethylene glycol (GLYCOLAX) packet 17 g, 17 g, Oral, Daily PRN, Addi Araiza MD    Allergies: Other    Social History:   TOBACCO:   reports that she quit smoking about 24 years ago. Her smoking use included cigarettes. She has never used smokeless tobacco.  ETOH:   reports no history of alcohol use. Family History:   History reviewed. No pertinent family history. PHYSICAL EXAM:  /74   Pulse 74   Temp 97.1 °F (36.2 °C) (Temporal)   Resp 18   Ht 5' (1.524 m)   Wt 120 lb (54.4 kg)   SpO2 94%   BMI 23.44 kg/m²       Constitutional  well developed, well nourished.    Eyes  conjunctiva normal. Consistencies Administered: Reg solid; Thin;Ice Chips; Thin - straw; Thin - cup;Pudding - cup        Vision/Hearing  Vision  Vision: Impaired  Vision Exceptions: Wears glasses at all times  Hearing  Hearing: Within functional limits     Oral Motor Deficits  Oral/Motor  Oral Motor: Within functional limits     Oral Phase Dysfunction  Oral Phase  Oral Phase: WNL  Oral Phase  Oral Phase - Comment: Oral phase within normal limits. No oral or facial weakness was observed. No oral residue or buccal cavity pocketing was noted. No anterior spillage. Good mastacation and timely oral transit. Indicators of Pharyngeal Phase Dysfunction   Pharyngeal Phase  Pharyngeal Phase: Exceptions  Indicators of Pharyngeal Phase Dysfunction  Delayed Swallow: Ice chips;Puree - teaspoon  Decreased Laryngeal Elevation: All  Cough - Delayed: Thin - straw;Puree - teaspoon(Significantly delayed cough. Suspected this could be secondary to patients COPD.)  Pharyngeal Phase   Pharyngeal: Pharynegeal presents with mild pharyngeal phase dysarthria. Patient's laryngeal elevation was slightly decreased per laryngeal palpation during swallow. Patient tolerated solids, puree, thin liquids via cup and straw, and ice chips with no significant overt s/s of aspiration or penetration. A significantly delayed cough was observed on 2 occasions, after thin via straw and applesauce. However, patient's COPD could likely be the cause. Multiple swallows were also noted with an ice chip and applesauce via tsp.     Prognosis  Good     Education  Patient Education Response: Verbalizes understanding       Therapy Time  SLP Individual Minutes  Time In: 0800  Time Out: 0900  Minutes: 60        Mary Mortensen  2/1/2021 11:01 AM      Electronically signed by Krishan Dickerson, Graduate Clinician, on 2/1/2021 at 11:07 AM                        Cosigned by:  Tad Murguia, SLP at 2/1/2021 11:11 AM   Revision History RECORD REVIEW: Previous medical records, medications were reviewed at today's visit    IMPRESSION:   1. S/P lumbar laminectomy-Pain control/mobilzation  2. HTN-on meds monitor  3. DVT prophylaxis-Lovenox  4. Anemia-on iron   5. Neuropathy-on Neurontin  6. DM-on insulin-monitor BS  7. GI-PPI/bowel regimen  8. Hyperlipidemia-on statin  9. Pain control-Norco PRN  11. Lung/cerebellar mass-monitor   12. PT/OT  13.  UTI-complete abx    Add carafate / Cyndia Los TID before meals    Team conference with PT/OT/speech/nursing/Care coordinator to review in depth patients care and discharge planning    WC 50 ft SBA  Ambulation 0      ELOS pending        continue care    Expected duration and frequency therapy: 180 minutes per day, 5 days per week    [address] [facility]  [phone] CELL  Dr Margot Merida

## 2021-02-02 NOTE — PROGRESS NOTES
Occupational Therapy  Facility/Department: [facility] 8 REHAB UNIT  Daily Treatment Note  NAME: Rima Rolle  : 1933  MRN: [id number]    Date of Service: 2021    Discharge Recommendations:  Continue to assess pending progress       Assessment     Decreased functional mobility ; Decreased strength; Decreased endurance; Decreased balance; Decreased high-level IADLs     Patient Diagnosis(es): There were no encounter diagnoses. has a past medical history of Diabetes mellitus ([facility] [address] Hyperlipidemia, Hypertension, and Palliative care patient. has a past surgical history that includes Appendectomy; Mastectomy, bilateral; Cholecystectomy; and Lumbar spine surgery (N/A, 2021). Restrictions  Restrictions/Precautions  Restrictions/Precautions: Fall Risk, Weight Bearing, Surgical Protocols, Modified Diet  Required Braces or Orthoses?: Yes  Lower Extremity Weight Bearing Restrictions  Right Lower Extremity Weight Bearing: Weight Bearing As Tolerated  Left Lower Extremity Weight Bearing: Weight Bearing As Tolerated  Required Braces or Orthoses  Spinal Other: LSO  Left Lower Extremity Brace:  Dee Foot Orthotics  Position Activity Restriction  Spinal Precautions: No Bending, No Lifting, No Twisting  Other position/activity restrictions: AFO in room but has not been sized to patient and it does not fit in current shoe  Subjective   General  Chart Reviewed: Yes  Patient assessed for rehabilitation services?: Yes  Family / Caregiver Present: No  Diagnosis: L3-L5 decompressive laminectomy           Objective             Balance  Sitting Balance: Minimal assistance(CGA with no activity, Min A with ADL tasks)  Bed mobility  Rolling to Left: Stand by assistance  Rolling to Right: Stand by assistance  Supine to Sit: Contact guard assistance            Plan   Plan Current Treatment Recommendations: Strengthening, Balance Training, Functional Mobility Training, Endurance Training, Safety Education & Training, Patient/Caregiver Education & Training, Equipment Evaluation, Education, & procurement, Self-Care / ADL, Home Management Training       OutComes Score       02/02/21 0815   Toileting Hygiene   Assistance Needed Dependent   Comment Rolling supine in bed, cleanup post bowel accident   CARE Score 1   Shower/Bathe Self   Assistance Needed Substantial/maximal assistance   CARE Score 2   Upper Body Dressing   Assistance Needed Substantial/maximal assistance   Comment Max A with sweater sitting EOB   CARE Score 2   Lower Body Dressing   Assistance Needed Dependent   Comment Supine in bed   CARE Score 1   Putting On/Taking Off Footwear   Assistance Needed Dependent   CARE Score 1       Goals  Short term goals  Time Frame for Short term goals: 1 week  Short term goal 1: Min A with clothing management/hygiene for toileting. Short term goal 2: Mod A with toilet transfers. Short term goal 3: Mod A with LB dressing, using AE. Short term goal 4: Mod A with donning/doffing socks and shoes using AE. Short term goal 5: Min A with bathing hygiene. Short term goal 6: Min A with donning/doffing LSO. Long term goals  Time Frame for Long term goals : 3-4 weeks  Long term goal 1: Min A with clothing management/hygiene for toileting. Long term goal 2: Min A with toilet transfers. Long term goal 3: Min A with LB dressing, using AE. Long term goal 4: Min A with donning/doffing socks and shoes using AE. Long term goal 5: Supervision with bathing hygiene. Long term goals 6: Patient verbalize DME needs. Long term goal 7: Mod A with donning/doffing LSO. Patient Goals   Patient goals : Increase her independence with ADLs.        Therapy Time   Individual Concurrent Group Co-treatment   Time In 0815         Time Out 0915         Minutes 60         Timed Code Treatment Minutes: 60 Minutes Hellen Porras, OT

## 2021-02-02 NOTE — PROGRESS NOTES
02/02/21 1000   Restrictions/Precautions   Restrictions/Precautions Fall Risk;Weight Bearing;Surgical Protocols; Modified Diet   Required Braces or Orthoses? Yes   Lower Extremity Weight Bearing Restrictions   Right Lower Extremity Weight Bearing Weight Bearing As Tolerated   Left Lower Extremity Weight Bearing Weight Bearing As Tolerated   Required Braces or Orthoses   Spinal Other LSO   Position Activity Restriction   Spinal Precautions No Bending; No Lifting; No Twisting   Other position/activity restrictions AFO in room but has not been sized to patient and it does not fit in current shoe   Vision   Vision Impaired   Vision Exceptions Wears glasses at all times   Hearing   Hearing [facility]   General   Additional Pertinent Hx HTN; HYPERLIPIDEMIA; TYPE 2 DM; GERD; L FOOT DROP; RECENTLY FOUND LUNG MASS   Diagnosis S/P L3-4 AND L4-5 DECOMPRESSIVE LAMINECTOMY ON 1/26/2021   Follows Commands WFL  (EXHIBITS APPREHENSION AND FEAR W/ TF AND STANDING )   General Comment   Comments SPEECH THERAPY IN ROOM PRIOR TO PHYSICAL THERAPY - STATED PT HAD PAIN MEDS EARLIER IN THE A.M. AND WAS GROGGY/HARD TO KEEP PARTICIPATION IN THERAPY. Subjective   Subjective PT IN BED, STATES STILL FEELS FATIGUED. WHEN PT SITTING EOB, STATED LESS DIZZY COMPARED TO YESTERDAY. Bed Mobility   Rolling Stand by assistance;Contact guard assistance  (VC AND TC TO BEND KNEE INTO HOOKLYING PRIOR TO ROLLING)   Supine to Sit Moderate assistance  (BIALTERAL LE MANAGEMENT ASSIST; L SIDE OF BED )   Sit to Supine Moderate assistance  (BILATERAL LE MANAGEMENT; L SIDE OF BED )   Scooting Moderate assistance  (MOD A FOR SCOOTING FWD ON EOB.)   Comment PT ABLE TO USE UE MORE COMPARED TO YESTERDAY TO ASSIST W/ SUPINE TO SIT TF. THERAPIST MANAGED BILATERAL LE. PLACED WOOD PLANK UNDER FEET TO ASSIST W/ SCOOTING FWD TOWARD EOB.      Transfers   Sit to Stand Moderate Assistance;2 Person Assistance   Stand to sit Minimal Assistance Bed to Chair Moderate assistance;2 Person Assistance  (STAND STEP TF WITH RW TO THE L; THERAPIST BLOCKING RW )   Comment PT APPREHENSIVE TO STAND/TF REQUIRING MOTIVATIONAL SUPPORT FROM THERAPIST. PT ABLE TO PARTICIPATE MORE DURING TF, BUT STILL REQUIRED MOD A X2. THERAPIST BLOCKED RW AND DIRECTED ITS MOVEMENT DURING TF AS PT FEARFUL OF IT MOVING. Ambulation   Ambulation? No   WB Status WBAT BILATERAL LE    Other exercises   Other exercises 1 1 STS IN II BARS; MOD A FROM ONE THERAPIST AND MIN A FROM 2ND THERAPIST (X2); PT STOOD FOR 45 SEC    Other exercises 2 PRACTICED BED MOBILITY BEGINING OF SESSION AND AT END OF SESSION FOR CLEAN UP    Conditions Requiring Skilled Therapeutic Intervention   Assessment PT ABLE TO PARTICIPATE MORE DURING TF AND W/ STS; HOWEVER DEMONSTRATES FEAR AND APPREHENSION WITH BEING ON HER FEET D/T MED HX AND WEAKNESS. WHEN PT STOOD URINARY INCONTINENCE NOTED AND RETURNED TO ROOM FOR CLEAN UP. PT MAX A FOR LSO DONNING/DOFFING. Barriers to Learning DEMONSTRATED APPREHENSION W/ STANDING/WALKING ETC.    Activity Tolerance   Activity Tolerance Patient limited by fatigue;Patient limited by pain; Patient limited by endurance   Safety Devices   Type of devices Bed alarm in place;Call light within reach; Left in bed;Patient at risk for falls

## 2021-02-02 NOTE — PROCEDURES
INSTRUMENTAL SWALLOW REPORT  MODIFIED BARIUM SWALLOW    NAME: Rima Cormier   : 1933  MRN: [id number]     Date of Eval: 2021     Ordering Physician: Dr. Han Glass        :Referring Diagnosis: oropharyngeal dysphagia. Past Medical History:  has a past medical history of Diabetes mellitus ([facility] [address] Hyperlipidemia, Hypertension, and Palliative care patient. Past Surgical History:  has a past surgical history that includes Appendectomy; Mastectomy, bilateral; Cholecystectomy; and Lumbar spine surgery (N/A, 2021). Current Diet Solid Consistency: Dysphagia Soft and Bite-Sized (Dysphagia III)  Current Diet Liquid Consistency: Thin     Type of Study:  Initial MBS     Patient Position:   Lateral;A-P      Recent CT of Chest:   Impression   1. There is a lobular mass with its epicenter within the right lower   lobe. Unfortunately, this extends to also involve the inferior aspect   of the right upper lobe as well as the right middle lobe crossing both   the major and minor fissure. Measurements are as above. There is an   associated enlarged right hilar lymph node. This represents a primary   lung neoplasm. Some calcification within the periphery of the lesion   represents a granuloma which has been enveloped by the neoplasm. 2. There are 2 additional benign-appearing nodules in the right upper   lobe. The left lung is fully expanded and clear. There is no effusion   present. 3. Tiny trace pericardial effusion. Heavy coronary calcifications are   present. 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 .   Signed by Dr Wilson Roca on 2021 1:43 PM         Reason for Referral:  Margarette Bowden was referred for a MBS to assess the efficiency of his/her swallow function, assess for aspiration, and to make recommendations regarding safe dietary consistencies, effective compensatory strategies, and safe eating environment. Patient complaints: Nausea, vomiting, food stuck in throat, food stuck in esophagus, excessive belching, poor to no appetite, no interest in food, sore throat, soreness with swallowing. General Comment  Comments: Pt currently receiving soft and bite-sized diet texture with thin liquids. Continued complaints of difficulty swallowing, fatigue with PO intake, nausea, and vomitting episodes post PO. MBSS completed to determine UES dysfunction and safer diet texture. Subjective  Subjective: Pt alert and cooperative for MBSS. The pt discussed concerns about limited interest in PO intake and poor appetite. She stated, \"it feels like something was shoved in my throat and then ripped out; it's very raw and hurts to swallow. \"  Pt noted with difficulty sitting up-right without trunk support and assistance from SLP. Pt was evaluated in lateral and A-P view. Behavior/Cognition/Vision/Hearing:  Behavior/Cognition: Alert; Cooperative;Pleasant mood    Impressions: Pt presents with moderate-severe oropharyngeal phase dysphagia. It is noted that pt has moderate valleculae residue that cannot be cleared with additional dry swallows or liquid wash. Pt requires alternation of liquids and solids to assist with residue clearance. However, residue stacking is noted and takes a lot of effort to clear. With thin liquids, pt was observed 1x with flash penetration. The pt has minimal risk for flash penetration after the swallow on thin liquids due to residuals in valleculae and pyriforms. Pt is recommended to downgrade to minced and moist diet texture; use effortful swallow with all solids; do not use additional dry swallows as this appears to fatigue the mechanisms and increase belching and audible swallows. SLP recommends pt consume small, frequent meals and remain upright for at least 30 minutes post meals to reduce aspiration on residue. SLP also recommends pt complete pharyngeal/laryngeal strengthening exercises. If MD allows, SLP can use NMES(vital stim) to increase muscle strength training and motor response. SLP has made three referrals to GI, ENT, and neurology for neck CT scan. SLP noted suspicious tissue at C4; however no interference with bolus passage was observed. Pt has risks for aspiration/penetration episodes and will be followed by SLP to ensure safer PO intake. Consistencies Administered: Dysphagia Soft and Bite-Sized (Dysphagia III); Reg solid; Dysphagia Pureed (Dysphagia I); Thin cup;Dysphagia Minced and Moist (Dysphagia II)    Compensatory Swallowing Strategies Attempted: Alternate solids and liquids; No straws;Upright as possible for all oral intake;Effortful swallow     Oral Phase: D/C Recommendations: To be determined     Recommended Exercises:    Therapeutic Interventions: Diet tolerance monitoring;Oral motor exercises; Tongue base strengthening;Effortful swallow;Patient/Family education;Vital Stim/NMES;Pharyngeal exercises; Kathy    Education:   Images and recommendations were reviewed with the patient following this exam.   Patient Education: SLP presented verbal education on results from MBSS. Review of safe swallowing and aspiration precautions were discussed. Patient Education Response: Verbalizes understanding     Goals:    Long Term:      Goal 1: The patient will tolerate least restrictive diet with min/no overt s/s of aspiration. Short Term:     Goal 1: The patient will tolerate minced and moist diet texture with thin liquids with minimal overt s/s of aspiration. Goal 2: SLP  to reassess swallow function and insure safety with all oral intake. Goal 3: SLP will complete full speech/language cognitive evaluation. Goal 4: Patient/staff will complete daily oral hygiene to prevent aspriation of oral bacteria. Goal 5: Patient will complete pharyngeal/laryngeal exercises given verbal prompts and written instructions. Goal 6: Patient will verbalize and demonstrate compensatory swallow strategies 100% of opportunitites. Oral Preparation / Oral Phase  Oral Phase: Impaired  Oral Phase - Major Contributing Deficits  Poor Mastication: All  Reduced Posterior Propulsion: All  Holding Of Bolus: All  Piecemeal Swallowing: All  Fatigue of Mechanism: All    Pharyngeal Phase  Pharyngeal Phase: Impaired  Pharyngeal Phase - Major Contributing Deficits  Delayed Swallow Initiation: All  Premature Spillage to Valleculae: All  Reduced Pharyngeal Peristalsis: All  Reduced Epiglottic Distention: All  Reduced Laryngeal Elevation: All  Reduced Anterior Laryngeal Movement: All  Reduced Thyrohyoid Approximation: All  Pooling Valleculae: All  Pooling Pyriform:  All Shallow Penetration After: Thin cup(Noted flash/trace penetration episode on multiple swallow attempts to clear solid bolus with liquid wash. Pt cleared with additional dry swallow.)  Pharyngeal Wall - Weakness:  All  Fatigue of Mechanism: All    Esophageal Phase  Esophageal Screen: [facility]    Therapy Time:   Individual Concurrent Group Co-treatment   Time In 1100         Time Out 1200         Minutes 60            Total Treatment Time: 60    Jenelleghanshyam Jefferson, 2/2/2021, 2:03 PM   Electronically signed by Miguel Ángel Arriola- SLP on 2/2/21 at 2:23 PM CST

## 2021-02-02 NOTE — PLAN OF CARE
Problem: SAFETY  Goal: Free from accidental physical injury  2/1/2021 2306 by Jame Bowman LPN  Outcome: Ongoing  2/1/2021 1512 by Lori Correa RN  Outcome: Ongoing  Goal: Free from intentional harm  2/1/2021 2306 by Jame Bowman LPN  Outcome: Ongoing  2/1/2021 1512 by Lori Correa RN  Outcome: Ongoing     Problem: DAILY CARE  Goal: Daily care needs are met  2/1/2021 2306 by Jame Bowman LPN  Outcome: Ongoing  2/1/2021 1512 by Lori Correa RN  Outcome: Ongoing     Problem: PAIN  Goal: Patient's pain/discomfort is manageable  2/1/2021 2306 by Jame Bowman LPN  Outcome: Ongoing  2/1/2021 1512 by Lori Correa RN  Outcome: Ongoing     Problem: SKIN INTEGRITY  Goal: Skin integrity is maintained or improved  2/1/2021 2306 by Jame Bowman LPN  Outcome: Ongoing  2/1/2021 1512 by Lori Correa RN  Outcome: Ongoing     Problem: KNOWLEDGE DEFICIT  Goal: Patient/S.O. demonstrates understanding of disease process, treatment plan, medications, and discharge instructions.   2/1/2021 2306 by Jame Bowman LPN  Outcome: Ongoing  2/1/2021 1512 by Lori Correa RN  Outcome: Ongoing     Problem: DISCHARGE BARRIERS  Goal: Patient's continuum of care needs are met  2/1/2021 2306 by Jame Bowman LPN  Outcome: Ongoing  2/1/2021 1512 by Lori Correa RN  Outcome: Ongoing     Problem: Pain:  Goal: Pain level will decrease  Description: Pain level will decrease  2/1/2021 2306 by Jame Bowman LPN  Outcome: Ongoing  2/1/2021 1512 by Lori Correa RN  Outcome: Ongoing  Goal: Control of acute pain  Description: Control of acute pain  2/1/2021 2306 by Jame Bowman LPN  Outcome: Ongoing  2/1/2021 1512 by Lori Correa RN  Outcome: Ongoing  Goal: Control of chronic pain  Description: Control of chronic pain  2/1/2021 2306 by Jame Bowman LPN  Outcome: Ongoing  2/1/2021 1512 by Lori Correa RN  Outcome: Ongoing     Problem: Falls - Risk of:  Goal: Will remain free from falls Description: Will remain free from falls  2/1/2021 2306 by Alina Daniel LPN  Outcome: Ongoing  2/1/2021 1512 by Yanni Ivey RN  Outcome: Ongoing  Goal: Absence of physical injury  Description: Absence of physical injury  2/1/2021 2306 by Alina Daniel LPN  Outcome: Ongoing  2/1/2021 1512 by Yanni Ivey RN  Outcome: Ongoing     Problem: Skin Integrity:  Goal: Will show no infection signs and symptoms  Description: Will show no infection signs and symptoms  2/1/2021 2306 by Alina Daniel LPN  Outcome: Ongoing  2/1/2021 1512 by Yanni Ivey RN  Outcome: Ongoing  Goal: Absence of new skin breakdown  Description: Absence of new skin breakdown  2/1/2021 2306 by Alina Daniel LPN  Outcome: Ongoing  2/1/2021 1512 by Yanni Ivey RN  Outcome: Ongoing     Problem: Nutrition  Goal: Optimal nutrition therapy  2/1/2021 2306 by Alina Daniel LPN  Outcome: Ongoing  2/1/2021 1512 by Yanni Ivey RN  Outcome: Ongoing

## 2021-02-02 NOTE — PLAN OF CARE
Problem: SAFETY  Goal: Free from accidental physical injury  2/2/2021 0939 by Morenita Bradley RN  Outcome: Ongoing  2/1/2021 2306 by Yolanda Alamo LPN  Outcome: Ongoing  Goal: Free from intentional harm  2/2/2021 0939 by Morenita Bradley RN  Outcome: Ongoing  2/1/2021 2306 by Yolanda Alamo LPN  Outcome: Ongoing

## 2021-02-03 ENCOUNTER — TELEPHONE (OUTPATIENT)
Dept: ONCOLOGY | Facility: CLINIC | Age: 86
End: 2021-02-03

## 2021-02-03 ENCOUNTER — APPOINTMENT (OUTPATIENT)
Dept: CT IMAGING | Age: 86
DRG: 560 | End: 2021-02-03
Attending: PSYCHIATRY & NEUROLOGY
Payer: MEDICARE

## 2021-02-03 LAB
GLUCOSE BLD-MCNC: 109 MG/DL (ref 70–99)
GLUCOSE BLD-MCNC: 131 MG/DL (ref 70–99)
GLUCOSE BLD-MCNC: 138 MG/DL (ref 70–99)
GLUCOSE BLD-MCNC: 147 MG/DL (ref 70–99)
PERFORMED ON: ABNORMAL

## 2021-02-03 PROCEDURE — 97530 THERAPEUTIC ACTIVITIES: CPT

## 2021-02-03 PROCEDURE — 6360000002 HC RX W HCPCS: Performed by: NEUROLOGICAL SURGERY

## 2021-02-03 PROCEDURE — 70492 CT SFT TSUE NCK W/O & W/DYE: CPT

## 2021-02-03 PROCEDURE — 92526 ORAL FUNCTION THERAPY: CPT

## 2021-02-03 PROCEDURE — 97110 THERAPEUTIC EXERCISES: CPT

## 2021-02-03 PROCEDURE — 99232 SBSQ HOSP IP/OBS MODERATE 35: CPT | Performed by: PSYCHIATRY & NEUROLOGY

## 2021-02-03 PROCEDURE — 6370000000 HC RX 637 (ALT 250 FOR IP): Performed by: NEUROLOGICAL SURGERY

## 2021-02-03 PROCEDURE — 1180000000 HC REHAB R&B

## 2021-02-03 PROCEDURE — 99024 POSTOP FOLLOW-UP VISIT: CPT | Performed by: NEUROLOGICAL SURGERY

## 2021-02-03 PROCEDURE — 6370000000 HC RX 637 (ALT 250 FOR IP): Performed by: PSYCHIATRY & NEUROLOGY

## 2021-02-03 PROCEDURE — 6360000004 HC RX CONTRAST MEDICATION: Performed by: PSYCHIATRY & NEUROLOGY

## 2021-02-03 PROCEDURE — 97535 SELF CARE MNGMENT TRAINING: CPT

## 2021-02-03 PROCEDURE — 82947 ASSAY GLUCOSE BLOOD QUANT: CPT

## 2021-02-03 RX ORDER — ONDANSETRON 4 MG/1
TABLET, FILM COATED ORAL
Status: DISPENSED
Start: 2021-02-03 | End: 2021-02-04

## 2021-02-03 RX ADMIN — ONDANSETRON 4 MG: 4 TABLET, FILM COATED ORAL at 05:42

## 2021-02-03 RX ADMIN — NITROFURANTOIN MONOHYDRATE/MACROCRYSTALLINE 100 MG: 25; 75 CAPSULE ORAL at 20:50

## 2021-02-03 RX ADMIN — NITROFURANTOIN MONOHYDRATE/MACROCRYSTALLINE 100 MG: 25; 75 CAPSULE ORAL at 09:42

## 2021-02-03 RX ADMIN — IOPAMIDOL 90 ML: 755 INJECTION, SOLUTION INTRAVENOUS at 16:16

## 2021-02-03 RX ADMIN — SUCRALFATE 1 G: 1 TABLET ORAL at 05:42

## 2021-02-03 RX ADMIN — SUCRALFATE 1 G: 1 TABLET ORAL at 17:15

## 2021-02-03 RX ADMIN — CYCLOBENZAPRINE 10 MG: 10 TABLET, FILM COATED ORAL at 20:50

## 2021-02-03 RX ADMIN — ONDANSETRON 4 MG: 4 TABLET, FILM COATED ORAL at 17:16

## 2021-02-03 RX ADMIN — ENOXAPARIN SODIUM 40 MG: 40 INJECTION SUBCUTANEOUS at 17:18

## 2021-02-03 RX ADMIN — DOCUSATE SODIUM 50 MG AND SENNOSIDES 8.6 MG 1 TABLET: 8.6; 5 TABLET, FILM COATED ORAL at 09:42

## 2021-02-03 RX ADMIN — CYANOCOBALAMIN TAB 500 MCG 500 MCG: 500 TAB at 09:46

## 2021-02-03 RX ADMIN — PANTOPRAZOLE SODIUM 40 MG: 40 TABLET, DELAYED RELEASE ORAL at 05:42

## 2021-02-03 RX ADMIN — ACETAMINOPHEN 650 MG: 325 TABLET ORAL at 20:53

## 2021-02-03 RX ADMIN — ONDANSETRON 4 MG: 4 TABLET, FILM COATED ORAL at 11:41

## 2021-02-03 RX ADMIN — DOCUSATE SODIUM 50 MG AND SENNOSIDES 8.6 MG 1 TABLET: 8.6; 5 TABLET, FILM COATED ORAL at 20:50

## 2021-02-03 RX ADMIN — SUCRALFATE 1 G: 1 TABLET ORAL at 11:41

## 2021-02-03 RX ADMIN — GABAPENTIN 300 MG: 300 CAPSULE ORAL at 20:51

## 2021-02-03 RX ADMIN — SUCRALFATE 1 G: 1 TABLET ORAL at 00:03

## 2021-02-03 RX ADMIN — ROSUVASTATIN CALCIUM 20 MG: 20 TABLET, FILM COATED ORAL at 20:50

## 2021-02-03 RX ADMIN — INSULIN LISPRO 1 UNITS: 100 INJECTION, SOLUTION INTRAVENOUS; SUBCUTANEOUS at 20:54

## 2021-02-03 RX ADMIN — Medication 10 UNITS: at 20:53

## 2021-02-03 ASSESSMENT — PAIN SCALES - GENERAL
PAINLEVEL_OUTOF10: 4
PAINLEVEL_OUTOF10: 3

## 2021-02-03 ASSESSMENT — PAIN DESCRIPTION - PAIN TYPE: TYPE: SURGICAL PAIN

## 2021-02-03 ASSESSMENT — PAIN DESCRIPTION - ONSET: ONSET: GRADUAL

## 2021-02-03 NOTE — PROGRESS NOTES
[facility]  INPATIENT SPEECH THERAPY  [facility] 8 REHAB UNIT  TIME:   1100  1200  60 minutes    [x]Daily Note  []Progress Note    Date: 2/3/2021  Patient Name: Geena Mace        MRN: [id number]    Account #: [de-identified]  : 1933  (80 y.o.)  Gender: female   Primary Provider: Carmen Marie MD  Precautions: back brace, aspiration risks  Swallowing Status/Diet: Minced and moist diet texture with thin liquids    ADMITTING DIAGNOSIS:   has Lumbar spinal stenosis; Lumbar stenosis with neurogenic claudication; Cerebellar mass; Type 2 diabetes mellitus without complication, without long-term current use of insulin ([address] Lung mass; and Palliative care patient on their problem list.    Subjective:   Pt pleasant and cooperative for ST. ST was in rehab dining to review, educate, and train pt with new swallow strategies. Pt was very fatigued. She reported the diet downgrade was good for her to eat. She also stated \"i'm having trouble talking today, getting the words out, today's rough because I feel so sleepy. \" Nursing reported pt is tolerating crushed pills in applesauce without difficulty. Objective:   Diet monitoring and compensatory swallow strategy training completed. Pt consumed thin liquids via open cup without any vomiting or emesis episodes. Pt tolerated 50% of nutritional shake. Three bites of puree was presented with crushed medications. Pt used left head turn, additional dry and effortful swallow, and use of thin liquid sips. All these strategies were reported to have no feeling of food stuck post swallowing.      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 Pt demonstrates decreased UES opening with residual stacking in upper esophagus. Additional dry swallows on the MBSS did not clear this from the esophagus. Pt is not interested in invasive procedures. However, pt is willing to use compensatory swallow strategies and trial NMES (vital stim) to improve quality of PO intake. At this time, SLP recommends pt to consume liquid nutrition primarily with solids as secondary means of PO intake. Pt will need to be followed by dietician to monitor hydration, nutrition, and weight loss. Pt is not interested in solids at this time, and pt has a high risk for aspiration on residue in valleculae and UES. SHORT TERM GOAL #1:  Goal 1: The patient will demonstrate recall of functional information following a/an (immediate, shortterm,long-term) delay with min cues in order to increase functional integration into environment. SHORT TERM GOAL #2:  Goal 2: The patient will complete executive function tasks (planning, self-monitoring, organizing, etc) with min verbal cues at 80% accuracy to improve safety awareness with functional ADL's    SHORT TERM GOAL #3:  Goal 3: The patient will demonstrate functional problem solving and safety awareness with min verbal cues at 80% accuracy for daily living tasks in order to increase safe interaction with environment and decreased assistance from caregivers. Swallowing Short Term Goals  Short-term Goals  Goal 1: The patient will tolerate a regular diet wtih thin liquids with minimal overt s/s of aspiration. Goal 2: SLP will return to reassess swallow function and insure safety with all oral intake. Goal 3: SLP will complete full speech/language cognitive evaluation. Goal 4: Patient/staff will complete daily oral hygiene to prevent aspriation of oral bacteria. Goal 5: Patient will complete pharyngeal/laryngeal exercises given verbal prompts and written instructions. Goal 6: Patient will verbalize and demonstrate compensatory swallow strategies 100% of opportunitites. General:  WBC 10.0 02/01/2021   No recent CXR  Lung sounds noted with congestion as reported by RN      Recommended Diet and Intervention  Diet Solids Recommendation: Dysphagia minced and moist (Dysphagia II)  Liquid Consistency Recommendation: Thin  Recommended Form of Meds: crushed in puree  Therapeutic Interventions: Oral motor exercises; Tongue base strengthening;Patient/Family education;Effortful swallow;Kathy; Laryngeal exercises; Therapeutic PO trials with SLP;Oral care;Diet tolerance monitoring     Compensatory Swallowing Strategies  Compensatory Swallowing Strategies: No straws;Upright as possible for all oral intake;Remain upright for 30-45 minutes after meals    ASSESSMENT:  Assessment: []Progressing towards goals          [x]Not Progressing towards goals    Patient Tolerance of Treatment:   []Tolerated well []Tolerated fair [x]Required rest breaks [x]Fatigued    Education:  Learner:  [x]Patient          []Significant Other          []Other  Education provided regarding:  []Goals and POC   [x]Diet and swallowing precautions    []Home Exercise Program  []Progress and/or discharge information  Method of Education:  [x]Discussion          [x]Demonstration          [x]Handout          []Other  Evaluation of Education:   [x]Verbalized understanding   []Demonstrates without assistance  []Demonstrates with assistance  []Needs further instruction     []No evidence of learning                  []No family present      Plan: [x]Continue with current plan of care    []Modify current plan of care as follows:    []Discharge patient    Discharge Location:    Services/Supervision Recommended: [x]Patient continues to require treatment by a licensed therapist to address functional deficits as outlined in the established plan of care.     RECOMMENDATIONS:  Encourage liquids and liquid nutritional supplements  Encourage swallowing  Provide oral care routine  Monitor lung sounds  Begin NMES if MD allows  CT neck scan for suspicious tissue identified on MBSS  PER PATIENT:  not willing to do any invasive procedures for UES dysfunction    Electronically signed by BARBIE Almonte on 2/3/2021 at 12:04 PM

## 2021-02-03 NOTE — TELEPHONE ENCOUNTER
Care discussed with Dr. Russ Bey from [facility] (cell # [phone]) who updated me on the patient's postop condition (following back surgery).  Patient is now in rehab trying to gain back mobility.  Patient and her daughter have discussed the issue of whether to further pursue the lung lesion and brain lesion (possible lung cancer with brain metastases), ultimately deciding to forego any more diagnostic or therapeutic interventions.  Dr. Bey informs me that the patient has already decided to go home with palliative care/hospice.

## 2021-02-03 NOTE — PROGRESS NOTES
Patient:   Annie Berger  MR#:    [id number]   Room:    0827/827-01   YOB: 1933  Date of Progress Note: 2/3/2021  Time of Note                           10:44 AM  Consulting Physician:   Jose Degroot M.D.   Attending Physician:  Jose Degroot MD     Chief complaint S/p lumbar laminectomy S:This 80 y.o. female  with history of diabetes mellitus, hyperlipidemia,known lung mass and HTN. Since Dec. 2020 patient has had difficulty walking, left foot drop and back pain. It had worsened to the point that she hasn't been able to walk for the past couple of weakness or care for herself. Her daughter came in December to stay with her. Prior to this patient lived at home independently. She had an MRI done as outpatient that revealed severe spinal stenosis at L3/4 and L4/5. She was referred to neurosurgeon Dr. Zeeshan Martínez, who recommended L3/4 & L4/5 decompressive laminectomy. She was in agreement and was admitted to [facility] on 1/26/21 for surgery. She tolerated the procedure well. She will be required to wear a TLSO brace when out of bed and follow spine precautions. She continues to have significant lower extremity weakness, worse on the left. In regards to her known lung mass, patient has been seen prior to admit by pulmonologist Dr. Glenice Buerger who plans to a bronchoscopy with biopsy at some point. He had recommended her getting her back surgery and then rehab to get her stronger prior to having the procedure done. Dr. Zeeshan Martínez ordered a MRI of the head d/t her persistent lower extremity weakness and known lung mass. 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 Dr. Zeeshan Martínez felt this would explain balance difficulty but now her LE weakness. Oncology and Palliative Care have been consulted. Patient at this point is stating she doesn't want any chemo or radiation. She is participating in both PT/OT. She is felt to need a stay on Rehab to work towards her goal of returning home with her daughter.  No new issues.       REVIEW OF SYSTEMS:  Constitutional: No fevers No chills  Neck:No stiffness  Respiratory: No shortness of breath  Cardiovascular: No chest pain No palpitations  Gastrointestinal: No abdominal pain    Genitourinary: No Dysuria  Neurological: No headache, no confusion    Past Medical History: Diagnosis Date    Diabetes mellitus ([facility] [address]     Hyperlipidemia     Hypertension     Palliative care patient 01/29/2021       Past Surgical History:      Procedure Laterality Date    APPENDECTOMY      CHOLECYSTECTOMY      LUMBAR SPINE SURGERY N/A 1/26/2021    LAMINECTOMY L3-4/ L4-5 performed by Jerel Cabral MD at [address], BILATERAL         Medications in Hospital:      Current Facility-Administered Medications:     sucralfate (CARAFATE) tablet 1 g, 1 g, Oral, 4 times per day, Deric Mishra MD, 1 g at 02/03/21 0542    ondansetron (ZOFRAN) tablet 4 mg, 4 mg, Oral, TID AC, Deric Mishra MD, 4 mg at 02/03/21 0542    HYDROcodone-acetaminophen ([facility])  MG per tablet 1 tablet, 1 tablet, Oral, Q4H PRN, Deric Mishra MD, 1 tablet at 02/02/21 2040    nitrofurantoin (macrocrystal-monohydrate) (MACROBID) capsule 100 mg, 100 mg, Oral, 2 times per day, Deric Mishra MD, 100 mg at 02/03/21 0942    simethicone (MYLICON) chewable tablet 80 mg, 80 mg, Oral, Q6H PRN, Jerel Cabral MD, 80 mg at 02/01/21 0738    bisacodyl (DULCOLAX) suppository 10 mg, 10 mg, Rectal, Daily PRN, Jerel Cabral MD    ondansetron (ZOFRAN-ODT) disintegrating tablet 4 mg, 4 mg, Oral, Q8H PRN, 4 mg at 02/01/21 1516 **OR** ondansetron (ZOFRAN) injection 4 mg, 4 mg, Intravenous, Q6H PRN, Jerel Cabral MD    sennosides-docusate sodium (SENOKOT-S) 8.6-50 MG tablet 1 tablet, 1 tablet, Oral, BID, Jerel Cabral MD, 1 tablet at 02/03/21 0942    insulin lispro (HUMALOG) injection vial 4 Units, 0.08 Units/kg, Subcutaneous, TID WC, Jerel Cabral MD    insulin lispro (HUMALOG) injection vial 0-12 Units, 0-12 Units, Subcutaneous, TID WC, Jerel Cabral MD    insulin lispro (HUMALOG) injection vial 0-6 Units, 0-6 Units, Subcutaneous, Nightly, Jerel Cabral MD, 1 Units at 02/02/21 2042    cetirizine (ZYRTEC) tablet 10 mg, 10 mg, Oral, Daily, Jerel Cabral MD, 10 mg at 02/02/21 2107 PHYSICAL EXAM:  /60   Pulse 74   Temp 97.9 °F (36.6 °C) (Temporal)   Resp 12   Ht 5' (1.524 m)   Wt 120 lb (54.4 kg)   SpO2 91%   BMI 23.44 kg/m²       Constitutional  well developed, well nourished. Eyes  conjunctiva normal.   Ear, nose, throat - No scars, masses, or lesions over external nose or ears, no atrophy of tongue  Neck-symmetric, no masses noted, no jugular vein distension  Respiration- chest wall appears symmetric, good expansion,   normal effort without use of accessory muscles  Musculoskeletal  no significant wasting of muscles noted, no bony deformities  Extremities-no clubbing, cyanosis or edema  Skin  warm, dry, and intact. No rash, erythema, or pallor. Psychiatric  mood, affect, and behavior appear normal.      Neurological exam  Awake, alert, fluent oriented slow  Attention and concentration appear appropriate  Recent and remote memory appears unremarkable  Speech normal without dysarthria  No clear issues with language of fund of knowledge     Cranial Nerve Exam     CN III, IV,VI-EOMI, No nystagmus, conjugate eye movements, no ptosis    CN VII-no facial assymetry       Motor Exam  antigravity throughout upper and lower extremities bilaterally      Tremors- no tremors in hands or head noted     Gait  Not tested      Nursing/pcp notes, imaging,labs and vitals reviewed.      PT,OT and/or speech notes reviewed    Lab Results   Component Value Date    WBC 10.0 02/01/2021    HGB 11.7 (L) 02/01/2021    HCT 35.8 (L) 02/01/2021    MCV 90.4 02/01/2021     02/01/2021     Lab Results   Component Value Date     02/01/2021    K 3.6 02/01/2021    CL 96 (L) 02/01/2021    CO2 24 02/01/2021    BUN 18 02/01/2021    CREATININE 0.5 02/01/2021    GLUCOSE 127 (H) 02/01/2021    CALCIUM 8.9 02/01/2021    PROT 5.2 (L) 02/01/2021    LABALBU 3.4 (L) 02/01/2021    BILITOT 0.5 02/01/2021    ALKPHOS 79 02/01/2021    AST 21 02/01/2021    ALT 9 02/01/2021    LABGLOM >60 02/01/2021 GFRAA >59 02/01/2021     Lab Results   Component Value Date    INR 1.01 01/30/2021    INR 0.93 01/06/2021    PROTIME 13.2 01/30/2021    PROTIME 12.3 01/06/2021       Yary Reis   Student Physical Therapist   Physical Therapy   Progress Notes   Signed   Date of Service:  2/2/2021 11:00 AM               Signed             Show:Clear all  [x]Manual[x]Template[]Copied    Added by:  Berkley Gamez    []Leisa for details       02/02/21 1000   Restrictions/Precautions   Restrictions/Precautions Fall Risk;Weight Bearing;Surgical Protocols; Modified Diet   Required Braces or Orthoses? Yes   Lower Extremity Weight Bearing Restrictions   Right Lower Extremity Weight Bearing Weight Bearing As Tolerated   Left Lower Extremity Weight Bearing Weight Bearing As Tolerated   Required Braces or Orthoses   Spinal Other LSO   Position Activity Restriction   Spinal Precautions No Bending; No Lifting; No Twisting   Other position/activity restrictions AFO in room but has not been sized to patient and it does not fit in current shoe   Vision   Vision Impaired   Vision Exceptions Wears glasses at all times   Hearing   Hearing [facility]   General   Additional Pertinent Hx HTN; HYPERLIPIDEMIA; TYPE 2 DM; GERD; L FOOT DROP; RECENTLY FOUND LUNG MASS   Diagnosis S/P L3-4 AND L4-5 DECOMPRESSIVE LAMINECTOMY ON 1/26/2021   Follows Commands WFL  (EXHIBITS APPREHENSION AND FEAR W/ TF AND STANDING )   General Comment   Comments SPEECH THERAPY IN ROOM PRIOR TO PHYSICAL THERAPY - STATED PT HAD PAIN MEDS EARLIER IN THE A.M. AND WAS GROGGY/HARD TO KEEP PARTICIPATION IN THERAPY. Subjective   Subjective PT IN BED, STATES STILL FEELS FATIGUED. WHEN PT SITTING EOB, STATED LESS DIZZY COMPARED TO YESTERDAY.    Bed Mobility   Rolling Stand by assistance;Contact guard assistance  (VC AND TC TO BEND KNEE INTO HOOKLYING PRIOR TO ROLLING)   Supine to Sit Moderate assistance  (BIALTERAL LE MANAGEMENT ASSIST; L SIDE OF BED )   Sit to Supine Moderate assistance (BILATERAL LE MANAGEMENT; L SIDE OF BED )   Scooting Moderate assistance  (MOD A FOR SCOOTING FWD ON EOB.)   Comment PT ABLE TO USE UE MORE COMPARED TO YESTERDAY TO ASSIST W/ SUPINE TO SIT TF. THERAPIST MANAGED BILATERAL LE. PLACED WOOD PLANK UNDER FEET TO ASSIST W/ SCOOTING FWD TOWARD EOB. Transfers   Sit to Stand Moderate Assistance;2 Person Assistance   Stand to sit Minimal Assistance   Bed to Chair Moderate assistance;2 Person Assistance  (STAND STEP TF WITH RW TO THE L; THERAPIST BLOCKING RW )   Comment PT APPREHENSIVE TO STAND/TF REQUIRING MOTIVATIONAL SUPPORT FROM THERAPIST. PT ABLE TO PARTICIPATE MORE DURING TF, BUT STILL REQUIRED MOD A X2. THERAPIST BLOCKED RW AND DIRECTED ITS MOVEMENT DURING TF AS PT FEARFUL OF IT MOVING. Ambulation   Ambulation? No   WB Status WBAT BILATERAL LE    Other exercises   Other exercises 1 1 STS IN II BARS; MOD A FROM ONE THERAPIST AND MIN A FROM 2ND THERAPIST (X2); PT STOOD FOR 45 SEC    Other exercises 2 PRACTICED BED MOBILITY BEGINING OF SESSION AND AT END OF SESSION FOR CLEAN UP    Conditions Requiring Skilled Therapeutic Intervention   Assessment PT ABLE TO PARTICIPATE MORE DURING TF AND W/ STS; HOWEVER DEMONSTRATES FEAR AND APPREHENSION WITH BEING ON HER FEET D/T MED HX AND WEAKNESS. WHEN PT STOOD URINARY INCONTINENCE NOTED AND RETURNED TO ROOM FOR CLEAN UP. PT MAX A FOR LSO DONNING/DOFFING. Barriers to Learning DEMONSTRATED APPREHENSION W/ STANDING/WALKING ETC.    Activity Tolerance   Activity Tolerance Patient limited by fatigue;Patient limited by pain; Patient limited by endurance   Safety Devices   Type of devices Bed alarm in place;Call light within reach; Left in bed;Patient at risk for falls                Cosigned by:  Leigh Ann Valencia, PT at 2/2/2021  2:20 PM   Revision History                                    RECORD REVIEW: Previous medical records, medications were reviewed at today's visit    IMPRESSION: 1. S/P lumbar laminectomy-Pain control/mobilzation  2. HTN-on meds monitor  3. DVT prophylaxis-Lovenox  4. Anemia-on iron   5. Neuropathy-on Neurontin  6. DM-on insulin-monitor BS  7. GI-PPI/bowel regimen  8. Hyperlipidemia-on statin  9. Pain control-Norco PRN  11. Lung/cerebellar mass-monitor   12. PT/OT  13.  UTI-complete abx    Add carafate / zofran TID before meals    ELOS pending        continue present care    Expected duration and frequency therapy: 180 minutes per day, 5 days per week    [address] [place] [address]  [phone] CELL  Dr Dodson Esters

## 2021-02-03 NOTE — PLAN OF CARE
Problem: SAFETY  Goal: Free from accidental physical injury  Outcome: Ongoing  Goal: Free from intentional harm  Outcome: Ongoing

## 2021-02-03 NOTE — PROGRESS NOTES
NEUROSURGERY POSTOPERATIVE PROGRESS NOTE      Chief Complaint: Back pain, difficulty walking    Date of Surgery: 1/26/2021    Procedure Performed: L3/4 and L4/5 decompressive laminectomy      Interval Update: Patient on rehab floor. She continues to endorse back pain with movement and lower extremity weakness. She does not appear to be mobilizing very well with therapy. Speech made adjustments to her diet and is working on swallowing and she reports improvement in that regard. HPI: 45-year-old female who presented to neurosurgical attention complaining of progressive back pain, difficulty ambulating, and left lower extremity pain and weakness. On examination, she was found to have a left foot drop, difficulty while standing upright with upright posture, and inability to walk more than a few feet without stopping to rest.  An MRI scan of her lumbar spine was performed that demonstrated lumbar degenerative scoliosis as well as disk osteophyte formation and advanced facet arthropathy mostly at L3-L4 and L4-L5, that were causing significant central canal and bilateral lateral recess stenosis, left greater than right. Given her imaging findings and her presenting complaints, I offered the patient the surgery (L3/4 and L4/5 laminectomy) understanding with her advanced age that she would be at elevated risk for complications related to surgery. She voiced understanding and requested that we proceed. 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 Her surgery was delayed for one week while a CT scan of her chest was obtained and she was referred to Pulmonology and Pulmonology agreed that given her limited mobility that the spinal surgery should still occur in order to increase her functional mobility and hopefully, her activity level to tolerate potential treatment for her malignancy.       Objective:

## 2021-02-03 NOTE — PROGRESS NOTES
02/03/21 1000   Restrictions/Precautions   Restrictions/Precautions Fall Risk;Weight Bearing;Surgical Protocols; Modified Diet   Required Braces or Orthoses? Yes   Lower Extremity Weight Bearing Restrictions   Right Lower Extremity Weight Bearing Weight Bearing As Tolerated   Left Lower Extremity Weight Bearing Weight Bearing As Tolerated   Required Braces or Orthoses   Spinal Other LSO   Position Activity Restriction   Spinal Precautions No Bending; No Lifting; No Twisting   Vision   Vision Impaired   Vision Exceptions Wears glasses at all times   Hearing   Hearing [employer]   General   Additional Pertinent Hx HTN; HYPERLIPIDEMIA; TYPE 2 DM; GERD; L FOOT DROP; RECENTLY FOUND LUNG MASS   Diagnosis S/P L3-4 AND L4-5 DECOMPRESSIVE LAMINECTOMY ON 1/26/2021   Follows Commands WFL   Subjective   Subjective PT C/O OF FEELING DIZZY, TIRED, AND NAUSEOUS. Bed Mobility   Rolling Contact guard assistance;Minimal assistance  (ROLLING TO L )   Supine to Sit Maximal assistance  (ASSIST W/ BILATERAL LE AND TRUNK TO PUSH UP TO SITTING EOB )   Scooting Contact guard assistance  (PLACE BOARD UNDER FEET FOR SCOOTING FWD AT EOB)   Comment PT PERFORMED SUPINE TO SIT FROM L SIDELYING TO SITTING L EOB   Transfers   Sit to Stand Moderate Assistance;2 Person Assistance   Stand to sit Minimal Assistance   Bed to Chair Dependent/Total  (RODOLFO STEADY TO W/C )   Ambulation   Ambulation? No   WB Status WBAT BILATERAL LE    Wheelchair Activities   Propulsion Yes   Propulsion 1   Propulsion Manual   Level Level Tile   Method RUE;LUE   Level of Assistance Stand by assistance;Contact guard assistance   Description/ Details NO TURNS; VC FOR TECHNIQUE   Distance 25 FT   Other exercises   Other exercises 1 2 STANDS IN II BARS, WITH MIN/MOD A X2, 45 SEC STANDING. FIRST STAND PT ABLE TO LOCKOUT R KNEE IND, UNABLE TO DO SO ON 2ND STAND.     Other exercises 2 SEATED BIALTERAL LE EX: HIP ABD X10 EA SIDE, ADD X10 BILATERAL, LAQ X10 EA SIDE Conditions Requiring Skilled Therapeutic Intervention   Assessment RODOLFO CLARA USED FOR BED TO CHAIR TF. PT REQUIRES MIN/MOD A X2 TO STAND AND DEMONSTRATES APPREHENSION/FEAR OF FALLING RENDERING THE NEED FOR EXTRA ENCOURAGEMENT FROM THERAPIST TO PARTICIPATE IN THERAPY. Activity Tolerance   Activity Tolerance Patient limited by fatigue;Patient limited by pain; Patient limited by endurance   Safety Devices   Type of devices Chair alarm in place; Left in chair;Patient at risk for falls  (PT LEFT W/ SPEECH THERAPY )

## 2021-02-03 NOTE — PROGRESS NOTES
Occupational Therapy  Facility/Department: [facility] REHAB UNIT  Daily Treatment Note  NAME: Rima Pryor  : 1933  MRN: [id number]    Date of Service: 2/3/2021    Discharge Recommendations:  Continue to assess pending progress       Assessment   Performance deficits / Impairments: Decreased functional mobility ; Decreased strength;Decreased endurance;Decreased balance;Decreased high-level IADLs  Assessment: Pt completed UE dressing with min assist and LE dressing with max assist, dependent for toileting. Attempted to stand with walker and pt unable to complete full stand, used [place]ana [place] instead to pull pants up over hips. Once up in Ariana Hole pt stated she needed to lay back down due to fatigue. Pt requested to not have pain meds this AM due to them making her too lethargic. Will continue to follow. Treatment Diagnosis: L3-4, L4-5 decompressive laminectomy, 2 cm lesion on R cerebellum, lung mass  Prognosis: Good  History: Lung mass, 2 cm lesion on R cerebellum  Activity Tolerance  Activity Tolerance: Patient limited by fatigue         Patient Diagnosis(es): There were no encounter diagnoses. has a past medical history of Diabetes mellitus ([facility] [address] Hyperlipidemia, Hypertension, and Palliative care patient. has a past surgical history that includes Appendectomy; Mastectomy, bilateral; Cholecystectomy; and Lumbar spine surgery (N/A, 2021). Restrictions  Restrictions/Precautions  Restrictions/Precautions: Fall Risk, Weight Bearing, Surgical Protocols, Modified Diet  Required Braces or Orthoses?: Yes  Lower Extremity Weight Bearing Restrictions  Right Lower Extremity Weight Bearing: Weight Bearing As Tolerated  Left Lower Extremity Weight Bearing: Weight Bearing As Tolerated  Required Braces or Orthoses  Spinal Other: LSO  Left Lower Extremity Brace:  Dee Foot Orthotics  Position Activity Restriction  Spinal Precautions: No Bending, No Lifting, No Twisting Other position/activity restrictions: AFO in room but has not been sized to patient and it does not fit in current shoe  Subjective   General  Chart Reviewed: Yes  Patient assessed for rehabilitation services?: Yes  Response to previous treatment: Patient with no complaints from previous session  Family / Caregiver Present: No  Diagnosis: L3-L5 decompressive laminectomy  Subjective  Subjective: \"I can't take any more of that pain medicine, it's knocking me out and I can't do anything. \"  Pain Assessment  Pain Assessment: 0-10  Pain Level: 4  Pain Type: Surgical pain  Pain Location: Back  Pain Orientation: Mid  Pain Descriptors: Aching; Sharp  Pain Frequency: Intermittent  Pain Onset: Gradual  Functional Pain Assessment: Prevents or interferes with many active not passive activities  Non-Pharmaceutical Pain Intervention(s): Repositioned;Rest;Therapeutic presence  Response to Pain Intervention: Patient Satisfied  Vital Signs  Patient Currently in Pain: Yes(With activity, no pain while resting in bed.)   Orientation     Objective    ADL  UE Dressing: Minimal assistance  LE Dressing: Dependent/Total;Maximum assistance  Toileting: Dependent/Total        Balance  Sitting Balance: Minimal assistance  Standing Balance: Maximum assistance  Bed mobility  Rolling to Left: Stand by assistance  Rolling to Right: Stand by assistance  Supine to Sit: Moderate assistance  Sit to Supine: Moderate assistance  Scooting: Moderate assistance  Transfers  Sit to stand: Maximum assistance  Stand to sit: Maximum assistance  Transfer Comments: Attempted to stand with walker to pull pants up over hips, but pt unable to stand enough to clear bed, used Lennox Barnacle after calling for assistance and no one came. Pt stood with max assist in Lennox Barnacle and were able to get pants up over hips.                                             Type of ROM/Therapeutic Exercise  Type of ROM/Therapeutic Exercise: AROM Comment: Kris UE ex 10x2 with no added resistance. Plan   Plan  Current Treatment Recommendations: Strengthening, Balance Training, Functional Mobility Training, Endurance Training, Safety Education & Training, Patient/Caregiver Education & Training, Equipment Evaluation, Education, & procurement, Self-Care / ADL, Home Management Training  G-Code     OutComes Score                                                  AM-PAC Score             Goals  Short term goals  Time Frame for Short term goals: 1 week  Short term goal 1: Min A with clothing management/hygiene for toileting. Short term goal 2: Mod A with toilet transfers. Short term goal 3: Mod A with LB dressing, using AE. Short term goal 4: Mod A with donning/doffing socks and shoes using AE. Short term goal 5: Min A with bathing hygiene. Short term goal 6: Min A with donning/doffing LSO. Long term goals  Time Frame for Long term goals : 3-4 weeks  Long term goal 1: Min A with clothing management/hygiene for toileting. Long term goal 2: Min A with toilet transfers. Long term goal 3: Min A with LB dressing, using AE. Long term goal 4: Min A with donning/doffing socks and shoes using AE. Long term goal 5: Supervision with bathing hygiene. Long term goals 6: Patient verbalize DME needs. Long term goal 7: Mod A with donning/doffing LSO. Patient Goals   Patient goals : Increase her independence with ADLs.        Therapy Time   Individual Concurrent Group Co-treatment   Time In 0900         Time Out 1000         Minutes 60         Timed Code Treatment Minutes: [address] [place]

## 2021-02-04 LAB
ALBUMIN SERPL-MCNC: 3.3 G/DL (ref 3.5–5.2)
ALP BLD-CCNC: 80 U/L (ref 35–104)
ALT SERPL-CCNC: 12 U/L (ref 5–33)
ANION GAP SERPL CALCULATED.3IONS-SCNC: 7 MMOL/L (ref 7–19)
AST SERPL-CCNC: 24 U/L (ref 5–32)
BASOPHILS ABSOLUTE: 0 K/UL (ref 0–0.2)
BASOPHILS RELATIVE PERCENT: 0.3 % (ref 0–1)
BILIRUB SERPL-MCNC: 0.3 MG/DL (ref 0.2–1.2)
BUN BLDV-MCNC: 14 MG/DL (ref 8–23)
CALCIUM SERPL-MCNC: 9.3 MG/DL (ref 8.8–10.2)
CHLORIDE BLD-SCNC: 96 MMOL/L (ref 98–111)
CO2: 33 MMOL/L (ref 22–29)
CREAT SERPL-MCNC: 0.6 MG/DL (ref 0.5–0.9)
EOSINOPHILS ABSOLUTE: 0.1 K/UL (ref 0–0.6)
EOSINOPHILS RELATIVE PERCENT: 1 % (ref 0–5)
GFR AFRICAN AMERICAN: >59
GFR NON-AFRICAN AMERICAN: >60
GLUCOSE BLD-MCNC: 121 MG/DL (ref 70–99)
GLUCOSE BLD-MCNC: 138 MG/DL (ref 70–99)
GLUCOSE BLD-MCNC: 150 MG/DL (ref 70–99)
GLUCOSE BLD-MCNC: 73 MG/DL (ref 74–109)
GLUCOSE BLD-MCNC: 85 MG/DL (ref 70–99)
HCT VFR BLD CALC: 36.1 % (ref 37–47)
HEMOGLOBIN: 11.5 G/DL (ref 12–16)
IMMATURE GRANULOCYTES #: 0.1 K/UL
LYMPHOCYTES ABSOLUTE: 1.6 K/UL (ref 1.1–4.5)
LYMPHOCYTES RELATIVE PERCENT: 22.7 % (ref 20–40)
MCH RBC QN AUTO: 29.1 PG (ref 27–31)
MCHC RBC AUTO-ENTMCNC: 31.9 G/DL (ref 33–37)
MCV RBC AUTO: 91.4 FL (ref 81–99)
MONOCYTES ABSOLUTE: 0.7 K/UL (ref 0–0.9)
MONOCYTES RELATIVE PERCENT: 10.1 % (ref 0–10)
NEUTROPHILS ABSOLUTE: 4.6 K/UL (ref 1.5–7.5)
NEUTROPHILS RELATIVE PERCENT: 64.8 % (ref 50–65)
PDW BLD-RTO: 13.4 % (ref 11.5–14.5)
PERFORMED ON: ABNORMAL
PERFORMED ON: NORMAL
PLATELET # BLD: 245 K/UL (ref 130–400)
PMV BLD AUTO: 10.5 FL (ref 9.4–12.3)
POTASSIUM REFLEX MAGNESIUM: 3.7 MMOL/L (ref 3.5–5)
RBC # BLD: 3.95 M/UL (ref 4.2–5.4)
SODIUM BLD-SCNC: 136 MMOL/L (ref 136–145)
TOTAL PROTEIN: 5.1 G/DL (ref 6.6–8.7)
WBC # BLD: 7.1 K/UL (ref 4.8–10.8)

## 2021-02-04 PROCEDURE — 97129 THER IVNTJ 1ST 15 MIN: CPT

## 2021-02-04 PROCEDURE — 1180000000 HC REHAB R&B

## 2021-02-04 PROCEDURE — 6370000000 HC RX 637 (ALT 250 FOR IP): Performed by: NEUROLOGICAL SURGERY

## 2021-02-04 PROCEDURE — 97110 THERAPEUTIC EXERCISES: CPT

## 2021-02-04 PROCEDURE — 80053 COMPREHEN METABOLIC PANEL: CPT

## 2021-02-04 PROCEDURE — 92526 ORAL FUNCTION THERAPY: CPT

## 2021-02-04 PROCEDURE — 82947 ASSAY GLUCOSE BLOOD QUANT: CPT

## 2021-02-04 PROCEDURE — 6360000002 HC RX W HCPCS: Performed by: NEUROLOGICAL SURGERY

## 2021-02-04 PROCEDURE — 36415 COLL VENOUS BLD VENIPUNCTURE: CPT

## 2021-02-04 PROCEDURE — 97130 THER IVNTJ EA ADDL 15 MIN: CPT

## 2021-02-04 PROCEDURE — 6370000000 HC RX 637 (ALT 250 FOR IP): Performed by: PSYCHIATRY & NEUROLOGY

## 2021-02-04 PROCEDURE — 85025 COMPLETE CBC W/AUTO DIFF WBC: CPT

## 2021-02-04 PROCEDURE — 93880 EXTRACRANIAL BILAT STUDY: CPT

## 2021-02-04 PROCEDURE — 99232 SBSQ HOSP IP/OBS MODERATE 35: CPT | Performed by: PSYCHIATRY & NEUROLOGY

## 2021-02-04 PROCEDURE — 97530 THERAPEUTIC ACTIVITIES: CPT

## 2021-02-04 RX ORDER — SUCRALFATE 1 G/1
1 TABLET ORAL
Status: DISCONTINUED | OUTPATIENT
Start: 2021-02-04 | End: 2021-02-20 | Stop reason: HOSPADM

## 2021-02-04 RX ADMIN — SUCRALFATE 1 G: 1 TABLET ORAL at 12:14

## 2021-02-04 RX ADMIN — ENOXAPARIN SODIUM 40 MG: 40 INJECTION SUBCUTANEOUS at 16:37

## 2021-02-04 RX ADMIN — PANTOPRAZOLE SODIUM 40 MG: 40 TABLET, DELAYED RELEASE ORAL at 06:02

## 2021-02-04 RX ADMIN — NITROFURANTOIN MONOHYDRATE/MACROCRYSTALLINE 100 MG: 25; 75 CAPSULE ORAL at 07:43

## 2021-02-04 RX ADMIN — NITROFURANTOIN MONOHYDRATE/MACROCRYSTALLINE 100 MG: 25; 75 CAPSULE ORAL at 20:59

## 2021-02-04 RX ADMIN — DILTIAZEM HYDROCHLORIDE 240 MG: 240 CAPSULE, COATED, EXTENDED RELEASE ORAL at 07:44

## 2021-02-04 RX ADMIN — SUCRALFATE 1 G: 1 TABLET ORAL at 18:03

## 2021-02-04 RX ADMIN — CETIRIZINE HYDROCHLORIDE 10 MG: 10 TABLET, FILM COATED ORAL at 07:43

## 2021-02-04 RX ADMIN — ROSUVASTATIN CALCIUM 20 MG: 20 TABLET, FILM COATED ORAL at 20:59

## 2021-02-04 RX ADMIN — SUCRALFATE 1 G: 1 TABLET ORAL at 06:02

## 2021-02-04 RX ADMIN — ACETAMINOPHEN 650 MG: 325 TABLET ORAL at 20:59

## 2021-02-04 RX ADMIN — LISINOPRIL 5 MG: 5 TABLET ORAL at 07:43

## 2021-02-04 RX ADMIN — ONDANSETRON 4 MG: 4 TABLET, FILM COATED ORAL at 11:14

## 2021-02-04 RX ADMIN — DOCUSATE SODIUM 50 MG AND SENNOSIDES 8.6 MG 1 TABLET: 8.6; 5 TABLET, FILM COATED ORAL at 20:59

## 2021-02-04 RX ADMIN — Medication 10 UNITS: at 20:59

## 2021-02-04 RX ADMIN — SUCRALFATE 1 G: 1 TABLET ORAL at 00:47

## 2021-02-04 RX ADMIN — CYANOCOBALAMIN TAB 500 MCG 500 MCG: 500 TAB at 07:44

## 2021-02-04 RX ADMIN — GABAPENTIN 300 MG: 300 CAPSULE ORAL at 20:59

## 2021-02-04 RX ADMIN — SUCRALFATE 1 G: 1 TABLET ORAL at 20:59

## 2021-02-04 RX ADMIN — FERROUS SULFATE TAB 325 MG (65 MG ELEMENTAL FE) 325 MG: 325 (65 FE) TAB at 07:43

## 2021-02-04 RX ADMIN — DOCUSATE SODIUM 50 MG AND SENNOSIDES 8.6 MG 1 TABLET: 8.6; 5 TABLET, FILM COATED ORAL at 07:43

## 2021-02-04 RX ADMIN — ONDANSETRON 4 MG: 4 TABLET, FILM COATED ORAL at 16:37

## 2021-02-04 RX ADMIN — ONDANSETRON 4 MG: 4 TABLET, FILM COATED ORAL at 06:02

## 2021-02-04 ASSESSMENT — PAIN SCALES - GENERAL: PAINLEVEL_OUTOF10: 0

## 2021-02-04 NOTE — CARE COORDINATION
Ms. Jairo Kanner and daughter, apparently misunderstanding that length of stay is expected to be longer than the first [facility] review -they though LOS would be 7 days - she expected to go home this weekend, despite her very debilitated state and need for assistance of two people and mechanical transfer device. Therefore, family instruction completed this morning with her daughter and further discussion. Ms. Jairo Kanner indicates she is discouraged - says her legs don't work as well now as they did when she was as home. Also she had been somnolent- they identified use of Norco for pain was \"too much\" for her- she gets some relief from Tylenol and can follow directions and participate much better with Tylenol. Encouraged her and daughter to continue with inpatient rehabilitation- she indicates she will consider this.

## 2021-02-04 NOTE — PROGRESS NOTES
02/04/21 0900   Restrictions/Precautions   Restrictions/Precautions Fall Risk;Weight Bearing;Surgical Protocols; Modified Diet   Required Braces or Orthoses? Yes   Lower Extremity Weight Bearing Restrictions   Right Lower Extremity Weight Bearing Weight Bearing As Tolerated   Left Lower Extremity Weight Bearing Weight Bearing As Tolerated   Required Braces or Orthoses   Spinal Other LSO   Left Lower Extremity Brace Dee Foot Orthotics   General   Additional Pertinent Hx HTN; HYPERLIPIDEMIA; TYPE 2 DM; GERD; L FOOT DROP; RECENTLY FOUND LUNG MASS   Diagnosis S/P L3-4 AND L4-5 DECOMPRESSIVE LAMINECTOMY ON 1/26/2021   Follows Commands WFL   General Comment   Comments DAUGHTER IN ROOM FOR FTD. PT MORE ALERT ACCORDING TO DAUGHTER COMPARED TO PREVIOUS DAYS D/T MED CHANGE AND HAVING TYLENOL AT P.M. FOR PAIN MANAGEMENT NOW. Subjective   Subjective PT FEELS TIRED BUT READY TO PARTICIPATE IN PHYSICAL THERAPY. WOULD LIKE TO GO HOME. Bed Mobility   Rolling Modified independent;Stand by assistance  (ROLLING L AND R FOR CLEAN UP; VC TO MAINTAIN PRECAUTIONS)   Supine to Sit Moderate assistance;Maximal assistance  (FROM L SIDELYING TO SITTING EOB; VC TO MAINTAIN PRECAUTIONS)   Scooting Contact guard assistance;Minimal assistance  (WOOD PLANK UNDER FEET DURING SCOOTING FWD)   Comment DAUGHTER EDUCATED ON BED MOBILITY AND MAINTAINING SPINAL PRECAUTIONS. Transfers   Sit to Stand Moderate Assistance;2 Person Assistance   Stand to sit Minimal Assistance   Bed to Chair Moderate assistance;2 Person Assistance  (STAND STEP WITH RW TO THE R AND L; 3X DURING SESSION)   Comment DAUGHTER ASSISTED WITH 2 BED TO CHAIR TRANSFERS; EDUCATED DAUGHTER ON LSO DONNING/DOFFING AS WELL AS GAIT BELT    Ambulation   Ambulation?  No   WB Status WBAT BILATERAL LE    Wheelchair Activities   Propulsion No   Conditions Requiring Skilled Therapeutic Intervention Assessment FTD W/ PT'S DAUGHTER: REVIEWED W/ DAUGHTER AND PT BED MOBILITY, DONNING/DOFFING GAIT BELT AND LSO, AND BED TO CHAIR TF USING STAND-STEP TECHNIQUE TO R AND L AND ROLLING WALKER. ALSO REVIEWED SPINAL PRECAUTIONS AND PROVIDED HANDOUT. PT MAX A WITH SUPINE TO SIT, MOD A X2 FOR BED TO CHAIR TF. DAUGHTER SUPPORTIVE OF PT'S DECISION AND STRONG ADVOCATE FOR PT MAKING HEROWN DECISION HOWEVER PHYSICAL THERAPY DID EMPHASIZE THAT AS THE PT STILL REQUIRES INCREASED ASSISTANCE NOT ADVISABLE TO RETURN HOME AT THIS TIME. DAUGHTER RECEPTIVE TO INPUT FROM PHYSICAL THERAPY BUT AS STATED, STRONG SUPPORTER OF WHAT HER MOTHER PREFERS/DECIDES. Activity Tolerance   Activity Tolerance Patient limited by fatigue;Patient limited by pain; Patient limited by endurance   Safety Devices   Type of devices Chair alarm in place; Left in chair;Patient at risk for falls  (PT LEFT W/ SPEECH THERAPY)

## 2021-02-04 NOTE — PROGRESS NOTES
Nutrition Assessment     Type and Reason for Visit: Reassess    Nutrition Recommendations/Plan: Update flavor preferences and continue current POC. Nutrition Assessment:  Pt continues to be at risk nutritionally AEB continued poor po intake (1-25% most meals). Pt reports consuming most of ONS, prefers only chocolate. Will update flavor preference and continue current POC. Current Nutrition Therapies:    Dietary Nutrition Supplements: Diabetic Oral Supplement  DIET CARB CONTROL; Carb Control: 4 carb choices (60 gms)/meal; Dysphagia Minced and Moist    Anthropometric Measures:  · Height: 5' (152.4 cm)  · Current Body Wt: 120 lb (54.4 kg)   · BMI: 23.4    Nutrition Diagnosis:   · Increased nutrient needs, Inadequate oral intake related to acute injury/trauma, increase demand for energy/nutrients as evidenced by intake 0-25%, intake 26-50%, wounds, poor intake prior to admission, nausea, vomiting    Nutrition Interventions:   Food and/or Nutrient Delivery:  Continue Current Diet, Continue Oral Nutrition Supplement  Nutrition Education/Counseling:  No recommendation at this time   Coordination of Nutrition Care:  Continue to monitor while inpatient    Goals:  Pt will consume >50% of meals and ONS and maintain wt.        Nutrition Monitoring and Evaluation:   Food/Nutrient Intake Outcomes:  Food and Nutrient Intake, Supplement Intake  Physical Signs/Symptoms Outcomes:  Biochemical Data, Nutrition Focused Physical Findings, Skin, Weight     Electronically signed by Shital Coronado, MS, RD, LD on 2/4/21 at 1:27 PM CST    Contact: [phone]

## 2021-02-04 NOTE — PROGRESS NOTES
Vascular Preliminary Report      Bilateral Carotid Artery Duplex Completed. Predicted Degree of Stenosis:     Rt ICA: <50%      Lt ICA:  <50%      Bilateral Vertebral Arteries appear antegrade. Final Report Pending.

## 2021-02-04 NOTE — PLAN OF CARE
Problem: SAFETY  Goal: Free from accidental physical injury  2/3/2021 2351 by Horace Russo LPN  Outcome: Ongoing  2/3/2021 1605 by Anton Marin RN  Outcome: Ongoing  Goal: Free from intentional harm  2/3/2021 2351 by Horace Russo LPN  Outcome: Ongoing  2/3/2021 1605 by Anton Marin RN  Outcome: Ongoing     Problem: DAILY CARE  Goal: Daily care needs are met  2/3/2021 2351 by Horace Russo LPN  Outcome: Ongoing  2/3/2021 1605 by Anton Marin RN  Outcome: Ongoing     Problem: PAIN  Goal: Patient's pain/discomfort is manageable  2/3/2021 2351 by Horace Russo LPN  Outcome: Ongoing  2/3/2021 1605 by Anton Marin RN  Outcome: Ongoing     Problem: SKIN INTEGRITY  Goal: Skin integrity is maintained or improved  2/3/2021 2351 by Horace Russo LPN  Outcome: Ongoing  2/3/2021 1605 by Anton Marin RN  Outcome: Ongoing     Problem: KNOWLEDGE DEFICIT  Goal: Patient/S.O. demonstrates understanding of disease process, treatment plan, medications, and discharge instructions.   2/3/2021 2351 by Horace Russo LPN  Outcome: Ongoing  2/3/2021 1605 by Anton Marin RN  Outcome: Ongoing     Problem: DISCHARGE BARRIERS  Goal: Patient's continuum of care needs are met  2/3/2021 2351 by Horace Russo LPN  Outcome: Ongoing  2/3/2021 1605 by Anton Marin RN  Outcome: Ongoing     Problem: Pain:  Goal: Pain level will decrease  Description: Pain level will decrease  2/3/2021 2351 by Horace Russo LPN  Outcome: Ongoing  2/3/2021 1605 by Anton Marin RN  Outcome: Ongoing  Goal: Control of acute pain  Description: Control of acute pain  2/3/2021 2351 by Horace Russo LPN  Outcome: Ongoing  2/3/2021 1605 by Anton Marin RN  Outcome: Ongoing  Goal: Control of chronic pain  Description: Control of chronic pain  2/3/2021 2351 by Horace Russo LPN  Outcome: Ongoing  2/3/2021 1605 by Marlou Tomas, RN  Outcome: Ongoing     Problem: Falls - Risk of:  Goal: Will remain free from falls Description: Will remain free from falls  2/3/2021 2351 by Clive Fernández LPN  Outcome: Ongoing  2/3/2021 1605 by Be Chavarria RN  Outcome: Ongoing  Goal: Absence of physical injury  Description: Absence of physical injury  2/3/2021 2351 by Clive Fernández LPN  Outcome: Ongoing  2/3/2021 1605 by Be Chavarria RN  Outcome: Ongoing     Problem: Skin Integrity:  Goal: Will show no infection signs and symptoms  Description: Will show no infection signs and symptoms  2/3/2021 2351 by Clive Fernández LPN  Outcome: Ongoing  2/3/2021 [address] by Be Chavarria RN  Outcome: Ongoing  Goal: Absence of new skin breakdown  Description: Absence of new skin breakdown  2/3/2021 2351 by Clive Fernández LPN  Outcome: Ongoing  2/3/2021 1605 by Be Chavarria RN  Outcome: Ongoing     Problem: Nutrition  Goal: Optimal nutrition therapy  2/3/2021 2351 by Clive Fernández LPN  Outcome: Ongoing  2/3/2021 [address] by Be Chavarria, RN  Outcome: Ongoing

## 2021-02-04 NOTE — PLAN OF CARE
Problem: SAFETY  Goal: Free from accidental physical injury  2/4/2021 1159 by Nadeen Stiles LPN  Outcome: Ongoing  2/3/2021 2351 by Leeanna Leon LPN  Outcome: Ongoing  Goal: Free from intentional harm  2/4/2021 1159 by Nadeen Stiles LPN  Outcome: Ongoing  2/3/2021 2351 by Leeanna Leon LPN  Outcome: Ongoing     Problem: DAILY CARE  Goal: Daily care needs are met  2/4/2021 1159 by Nadeen Stiles LPN  Outcome: Ongoing  2/3/2021 2351 by Leeanna Leon LPN  Outcome: Ongoing     Problem: PAIN  Goal: Patient's pain/discomfort is manageable  2/4/2021 1159 by Nadeen Stiles LPN  Outcome: Ongoing  2/3/2021 2351 by Leeanna Leon LPN  Outcome: Ongoing     Problem: SKIN INTEGRITY  Goal: Skin integrity is maintained or improved  2/4/2021 1159 by Nadeen Stiles LPN  Outcome: Ongoing  2/3/2021 2351 by Leeanna Leon LPN  Outcome: Ongoing     Problem: KNOWLEDGE DEFICIT  Goal: Patient/S.O. demonstrates understanding of disease process, treatment plan, medications, and discharge instructions.   2/4/2021 1159 by Nadeen Stiles LPN  Outcome: Ongoing  2/3/2021 2351 by Leeanna Leon LPN  Outcome: Ongoing     Problem: DISCHARGE BARRIERS  Goal: Patient's continuum of care needs are met  2/4/2021 1159 by Nadeen Stiles LPN  Outcome: Ongoing  2/3/2021 2351 by Leeanna Leon LPN  Outcome: Ongoing     Problem: Pain:  Goal: Pain level will decrease  Description: Pain level will decrease  2/4/2021 1159 by Nadeen Stiles LPN  Outcome: Ongoing  2/3/2021 2351 by Leeanna Leon LPN  Outcome: Ongoing  Goal: Control of acute pain  Description: Control of acute pain  2/4/2021 1159 by Nadeen Stiles LPN  Outcome: Ongoing  2/3/2021 2351 by Leeanna Leon LPN  Outcome: Ongoing  Goal: Control of chronic pain  Description: Control of chronic pain  2/4/2021 1159 by Nadeen Stiles LPN  Outcome: Ongoing  2/3/2021 2351 by Leeanna Leon LPN  Outcome: Ongoing     Problem: Falls - Risk of:  Goal: Will remain free from falls Description: Will remain free from falls  2/4/2021 1159 by Alberto Hawkins LPN  Outcome: Ongoing  2/3/2021 2351 by Fransisco Atkins LPN  Outcome: Ongoing  Goal: Absence of physical injury  Description: Absence of physical injury  2/4/2021 1159 by Alberto Hawkins LPN  Outcome: Ongoing  2/3/2021 2351 by Fransisco Atkins LPN  Outcome: Ongoing     Problem: Skin Integrity:  Goal: Will show no infection signs and symptoms  Description: Will show no infection signs and symptoms  2/4/2021 1159 by Alberto Hawkins LPN  Outcome: Ongoing  2/3/2021 2351 by Fransisco Atkins LPN  Outcome: Ongoing  Goal: Absence of new skin breakdown  Description: Absence of new skin breakdown  2/4/2021 1159 by Alberto Hawkins LPN  Outcome: Ongoing  2/3/2021 2351 by Fransisco Atkins LPN  Outcome: Ongoing     Problem: Bleeding:  Goal: Will show no signs and symptoms of excessive bleeding  Description: Will show no signs and symptoms of excessive bleeding  Outcome: Ongoing     Problem: Serum Glucose Level - Abnormal:  Goal: Ability to maintain appropriate glucose levels has stabilized  Description: Ability to maintain appropriate glucose levels has stabilized  Outcome: Ongoing     Problem: Infection - Surgical Site:  Goal: Will show no infection signs and symptoms  Description: Will show no infection signs and symptoms  2/4/2021 1159 by Alberto Hawkins LPN  Outcome: Ongoing  2/3/2021 2351 by Fransisco Atkins LPN  Outcome: Ongoing

## 2021-02-04 NOTE — PLAN OF CARE
Nutrition Problem #1: Increased nutrient needs, Inadequate oral intake  Intervention: Food and/or Nutrient Delivery: Continue Current Diet, Continue Oral Nutrition Supplement  Nutritional Goals: Pt will consume >50% of meals and ONS and maintain wt.

## 2021-02-04 NOTE — PROGRESS NOTES
[facility]  INPATIENT SPEECH THERAPY  [facility] 8 REHAB UNIT  TIME    [id number]       [x]Daily Note  []Progress Note    Date: 2021  Patient Name: Sugey Barrera        MRN: [id number]    Account #: [de-identified]  : 1933  (80 y.o.)  Gender: female   Primary Provider: Jackie Demarco MD  Precautions: Fall/aspiration   Swallowing Status/Diet: Soft and bite sized with thin liquids    Subjective:   Patient alert and cooperative with all therapy tasks. Patient's daughter was present for family therapy day. Objective:  SLP educated pt and daughter on swallowing techniques they can practice at home for pharyngeal strengthening. Patient completed 10 effortful swallows and 10 [place] during therapy to demonstrate understanding of these exercises. Diet recommendations were also provided based on patient's MBSS results. A minced and moist diet with thin liquids was recommended to be the most appropriate diet for the pt. The effortful swallow was recommended to be used when pt swallows food to help pass the bolus. Pt had difficulty remembering swallowing exercises after she was provided them. Daughter stated she would practice them at home with her and expressed understanding of diet recommendations. Pt had mild difficulty navigating her phone, so SLP assisted her. Pt and daughter discussed the possibility of d/c on Saturday. SHORT TERM GOAL #1:  Goal 1: The patient will demonstrate recall of functional information following a/an (immediate, shortterm,long-term) delay with min cues in order to increase functional integration into environment.     SHORT TERM GOAL #2:  Goal 2: The patient will complete executive function tasks (planning, self-monitoring, organizing, etc) with min verbal cues at 80% accuracy to improve safety awareness with functional ADL's    SHORT TERM GOAL #3: Goal 3: The patient will demonstrate functional problem solving and safety awareness with min verbal cues at 80% accuracy for daily living tasks in order to increase safe interaction with environment and decreased assistance from caregivers. Swallowing Short Term Goals  Short-term Goals  Goal 1: The patient will tolerate a regular diet wtih thin liquids with minimal overt s/s of aspiration. Goal 2: SLP will return to reassess swallow function and insure safety with all oral intake. Goal 3: SLP will complete full speech/language cognitive evaluation. Goal 4: Patient/staff will complete daily oral hygiene to prevent aspriation of oral bacteria. Goal 5: Patient will complete pharyngeal/laryngeal exercises given verbal prompts and written instructions. Goal 6: Patient will verbalize and demonstrate compensatory swallow strategies 100% of opportunitites.          ASSESSMENT:  Assessment: [x]Progressing towards goals          []Not Progressing towards goals    Patient Tolerance of Treatment:   [x]Tolerated well []Tolerated fair []Required rest breaks []Fatigued    Education:  Learner:  [x]Patient          []Significant Other          [x]Other  Education provided regarding:  [x]Goals and POC   [x]Diet and swallowing precautions    []Home Exercise Program  []Progress and/or discharge information  Method of Education:  [x]Discussion          [x]Demonstration          []Handout          []Other  Evaluation of Education:   []Verbalized understanding   []Demonstrates without assistance  [x]Demonstrates with assistance  []Needs further instruction     []No evidence of learning                  []No family present      Plan: [x]Continue with current plan of care    []Modify current plan of care as follows:    []Discharge patient    Discharge Location:    Services/Supervision Recommended: [x]Patient continues to require treatment by a licensed therapist to address functional deficits as outlined in the established plan of care.     Electronically signed by Randolph Durbin, Graduate Clinician on 2/4/21 at 12:52 PM CST

## 2021-02-04 NOTE — PROGRESS NOTES
Occupational Therapy  Facility/Department: [facility] 8 REHAB UNIT  Daily Treatment Note  NAME: Rima Tolliver  : 1933  MRN: [id number]    Date of Service: 2021    Discharge Recommendations:  Continue to assess pending progress       Assessment   Performance deficits / Impairments: Decreased functional mobility ; Decreased strength;Decreased endurance;Decreased balance;Decreased high-level IADLs  Assessment: [facility] therapy day with pt's daughter. Pt required Max A x2 to stand to [employer]. Planned to transfer to bed due to increased dizziness, but transferred pt to bed. Discussed with daughter and pt that pt was not safe to go home at this time, without the daughter having further practice. Daughter stated that that her discharge was going to be up to her mom on what they decided on when she was discharging. Pt requires continued skilled OT to increase pt's independence with ADLs, transfers and to continue caregiver training, so that the pt can go home with assist from her daughter safely. 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 Pt stated that she needed to lie down. Safety Devices  Safety Devices in place: Yes  Type of devices: Bed alarm in place;Call light within reach         Patient Diagnosis(es): There were no encounter diagnoses. has a past medical history of Diabetes mellitus ([facility] [address] Hyperlipidemia, Hypertension, and Palliative care patient. has a past surgical history that includes Appendectomy; Mastectomy, bilateral; Cholecystectomy; and Lumbar spine surgery (N/A, 2021).     Restrictions  Restrictions/Precautions  Restrictions/Precautions: Fall Risk, Weight Bearing, Surgical Protocols, Modified Diet  Required Braces or Orthoses?: Yes Lower Extremity Weight Bearing Restrictions  Right Lower Extremity Weight Bearing: Weight Bearing As Tolerated  Left Lower Extremity Weight Bearing: Weight Bearing As Tolerated  Required Braces or Orthoses  Spinal Other: LSO  Left Lower Extremity Brace: Dee Foot Orthotics  Position Activity Restriction  Spinal Precautions: No Bending, No Lifting, No Twisting  Other position/activity restrictions: AFO in room but has not been sized to patient and it does not fit in current shoe  Subjective   General  Chart Reviewed: Yes  Patient assessed for rehabilitation services?: Yes  Response to previous treatment: Patient with no complaints from previous session  Family / Caregiver Present: No  Diagnosis: L3-L5 decompressive laminectomy  Subjective  Subjective: \"I am really dizzy. \"      Objective       Transfers  Stand Step Transfers: Dependent/Total;2 Person assistance;Maximum assistance(w/c>bed using alejo stedy)  Sit to stand: Maximum assistance;2 Person assistance  Stand to sit: 2 Person assistance;Maximum assistance(Using alejo stedy, Pt's daughter assisted.)                 Plan   Plan  Current Treatment Recommendations: Strengthening, Balance Training, Functional Mobility Training, Endurance Training, Safety Education & Training, Patient/Caregiver Education & Training, Equipment Evaluation, Education, & procurement, Self-Care / ADL, Home Management Training         Goals  Short term goals  Time Frame for Short term goals: 1 week  Short term goal 1: Min A with clothing management/hygiene for toileting. Short term goal 2: Mod A with toilet transfers. Short term goal 3: Mod A with LB dressing, using AE. Short term goal 4: Mod A with donning/doffing socks and shoes using AE. Short term goal 5: Min A with bathing hygiene. Short term goal 6: Min A with donning/doffing LSO. Long term goals  Time Frame for Long term goals : 3-4 weeks  Long term goal 1: Min A with clothing management/hygiene for toileting. Long term goal 2: Min A with toilet transfers. Long term goal 3: Min A with LB dressing, using AE. Long term goal 4: Min A with donning/doffing socks and shoes using AE. Long term goal 5: Supervision with bathing hygiene. Long term goals 6: Patient verbalize DME needs. Long term goal 7: Mod A with donning/doffing LSO. Patient Goals   Patient goals : Increase her independence with ADLs.        Therapy Time   Individual Concurrent Group Co-treatment   Time In 1100         Time Out 1200         Minutes 60         Timed Code Treatment Minutes: [address], OT

## 2021-02-05 LAB
GLUCOSE BLD-MCNC: 132 MG/DL (ref 70–99)
GLUCOSE BLD-MCNC: 165 MG/DL (ref 70–99)
GLUCOSE BLD-MCNC: 289 MG/DL (ref 70–99)
GLUCOSE BLD-MCNC: 90 MG/DL (ref 70–99)
PERFORMED ON: ABNORMAL
PERFORMED ON: NORMAL

## 2021-02-05 PROCEDURE — 82947 ASSAY GLUCOSE BLOOD QUANT: CPT

## 2021-02-05 PROCEDURE — 1180000000 HC REHAB R&B

## 2021-02-05 PROCEDURE — 6370000000 HC RX 637 (ALT 250 FOR IP): Performed by: PSYCHIATRY & NEUROLOGY

## 2021-02-05 PROCEDURE — 97110 THERAPEUTIC EXERCISES: CPT

## 2021-02-05 PROCEDURE — 97530 THERAPEUTIC ACTIVITIES: CPT

## 2021-02-05 PROCEDURE — 6360000002 HC RX W HCPCS: Performed by: NEUROLOGICAL SURGERY

## 2021-02-05 PROCEDURE — 6370000000 HC RX 637 (ALT 250 FOR IP): Performed by: NEUROLOGICAL SURGERY

## 2021-02-05 PROCEDURE — 97129 THER IVNTJ 1ST 15 MIN: CPT

## 2021-02-05 PROCEDURE — 99232 SBSQ HOSP IP/OBS MODERATE 35: CPT | Performed by: PSYCHIATRY & NEUROLOGY

## 2021-02-05 PROCEDURE — 92526 ORAL FUNCTION THERAPY: CPT

## 2021-02-05 PROCEDURE — 97130 THER IVNTJ EA ADDL 15 MIN: CPT

## 2021-02-05 RX ORDER — CASTOR OIL AND BALSAM, PERU 788; 87 MG/G; MG/G
OINTMENT TOPICAL 2 TIMES DAILY
Status: DISCONTINUED | OUTPATIENT
Start: 2021-02-05 | End: 2021-02-17

## 2021-02-05 RX ADMIN — CYANOCOBALAMIN TAB 500 MCG 500 MCG: 500 TAB at 08:45

## 2021-02-05 RX ADMIN — ONDANSETRON 4 MG: 4 TABLET, FILM COATED ORAL at 11:25

## 2021-02-05 RX ADMIN — NITROFURANTOIN MONOHYDRATE/MACROCRYSTALLINE 100 MG: 25; 75 CAPSULE ORAL at 08:46

## 2021-02-05 RX ADMIN — GABAPENTIN 300 MG: 300 CAPSULE ORAL at 20:43

## 2021-02-05 RX ADMIN — LISINOPRIL 5 MG: 5 TABLET ORAL at 08:43

## 2021-02-05 RX ADMIN — Medication: at 14:51

## 2021-02-05 RX ADMIN — SUCRALFATE 1 G: 1 TABLET ORAL at 20:43

## 2021-02-05 RX ADMIN — ONDANSETRON 4 MG: 4 TABLET, FILM COATED ORAL at 06:01

## 2021-02-05 RX ADMIN — ONDANSETRON 4 MG: 4 TABLET, FILM COATED ORAL at 15:29

## 2021-02-05 RX ADMIN — PANTOPRAZOLE SODIUM 40 MG: 40 TABLET, DELAYED RELEASE ORAL at 06:01

## 2021-02-05 RX ADMIN — ROSUVASTATIN CALCIUM 20 MG: 20 TABLET, FILM COATED ORAL at 20:43

## 2021-02-05 RX ADMIN — SUCRALFATE 1 G: 1 TABLET ORAL at 08:45

## 2021-02-05 RX ADMIN — ENOXAPARIN SODIUM 40 MG: 40 INJECTION SUBCUTANEOUS at 15:29

## 2021-02-05 RX ADMIN — SUCRALFATE 1 G: 1 TABLET ORAL at 16:51

## 2021-02-05 RX ADMIN — Medication 10 UNITS: at 20:43

## 2021-02-05 RX ADMIN — SUCRALFATE 1 G: 1 TABLET ORAL at 11:25

## 2021-02-05 RX ADMIN — DILTIAZEM HYDROCHLORIDE 240 MG: 240 CAPSULE, COATED, EXTENDED RELEASE ORAL at 08:46

## 2021-02-05 RX ADMIN — DOCUSATE SODIUM 50 MG AND SENNOSIDES 8.6 MG 1 TABLET: 8.6; 5 TABLET, FILM COATED ORAL at 08:46

## 2021-02-05 RX ADMIN — ACETAMINOPHEN 650 MG: 325 TABLET ORAL at 20:42

## 2021-02-05 RX ADMIN — Medication: at 20:52

## 2021-02-05 ASSESSMENT — PAIN SCALES - GENERAL
PAINLEVEL_OUTOF10: 2
PAINLEVEL_OUTOF10: 4

## 2021-02-05 NOTE — PROGRESS NOTES
[facility]  Nurse  Consult Note       NAME:  [facility] RECORD NUMBER:  [id number]  AGE: 80 y.o. GENDER: female  : 1933  TODAY'S DATE:  2021    Subjective   Reason for Wound Nurse Evaluation and Assessment: Coccyx Pressure Injury      Rima Mckeon is a 80 y.o. female referred by:   [] Physician  [x] Nursing  [] Other:     Wound Identification:  Wound Type: pressure  Contributing Factors: chronic pressure, decreased mobility and shear force    Wound History: Patient admitted with pressure injury to the coccyx  Current Wound Care Treatment:  Venelex, Silicone bordered foam dressing    Patient Goal of Care:  [x] Wound Healing  [] Odor Control  [] Palliative Care  [] Pain Control   [] Other:         PAST MEDICAL HISTORY        Diagnosis Date    Diabetes mellitus ([facility] [address]     Hyperlipidemia     Hypertension     Palliative care patient 2021       PAST SURGICAL HISTORY    Past Surgical History:   Procedure Laterality Date    APPENDECTOMY      CHOLECYSTECTOMY      LUMBAR SPINE SURGERY N/A 2021    LAMINECTOMY L3-4/ L4-5 performed by Lyric Bosch MD at [address], [address]    History reviewed. No pertinent family history. SOCIAL HISTORY    Social History     Tobacco Use    Smoking status: Former Smoker     Types: Cigarettes     Quit date: 1997     Years since quittin.0    Smokeless tobacco: Never Used    Tobacco comment: pt has not smoked in 25 years   Substance Use Topics    Alcohol use: Never     Frequency: Never    Drug use: Never       ALLERGIES    Allergies   Allergen Reactions    Other Other (See Comments)     Molds and oak trees. Sneezing & coughing reaction       MEDICATIONS    No current facility-administered medications on file prior to encounter.       Current Outpatient Medications on File Prior to Encounter   Medication Sig Dispense Refill    rosuvastatin (CRESTOR) 20 MG tablet TAKE 1 TABLET DAILY 90 tablet 3  gabapentin (NEURONTIN) 300 MG capsule Take 1 capsule by mouth nightly for 30 days.  30 capsule 2    dilTIAZem (CARDIZEM CD) 240 MG extended release capsule Take 1 capsule by mouth daily 90 capsule 3    metFORMIN (GLUCOPHAGE-XR) 500 MG extended release tablet TAKE 1 TABLET DAILY WITH BREAKFAST 90 tablet 3    glimepiride (AMARYL) 1 MG tablet TAKE 1 TABLET EVERY MORNING BEFORE BREAKFAST 90 tablet 3    lisinopril (PRINIVIL;ZESTRIL) 5 MG tablet TAKE 1 TABLET DAILY 90 tablet 3    ferrous sulfate (IRON 325) 325 (65 Fe) MG tablet Take 325 mg by mouth daily (with breakfast)      OMEGA-3 FATTY ACIDS PO Take 1 capsule by mouth daily       Cyanocobalamin (VITAMIN B 12) 500 MCG TABS Take 500 mcg by mouth daily       cetirizine (ZYRTEC) 10 MG tablet Take 10 mg by mouth daily      aspirin 81 MG EC tablet Take 81 mg by mouth daily         Objective    /75   Pulse 74   Temp 97.2 °F (36.2 °C) (Temporal)   Resp 16   Ht 5' (1.524 m)   Wt 122 lb 4 oz (55.5 kg)   SpO2 91%   BMI 23.88 kg/m²     LABS:  WBC:    Lab Results   Component Value Date    WBC 7.1 02/04/2021     H/H:    Lab Results   Component Value Date    HGB 11.5 02/04/2021    HCT 36.1 02/04/2021     PTT:    Lab Results   Component Value Date    APTT 25.5 01/06/2021   [APTT}  PT/INR:    Lab Results   Component Value Date    PROTIME 13.2 01/30/2021    INR 1.01 01/30/2021     HgBA1c:    Lab Results   Component Value Date    LABA1C 6.0 12/16/2020       Assessment   Jaylon Risk Score: Jaylon Scale Score: 17    Patient Active Problem List   Diagnosis Code    Lumbar spinal stenosis M48.061    Lumbar stenosis with neurogenic claudication M48.062    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    Type 2 diabetes mellitus without complication, without long-term current use of insulin (HCC) E11.9    Lung mass R91.8    Palliative care patient Z51.5    Dysphagia R13.10    S/P laminectomy Z98.890    Acute midline low back pain without sciatica M54.5    Weakness R53.1  Essential hypertension I10    Anemia D64.9    S/P lumbar laminectomy Z98.890    Neuropathy G62.9    Other hyperlipidemia E78.49    Acute cystitis without hematuria N30.00       Measurements:  Wound 01/30/21 Sacrum Mid stage 2 ulcer coccyx, 1 cm x 1cm (Active)   Wound Image   02/05/21 1117   Wound Etiology Deep tissue/Injury 02/05/21 1117   Dressing Status New dressing applied 02/05/21 1117   Wound Cleansed Soap and water 02/05/21 1117   Dressing/Treatment Silicone border;Pharmaceutical agent (see MAR) 02/05/21 1117   Wound Length (cm) 5 cm 02/05/21 1117   Wound Width (cm) 2.5 cm 02/05/21 1117   Wound Surface Area (cm^2) 12.5 cm^2 02/05/21 1117   Change in Wound Size % (l*w) -1150 02/05/21 1117   Wound Assessment Fibrin;Purple/maroon 02/05/21 1117   Drainage Amount None 02/05/21 1117   Odor None 02/05/21 1117   Radha-wound Assessment Intact 02/05/21 1117   Margins Attached edges 02/05/21 1117   Wound Thickness Description not for Pressure Injury Partial thickness 02/03/21 2345   Number of days: 5     Incision Back Lower;Medial (Active)   Dressing Status Clean;Dry; Intact 02/01/21 2301   Dressing/Treatment Open to air 02/04/21 0754   Closure Other (Comment) 02/04/21 0754   Incision Assessment Dry 02/03/21 2345   Drainage Amount None 02/04/21 0754   Odor None 02/04/21 0754   Radha-incision Assessment Intact 02/03/21 2345   Number of days:          Response to treatment:  Well tolerated by patient.      Pain Assessment:  Severity:  0 / 10  Quality of pain: N/A  Wound Pain Timing/Severity: none  Premedicated: No    Plan   Plan of Care: Wound 01/30/21 Sacrum Mid stage 2 ulcer coccyx, 1 cm x 1cm-Dressing/Treatment: Silicone border, Pharmaceutical agent (see MAR)    Specialty Bed Required : Yes   [] Low Air Loss   [x] Pressure Redistribution  [] Fluid Immersion  [] Bariatric  [] Total Pressure Relief  [] Other:     Current Diet: Dietary Nutrition Supplements: Diabetic Oral Supplement DIET CARB CONTROL; Carb Control: 4 carb choices (60 gms)/meal; Dysphagia Minced and Moist  Dietician consult:  Yes    Discharge Plan:  Placement for patient upon discharge: home with support    Patient appropriate for Outpatient [address] [facility] [address]: Yes    Referrals:  []   [] [address] [employer] [facility]  [] Supplies  [] Other    Patient/Caregiver Teaching:  Level of patient/caregiver understanding able to:   [x] Indicates understanding       [x] Needs reinforcement  [] Unsuccessful      [] Verbal Understanding  [] Demonstrated understanding       [] No evidence of learning  [] Refused teaching         [] N/A       Patient has a deep tissue injury to the coccyx that has an open area at the lower portion of the wound. Patient states that this started at home from sitting for so much. Recommendation is to place on Dolphin mattress, ttrict turns every 2 hours while in bed with no lying flat on back, limit time up in chair, use pressure redistribution cushions while in chair, keep head of bed 30 degrees or less. Venelex and sacral foam dressings.     Electronically signed by   Basilio Ann RN, [facility] 2/5/2021

## 2021-02-05 NOTE — PROGRESS NOTES
Occupational Therapy  Facility/Department: [facility] 8 REHAB UNIT  Daily Treatment Note  NAME: Rima Byrd  : 1933  MRN: [id number]    Date of Service: 2021    Discharge Recommendations:  Continue to assess pending progress       Assessment   Performance deficits / Impairments: Decreased functional mobility ; Decreased strength;Decreased endurance;Decreased balance;Decreased high-level IADLs  Assessment: Per wound care nurse, pt does not need to sit up all day or long periods of time in wheelchair due to Stage II wound on her coccyx. Retrieved Roho cushion for wheelchair. Pt limited by fatigue and pain. She requires Max A for squat pivot transfers with arm rest removed. Pt now on a dauphin mattress. Treatment Diagnosis: L3-4, L4-5 decompressive laminectomy, 2 cm lesion on R cerebellum, lung mass  OT Education: Transfer Training  Activity Tolerance  Activity Tolerance: Patient limited by fatigue  Safety Devices  Safety Devices in place: Yes  Type of devices: Bed alarm in place;Call light within reach         Patient Diagnosis(es): There were no encounter diagnoses. has a past medical history of Diabetes mellitus ([facility] [address] Hyperlipidemia, Hypertension, and Palliative care patient. has a past surgical history that includes Appendectomy; Mastectomy, bilateral; Cholecystectomy; and Lumbar spine surgery (N/A, 2021). Restrictions  Restrictions/Precautions  Restrictions/Precautions: Fall Risk, Weight Bearing, Surgical Protocols, Modified Diet  Required Braces or Orthoses?: Yes  Lower Extremity Weight Bearing Restrictions  Right Lower Extremity Weight Bearing: Weight Bearing As Tolerated  Left Lower Extremity Weight Bearing: Weight Bearing As Tolerated  Required Braces or Orthoses  Spinal Other: LSO  Left Lower Extremity Brace:  Dee Foot Orthotics  Position Activity Restriction  Spinal Precautions: No Bending, No Lifting, No Twisting Other position/activity restrictions: AFO in room but has not been sized to patient and it does not fit in current shoe       Objective    Balance  Sitting Balance: Contact guard assistance  Standing Balance: Maximum assistance     Transfers  Sit Pivot Transfers: Maximum assistance  Transfer Comments: Bed<>w/c with arm rest removed. Plan   Plan  Current Treatment Recommendations: Strengthening, Balance Training, Functional Mobility Training, Endurance Training, Safety Education & Training, Patient/Caregiver Education & Training, Equipment Evaluation, Education, & procurement, Self-Care / ADL, Home Management Training       Goals  Short term goals  Time Frame for Short term goals: 1 week  Short term goal 1: Min A with clothing management/hygiene for toileting. Short term goal 2: Mod A with toilet transfers. Short term goal 3: Mod A with LB dressing, using AE. Short term goal 4: Mod A with donning/doffing socks and shoes using AE. Short term goal 5: Min A with bathing hygiene. Short term goal 6: Min A with donning/doffing LSO. Long term goals  Time Frame for Long term goals : 3-4 weeks  Long term goal 1: Min A with clothing management/hygiene for toileting. Long term goal 2: Min A with toilet transfers. Long term goal 3: Min A with LB dressing, using AE. Long term goal 4: Min A with donning/doffing socks and shoes using AE. Long term goal 5: Supervision with bathing hygiene. Long term goals 6: Patient verbalize DME needs. Long term goal 7: Mod A with donning/doffing LSO. Patient Goals   Patient goals : Increase her independence with ADLs.        Therapy Time   Individual Concurrent Group Co-treatment   Time In 5706         Time Out 1515         Minutes 60         Timed Code Treatment Minutes: 75 Ariel Royal OT

## 2021-02-05 NOTE — PLAN OF CARE
Problem: SAFETY  Goal: Free from accidental physical injury  2/4/2021 2337 by Fritz Peace LPN  Outcome: Ongoing  2/4/2021 1159 by Jed Robertson LPN  Outcome: Ongoing  Goal: Free from intentional harm  2/4/2021 2337 by Fritz Peace LPN  Outcome: Ongoing  2/4/2021 1159 by Jed Robertson LPN  Outcome: Ongoing     Problem: DAILY CARE  Goal: Daily care needs are met  2/4/2021 2337 by Fritz Peace LPN  Outcome: Ongoing  2/4/2021 1159 by Jed Robertson LPN  Outcome: Ongoing     Problem: PAIN  Goal: Patient's pain/discomfort is manageable  2/4/2021 2337 by Fritz Peace LPN  Outcome: Ongoing  2/4/2021 1159 by Jed Robertson LPN  Outcome: Ongoing     Problem: SKIN INTEGRITY  Goal: Skin integrity is maintained or improved  2/4/2021 2337 by Fritz Peace LPN  Outcome: Ongoing  2/4/2021 1159 by Jed Robertson LPN  Outcome: Ongoing     Problem: KNOWLEDGE DEFICIT  Goal: Patient/S.O. demonstrates understanding of disease process, treatment plan, medications, and discharge instructions.   2/4/2021 2337 by Fritz Peace LPN  Outcome: Ongoing  2/4/2021 1159 by Jed Robertson LPN  Outcome: Ongoing     Problem: DISCHARGE BARRIERS  Goal: Patient's continuum of care needs are met  2/4/2021 2337 by Fritz Peace LPN  Outcome: Ongoing  2/4/2021 1159 by Jed Robertson LPN  Outcome: Ongoing     Problem: Pain:  Goal: Pain level will decrease  Description: Pain level will decrease  2/4/2021 2337 by Fritz Peace LPN  Outcome: Ongoing  2/4/2021 1159 by Jed Robertson LPN  Outcome: Ongoing  Goal: Control of acute pain  Description: Control of acute pain  2/4/2021 2337 by Fritz Peace LPN  Outcome: Ongoing  2/4/2021 1159 by Jed Robertson LPN  Outcome: Ongoing  Goal: Control of chronic pain  Description: Control of chronic pain  2/4/2021 2337 by Fritz Peace LPN  Outcome: Ongoing  2/4/2021 1159 by Jed Robertson LPN  Outcome: Ongoing     Problem: Falls - Risk of: Goal: Will remain free from falls  Description: Will remain free from falls  2/4/2021 2337 by Jacqui Lewis LPN  Outcome: Ongoing  2/4/2021 1159 by Renny Valle LPN  Outcome: Ongoing  Goal: Absence of physical injury  Description: Absence of physical injury  2/4/2021 2337 by Jacqui Lewis LPN  Outcome: Ongoing  2/4/2021 1159 by Renny Valle LPN  Outcome: Ongoing     Problem: Skin Integrity:  Goal: Will show no infection signs and symptoms  Description: Will show no infection signs and symptoms  2/4/2021 2337 by Jacqui Lewis LPN  Outcome: Ongoing  2/4/2021 1159 by Renny Valle LPN  Outcome: Ongoing  Goal: Absence of new skin breakdown  Description: Absence of new skin breakdown  2/4/2021 2337 by Jacqui Lewis LPN  Outcome: Ongoing  2/4/2021 1159 by Renny Valle LPN  Outcome: Ongoing     Problem: Nutrition  Goal: Optimal nutrition therapy  Outcome: Ongoing     Problem: Bleeding:  Goal: Will show no signs and symptoms of excessive bleeding  Description: Will show no signs and symptoms of excessive bleeding  2/4/2021 2337 by Jacqui Lewis LPN  Outcome: Ongoing  2/4/2021 1159 by Renny Valle LPN  Outcome: Ongoing     Problem: Serum Glucose Level - Abnormal:  Goal: Ability to maintain appropriate glucose levels has stabilized  Description: Ability to maintain appropriate glucose levels has stabilized  2/4/2021 2337 by Jacqui Lewis LPN  Outcome: Ongoing  2/4/2021 1159 by Renny Valle LPN  Outcome: Ongoing     Problem: Infection - Surgical Site:  Goal: Will show no infection signs and symptoms  Description: Will show no infection signs and symptoms  2/4/2021 2337 by Jacqui Lewis LPN  Outcome: Ongoing  2/4/2021 1159 by Renny Valle LPN  Outcome: Ongoing

## 2021-02-05 NOTE — PLAN OF CARE
Problem: SAFETY  Goal: Free from accidental physical injury  2/5/2021 1205 by Berna Catherine RN  Outcome: Ongoing  2/4/2021 2337 by Aria Vergara LPN  Outcome: Ongoing  Goal: Free from intentional harm  2/5/2021 1205 by Berna Catherine RN  Outcome: Ongoing  2/4/2021 2337 by Aria Vergara LPN  Outcome: Ongoing

## 2021-02-05 NOTE — PROGRESS NOTES
[facility]  INPATIENT SPEECH THERAPY  [facility] 8 REHAB UNIT  TIME   Time In: 1300  Time Out: 1400  Minutes: 60       [x]Daily Note  []Progress Note    Date: 2021  Patient Name: Julieth Humphreys        MRN: [id number]    Account #: [de-identified]  : 1933  (80 y.o.)  Gender: female   Primary Provider: Gareth Mims MD  Precautions: Fall/ASpiration  Swallowing Status/Diet: Minced and Moist/Thin Liquids      Subjective:  Patient was seen in her room sitting in an upright position in her bed. Patient was alert and cooperative throughout all therapeutic activities. Upon arrival, when asked how she was feeling, patient reported that she did not feel well due to multiple bowel movements. She stated that she was on a bed pan and needed to get off. Clinician called for a nurse to help assist.     Objective:   Patient completed pharyngeal strengthening exercises in order to improve laryngeal elevation for swallowing functions. Patient did not require any cues in order to follow clinician's instructions. She completed 8 Kathy exercises this date. Patient was able to recall her swallowing precautions independently. Patient continues to report decreased appetite. Patient reports continued difficulty with swallow even when she utilizes swallow precautions. Patient completed an abstract reasoning task with functional picture cards. Patient had to compare and contrast functional items as well as provide the correct category given 4 different pictures. She independently earned a 90% accuracy. Patient independently completed a functional reading task with an 87%. She was given different tv programs and asked to answer related questions in order to target problem solving and working memory. Recommended patient be upgraded to meds whole in applesauce one at a time if able to tolerate. Patient dislikes crushed meds.       Recommended Diet and Intervention Diet Solids Recommendation: Dysphagia minced and moist (Dysphagia II)  Liquid Consistency Recommendation: Thin  Recommended Form of Meds: Whole in applesauce/pudding 1 at a time. Therapeutic Interventions: Oral motor exercises; Tongue base strengthening;Patient/Family education;Effortful swallow;Kathy; Laryngeal exercises; Therapeutic PO trials with SLP;Oral care;Diet tolerance monitoring     Compensatory Swallowing Strategies  Compensatory Swallowing Strategies: No straws;Upright as possible for all oral intake;Remain upright for 30-45 minutes after meals    SHORT TERM GOAL #1:  Goal 1: The patient will demonstrate recall of functional information following a/an (immediate, shortterm,long-term) delay with min cues in order to increase functional integration into environment. SHORT TERM GOAL #2:  Goal 2: The patient will complete executive function tasks (planning, self-monitoring, organizing, etc) with min verbal cues at 80% accuracy to improve safety awareness with functional ADL's    SHORT TERM GOAL #3:  Goal 3: The patient will demonstrate functional problem solving and safety awareness with min verbal cues at 80% accuracy for daily living tasks in order to increase safe interaction with environment and decreased assistance from caregivers. Swallowing Short Term Goals  Short-term Goals  Goal 1: The patient will tolerate a regular diet wtih thin liquids with minimal overt s/s of aspiration. Goal 2: SLP will return to reassess swallow function and insure safety with all oral intake. Goal 3: SLP will complete full speech/language cognitive evaluation. Goal 4: Patient/staff will complete daily oral hygiene to prevent aspriation of oral bacteria. Goal 5: Patient will complete pharyngeal/laryngeal exercises given verbal prompts and written instructions. Goal 6: Patient will verbalize and demonstrate compensatory swallow strategies 100% of opportunitites.          ASSESSMENT: Assessment: [x]Progressing towards goals          []Not Progressing towards goals    Patient Tolerance of Treatment:   [x]Tolerated well []Tolerated fair []Required rest breaks []Fatigued    Education:  Learner:  [x]Patient          []Significant Other          []Other  Education provided regarding:  [x]Goals and POC   [x]Diet and swallowing precautions    []Home Exercise Program  []Progress and/or discharge information  Method of Education:  [x]Discussion          [x]Demonstration          []Handout          []Other  Evaluation of Education:   [x]Verbalized understanding   []Demonstrates without assistance  [x]Demonstrates with assistance  []Needs further instruction     []No evidence of learning                  []No family present      Plan: [x]Continue with current plan of care    []Modify current plan of care as follows:    []Discharge patient    Discharge Location:    Services/Supervision Recommended:      [x]Patient continues to require treatment by a licensed therapist to address functional deficits as outlined in the established plan of care.       Electronically signed by Marycruz Tucker, Graduate Clinician, on 2/5/2021 at 2:38 PM

## 2021-02-05 NOTE — PROGRESS NOTES
02/05/21 0900   Restrictions/Precautions   Restrictions/Precautions Fall Risk;Weight Bearing;Surgical Protocols; Modified Diet   Required Braces or Orthoses? Yes   Lower Extremity Weight Bearing Restrictions   Right Lower Extremity Weight Bearing Weight Bearing As Tolerated   Left Lower Extremity Weight Bearing Weight Bearing As Tolerated   Required Braces or Orthoses   Spinal Other LSO   Position Activity Restriction   Spinal Precautions No Bending; No Lifting; No Twisting   General   Additional Pertinent Hx HTN; HYPERLIPIDEMIA; TYPE 2 DM; GERD; L FOOT DROP; RECENTLY FOUND LUNG MASS   Diagnosis S/P L3-4 AND L4-5 DECOMPRESSIVE LAMINECTOMY ON 1/26/2021   General Comment   Comments PT MORE ALERT SITTING UP IN BED AND TALKING MORE W/ DAUGHTER   Bed Mobility   Rolling Modified independent;Stand by assistance  (ROLLING L AND R FOR CLEAN-UP)   Supine to Sit Moderate assistance;Maximal assistance  (TO LEFT SIDE OF BED)   Scooting Contact guard assistance;Minimal assistance   Transfers   Sit to Stand Moderate Assistance  (W/ RODOLFO STEADY AND IN II BARS)   Stand to sit Minimal Assistance   Bed to Chair Dependent/Total  (RODOLFO STEADY TO W/C FROM BED)   Ambulation   Ambulation? No   WB Status WBAT BILATERAL LE    Other exercises   Other exercises 1 2 STAND SIN II BARS W/ MOD A X1, 1MIN STAND FIRST TIME - PT ABLE TO LOCK KNEES WITH VC; 2ND STAND 30 SEC   Conditions Requiring Skilled Therapeutic Intervention   Assessment IMPROVED SIT TO STAND TF REQUIRING MOD A X1. RODOLFO STEADY TF FROM BED TO W/C. PT MORE ALERT AND PARTICIPATORY COMPARED TO PREVIOUS SESSIONS. Activity Tolerance   Activity Tolerance Patient limited by fatigue;Patient limited by endurance; Patient limited by pain   Safety Devices   Type of devices Chair alarm in place; Left in chair;Patient at risk for falls;Call light within reach

## 2021-02-06 LAB
GLUCOSE BLD-MCNC: 100 MG/DL (ref 70–99)
GLUCOSE BLD-MCNC: 104 MG/DL (ref 70–99)
GLUCOSE BLD-MCNC: 191 MG/DL (ref 70–99)
GLUCOSE BLD-MCNC: 194 MG/DL (ref 70–99)
PERFORMED ON: ABNORMAL

## 2021-02-06 PROCEDURE — 6360000002 HC RX W HCPCS: Performed by: NEUROLOGICAL SURGERY

## 2021-02-06 PROCEDURE — 99232 SBSQ HOSP IP/OBS MODERATE 35: CPT | Performed by: PSYCHIATRY & NEUROLOGY

## 2021-02-06 PROCEDURE — 6370000000 HC RX 637 (ALT 250 FOR IP): Performed by: PSYCHIATRY & NEUROLOGY

## 2021-02-06 PROCEDURE — 6370000000 HC RX 637 (ALT 250 FOR IP): Performed by: NEUROLOGICAL SURGERY

## 2021-02-06 PROCEDURE — 82947 ASSAY GLUCOSE BLOOD QUANT: CPT

## 2021-02-06 PROCEDURE — 1180000000 HC REHAB R&B

## 2021-02-06 RX ADMIN — DILTIAZEM HYDROCHLORIDE 240 MG: 240 CAPSULE, COATED, EXTENDED RELEASE ORAL at 09:37

## 2021-02-06 RX ADMIN — GABAPENTIN 300 MG: 300 CAPSULE ORAL at 20:47

## 2021-02-06 RX ADMIN — CETIRIZINE HYDROCHLORIDE 10 MG: 10 TABLET, FILM COATED ORAL at 09:38

## 2021-02-06 RX ADMIN — INSULIN LISPRO 4 UNITS: 100 INJECTION, SOLUTION INTRAVENOUS; SUBCUTANEOUS at 12:29

## 2021-02-06 RX ADMIN — ACETAMINOPHEN 650 MG: 325 TABLET ORAL at 09:55

## 2021-02-06 RX ADMIN — SUCRALFATE 1 G: 1 TABLET ORAL at 09:37

## 2021-02-06 RX ADMIN — Medication: at 21:23

## 2021-02-06 RX ADMIN — CYANOCOBALAMIN TAB 500 MCG 500 MCG: 500 TAB at 09:36

## 2021-02-06 RX ADMIN — Medication 10 UNITS: at 20:48

## 2021-02-06 RX ADMIN — FERROUS SULFATE TAB 325 MG (65 MG ELEMENTAL FE) 325 MG: 325 (65 FE) TAB at 09:38

## 2021-02-06 RX ADMIN — ACETAMINOPHEN 650 MG: 325 TABLET ORAL at 16:54

## 2021-02-06 RX ADMIN — ROSUVASTATIN CALCIUM 20 MG: 20 TABLET, FILM COATED ORAL at 20:47

## 2021-02-06 RX ADMIN — PANTOPRAZOLE SODIUM 40 MG: 40 TABLET, DELAYED RELEASE ORAL at 06:19

## 2021-02-06 RX ADMIN — ACETAMINOPHEN 650 MG: 325 TABLET ORAL at 21:21

## 2021-02-06 RX ADMIN — ENOXAPARIN SODIUM 40 MG: 40 INJECTION SUBCUTANEOUS at 16:51

## 2021-02-06 RX ADMIN — CYANOCOBALAMIN TAB 500 MCG 500 MCG: 500 TAB at 09:38

## 2021-02-06 RX ADMIN — LISINOPRIL 5 MG: 5 TABLET ORAL at 09:38

## 2021-02-06 RX ADMIN — Medication: at 10:37

## 2021-02-06 RX ADMIN — ONDANSETRON 4 MG: 4 TABLET, FILM COATED ORAL at 06:19

## 2021-02-06 RX ADMIN — DOCUSATE SODIUM 50 MG AND SENNOSIDES 8.6 MG 1 TABLET: 8.6; 5 TABLET, FILM COATED ORAL at 09:35

## 2021-02-06 RX ADMIN — INSULIN LISPRO 2 UNITS: 100 INJECTION, SOLUTION INTRAVENOUS; SUBCUTANEOUS at 12:28

## 2021-02-06 ASSESSMENT — PAIN SCALES - GENERAL: PAINLEVEL_OUTOF10: 4

## 2021-02-06 NOTE — H&P
[id number] [facility] Neurosurgery  History and Physical                  Chief Complaint   Patient presents with    Back pain, difficulty walking               HISTORY OF PRESENT ILLNESS:       The patient is a 80 y.o. female who presented for neurosurgical evaluation of leg pain and weakness, difficulty walking and back pain. This was getting worse for some time. She had great difficulty walking without a walker and had significant difficulty lifting her left leg. Her left leg was numb as well. She has some difficulty with her right leg as well, but her left leg was far more symptomatic. She attempted home health physical therapy but she had difficulty completing the exercises due to pain and weakness. She denies any bowel or bladder incontinence. She had an MRI that showed significant multilevel spinal stenosis and I offered her L3-5 laminectomy. During her preoperative workup, a pulmonary mass was found on CXR that has been further evaluated by CT and she has seen  [facility] with pulmonology and a bronchoscopy and biopsy is planned, however she remained quite debilitated from her spinal stenosis and all providers agreed that she would benefit from the proposed surgery to improve functional mobility and activity tolerance before considering next steps for her lung mass. She has requested that we proceed with surgery.        MEDICAL HISTORY:                  Past Medical History:   Diagnosis Date    Diabetes mellitus ([facility] [address]      Hyperlipidemia      Hypertension                 Past Surgical History:   Procedure Laterality Date    APPENDECTOMY        GALLBLADDER SURGERY        MASTECTOMY, BILATERAL                Current Outpatient Medications:     polyethylene glycol (GLYCOLAX) 17 GM/SCOOP powder, Take 17 g by mouth daily as needed (constipation), Disp: 510 g, Rfl: 0    gabapentin (NEURONTIN) 300 MG capsule, Take 1 capsule by mouth nightly for 30 days. , Disp: 30 capsule, Rfl: 2   dilTIAZem (CARDIZEM CD) 240 MG extended release capsule, Take 1 capsule by mouth daily, Disp: 90 capsule, Rfl: 3    metFORMIN (GLUCOPHAGE-XR) 500 MG extended release tablet, TAKE 1 TABLET DAILY WITH BREAKFAST, Disp: 90 tablet, Rfl: 3    glimepiride (AMARYL) 1 MG tablet, TAKE 1 TABLET EVERY MORNING BEFORE BREAKFAST, Disp: 90 tablet, Rfl: 3    lisinopril (PRINIVIL;ZESTRIL) 5 MG tablet, TAKE 1 TABLET DAILY, Disp: 90 tablet, Rfl: 3    rosuvastatin (CRESTOR) 20 MG tablet, Take 1 tablet by mouth daily, Disp: 90 tablet, Rfl: 1    ferrous sulfate (IRON 325) 325 (65 Fe) MG tablet, Take 325 mg by mouth daily (with breakfast), Disp: , Rfl:     OMEGA-3 FATTY ACIDS PO, Take by mouth, Disp: , Rfl:     aspirin 325 MG EC tablet, Take 325 mg by mouth every 6 hours as needed, Disp: , Rfl:     Iron Combinations (IRON COMPLEX PO), Take by mouth, Disp: , Rfl:     Cyanocobalamin (VITAMIN B 12) 500 MCG TABS, Take 500 mcg by mouth daily , Disp: , Rfl:     cetirizine (ZYRTEC) 10 MG tablet, Take 10 mg by mouth daily, Disp: , Rfl:      Allergies: Other     Social History:   Social History           Tobacco Use   Smoking Status Former Smoker    Types: Cigarettes   Smokeless Tobacco Never Used   Tobacco Comment     pt has not smoked in 25 years      Social History           Substance and Sexual Activity   Alcohol Use Never    Frequency: Never            Family History:   History reviewed. No pertinent family history.     REVIEW OF SYSTEMS:     Constitutional: Negative.    HENT: Negative.    Eyes: Negative.    Respiratory: Negative.    Cardiovascular: Negative.    Gastrointestinal: Negative.    Genitourinary: Negative.    Musculoskeletal: Positive for back pain. Skin: Negative.    Neurological: Negative.    Endo/Heme/Allergies: Negative.    Psychiatric/Behavioral: Negative.            PHYSICAL EXAM:       Constitutional: Appears well-developed and well-nourished.    Eyes  conjunctiva normal.  Pupils react to light Ear, nose,throat -Normal pinna and tragus, No scars, or lesions over external nose or ears, no obvious atrophy of tongue  Neck- symmetric, trachea midline, no jugular vein distension  Respiration- chest wall symmetric, normal effort without use of accessory muscles  Musculoskeletal  no significant wasting of muscles noted, no bony deformities, symmetric bulk  Extremities- no clubbing, cyanosis or edema  Skin  warm, dry, and intact. No rash,erythema, or pallor.   Psychiatric  mood, affect, and behavior appear normal.         NEUROLOGIC EXAM:     MENTAL STATUS: Alert, oriented, thought content appropriate     CRANIAL NERVES: PERRL, EOMI, symmetric facies, tongue midline     MOTOR:      Right Upper Extremity:     Deltoid: 5/5  Biceps: 5/5  Triceps: 5/5  Wrist Extension: 5/5  Finger Abduction: 5/5     Left Upper Extremity:     Deltoid: 5/5  Biceps: 5/5  Triceps: 5/5  Wrist Extension: 5/5  Finger Abduction: 5/5     Right Lower Extremity:     Hip Flexion: 5/5  Knee Extension: 5/5  Ankle Plantarflexion: 5/5  Ankle Dorsiflexion: 5/5        Left Lower Extremity:     Hip Flexion: 4/5  Knee Extension: 4/5  Ankle Plantarflexion: 4/5  Ankle Dorsiflexion: 2/5  EHL: 2/5        SOMATOSENSORY:      Right Upper Extremity: normal light touch sensation  Left Upper Extremity: normal light touch sensation  Right Lower Extremity: normal light touch sensation  Left Lower Extremity: decreased to light touch throughout        REFLEXES:     Biceps: 2+  Patella: 2+        Rogers's: Negative        COORDINATION and GAIT:     Gait: Unsteady with stooped posture, L foot drop        IMAGING:     My interpretation of imaging studies: X-rays of the lumbar spine show moderate levoscoliosis with degenerative facet arthropathy and spondylosis most prominent from L2/3 - L5/S1    MRI of the lumbar spine reviewed. There is severe spinal stenosis, L>R at L3/4 and L4/5 with disc/osteophyte protrusions and facet arthropathy. There is degenerative disc disease at L5/S1 but the central canal and lateral recesses are relatively patent.        ASSESSMENT AND PLAN:  This is a 80 y.o. female who presented for neurosurgical evaluation of difficulty walking, left leg weakness and back pain. She has impressive spinal stenosis at L3/4 and L4/5 that is likely contributing to her symptoms. 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 We will proceed with L3-5 laminectomy. She has provided consent after a full PARQ discussion.

## 2021-02-06 NOTE — PLAN OF CARE
Problem: SAFETY  Goal: Free from accidental physical injury  2/6/2021 0008 by Brianne Munguia LPN  Outcome: Ongoing  2/5/2021 1205 by Thai Soliman RN  Outcome: Ongoing  Goal: Free from intentional harm  2/6/2021 0008 by Brianne Munguia LPN  Outcome: Ongoing  2/5/2021 1205 by Thai Soliman RN  Outcome: Ongoing     Problem: DAILY CARE  Goal: Daily care needs are met  2/6/2021 0008 by Brianne Munguia LPN  Outcome: Ongoing  2/5/2021 1205 by Thai Soliman RN  Outcome: Ongoing     Problem: PAIN  Goal: Patient's pain/discomfort is manageable  2/6/2021 0008 by Brianne Munguia LPN  Outcome: Ongoing  2/5/2021 1205 by Thai Soliman RN  Outcome: Ongoing     Problem: SKIN INTEGRITY  Goal: Skin integrity is maintained or improved  2/6/2021 0008 by Brianne Munguia LPN  Outcome: Ongoing  2/5/2021 1205 by Thai Soliman RN  Outcome: Ongoing     Problem: KNOWLEDGE DEFICIT  Goal: Patient/S.O. demonstrates understanding of disease process, treatment plan, medications, and discharge instructions.   2/6/2021 0008 by Brianne Munguia LPN  Outcome: Ongoing  2/5/2021 1205 by Thai Soliman RN  Outcome: Ongoing     Problem: DISCHARGE BARRIERS  Goal: Patient's continuum of care needs are met  2/6/2021 0008 by Brianne Munguia LPN  Outcome: Ongoing  2/5/2021 1205 by Thai Soliman RN  Outcome: Ongoing     Problem: Pain:  Goal: Pain level will decrease  Description: Pain level will decrease  2/6/2021 0008 by Brianne Munguia LPN  Outcome: Ongoing  2/5/2021 1205 by Thai Soliman RN  Outcome: Ongoing  Goal: Control of acute pain  Description: Control of acute pain  2/6/2021 0008 by Brianne Munguia LPN  Outcome: Ongoing  2/5/2021 1205 by Thai Soliman RN  Outcome: Ongoing  Goal: Control of chronic pain  Description: Control of chronic pain  2/6/2021 0008 by Brianne Munguia LPN  Outcome: Ongoing  2/5/2021 1205 by Thai Soliman RN  Outcome: Ongoing     Problem: Falls - Risk of:  Goal: Will remain free from falls Description: Will remain free from falls  2/6/2021 0008 by Aniket Ashton LPN  Outcome: Ongoing  2/5/2021 1205 by Charles Acuña RN  Outcome: Ongoing  Goal: Absence of physical injury  Description: Absence of physical injury  2/6/2021 0008 by Aniket Ashton LPN  Outcome: Ongoing  2/5/2021 1205 by Charles Acuña RN  Outcome: Ongoing     Problem: Skin Integrity:  Goal: Will show no infection signs and symptoms  Description: Will show no infection signs and symptoms  2/6/2021 0008 by Aniket Ashton LPN  Outcome: Ongoing  2/5/2021 1205 by Charles Acuña RN  Outcome: Ongoing  Goal: Absence of new skin breakdown  Description: Absence of new skin breakdown  2/6/2021 0008 by Aniket Ashton LPN  Outcome: Ongoing  2/5/2021 1205 by Charles Acuña RN  Outcome: Ongoing     Problem: Nutrition  Goal: Optimal nutrition therapy  2/6/2021 0008 by Aniket Ashton LPN  Outcome: Ongoing  2/5/2021 1205 by Charles Acuña RN  Outcome: Ongoing     Problem: Bleeding:  Goal: Will show no signs and symptoms of excessive bleeding  Description: Will show no signs and symptoms of excessive bleeding  2/6/2021 0008 by Aniket Ashton LPN  Outcome: Ongoing  2/5/2021 1205 by Charles Acuña RN  Outcome: Ongoing     Problem: Serum Glucose Level - Abnormal:  Goal: Ability to maintain appropriate glucose levels has stabilized  Description: Ability to maintain appropriate glucose levels has stabilized  2/6/2021 0008 by Aniket Ashton LPN  Outcome: Ongoing  2/5/2021 1205 by Charles Acuña RN  Outcome: Ongoing     Problem: Infection - Surgical Site:  Goal: Will show no infection signs and symptoms  Description: Will show no infection signs and symptoms  2/6/2021 0008 by Aniket Ashton LPN  Outcome: Ongoing  2/5/2021 1205 by Charles Acuña RN  Outcome: Ongoing

## 2021-02-06 NOTE — PROGRESS NOTES
S:This 80 y.o. female  with history of diabetes mellitus, hyperlipidemia,known lung mass and HTN. Since Dec. 2020 patient has had difficulty walking, left foot drop and back pain. It had worsened to the point that she hasn't been able to walk for the past couple of weakness or care for herself. Her daughter came in December to stay with her. Prior to this patient lived at home independently. She had an MRI done as outpatient that revealed severe spinal stenosis at L3/4 and L4/5. She was referred to neurosurgeon Dr. Scarlet Gaviria, who recommended L3/4 & L4/5 decompressive laminectomy. She was in agreement and was admitted to [facility] on 1/26/21 for surgery. She tolerated the procedure well. She will be required to wear a TLSO brace when out of bed and follow spine precautions. She continues to have significant lower extremity weakness, worse on the left. In regards to her known lung mass, patient has been seen prior to admit by pulmonologist Dr. Marii Corral who plans to a bronchoscopy with biopsy at some point. He had recommended her getting her back surgery and then rehab to get her stronger prior to having the procedure done. Dr. Scarlet Gaviria ordered a MRI of the head d/t her persistent lower extremity weakness and known lung mass. 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 Dr. Scarlet Gaviria felt this would explain balance difficulty but now her LE weakness. Oncology and Palliative Care have been consulted. Patient at this point is stating she doesn't want any chemo or radiation. She is participating in both PT/OT. She is felt to need a stay on Rehab to work towards her goal of returning home with her daughter.  No new issues.       REVIEW OF SYSTEMS:  Constitutional: No fevers No chills  Neck:No stiffness  Respiratory: No shortness of breath  Cardiovascular: No chest pain No palpitations  Gastrointestinal: No abdominal pain    Genitourinary: No Dysuria  Neurological: No headache, no confusion    Past Medical History: Diagnosis Date    Diabetes mellitus ([facility] [address]     Hyperlipidemia     Hypertension     Palliative care patient 01/29/2021       Past Surgical History:      Procedure Laterality Date    APPENDECTOMY      CHOLECYSTECTOMY      LUMBAR SPINE SURGERY N/A 1/26/2021    LAMINECTOMY L3-4/ L4-5 performed by Yamilka Gongora MD at [address], BILATERAL         Medications in Hospital:      Current Facility-Administered Medications:     Venelex ointment, , Topical, BID, Becki Fitzgerald MD, Given at 02/06/21 1037    sucralfate (CARAFATE) tablet 1 g, 1 g, Oral, 4x Daily WC, Becki Fitzgerald MD, 1 g at 02/06/21 0937    ondansetron (ZOFRAN) tablet 4 mg, 4 mg, Oral, TID AC, Becki Fitzgerald MD, 4 mg at 02/06/21 0619    HYDROcodone-acetaminophen (NORCO)  MG per tablet 1 tablet, 1 tablet, Oral, Q4H PRN, Becki Fitzgerald MD, 1 tablet at 02/02/21 2040    simethicone (MYLICON) chewable tablet 80 mg, 80 mg, Oral, Q6H PRN, Yamilka Gongora MD, 80 mg at 02/01/21 0738    bisacodyl (DULCOLAX) suppository 10 mg, 10 mg, Rectal, Daily PRN, Yamilka Gongora MD    ondansetron (ZOFRAN-ODT) disintegrating tablet 4 mg, 4 mg, Oral, Q8H PRN, 4 mg at 02/01/21 1516 **OR** ondansetron (ZOFRAN) injection 4 mg, 4 mg, Intravenous, Q6H PRN, Yamilka Gongora MD    sennosides-docusate sodium (SENOKOT-S) 8.6-50 MG tablet 1 tablet, 1 tablet, Oral, BID, Yamilka Gongora MD, 1 tablet at 02/06/21 0935    insulin lispro (HUMALOG) injection vial 4 Units, 0.08 Units/kg, Subcutaneous, TID , Yamilka Gongora MD    insulin lispro (HUMALOG) injection vial 0-12 Units, 0-12 Units, Subcutaneous, TID , Yamilka Gongora MD    insulin lispro (HUMALOG) injection vial 0-6 Units, 0-6 Units, Subcutaneous, Nightly, Yamilka Gongora MD, 1 Units at 02/03/21 2054    cetirizine (ZYRTEC) tablet 10 mg, 10 mg, Oral, Daily, Yamilka Gongora MD, 10 mg at 02/06/21 4457   cyclobenzaprine (FLEXERIL) tablet 10 mg, 10 mg, Oral, TID PRN, Alex Barlow MD, 10 mg at 02/03/21 2050    dextrose 5 % solution, 100 mL/hr, Intravenous, PRN, Alex Barlow MD    dextrose 50 % IV solution, 12.5 g, Intravenous, PRN, Alex Barlow MD    dilTIAZem (CARDIZEM CD) extended release capsule 240 mg, 240 mg, Oral, Daily, Alex Barlow MD, 240 mg at 02/06/21 9112    enoxaparin (LOVENOX) injection 40 mg, 40 mg, Subcutaneous, Daily, Alex Barlow MD, 40 mg at 02/05/21 1529    ferrous sulfate (IRON 325) tablet 325 mg, 325 mg, Oral, Daily with breakfast, Alex Barlow MD, 325 mg at 02/06/21 4453    gabapentin (NEURONTIN) capsule 300 mg, 300 mg, Oral, Nightly, Alex Barlow MD, 300 mg at 02/05/21 2043    glucagon (rDNA) injection 1 mg, 1 mg, Intramuscular, PRN, Alex Barlow MD    glucose (GLUTOSE) 40 % oral gel 15 g, 15 g, Oral, PRN, Alex Barlow MD    insulin glargine (LANTUS) injection vial 10 Units, 10 Units, Subcutaneous, Nightly, Alex Barlow MD, 10 Units at 02/05/21 2043    lisinopril (PRINIVIL;ZESTRIL) tablet 5 mg, 5 mg, Oral, Daily, Alex Barlow MD, 5 mg at 02/06/21 0938    pantoprazole (PROTONIX) tablet 40 mg, 40 mg, Oral, QAM AC, Alex Barlow MD, 40 mg at 02/06/21 9441    rosuvastatin (CRESTOR) tablet 20 mg, 20 mg, Oral, Nightly, Alex Barlow MD, 20 mg at 02/05/21 2043    vitamin B-12 (CYANOCOBALAMIN) tablet 500 mcg, 500 mcg, Oral, Daily, Alex Barlow MD, 500 mcg at 02/06/21 7645    acetaminophen (TYLENOL) tablet 650 mg, 650 mg, Oral, Q4H PRN, Kade Servin MD, 650 mg at 02/06/21 0955    polyethylene glycol (GLYCOLAX) packet 17 g, 17 g, Oral, Daily PRN, Kade Servin MD    Allergies: Other    Social History:   TOBACCO:   reports that she quit smoking about 24 years ago. Her smoking use included cigarettes. She has never used smokeless tobacco.  ETOH:   reports no history of alcohol use.     Family History: History reviewed. No pertinent family history. PHYSICAL EXAM:  BP (!) 143/73   Pulse 72   Temp 98 °F (36.7 °C) (Temporal)   Resp 16   Ht 5' (1.524 m)   Wt 121 lb 3.2 oz (55 kg)   SpO2 93%   BMI 23.67 kg/m²       Constitutional  well developed, well nourished. Eyes  conjunctiva normal.   Ear, nose, throat - No scars, masses, or lesions over external nose or ears, no atrophy of tongue  Neck-symmetric, no masses noted, no jugular vein distension  Respiration- chest wall appears symmetric, good expansion,   normal effort without use of accessory muscles  Musculoskeletal  no significant wasting of muscles noted, no bony deformities  Extremities-no clubbing, cyanosis or edema  Skin  warm, dry, and intact. No rash, erythema, or pallor. Psychiatric  mood, affect, and behavior appear normal.      Neurological exam  Awake, alert, fluent oriented slow  Attention and concentration appear appropriate  Recent and remote memory appears unremarkable  Speech normal without dysarthria  No clear issues with language of fund of knowledge     Cranial Nerve Exam     CN III, IV,VI-EOMI, No nystagmus, conjugate eye movements, no ptosis    CN VII-no facial assymetry       Motor Exam  antigravity throughout upper and lower extremities bilaterally      Tremors- no tremors in hands or head noted     Gait  Not tested      Nursing/pcp notes, imaging,labs and vitals reviewed.      PT,OT and/or speech notes reviewed    Lab Results   Component Value Date    WBC 7.1 02/04/2021    HGB 11.5 (L) 02/04/2021    HCT 36.1 (L) 02/04/2021    MCV 91.4 02/04/2021     02/04/2021     Lab Results   Component Value Date     02/04/2021    K 3.7 02/04/2021    CL 96 (L) 02/04/2021    CO2 33 (H) 02/04/2021    BUN 14 02/04/2021    CREATININE 0.6 02/04/2021    GLUCOSE 73 (L) 02/04/2021    CALCIUM 9.3 02/04/2021    PROT 5.1 (L) 02/04/2021    LABALBU 3.3 (L) 02/04/2021    BILITOT 0.3 02/04/2021    ALKPHOS 80 02/04/2021 AST 24 02/04/2021    ALT 12 02/04/2021    LABGLOM >60 02/04/2021    GFRAA >59 02/04/2021     Lab Results   Component Value Date    INR 1.01 01/30/2021    INR 0.93 01/06/2021    PROTIME 13.2 01/30/2021    PROTIME 12.3 01/06/2021       CT SOFT TISSUE NECK W WO CONTRAST [[id number]]    Resulted: 02/03/21 1702    Updated: 02/03/21 1704    Narrative:     Examination. CT SOFT TISSUE NECK W WO CONTRAST 2/3/2021 3:15 PM   History: Swallowing difficulty. DLP: 1213 mGycm. The CT scan of the soft tissues of the neck is performed after   intravenous contrast enhancement. The images are acquired in axial   plane with subsequent reconstruction in coronal and sagittal plane. There is no previous study for comparison. The limited included brain is unremarkable. The visualized   intracranial vessels are unremarkable. There is moderate diffuse   cerebral atrophy. The orbits appear unremarkable. There is a mucous retention cyst in   the right maxillary antrum. The nasopharyngeal soft tissues are normal. The parapharyngeal spaces   are normal.   The left parotid gland is atrophic which may be due to previous   inflammation or surgery? . A normal right parotid gland is seen. Submandibular glands bilaterally are normal.   The oropharyngeal soft tissues are poorly evaluated due to extensive   artifacts from the dental hardware. No discrete mass is seen. No   abnormal enhancement. There is no evidence of superficial or deep cervical lymphadenopathy. The vallecula, the epiglottis, the piriform sinuses, the false and   true vocal cords are normal. The thyroid gland appears normal.   Atheromatous plaques are seen in the common and internal carotid   arteries bilaterally. There is high-grade stenosis of the proximal   left internal carotid artery. There is a 50% stenosis of the proximal   right internal carotid artery.    There are moderate chronic degenerative changes of the cervical spine predominantly at C5, C6 and C7. Prevertebral soft tissues are normal.   Impression:     No evidence of mass or lymphadenopathy. An atrophic left parotid gland. The etiology is not certain. Atheromatous changes of the carotid arteries bilaterally and a   high-grade stenosis of the left proximal internal carotid artery. Right maxillary sinusitis. Cerebral atrophy. Signed by Dr Cece Jerez on 2/3/2021 5:02 PM     Burgess Vera   Student Physical Therapist   Physical Therapy   Progress Notes   Signed   Date of Service:  2/5/2021 10:00 AM               Signed             Show:Clear all  []Manual[x]Template[]Copied    Added by:  Jenny Iglesias    []Leisa for details       02/05/21 0900   Restrictions/Precautions   Restrictions/Precautions Fall Risk;Weight Bearing;Surgical Protocols; Modified Diet   Required Braces or Orthoses? Yes   Lower Extremity Weight Bearing Restrictions   Right Lower Extremity Weight Bearing Weight Bearing As Tolerated   Left Lower Extremity Weight Bearing Weight Bearing As Tolerated   Required Braces or Orthoses   Spinal Other LSO   Position Activity Restriction   Spinal Precautions No Bending; No Lifting; No Twisting   General   Additional Pertinent Hx HTN; HYPERLIPIDEMIA; TYPE 2 DM; GERD; L FOOT DROP; RECENTLY FOUND LUNG MASS   Diagnosis S/P L3-4 AND L4-5 DECOMPRESSIVE LAMINECTOMY ON 1/26/2021   General Comment   Comments PT MORE ALERT SITTING UP IN BED AND TALKING MORE W/ DAUGHTER   Bed Mobility   Rolling Modified independent;Stand by assistance  (ROLLING L AND R FOR CLEAN-UP)   Supine to Sit Moderate assistance;Maximal assistance  (TO LEFT SIDE OF BED)   Scooting Contact guard assistance;Minimal assistance   Transfers   Sit to Stand Moderate Assistance  (W/ RODOLFO STEADY AND IN II BARS)   Stand to sit Minimal Assistance   Bed to Chair Dependent/Total  (RODOLFO STEADY TO W/C FROM BED)   Ambulation   Ambulation?  No   WB Status WBAT BILATERAL LE    Other exercises Other exercises 1 2 STAND SIN II BARS W/ MOD A X1, 1MIN STAND FIRST TIME - PT ABLE TO LOCK KNEES WITH VC; 2ND STAND 30 SEC   Conditions Requiring Skilled Therapeutic Intervention   Assessment IMPROVED SIT TO STAND TF REQUIRING MOD A X1. RODOLFO STEADY TF FROM BED TO W/C. PT MORE ALERT AND PARTICIPATORY COMPARED TO PREVIOUS SESSIONS. Activity Tolerance   Activity Tolerance Patient limited by fatigue;Patient limited by endurance; Patient limited by pain   Safety Devices   Type of devices Chair alarm in place; Left in chair;Patient at risk for falls;Call light within reach                Cosigned by: Anastacio Rodrigez, PT at 2/5/2021  1:00 PM   Revision History                      RECORD REVIEW: Previous medical records, medications were reviewed at today's visit    IMPRESSION:   1. S/P lumbar laminectomy-Pain control/mobilzation  2. HTN-on meds monitor  3. DVT prophylaxis-Lovenox  4. Anemia-on iron   5. Neuropathy-on Neurontin  6. DM-on insulin-monitor BS  7. GI-PPI/bowel regimen  8. Hyperlipidemia-on statin  9. Pain control-Norco PRN  11. Lung/cerebellar mass-monitor   12. PT/OT  13.  UTI-complete abx    Add carafate / zofran TID before meals    CT soft tissue neck- hi grade stenosis of left proximal internal carotid   Carotid US <50% bilateral carotids    ELOS pending        continue present care    Expected duration and frequency therapy: 180 minutes per day, 5 days per week    CALL WITH ANY QUESTIONS  [phone] CELL  Dr Mathew Moeller

## 2021-02-06 NOTE — PROGRESS NOTES
S:This 80 y.o. female  with history of diabetes mellitus, hyperlipidemia,known lung mass and HTN. Since Dec. 2020 patient has had difficulty walking, left foot drop and back pain. It had worsened to the point that she hasn't been able to walk for the past couple of weakness or care for herself. Her daughter came in December to stay with her. Prior to this patient lived at home independently. She had an MRI done as outpatient that revealed severe spinal stenosis at L3/4 and L4/5. She was referred to neurosurgeon Dr. Henry Trujillo, who recommended L3/4 & L4/5 decompressive laminectomy. She was in agreement and was admitted to [facility] on 1/26/21 for surgery. She tolerated the procedure well. She will be required to wear a TLSO brace when out of bed and follow spine precautions. She continues to have significant lower extremity weakness, worse on the left. In regards to her known lung mass, patient has been seen prior to admit by pulmonologist Dr. Natty Ochoa who plans to a bronchoscopy with biopsy at some point. He had recommended her getting her back surgery and then rehab to get her stronger prior to having the procedure done. Dr. Henry Trujillo ordered a MRI of the head d/t her persistent lower extremity weakness and known lung mass. 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 Dr. Henry Trujillo felt this would explain balance difficulty but now her LE weakness. Oncology and Palliative Care have been consulted. Patient at this point is stating she doesn't want any chemo or radiation. She is participating in both PT/OT. She is felt to need a stay on Rehab to work towards her goal of returning home with her daughter.  No acute complaints.       REVIEW OF SYSTEMS:  Constitutional: No fevers No chills  Neck:No stiffness  Respiratory: No shortness of breath  Cardiovascular: No chest pain No palpitations  Gastrointestinal: No abdominal pain    Genitourinary: No Dysuria  Neurological: No headache, no confusion    Past Medical History: Diagnosis Date    Diabetes mellitus ([facility] [address]     Hyperlipidemia     Hypertension     Palliative care patient 01/29/2021       Past Surgical History:      Procedure Laterality Date    APPENDECTOMY      CHOLECYSTECTOMY      LUMBAR SPINE SURGERY N/A 1/26/2021    LAMINECTOMY L3-4/ L4-5 performed by Alex Barlow MD at [address], BILATERAL         Medications in Hospital:      Current Facility-Administered Medications:     Venelex ointment, , Topical, BID, Kade Servin MD, Given at 02/05/21 1451    sucralfate (CARAFATE) tablet 1 g, 1 g, Oral, 4x Daily WC, Kade Servin MD, 1 g at 02/05/21 1651    ondansetron (ZOFRAN) tablet 4 mg, 4 mg, Oral, TID AC, Kade Servin MD, 4 mg at 02/05/21 1529    HYDROcodone-acetaminophen (NORCO)  MG per tablet 1 tablet, 1 tablet, Oral, Q4H PRN, Kade Servin MD, 1 tablet at 02/02/21 2040    simethicone (MYLICON) chewable tablet 80 mg, 80 mg, Oral, Q6H PRN, Alex Barlow MD, 80 mg at 02/01/21 0738    bisacodyl (DULCOLAX) suppository 10 mg, 10 mg, Rectal, Daily PRN, Alex Barlow MD    ondansetron (ZOFRAN-ODT) disintegrating tablet 4 mg, 4 mg, Oral, Q8H PRN, 4 mg at 02/01/21 1516 **OR** ondansetron (ZOFRAN) injection 4 mg, 4 mg, Intravenous, Q6H PRN, Alex Barlow MD    sennosides-docusate sodium (SENOKOT-S) 8.6-50 MG tablet 1 tablet, 1 tablet, Oral, BID, Alex Barlow MD, 1 tablet at 02/05/21 0846    insulin lispro (HUMALOG) injection vial 4 Units, 0.08 Units/kg, Subcutaneous, TID , Alex Barlow MD    insulin lispro (HUMALOG) injection vial 0-12 Units, 0-12 Units, Subcutaneous, TID , Alex Barlow MD    insulin lispro (HUMALOG) injection vial 0-6 Units, 0-6 Units, Subcutaneous, Nightly, Alex Barlow MD, 1 Units at 02/03/21 1602    cetirizine (ZYRTEC) tablet 10 mg, 10 mg, Oral, Daily, Alex Barlow MD, 10 mg at 02/04/21 4458   cyclobenzaprine (FLEXERIL) tablet 10 mg, 10 mg, Oral, TID PRN, Almas Alarcon MD, 10 mg at 02/03/21 2050    dextrose 5 % solution, 100 mL/hr, Intravenous, PRN, Almas Alarcon MD    dextrose 50 % IV solution, 12.5 g, Intravenous, PRN, Almas Alarcon MD    dilTIAZem (CARDIZEM CD) extended release capsule 240 mg, 240 mg, Oral, Daily, Almas Alarcon MD, 240 mg at 02/05/21 0846    enoxaparin (LOVENOX) injection 40 mg, 40 mg, Subcutaneous, Daily, Almas Alarcon MD, 40 mg at 02/05/21 1529    ferrous sulfate (IRON 325) tablet 325 mg, 325 mg, Oral, Daily with breakfast, Almas Alarcon MD, 325 mg at 02/04/21 0743    gabapentin (NEURONTIN) capsule 300 mg, 300 mg, Oral, Nightly, Almas Alarcon MD, 300 mg at 02/04/21 2059    glucagon (rDNA) injection 1 mg, 1 mg, Intramuscular, PRN, Almas Alarcon MD    glucose (GLUTOSE) 40 % oral gel 15 g, 15 g, Oral, PRN, Almas Alarcon MD    insulin glargine (LANTUS) injection vial 10 Units, 10 Units, Subcutaneous, Nightly, Almas Alarcon MD, 10 Units at 02/04/21 2059    lisinopril (PRINIVIL;ZESTRIL) tablet 5 mg, 5 mg, Oral, Daily, Almas Alarcon MD, 5 mg at 02/05/21 0843    pantoprazole (PROTONIX) tablet 40 mg, 40 mg, Oral, QAM AC, Almas Alarcon MD, 40 mg at 02/05/21 0601    rosuvastatin (CRESTOR) tablet 20 mg, 20 mg, Oral, Nightly, Almas Alarcon MD, 20 mg at 02/04/21 2059    vitamin B-12 (CYANOCOBALAMIN) tablet 500 mcg, 500 mcg, Oral, Daily, Almas Alarcon MD, 500 mcg at 02/05/21 0845    acetaminophen (TYLENOL) tablet 650 mg, 650 mg, Oral, Q4H PRN, Gareth Mims MD, 650 mg at 02/04/21 2059    polyethylene glycol (GLYCOLAX) packet 17 g, 17 g, Oral, Daily PRN, Gareth Mims MD    Allergies: Other    Social History:   TOBACCO:   reports that she quit smoking about 24 years ago. Her smoking use included cigarettes. She has never used smokeless tobacco.  ETOH:   reports no history of alcohol use.     Family History: History reviewed. No pertinent family history. PHYSICAL EXAM:  /61   Pulse 71   Temp 97.3 °F (36.3 °C) (Temporal)   Resp 16   Ht 5' (1.524 m)   Wt 122 lb 4 oz (55.5 kg)   SpO2 91%   BMI 23.88 kg/m²       Constitutional  well developed, well nourished. Eyes  conjunctiva normal.   Ear, nose, throat - No scars, masses, or lesions over external nose or ears, no atrophy of tongue  Neck-symmetric, no masses noted, no jugular vein distension  Respiration- chest wall appears symmetric, good expansion,   normal effort without use of accessory muscles  Musculoskeletal  no significant wasting of muscles noted, no bony deformities  Extremities-no clubbing, cyanosis or edema  Skin  warm, dry, and intact. No rash, erythema, or pallor. Psychiatric  mood, affect, and behavior appear normal.      Neurological exam  Awake, alert, fluent oriented slow  Attention and concentration appear appropriate  Recent and remote memory appears unremarkable  Speech normal without dysarthria  No clear issues with language of fund of knowledge     Cranial Nerve Exam     CN III, IV,VI-EOMI, No nystagmus, conjugate eye movements, no ptosis    CN VII-no facial assymetry       Motor Exam  antigravity throughout upper and lower extremities bilaterally      Tremors- no tremors in hands or head noted     Gait  Not tested      Nursing/pcp notes, imaging,labs and vitals reviewed.      PT,OT and/or speech notes reviewed    Lab Results   Component Value Date    WBC 7.1 02/04/2021    HGB 11.5 (L) 02/04/2021    HCT 36.1 (L) 02/04/2021    MCV 91.4 02/04/2021     02/04/2021     Lab Results   Component Value Date     02/04/2021    K 3.7 02/04/2021    CL 96 (L) 02/04/2021    CO2 33 (H) 02/04/2021    BUN 14 02/04/2021    CREATININE 0.6 02/04/2021    GLUCOSE 73 (L) 02/04/2021    CALCIUM 9.3 02/04/2021    PROT 5.1 (L) 02/04/2021    LABALBU 3.3 (L) 02/04/2021    BILITOT 0.3 02/04/2021    ALKPHOS 80 02/04/2021    AST 24 02/04/2021 predominantly at C5, C6 and C7. Prevertebral soft tissues are normal.   Impression:     No evidence of mass or lymphadenopathy. An atrophic left parotid gland. The etiology is not certain. Atheromatous changes of the carotid arteries bilaterally and a   high-grade stenosis of the left proximal internal carotid artery. Right maxillary sinusitis. Cerebral atrophy. Signed by Dr Shanice Hardy on 2/3/2021 5:02 PM     Sushila Phelps   Student Physical Therapist   Physical Therapy   Progress Notes   Signed   Date of Service:  2/5/2021 10:00 AM               Signed             Show:Clear all  []Manual[x]Template[]Copied    Added by:  Charito Lo    []Leisa for details       02/05/21 0900   Restrictions/Precautions   Restrictions/Precautions Fall Risk;Weight Bearing;Surgical Protocols; Modified Diet   Required Braces or Orthoses? Yes   Lower Extremity Weight Bearing Restrictions   Right Lower Extremity Weight Bearing Weight Bearing As Tolerated   Left Lower Extremity Weight Bearing Weight Bearing As Tolerated   Required Braces or Orthoses   Spinal Other LSO   Position Activity Restriction   Spinal Precautions No Bending; No Lifting; No Twisting   General   Additional Pertinent Hx HTN; HYPERLIPIDEMIA; TYPE 2 DM; GERD; L FOOT DROP; RECENTLY FOUND LUNG MASS   Diagnosis S/P L3-4 AND L4-5 DECOMPRESSIVE LAMINECTOMY ON 1/26/2021   General Comment   Comments PT MORE ALERT SITTING UP IN BED AND TALKING MORE W/ DAUGHTER   Bed Mobility   Rolling Modified independent;Stand by assistance  (ROLLING L AND R FOR CLEAN-UP)   Supine to Sit Moderate assistance;Maximal assistance  (TO LEFT SIDE OF BED)   Scooting Contact guard assistance;Minimal assistance   Transfers   Sit to Stand Moderate Assistance  (W/ RODOLFO STEADY AND IN II BARS)   Stand to sit Minimal Assistance   Bed to Chair Dependent/Total  (RODOLFO STEADY TO W/C FROM BED)   Ambulation   Ambulation?  No   WB Status WBAT BILATERAL LE    Other exercises Other exercises 1 2 STAND SIN II BARS W/ MOD A X1, 1MIN STAND FIRST TIME - PT ABLE TO LOCK KNEES WITH VC; 2ND STAND 30 SEC   Conditions Requiring Skilled Therapeutic Intervention   Assessment IMPROVED SIT TO STAND TF REQUIRING MOD A X1. RODOLFO STEADY TF FROM BED TO W/C. PT MORE ALERT AND PARTICIPATORY COMPARED TO PREVIOUS SESSIONS. Activity Tolerance   Activity Tolerance Patient limited by fatigue;Patient limited by endurance; Patient limited by pain   Safety Devices   Type of devices Chair alarm in place; Left in chair;Patient at risk for falls;Call light within reach                Cosigned by: Judd Lemos PT at 2/5/2021  1:00 PM   Revision History                      RECORD REVIEW: Previous medical records, medications were reviewed at today's visit    IMPRESSION:   1. S/P lumbar laminectomy-Pain control/mobilzation  2. HTN-on meds monitor  3. DVT prophylaxis-Lovenox  4. Anemia-on iron   5. Neuropathy-on Neurontin  6. DM-on insulin-monitor BS  7. GI-PPI/bowel regimen  8. Hyperlipidemia-on statin  9. Pain control-Norco PRN  11. Lung/cerebellar mass-monitor   12. PT/OT  13.  UTI-complete abx    Add carafate / zofran TID before meals    CT soft tissue neck- hi grade stenosis of left proximal internal carotid   Carotid US <50% bilateral carotids    ELOS pending        continue current care    Expected duration and frequency therapy: 180 minutes per day, 5 days per week    [address] [facility]  Dr Wicho Duncan

## 2021-02-07 LAB
GLUCOSE BLD-MCNC: 130 MG/DL (ref 70–99)
GLUCOSE BLD-MCNC: 148 MG/DL (ref 70–99)
GLUCOSE BLD-MCNC: 158 MG/DL (ref 70–99)
GLUCOSE BLD-MCNC: 95 MG/DL (ref 70–99)
PERFORMED ON: ABNORMAL
PERFORMED ON: NORMAL

## 2021-02-07 PROCEDURE — 82947 ASSAY GLUCOSE BLOOD QUANT: CPT

## 2021-02-07 PROCEDURE — 6370000000 HC RX 637 (ALT 250 FOR IP): Performed by: PSYCHIATRY & NEUROLOGY

## 2021-02-07 PROCEDURE — 99232 SBSQ HOSP IP/OBS MODERATE 35: CPT | Performed by: PSYCHIATRY & NEUROLOGY

## 2021-02-07 PROCEDURE — 6360000002 HC RX W HCPCS: Performed by: NEUROLOGICAL SURGERY

## 2021-02-07 PROCEDURE — 6370000000 HC RX 637 (ALT 250 FOR IP): Performed by: NEUROLOGICAL SURGERY

## 2021-02-07 PROCEDURE — 1180000000 HC REHAB R&B

## 2021-02-07 RX ADMIN — GABAPENTIN 300 MG: 300 CAPSULE ORAL at 21:07

## 2021-02-07 RX ADMIN — Medication: at 08:29

## 2021-02-07 RX ADMIN — PANTOPRAZOLE SODIUM 40 MG: 40 TABLET, DELAYED RELEASE ORAL at 05:42

## 2021-02-07 RX ADMIN — Medication: at 21:19

## 2021-02-07 RX ADMIN — DOCUSATE SODIUM 50 MG AND SENNOSIDES 8.6 MG 1 TABLET: 8.6; 5 TABLET, FILM COATED ORAL at 21:07

## 2021-02-07 RX ADMIN — FERROUS SULFATE TAB 325 MG (65 MG ELEMENTAL FE) 325 MG: 325 (65 FE) TAB at 08:22

## 2021-02-07 RX ADMIN — LISINOPRIL 5 MG: 5 TABLET ORAL at 08:22

## 2021-02-07 RX ADMIN — Medication 10 UNITS: at 21:08

## 2021-02-07 RX ADMIN — CETIRIZINE HYDROCHLORIDE 10 MG: 10 TABLET, FILM COATED ORAL at 08:22

## 2021-02-07 RX ADMIN — ENOXAPARIN SODIUM 40 MG: 40 INJECTION SUBCUTANEOUS at 15:51

## 2021-02-07 RX ADMIN — CYANOCOBALAMIN TAB 500 MCG 500 MCG: 500 TAB at 08:22

## 2021-02-07 RX ADMIN — DILTIAZEM HYDROCHLORIDE 240 MG: 240 CAPSULE, COATED, EXTENDED RELEASE ORAL at 08:22

## 2021-02-07 RX ADMIN — ONDANSETRON 4 MG: 4 TABLET, FILM COATED ORAL at 05:42

## 2021-02-07 RX ADMIN — ROSUVASTATIN CALCIUM 20 MG: 20 TABLET, FILM COATED ORAL at 21:07

## 2021-02-07 RX ADMIN — ACETAMINOPHEN 650 MG: 325 TABLET ORAL at 21:07

## 2021-02-07 RX ADMIN — SUCRALFATE 1 G: 1 TABLET ORAL at 08:22

## 2021-02-07 RX ADMIN — DOCUSATE SODIUM 50 MG AND SENNOSIDES 8.6 MG 1 TABLET: 8.6; 5 TABLET, FILM COATED ORAL at 08:22

## 2021-02-07 RX ADMIN — SUCRALFATE 1 G: 1 TABLET ORAL at 11:03

## 2021-02-07 RX ADMIN — INSULIN LISPRO 1 UNITS: 100 INJECTION, SOLUTION INTRAVENOUS; SUBCUTANEOUS at 21:08

## 2021-02-07 RX ADMIN — SUCRALFATE 1 G: 1 TABLET ORAL at 21:07

## 2021-02-07 ASSESSMENT — PAIN SCALES - GENERAL: PAINLEVEL_OUTOF10: 0

## 2021-02-07 NOTE — PLAN OF CARE
Problem: SAFETY  Goal: Free from accidental physical injury  Outcome: Ongoing  Goal: Free from intentional harm  Outcome: Ongoing     Problem: DAILY CARE  Goal: Daily care needs are met  Outcome: Ongoing     Problem: PAIN  Goal: Patient's pain/discomfort is manageable  Outcome: Ongoing     Problem: SKIN INTEGRITY  Goal: Skin integrity is maintained or improved  Outcome: Ongoing     Problem: KNOWLEDGE DEFICIT  Goal: Patient/S.O. demonstrates understanding of disease process, treatment plan, medications, and discharge instructions.   Outcome: Ongoing     Problem: DISCHARGE BARRIERS  Goal: Patient's continuum of care needs are met  Outcome: Ongoing     Problem: Pain:  Goal: Pain level will decrease  Description: Pain level will decrease  Outcome: Ongoing  Goal: Control of acute pain  Description: Control of acute pain  Outcome: Ongoing  Goal: Control of chronic pain  Description: Control of chronic pain  Outcome: Ongoing     Problem: Falls - Risk of:  Goal: Will remain free from falls  Description: Will remain free from falls  Outcome: Ongoing  Goal: Absence of physical injury  Description: Absence of physical injury  Outcome: Ongoing     Problem: Skin Integrity:  Goal: Will show no infection signs and symptoms  Description: Will show no infection signs and symptoms  Outcome: Ongoing  Goal: Absence of new skin breakdown  Description: Absence of new skin breakdown  Outcome: Ongoing     Problem: Nutrition  Goal: Optimal nutrition therapy  Outcome: Ongoing     Problem: Bleeding:  Goal: Will show no signs and symptoms of excessive bleeding  Description: Will show no signs and symptoms of excessive bleeding  Outcome: Ongoing     Problem: Serum Glucose Level - Abnormal:  Goal: Ability to maintain appropriate glucose levels has stabilized  Description: Ability to maintain appropriate glucose levels has stabilized  Outcome: Ongoing     Problem: Infection - Surgical Site:  Goal: Will show no infection signs and symptoms

## 2021-02-07 NOTE — PROGRESS NOTES
S:This 80 y.o. female  with history of diabetes mellitus, hyperlipidemia,known lung mass and HTN. Since Dec. 2020 patient has had difficulty walking, left foot drop and back pain. It had worsened to the point that she hasn't been able to walk for the past couple of weakness or care for herself. Her daughter came in December to stay with her. Prior to this patient lived at home independently. She had an MRI done as outpatient that revealed severe spinal stenosis at L3/4 and L4/5. She was referred to neurosurgeon Dr. Sebastien Meng, who recommended L3/4 & L4/5 decompressive laminectomy. She was in agreement and was admitted to [facility] on 1/26/21 for surgery. She tolerated the procedure well. She will be required to wear a TLSO brace when out of bed and follow spine precautions. She continues to have significant lower extremity weakness, worse on the left. In regards to her known lung mass, patient has been seen prior to admit by pulmonologist Dr. Mike Jacob who plans to a bronchoscopy with biopsy at some point. He had recommended her getting her back surgery and then rehab to get her stronger prior to having the procedure done. Dr. Sebastien Meng ordered a MRI of the head d/t her persistent lower extremity weakness and known lung mass. 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 Dr. Sebastien Meng felt this would explain balance difficulty but now her LE weakness. Oncology and Palliative Care have been consulted. Patient at this point is stating she doesn't want any chemo or radiation. She is participating in both PT/OT. She is felt to need a stay on Rehab to work towards her goal of returning home with her daughter.  No acute issues.       REVIEW OF SYSTEMS:  Constitutional: No fevers No chills  Neck:No stiffness  Respiratory: No shortness of breath  Cardiovascular: No chest pain No palpitations  Gastrointestinal: No abdominal pain    Genitourinary: No Dysuria  Neurological: No headache, no confusion    Past Medical History: Diagnosis Date    Diabetes mellitus ([facility] [address]     Hyperlipidemia     Hypertension     Palliative care patient 01/29/2021       Past Surgical History:      Procedure Laterality Date    APPENDECTOMY      CHOLECYSTECTOMY      LUMBAR SPINE SURGERY N/A 1/26/2021    LAMINECTOMY L3-4/ L4-5 performed by Belinda Handy MD at [address], BILATERAL         Medications in Hospital:      Current Facility-Administered Medications:     Venelex ointment, , Topical, BID, Margot Merida MD, Given at 02/07/21 0829    sucralfate (CARAFATE) tablet 1 g, 1 g, Oral, 4x Daily WC, Margot Merida MD, 1 g at 02/07/21 1103    ondansetron (ZOFRAN) tablet 4 mg, 4 mg, Oral, TID AC, Margot Merida MD, 4 mg at 02/07/21 0542    HYDROcodone-acetaminophen ([facility])  MG per tablet 1 tablet, 1 tablet, Oral, Q4H PRN, Marogt Merida MD, 1 tablet at 02/02/21 2040    simethicone (MYLICON) chewable tablet 80 mg, 80 mg, Oral, Q6H PRN, Belinda Handy MD, 80 mg at 02/01/21 0738    bisacodyl (DULCOLAX) suppository 10 mg, 10 mg, Rectal, Daily PRN, Belinda Handy MD    ondansetron (ZOFRAN-ODT) disintegrating tablet 4 mg, 4 mg, Oral, Q8H PRN, 4 mg at 02/01/21 1516 **OR** ondansetron (ZOFRAN) injection 4 mg, 4 mg, Intravenous, Q6H PRN, Belinda Handy MD    sennosides-docusate sodium (SENOKOT-S) 8.6-50 MG tablet 1 tablet, 1 tablet, Oral, BID, Belinda Handy MD, 1 tablet at 02/07/21 0822    insulin lispro (HUMALOG) injection vial 4 Units, 0.08 Units/kg, Subcutaneous, TID WC, Belinda Handy MD, 4 Units at 02/06/21 1229    insulin lispro (HUMALOG) injection vial 0-12 Units, 0-12 Units, Subcutaneous, TID WC, Belinda Handy MD, 2 Units at 02/06/21 1228    insulin lispro (HUMALOG) injection vial 0-6 Units, 0-6 Units, Subcutaneous, Nightly, Belinda Handy MD, 1 Units at 02/03/21 2054    cetirizine (ZYRTEC) tablet 10 mg, 10 mg, Oral, Daily, Belinda Handy MD, 10 mg at 02/07/21 8430   cyclobenzaprine (FLEXERIL) tablet 10 mg, 10 mg, Oral, TID PRN, Naman Alarcon MD, 10 mg at 02/03/21 2050    dextrose 5 % solution, 100 mL/hr, Intravenous, PRN, Naman Alarcon MD    dextrose 50 % IV solution, 12.5 g, Intravenous, PRN, Naman Alarcon MD    dilTIAZem (CARDIZEM CD) extended release capsule 240 mg, 240 mg, Oral, Daily, Naman Alarcon MD, 240 mg at 02/07/21 7793    enoxaparin (LOVENOX) injection 40 mg, 40 mg, Subcutaneous, Daily, Naman Alarcon MD, 40 mg at 02/06/21 1651    ferrous sulfate (IRON 325) tablet 325 mg, 325 mg, Oral, Daily with breakfast, Naman Alarcon MD, 325 mg at 02/07/21 3156    gabapentin (NEURONTIN) capsule 300 mg, 300 mg, Oral, Nightly, Naman Alarcon MD, 300 mg at 02/06/21 2047    glucagon (rDNA) injection 1 mg, 1 mg, Intramuscular, PRN, Naman Alarcon MD    glucose (GLUTOSE) 40 % oral gel 15 g, 15 g, Oral, PRN, Naman Alarcon MD    insulin glargine (LANTUS) injection vial 10 Units, 10 Units, Subcutaneous, Nightly, Naman Alarcon MD, 10 Units at 02/06/21 2048    lisinopril (PRINIVIL;ZESTRIL) tablet 5 mg, 5 mg, Oral, Daily, Naman Alarcon MD, 5 mg at 02/07/21 3851    pantoprazole (PROTONIX) tablet 40 mg, 40 mg, Oral, QAM AC, Naman Alarcon MD, 40 mg at 02/07/21 0542    rosuvastatin (CRESTOR) tablet 20 mg, 20 mg, Oral, Nightly, Naman Alarcon MD, 20 mg at 02/06/21 2047    vitamin B-12 (CYANOCOBALAMIN) tablet 500 mcg, 500 mcg, Oral, Daily, Naamn Alarcon MD, 500 mcg at 02/07/21 5986    acetaminophen (TYLENOL) tablet 650 mg, 650 mg, Oral, Q4H PRN, Joy Cox MD, 650 mg at 02/06/21 2121    polyethylene glycol (GLYCOLAX) packet 17 g, 17 g, Oral, Daily PRN, Joy Cox MD    Allergies: Other    Social History:   TOBACCO:   reports that she quit smoking about 24 years ago. Her smoking use included cigarettes. She has never used smokeless tobacco.  ETOH:   reports no history of alcohol use.     Family History: History reviewed. No pertinent family history. PHYSICAL EXAM:  /70   Pulse 74   Temp 98 °F (36.7 °C) (Temporal)   Resp 16   Ht 5' (1.524 m)   Wt 110 lb 9.6 oz (50.2 kg)   SpO2 93%   BMI 21.60 kg/m²       Constitutional  well developed, well nourished. Eyes  conjunctiva normal.   Ear, nose, throat - No scars, masses, or lesions over external nose or ears, no atrophy of tongue  Neck-symmetric, no masses noted, no jugular vein distension  Respiration- chest wall appears symmetric, good expansion,   normal effort without use of accessory muscles  Musculoskeletal  no significant wasting of muscles noted, no bony deformities  Extremities-no clubbing, cyanosis or edema  Skin  warm, dry, and intact. No rash, erythema, or pallor. Psychiatric  mood, affect, and behavior appear normal.      Neurological exam  Awake, alert, fluent oriented slow  Attention and concentration appear appropriate  Recent and remote memory appears unremarkable  Speech normal without dysarthria  No clear issues with language of fund of knowledge     Cranial Nerve Exam     CN III, IV,VI-EOMI, No nystagmus, conjugate eye movements, no ptosis    CN VII-no facial assymetry       Motor Exam  antigravity throughout upper and lower extremities bilaterally      Tremors- no tremors in hands or head noted     Gait  Not tested      Nursing/pcp notes, imaging,labs and vitals reviewed.      PT,OT and/or speech notes reviewed    Lab Results   Component Value Date    WBC 7.1 02/04/2021    HGB 11.5 (L) 02/04/2021    HCT 36.1 (L) 02/04/2021    MCV 91.4 02/04/2021     02/04/2021     Lab Results   Component Value Date     02/04/2021    K 3.7 02/04/2021    CL 96 (L) 02/04/2021    CO2 33 (H) 02/04/2021    BUN 14 02/04/2021    CREATININE 0.6 02/04/2021    GLUCOSE 73 (L) 02/04/2021    CALCIUM 9.3 02/04/2021    PROT 5.1 (L) 02/04/2021    LABALBU 3.3 (L) 02/04/2021    BILITOT 0.3 02/04/2021    ALKPHOS 80 02/04/2021    AST 24 02/04/2021 ALT 12 02/04/2021    LABGLOM >60 02/04/2021    GFRAA >59 02/04/2021     Lab Results   Component Value Date    INR 1.01 01/30/2021    INR 0.93 01/06/2021    PROTIME 13.2 01/30/2021    PROTIME 12.3 01/06/2021       CT SOFT TISSUE NECK W WO CONTRAST [[id number]]    Resulted: 02/03/21 1702    Updated: 02/03/21 1704    Narrative:     Examination. CT SOFT TISSUE NECK W WO CONTRAST 2/3/2021 3:15 PM   History: Swallowing difficulty. DLP: 1213 mGycm. The CT scan of the soft tissues of the neck is performed after   intravenous contrast enhancement. The images are acquired in axial   plane with subsequent reconstruction in coronal and sagittal plane. There is no previous study for comparison. The limited included brain is unremarkable. The visualized   intracranial vessels are unremarkable. There is moderate diffuse   cerebral atrophy. The orbits appear unremarkable. There is a mucous retention cyst in   the right maxillary antrum. The nasopharyngeal soft tissues are normal. The parapharyngeal spaces   are normal.   The left parotid gland is atrophic which may be due to previous   inflammation or surgery? . A normal right parotid gland is seen. Submandibular glands bilaterally are normal.   The oropharyngeal soft tissues are poorly evaluated due to extensive   artifacts from the dental hardware. No discrete mass is seen. No   abnormal enhancement. There is no evidence of superficial or deep cervical lymphadenopathy. The vallecula, the epiglottis, the piriform sinuses, the false and   true vocal cords are normal. The thyroid gland appears normal.   Atheromatous plaques are seen in the common and internal carotid   arteries bilaterally. There is high-grade stenosis of the proximal   left internal carotid artery. There is a 50% stenosis of the proximal   right internal carotid artery.    There are moderate chronic degenerative changes of the cervical spine predominantly at C5, C6 and C7. Prevertebral soft tissues are normal.   Impression:     No evidence of mass or lymphadenopathy. An atrophic left parotid gland. The etiology is not certain. Atheromatous changes of the carotid arteries bilaterally and a   high-grade stenosis of the left proximal internal carotid artery. Right maxillary sinusitis. Cerebral atrophy. Signed by Dr Crow Watt on 2/3/2021 5:02 PM     Lizbeth Steele   Student Physical Therapist   Physical Therapy   Progress Notes   Signed   Date of Service:  2/5/2021 10:00 AM               Signed             Show:Clear all  []Manual[x]Template[]Copied    Added by:  Nitza Gonzalez    []Leisa for details       02/05/21 0900   Restrictions/Precautions   Restrictions/Precautions Fall Risk;Weight Bearing;Surgical Protocols; Modified Diet   Required Braces or Orthoses? Yes   Lower Extremity Weight Bearing Restrictions   Right Lower Extremity Weight Bearing Weight Bearing As Tolerated   Left Lower Extremity Weight Bearing Weight Bearing As Tolerated   Required Braces or Orthoses   Spinal Other LSO   Position Activity Restriction   Spinal Precautions No Bending; No Lifting; No Twisting   General   Additional Pertinent Hx HTN; HYPERLIPIDEMIA; TYPE 2 DM; GERD; L FOOT DROP; RECENTLY FOUND LUNG MASS   Diagnosis S/P L3-4 AND L4-5 DECOMPRESSIVE LAMINECTOMY ON 1/26/2021   General Comment   Comments PT MORE ALERT SITTING UP IN BED AND TALKING MORE W/ DAUGHTER   Bed Mobility   Rolling Modified independent;Stand by assistance  (ROLLING L AND R FOR CLEAN-UP)   Supine to Sit Moderate assistance;Maximal assistance  (TO LEFT SIDE OF BED)   Scooting Contact guard assistance;Minimal assistance   Transfers   Sit to Stand Moderate Assistance  (W/ RODOLFO STEADY AND IN II BARS)   Stand to sit Minimal Assistance   Bed to Chair Dependent/Total  (RODOLFO STEADY TO W/C FROM BED)   Ambulation   Ambulation?  No   WB Status WBAT BILATERAL LE    Other exercises Other exercises 1 2 STAND SIN II BARS W/ MOD A X1, 1MIN STAND FIRST TIME - PT ABLE TO LOCK KNEES WITH VC; 2ND STAND 30 SEC   Conditions Requiring Skilled Therapeutic Intervention   Assessment IMPROVED SIT TO STAND TF REQUIRING MOD A X1. RODOLFO STEADY TF FROM BED TO W/C. PT MORE ALERT AND PARTICIPATORY COMPARED TO PREVIOUS SESSIONS. Activity Tolerance   Activity Tolerance Patient limited by fatigue;Patient limited by endurance; Patient limited by pain   Safety Devices   Type of devices Chair alarm in place; Left in chair;Patient at risk for falls;Call light within reach                Cosigned by: Dawna Mroeno, PT at 2/5/2021  1:00 PM   Revision History                      RECORD REVIEW: Previous medical records, medications were reviewed at today's visit    IMPRESSION:   1. S/P lumbar laminectomy-Pain control/mobilzation  2. HTN-on meds monitor  3. DVT prophylaxis-Lovenox  4. Anemia-on iron   5. Neuropathy-on Neurontin  6. DM-on insulin-monitor BS  7. GI-PPI/bowel regimen  8. Hyperlipidemia-on statin  9. Pain control-Norco PRN  11. Lung/cerebellar mass-monitor   12. PT/OT  13.  UTI-complete abx    Add carafate / zofran TID before meals    CT soft tissue neck- hi grade stenosis of left proximal internal carotid   Carotid US <50% bilateral carotids    ELOS pending        continue current care    Expected duration and frequency therapy: 180 minutes per day, 5 days per week    [address] [facility]  Dr Jackie Demarco

## 2021-02-08 PROBLEM — E44.0 MODERATE MALNUTRITION (HCC): Status: ACTIVE | Noted: 2021-02-08

## 2021-02-08 LAB
ALBUMIN SERPL-MCNC: 3.4 G/DL (ref 3.5–5.2)
ALP BLD-CCNC: 85 U/L (ref 35–104)
ALT SERPL-CCNC: 28 U/L (ref 5–33)
ANION GAP SERPL CALCULATED.3IONS-SCNC: 9 MMOL/L (ref 7–19)
AST SERPL-CCNC: 34 U/L (ref 5–32)
BASOPHILS ABSOLUTE: 0 K/UL (ref 0–0.2)
BASOPHILS RELATIVE PERCENT: 0.3 % (ref 0–1)
BILIRUB SERPL-MCNC: 0.3 MG/DL (ref 0.2–1.2)
BUN BLDV-MCNC: 10 MG/DL (ref 8–23)
CALCIUM SERPL-MCNC: 9.6 MG/DL (ref 8.8–10.2)
CHLORIDE BLD-SCNC: 98 MMOL/L (ref 98–111)
CO2: 31 MMOL/L (ref 22–29)
CREAT SERPL-MCNC: 0.6 MG/DL (ref 0.5–0.9)
EOSINOPHILS ABSOLUTE: 0.1 K/UL (ref 0–0.6)
EOSINOPHILS RELATIVE PERCENT: 0.9 % (ref 0–5)
GFR AFRICAN AMERICAN: >59
GFR NON-AFRICAN AMERICAN: >60
GLUCOSE BLD-MCNC: 128 MG/DL (ref 70–99)
GLUCOSE BLD-MCNC: 161 MG/DL (ref 70–99)
GLUCOSE BLD-MCNC: 181 MG/DL (ref 70–99)
GLUCOSE BLD-MCNC: 74 MG/DL (ref 74–109)
GLUCOSE BLD-MCNC: 77 MG/DL (ref 70–99)
HCT VFR BLD CALC: 39.5 % (ref 37–47)
HEMOGLOBIN: 12.6 G/DL (ref 12–16)
IMMATURE GRANULOCYTES #: 0.1 K/UL
LYMPHOCYTES ABSOLUTE: 1.6 K/UL (ref 1.1–4.5)
LYMPHOCYTES RELATIVE PERCENT: 15.9 % (ref 20–40)
MCH RBC QN AUTO: 29.1 PG (ref 27–31)
MCHC RBC AUTO-ENTMCNC: 31.9 G/DL (ref 33–37)
MCV RBC AUTO: 91.2 FL (ref 81–99)
MONOCYTES ABSOLUTE: 0.7 K/UL (ref 0–0.9)
MONOCYTES RELATIVE PERCENT: 7.5 % (ref 0–10)
NEUTROPHILS ABSOLUTE: 7.3 K/UL (ref 1.5–7.5)
NEUTROPHILS RELATIVE PERCENT: 74.2 % (ref 50–65)
PDW BLD-RTO: 13.8 % (ref 11.5–14.5)
PERFORMED ON: ABNORMAL
PERFORMED ON: NORMAL
PLATELET # BLD: 305 K/UL (ref 130–400)
PMV BLD AUTO: 10.2 FL (ref 9.4–12.3)
POTASSIUM REFLEX MAGNESIUM: 3.9 MMOL/L (ref 3.5–5)
RBC # BLD: 4.33 M/UL (ref 4.2–5.4)
SODIUM BLD-SCNC: 138 MMOL/L (ref 136–145)
TOTAL PROTEIN: 5.3 G/DL (ref 6.6–8.7)
WBC # BLD: 9.8 K/UL (ref 4.8–10.8)

## 2021-02-08 PROCEDURE — 6370000000 HC RX 637 (ALT 250 FOR IP): Performed by: NEUROLOGICAL SURGERY

## 2021-02-08 PROCEDURE — 6370000000 HC RX 637 (ALT 250 FOR IP): Performed by: PSYCHIATRY & NEUROLOGY

## 2021-02-08 PROCEDURE — 97535 SELF CARE MNGMENT TRAINING: CPT

## 2021-02-08 PROCEDURE — 97110 THERAPEUTIC EXERCISES: CPT

## 2021-02-08 PROCEDURE — 97130 THER IVNTJ EA ADDL 15 MIN: CPT

## 2021-02-08 PROCEDURE — 80053 COMPREHEN METABOLIC PANEL: CPT

## 2021-02-08 PROCEDURE — 82947 ASSAY GLUCOSE BLOOD QUANT: CPT

## 2021-02-08 PROCEDURE — 99232 SBSQ HOSP IP/OBS MODERATE 35: CPT | Performed by: PSYCHIATRY & NEUROLOGY

## 2021-02-08 PROCEDURE — 6360000002 HC RX W HCPCS: Performed by: NEUROLOGICAL SURGERY

## 2021-02-08 PROCEDURE — 92526 ORAL FUNCTION THERAPY: CPT

## 2021-02-08 PROCEDURE — 1180000000 HC REHAB R&B

## 2021-02-08 PROCEDURE — 36415 COLL VENOUS BLD VENIPUNCTURE: CPT

## 2021-02-08 PROCEDURE — 85025 COMPLETE CBC W/AUTO DIFF WBC: CPT

## 2021-02-08 PROCEDURE — 97129 THER IVNTJ 1ST 15 MIN: CPT

## 2021-02-08 RX ADMIN — LISINOPRIL 5 MG: 5 TABLET ORAL at 08:23

## 2021-02-08 RX ADMIN — SUCRALFATE 1 G: 1 TABLET ORAL at 08:22

## 2021-02-08 RX ADMIN — CETIRIZINE HYDROCHLORIDE 10 MG: 10 TABLET, FILM COATED ORAL at 08:22

## 2021-02-08 RX ADMIN — Medication 10 UNITS: at 21:50

## 2021-02-08 RX ADMIN — ENOXAPARIN SODIUM 40 MG: 40 INJECTION SUBCUTANEOUS at 16:23

## 2021-02-08 RX ADMIN — PANTOPRAZOLE SODIUM 40 MG: 40 TABLET, DELAYED RELEASE ORAL at 05:36

## 2021-02-08 RX ADMIN — SUCRALFATE 1 G: 1 TABLET ORAL at 21:21

## 2021-02-08 RX ADMIN — FERROUS SULFATE TAB 325 MG (65 MG ELEMENTAL FE) 325 MG: 325 (65 FE) TAB at 08:23

## 2021-02-08 RX ADMIN — ACETAMINOPHEN 650 MG: 325 TABLET ORAL at 21:21

## 2021-02-08 RX ADMIN — Medication: at 08:35

## 2021-02-08 RX ADMIN — DOCUSATE SODIUM 50 MG AND SENNOSIDES 8.6 MG 1 TABLET: 8.6; 5 TABLET, FILM COATED ORAL at 08:22

## 2021-02-08 RX ADMIN — CYANOCOBALAMIN TAB 500 MCG 500 MCG: 500 TAB at 08:23

## 2021-02-08 RX ADMIN — POLYETHYLENE GLYCOL 3350 17 G: 17 POWDER, FOR SOLUTION ORAL at 21:22

## 2021-02-08 RX ADMIN — DILTIAZEM HYDROCHLORIDE 240 MG: 240 CAPSULE, COATED, EXTENDED RELEASE ORAL at 08:22

## 2021-02-08 RX ADMIN — Medication: at 21:24

## 2021-02-08 RX ADMIN — INSULIN LISPRO 1 UNITS: 100 INJECTION, SOLUTION INTRAVENOUS; SUBCUTANEOUS at 21:50

## 2021-02-08 RX ADMIN — DOCUSATE SODIUM 50 MG AND SENNOSIDES 8.6 MG 1 TABLET: 8.6; 5 TABLET, FILM COATED ORAL at 21:21

## 2021-02-08 RX ADMIN — GABAPENTIN 300 MG: 300 CAPSULE ORAL at 21:54

## 2021-02-08 RX ADMIN — ROSUVASTATIN CALCIUM 20 MG: 20 TABLET, FILM COATED ORAL at 21:22

## 2021-02-08 RX ADMIN — ONDANSETRON 4 MG: 4 TABLET, FILM COATED ORAL at 05:36

## 2021-02-08 ASSESSMENT — PAIN DESCRIPTION - LOCATION: LOCATION: BACK

## 2021-02-08 ASSESSMENT — PAIN DESCRIPTION - PAIN TYPE: TYPE: SURGICAL PAIN

## 2021-02-08 ASSESSMENT — PAIN SCALES - GENERAL: PAINLEVEL_OUTOF10: 0

## 2021-02-08 NOTE — PROGRESS NOTES
Balance  Sitting Balance: Contact guard assistance  Standing Balance: Maximum assistance  Standing Balance  Time: 15 seconds  Functional Mobility  Functional - Mobility Device: Wheelchair  Activity: To/from bathroom  Assist Level: Moderate assistance  Functional Mobility Comments: Very fatigued     Transfers  Sit Pivot Transfers: Maximum assistance  Sit to stand: Maximum assistance  Stand to sit: Maximum assistance  Transfer Comments: bed>w/c        Type of ROM/Therapeutic Exercise  Type of ROM/Therapeutic Exercise: Free weights  Comment: 1#; 2 sets of 10 in all planes        Plan   Plan  Current Treatment Recommendations: Strengthening, Balance Training, Functional Mobility Training, Endurance Training, Safety Education & Training, Patient/Caregiver Education & Training, Equipment Evaluation, Education, & procurement, Self-Care / ADL, Home Management Training  OutComes Score       02/08/21 1100   Toilet Transfer   Assistance Needed Substantial/maximal assistance   CARE Score 2           Goals  Short term goals  Time Frame for Short term goals: 1 week  Short term goal 1: Min A with clothing management/hygiene for toileting. Short term goal 2: Mod A with toilet transfers. Short term goal 3: Mod A with LB dressing, using AE. Short term goal 4: Mod A with donning/doffing socks and shoes using AE. Short term goal 5: Min A with bathing hygiene. Short term goal 6: Min A with donning/doffing LSO. Long term goals  Time Frame for Long term goals : 3-4 weeks  Long term goal 1: Min A with clothing management/hygiene for toileting. Long term goal 2: Min A with toilet transfers. Long term goal 3: Min A with LB dressing, using AE. Long term goal 4: Min A with donning/doffing socks and shoes using AE. Long term goal 5: Supervision with bathing hygiene. Long term goals 6: Patient verbalize DME needs. Long term goal 7: Mod A with donning/doffing LSO.   Patient Goals Patient goals : Increase her independence with ADLs.        Therapy Time   Individual Concurrent Group Co-treatment   Time In 1100         Time Out 1200         Minutes 60         Timed Code Treatment Minutes: 75 Ariel Royal OT

## 2021-02-08 NOTE — PATIENT CARE CONFERENCE
[facility] ACUTE INPATIENT REHABILITATION  TEAM CONFERENCE NOTE    Date: 2021  Patient Name: Abhijit Rodriguez        MRN: [id number]    : 1933  (80 y.o.)  Gender: female      Diagnosis: S/P L3-4 AND L4-5 DECOMPRESSIVE LAMINECTOMY ON 2021      PHYSICAL THERAPY  STRENGTH  Strength RLE  Comment: GROSSLY 3 TO 3-/5   Strength LLE  Comment: HIP AND KNEE GROSSLY 3 TO 3-/5, ANKLE DF 1/5   ROM  AROM RLE (degrees)  RLE General AROM: HIP AND ANKLE WFL, MIN DEFICIT IN RLE KNEE EXT AROM/UNABLE TO ATTAIN FULL ROM AGAINST GRAVITY    AROM LLE (degrees)  LLE General AROM: NO L ANKLE DF AGAINST GRAVITY, HIP WFL, AND L KNEE MIN DEFICITS/UNABLE TO ATTAIN FULL KNEE EXT AGAINST GRAVITY   BED MOBILITY  Bed Mobility  Rolling: Moderate assistance, Minimal assistance(BILAT WITH RAILS)  Supine to Sit: Moderate assistance, Minimal assistance  Sit to Supine: Moderate assistance(BILATERAL LE MANAGEMENT; L SIDE OF BED )  Scooting: Contact guard assistance, Minimal assistance  Comment: TENDENCY TOWARDS POSTEROLATERAL LEFT LEAN SITTING EOB.   TRANSFERS  Transfers  Sit to Stand: Maximum Assistance(BILAT UES ON RW.  SEE COMMENT)  Stand to sit: Minimal Assistance  Bed to Chair: Moderate assistance, 2 Person Assistance(WITH RODOLFO SORTO)  Comment: PRESENTS WITH POSTERIOR LEAN IN STANDING, APPREHENSIVE IN COMMING TO FULLY ERECT POSTURE.  WOULD NOT FULLY EXTEND KNEES(RIGHTWARD LEAN WC.)  WHEELCHAIR PROPULSION  Propulsion 1  Propulsion: Manual  Level: Level Tile  Method: ADRIA CHAO  Level of Assistance: Stand by assistance  Description/ Details: NO TURNS; VC FOR TECHNIQUE  Distance: 50 FT  AMBULATION     STAIRS     GOALS:  Short term goals  Time Frame for Short term goals: 1 WEEK  Short term goal 1: AMB CGA W/ RW 50FT   Short term goal 2: SBA WITH ALL BED MOBILITY  Short term goal 3: SBA W/ W/C PROPULSION 75 FT W/ TURNS   Short term goal 4: MIN A WITH STAND STEP TF TO/FROM SURFACES W/ RW Short term goal 5: CAREGIVER SBA FOR ASSISTING PT WITH DONNING/DOFFING TLSO     Long term goals  Time Frame for Long term goals : 2 WEEKS   Long term goal 1: AMB 100FT W/ RW SBA   Long term goal 2: IND WITH STAND STEP TF TO/FROM SURFACES USING RW   Long term goal 3: IND W/ W/C PROPULSION FOR 150FT, INCLUDING TURNS  Long term goal 4: CAREGIVER AND PT IND WITH DONNING/DOFFING TLSO   Long term goal 5: PT IND WITH HOME EX PROGRAM AT D/C     ASSESSMENT:  Assessment: IMPROVED BED MOBILITY. IMPROVED FORCE THROUGH BILAT LE WITH STANDING. CONTINUE TO RECOMMEND RODOLFO STEADY FOR SAFETY FOR TRANSFERS. SPEECH THERAPY  Precautions: Fall/aspiration   Swallowing Status/Diet: Minced and mosit with thin liquids        Subjective:   Patient was seen in her room during therapy. She reported she had slept well the night before. Pt's daughter was in attendance for the first 15 minutes of therapy and stated she'd brought the pt french fries the previous night. No difficulty eating the fries was reported by the pt or her daughter. Her daughter stated she'd been encouraging her mother to utilize the effortful swallowing technique while eating to help pass the bolus. Pt was alert and attentive during therapeutic activities.      Objective:  Patient completed pharyngeal strengthening exercises in order to improve laryngeal elevation for swallowing functions. Patient did not require any cues in order to follow clinician's instructions. She completed 6 Kathy exercises and 7 effortful swallows.     Patient had previously been instructed to utilize the effortful swallowing technique when eating to help her pass the bolus. When asked if she had been using this strategy during meals, pt reported she only uses it sometimes.      Pt completed a problem-solving task during therapy. She was given various situations and asked to provide possible outcomes of each one. Pt gave appropriate responses to 9/10 situations (90% accuracy).      Pt read a short passage in therapy (4 paragraphs) and was asked questions pertaining to the passage. Of the 3 questions asked, 1 targeted short-term memory, 1 targeted working memory, and 1 targeted long-term memory (asking the client to remember a particular time in her life). Pt was able to answer all 3 questions appropriately without cues (100% accuracy). It was also noted patient had minimal difficulty reading the passage.      Pt demonstrated greater difficulty with short-term memory than long-term memory during conversation. She was able to recall events involving loved ones from her youth but had difficulty remembering the restaurant she got food from the previous night.      Pt was given three words to remember to target immediate memory and short-term memory. Pt was able to recall all three words directly after being given them, but had difficulty recalling 1 of the 3 items 5 minutes after being given them. Pt recalled the 3rd item after receiving a cue. She was asked to provide the words again 15 minutes after being given them, but only remembered 2/3 again. A cue was given, but pt was unable to remember the 3rd item.      Recommended Diet and Intervention  Diet Solids Recommendation: Dysphagia minced and moist (Dysphagia II)  Liquid Consistency Recommendation: Thin  Recommended Form of Meds: Whole in applesauce/pudding 1 at a time.   Therapeutic Interventions: Oral motor exercises; Tongue base strengthening;Patient/Family education;Effortful swallow;Kathy; Laryngeal exercises; Therapeutic PO trials with SLP;Oral care;Diet tolerance monitoring     Compensatory Swallowing Strategies  Compensatory Swallowing Strategies: No straws;Upright as possible for all oral intake;Remain upright for 30-45 minutes after meals     SHORT TERM GOAL #1: Lower Body Dressing: CARE Score: 1  Footwear: CARE Score: 1  Toilet Transfers: CARE Score: 2  Picking Up Object:  CARE Score: 1      UE Functioning:  WFLs    Pain Assessment:  Pain Level: 4  Pain Location: Back    STGs:  Short term goals  Time Frame for Short term goals: 1 week  Short term goal 1: Min A with clothing management/hygiene for toileting. Short term goal 2: Mod A with toilet transfers. Short term goal 3: Mod A with LB dressing, using AE. Short term goal 4: Mod A with donning/doffing socks and shoes using AE. Short term goal 5: Min A with bathing hygiene. Short term goal 6: Min A with donning/doffing LSO. LTGs:  Long term goals  Time Frame for Long term goals : 3-4 weeks  Long term goal 1: Min A with clothing management/hygiene for toileting. Long term goal 2: Min A with toilet transfers. Long term goal 3: Min A with LB dressing, using AE. Long term goal 4: Min A with donning/doffing socks and shoes using AE. Long term goal 5: Supervision with bathing hygiene. Long term goals 6: Patient verbalize DME needs. Long term goal 7: Mod A with donning/doffing LSO. Assessment:  Decreased functional mobility ; Decreased strength; Decreased endurance; Decreased balance; Decreased high-level IADLs    Progress impeded by fatigue and pain          NUTRITION  Current Wt: Weight: 111 lb 1.6 oz (50.4 kg) / Body mass index is 21.7 kg/m². Admission Wt: Admission Body Weight: 120 lb (54.4 kg)  Oral Diet Orders: Carb control (4 choices/meal); minced and moist  Oral Nutrition Supplement (ONS) Orders:Glucerna TID    Pt malnourished AEB poor po intake >5 days and significant wt loss X 1 week. Pt reports poor intake d/t fatigue and drinking some (~50%) of glucerna shakes. Will continue supplementation. Please see nutrition notes for further details. NURSING    Wounds/Incisions/Ulcers: Incision healing well to back.  Wound to coccyx; stage 2     Jaylon Scale Score: 16    Pain: Tylenol 650 mg q 4 hours PRN Consultations/Labs/X-rays:     Family Education: Need to make contact with family to initiate education- daughter participated in some education, will require much more    Fall Risk:  Mccall Score: 61    Fall in the last week? NONE      Other Nursing Issues: DNR, alert and oriented x4, cont of bowel and bladder, wears briefs, assist x2 with alejo steady, dolphin mattress, Macrobid - UTI, MDRO contact iso, accu cheks QID, Carb control diet,         SOCIAL WORK/CASE MANAGEMENT  Assessment: Ms Radha Brown wants to be home-discouraged, not realistic about care needs; daughter tells her \"whatever you want\" but voices great concern about ability to care for her    Discharge Plan   Estimated Length of Stay: 2/20/21  Destination: undetermined at this time    Pass: No    Services at Discharge: Other -continued rehabilitative and support care    Equipment at Discharge: to be determined     Progress made in the prior week:  1. Tolerating current diet consistency  2. Initial family education completed  3.  4.  5.      Goals for following week:  1. Mod A for self care  2.   3.   4.   5.     Factors facilitating achievement of predicted outcomes: Family support    Barriers to the achievement of predicted outcomes: Lower extremity weakness    Team Members Present at Conference:  : Marivel Godoy   Occupational Therapist: Akash Connors OTR/L  Physical Therapist: Ciaran Stern PT  Speech Therapist: Casey Gallardo MS,CCC-SLP  Nurse: Ovidio Brooks, RN,BSN   Nurse Manager:  Ovidio Brooks, RN, BSN  Dietitian:  Lilliam Waddell MS, RD, LD  Rehab Director:  Rasta Russo approve the established interdisciplinary plan of care as documented within the medical record of Rima Mancia.

## 2021-02-08 NOTE — PROGRESS NOTES
02/08/21 1000   Bed Mobility   Rolling Moderate assistance;Minimal assistance  (BILAT WITH RAILS)   Supine to Sit Moderate assistance  (LOG ROLL TO LEFT. ASSIST WITH LES. IMPROVED TRUNK CONTROL)   Comment TENDENCY TOWARDS POSTEROLATERAL LEFT LEAN SITTING EOB. Transfers   Sit to Stand Maximum Assistance  (BILAT UES ON RW.  SEE COMMENT)   Bed to Chair Maximum assistance  (STAND PIVOT WITH RW TO RIGHT)   Comment PRESENTS WITH POSTERIOR LEAN IN STANDING, APPREHENSIVE IN COMMING TO FULLY ERECT POSTURE.  WOULD NOT FULLY EXTEND KNEES  (RIGHTWARD LEAN WC.)   Propulsion 1   Method RUE;LUE   Level of Assistance Stand by assistance   Distance 50 FT   Other exercises   Other exercises 1 SITTING BILAT LE HIP FLEX/EXT/ABD/ADD; KNEE EXT. LIMITED FORCE GENERATION BILAT LES. FATIGUED WITH SUCCESSIVE REPS. Other exercises 2 PULL TO STAND PARALLLEL BARS. MAX A.  STOOD WITH POST LEAN. ABLE TO CORRECT WITH CUEING. INITIALLY REQUIRED POST FORCE THROUGH BILAT KNEES TO ACHIEVE EXTENSION; AFTERWARD ABLE TO MAINTAIN EXT INDEPENDENTY. STOOD APPROX 30 SEC   Conditions Requiring Skilled Therapeutic Intervention   Body structures, Functions, Activity limitations Decreased functional mobility ; Decreased ADL status; Decreased ROM; Decreased strength;Decreased endurance;Decreased balance;Decreased coordination; Increased pain;Decreased posture   Assessment IMPROVED BED MOBILITY. IMPROVED FORCE THROUGH BILAT LE WITH STANDING. CONTINUE TO RECOMMEND RODOLFO ELIZABETH FOR SAFETY FOR TRANSFERS.

## 2021-02-08 NOTE — PROGRESS NOTES
Patient:   Iram Ramos  MR#:    [id number]   Room:    0827/827-01   YOB: 1933  Date of Progress Note: 2/8/2021  Time of Note                           9:35 AM  Consulting Physician:   Breanna Woody M.D.   Attending Physician:  Breanna Woody MD     Chief complaint S/p lumbar laminectomy S:This 80 y.o. female  with history of diabetes mellitus, hyperlipidemia,known lung mass and HTN. Since Dec. 2020 patient has had difficulty walking, left foot drop and back pain. It had worsened to the point that she hasn't been able to walk for the past couple of weakness or care for herself. Her daughter came in December to stay with her. Prior to this patient lived at home independently. She had an MRI done as outpatient that revealed severe spinal stenosis at L3/4 and L4/5. She was referred to neurosurgeon Dr. Deion Chang, who recommended L3/4 & L4/5 decompressive laminectomy. She was in agreement and was admitted to [facility] on 1/26/21 for surgery. She tolerated the procedure well. She will be required to wear a TLSO brace when out of bed and follow spine precautions. She continues to have significant lower extremity weakness, worse on the left. In regards to her known lung mass, patient has been seen prior to admit by pulmonologist Dr. Saroj Cabrera who plans to a bronchoscopy with biopsy at some point. He had recommended her getting her back surgery and then rehab to get her stronger prior to having the procedure done. Dr. Deion Chang ordered a MRI of the head d/t her persistent lower extremity weakness and known lung mass. 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 Dr. Deion Chang felt this would explain balance difficulty but now her LE weakness. Oncology and Palliative Care have been consulted. Patient at this point is stating she doesn't want any chemo or radiation. She is participating in both PT/OT. She is felt to need a stay on Rehab to work towards her goal of returning home with her daughter.  No new issues.       REVIEW OF SYSTEMS:  Constitutional: No fevers No chills  Neck:No stiffness  Respiratory: No shortness of breath  Cardiovascular: No chest pain No palpitations  Gastrointestinal: No abdominal pain    Genitourinary: No Dysuria  Neurological: No headache, no confusion    Past Medical History: Diagnosis Date    Diabetes mellitus ([facility] [address]     Hyperlipidemia     Hypertension     Palliative care patient 01/29/2021       Past Surgical History:      Procedure Laterality Date    APPENDECTOMY      CHOLECYSTECTOMY      LUMBAR SPINE SURGERY N/A 1/26/2021    LAMINECTOMY L3-4/ L4-5 performed by Lori Alcantara MD at [address], BILATERAL         Medications in Hospital:      Current Facility-Administered Medications:     Venelex ointment, , Topical, BID, Jose Degroot MD, Given at 02/08/21 0835    sucralfate (CARAFATE) tablet 1 g, 1 g, Oral, 4x Daily WC, Jose Degroot MD, 1 g at 02/08/21 0822    ondansetron (ZOFRAN) tablet 4 mg, 4 mg, Oral, TID AC, Jose Degroot MD, 4 mg at 02/08/21 0536    HYDROcodone-acetaminophen (NORCO)  MG per tablet 1 tablet, 1 tablet, Oral, Q4H PRN, Jose Degroot MD, 1 tablet at 02/02/21 2040    simethicone (MYLICON) chewable tablet 80 mg, 80 mg, Oral, Q6H PRN, Lori Alcantara MD, 80 mg at 02/01/21 0738    bisacodyl (DULCOLAX) suppository 10 mg, 10 mg, Rectal, Daily PRN, Lori Alcantara MD    ondansetron (ZOFRAN-ODT) disintegrating tablet 4 mg, 4 mg, Oral, Q8H PRN, 4 mg at 02/01/21 1516 **OR** ondansetron (ZOFRAN) injection 4 mg, 4 mg, Intravenous, Q6H PRN, Lori Alcantara MD    sennosides-docusate sodium (SENOKOT-S) 8.6-50 MG tablet 1 tablet, 1 tablet, Oral, BID, Lori Alcantara MD, 1 tablet at 02/08/21 0822    insulin lispro (HUMALOG) injection vial 4 Units, 0.08 Units/kg, Subcutaneous, TID WC, Lori Alcantara MD, 4 Units at 02/06/21 1229    insulin lispro (HUMALOG) injection vial 0-12 Units, 0-12 Units, Subcutaneous, TID WC, Lori Alcantara MD, 2 Units at 02/06/21 1228    insulin lispro (HUMALOG) injection vial 0-6 Units, 0-6 Units, Subcutaneous, Nightly, Lori Alcantara MD, 1 Units at 02/07/21 2108    cetirizine (ZYRTEC) tablet 10 mg, 10 mg, Oral, Daily, Lori Alcantara MD, 10 mg at 02/08/21 1269   cyclobenzaprine (FLEXERIL) tablet 10 mg, 10 mg, Oral, TID PRN, Annamaria Torres MD, 10 mg at 02/03/21 2050    dextrose 5 % solution, 100 mL/hr, Intravenous, PRN, Annamaria Torres MD    dextrose 50 % IV solution, 12.5 g, Intravenous, PRN, Annamaria Torres MD    dilTIAZem (CARDIZEM CD) extended release capsule 240 mg, 240 mg, Oral, Daily, Annamaria Torres MD, 240 mg at 02/08/21 1319    enoxaparin (LOVENOX) injection 40 mg, 40 mg, Subcutaneous, Daily, Annamaria Torres MD, 40 mg at 02/07/21 1551    ferrous sulfate (IRON 325) tablet 325 mg, 325 mg, Oral, Daily with breakfast, Annamaria Torres MD, 325 mg at 02/08/21 9127    gabapentin (NEURONTIN) capsule 300 mg, 300 mg, Oral, Nightly, Annamaria Torres MD, 300 mg at 02/07/21 2107    glucagon (rDNA) injection 1 mg, 1 mg, Intramuscular, PRN, Annamaria Torres MD    glucose (GLUTOSE) 40 % oral gel 15 g, 15 g, Oral, PRN, Annamaria Torres MD    insulin glargine (LANTUS) injection vial 10 Units, 10 Units, Subcutaneous, Nightly, Annamaria Torres MD, 10 Units at 02/07/21 2108    lisinopril (PRINIVIL;ZESTRIL) tablet 5 mg, 5 mg, Oral, Daily, Annamaria Torres MD, 5 mg at 02/08/21 0823    pantoprazole (PROTONIX) tablet 40 mg, 40 mg, Oral, QAM AC, Annamaria Torres MD, 40 mg at 02/08/21 0536    rosuvastatin (CRESTOR) tablet 20 mg, 20 mg, Oral, Nightly, Annamaria Torres MD, 20 mg at 02/07/21 2107    vitamin B-12 (CYANOCOBALAMIN) tablet 500 mcg, 500 mcg, Oral, Daily, Annamaria Torres MD, 500 mcg at 02/08/21 7265    acetaminophen (TYLENOL) tablet 650 mg, 650 mg, Oral, Q4H PRN, Giovany Lee MD, 650 mg at 02/07/21 2107    polyethylene glycol (GLYCOLAX) packet 17 g, 17 g, Oral, Daily PRN, Giovany Lee MD    Allergies: Other    Social History:   TOBACCO:   reports that she quit smoking about 24 years ago. Her smoking use included cigarettes. She has never used smokeless tobacco.  ETOH:   reports no history of alcohol use.     Family History: ALT 28 02/08/2021    LABGLOM >60 02/08/2021    GFRAA >59 02/08/2021     Lab Results   Component Value Date    INR 1.01 01/30/2021    INR 0.93 01/06/2021    PROTIME 13.2 01/30/2021    PROTIME 12.3 01/06/2021       CT SOFT TISSUE NECK W WO CONTRAST [[id number]]    Resulted: 02/03/21 1702    Updated: 02/03/21 1704    Narrative:     Examination. CT SOFT TISSUE NECK W WO CONTRAST 2/3/2021 3:15 PM   History: Swallowing difficulty. DLP: 1213 mGycm. The CT scan of the soft tissues of the neck is performed after   intravenous contrast enhancement. The images are acquired in axial   plane with subsequent reconstruction in coronal and sagittal plane. There is no previous study for comparison. The limited included brain is unremarkable. The visualized   intracranial vessels are unremarkable. There is moderate diffuse   cerebral atrophy. The orbits appear unremarkable. There is a mucous retention cyst in   the right maxillary antrum. The nasopharyngeal soft tissues are normal. The parapharyngeal spaces   are normal.   The left parotid gland is atrophic which may be due to previous   inflammation or surgery? . A normal right parotid gland is seen. Submandibular glands bilaterally are normal.   The oropharyngeal soft tissues are poorly evaluated due to extensive   artifacts from the dental hardware. No discrete mass is seen. No   abnormal enhancement. There is no evidence of superficial or deep cervical lymphadenopathy. The vallecula, the epiglottis, the piriform sinuses, the false and   true vocal cords are normal. The thyroid gland appears normal.   Atheromatous plaques are seen in the common and internal carotid   arteries bilaterally. There is high-grade stenosis of the proximal   left internal carotid artery. There is a 50% stenosis of the proximal   right internal carotid artery.    There are moderate chronic degenerative changes of the cervical spine Goal 3: The patient will demonstrate functional problem solving and safety awareness with min verbal cues at 80% accuracy for daily living tasks in order to increase safe interaction with environment and decreased assistance from caregivers.        Swallowing Short Term Goals  Short-term Goals  Goal 1: The patient will tolerate a regular diet wtih thin liquids with minimal overt s/s of aspiration. Goal 2: SLP will return to reassess swallow function and insure safety with all oral intake. Goal 3: SLP will complete full speech/language cognitive evaluation. Goal 4: Patient/staff will complete daily oral hygiene to prevent aspriation of oral bacteria. Goal 5: Patient will complete pharyngeal/laryngeal exercises given verbal prompts and written instructions. Goal 6: Patient will verbalize and demonstrate compensatory swallow strategies 100% of opportunitites.        ASSESSMENT:  Assessment: [x]? Progressing towards goals           []? Not Progressing towards goals     Patient Tolerance of Treatment:       [x]? Tolerated well          []? Tolerated fair           []? Required rest breaks          []? Fatigued     Education:  Learner:  [x]? Patient          []? Significant Other          []? Other  Education provided regarding:  [x]? Goals and POC                               [x]? Diet and swallowing precautions                  []?Home Exercise Program                 []?Progress and/or discharge information  Method of Education:  [x]? Discussion          [x]? Demonstration          []? Handout          []? Other  Evaluation of Education:   [x]? Verbalized understanding                           []?Demonstrates without assistance  [x]? Demonstrates with assistance                    []?Needs further instruction                               []?No evidence of learning                              []?No family present                    Plan:   [x]? Continue with current plan of care []?Modify current plan of care as follows:               []?Discharge patient                          Discharge Location:                          Services/Supervision Recommended:       [x]? Patient continues to require treatment by a licensed therapist to address functional deficits as outlined in the established plan of care.        Electronically signed by Gavi Ugarte, Graduate Clinician, on 2/5/2021 at 2:38 PM                        Cosigned by: Maldonado Casanova, SLP at 2/5/2021  2:47 PM   Revision History                          RECORD REVIEW: Previous medical records, medications were reviewed at today's visit    IMPRESSION:   1. S/P lumbar laminectomy-Pain control/mobilzation  2. HTN-on meds monitor  3. DVT prophylaxis-Lovenox  4. Anemia-on iron   5. Neuropathy-on Neurontin  6. DM-on insulin-monitor BS  7. GI-PPI/bowel regimen  8. Hyperlipidemia-on statin  9. Pain control-Norco PRN  11. Lung/cerebellar mass-monitor   12. PT/OT  13.  UTI-complete abx    Add carafate / zofran TID before meals    CT soft tissue neck- hi grade stenosis of left proximal internal carotid   Carotid US <50% bilateral carotids    ELOS pending        continue present care    Expected duration and frequency therapy: 180 minutes per day, 5 days per week    CALL WITH ANY QUESTIONS  [phone] CELL  Dr Jasper Haddad

## 2021-02-08 NOTE — PROGRESS NOTES
Pt read a short passage in therapy (4 paragraphs) and was asked questions pertaining to the passage. Of the 3 questions asked, 1 targeted short-term memory, 1 targeted working memory, and 1 targeted long-term memory (asking the client to remember a particular time in her life). Pt was able to answer all 3 questions appropriately without cues (100% accuracy). It was also noted patient had minimal difficulty reading the passage. Pt demonstrated greater difficulty with short-term memory than long-term memory during conversation. She was able to recall events involving loved ones from her youth but had difficulty remembering the restaurant she got food from the previous night. Pt was given three words to remember to target immediate memory and short-term memory. Pt was able to recall all three words directly after being given them, but had difficulty recalling 1 of the 3 items 5 minutes after being given them. Pt recalled the 3rd item after receiving a cue. She was asked to provide the words again 15 minutes after being given them, but only remembered 2/3 again. A cue was given, but pt was unable to remember the 3rd item. Recommended Diet and Intervention  Diet Solids Recommendation: Dysphagia minced and moist (Dysphagia II)  Liquid Consistency Recommendation: Thin  Recommended Form of Meds: Whole in applesauce/pudding 1 at a time. Therapeutic Interventions: Oral motor exercises; Tongue base strengthening;Patient/Family education;Effortful swallow;Kathy; Laryngeal exercises; Therapeutic PO trials with SLP;Oral care;Diet tolerance monitoring     Compensatory Swallowing Strategies  Compensatory Swallowing Strategies: No straws;Upright as possible for all oral intake;Remain upright for 30-45 minutes after meals    SHORT TERM GOAL #1: Goal 1: The patient will demonstrate recall of functional information following a/an (immediate, shortterm,long-term) delay with min cues in order to increase functional integration into environment. SHORT TERM GOAL #2:  Goal 2: The patient will complete executive function tasks (planning, self-monitoring, organizing, etc) with min verbal cues at 80% accuracy to improve safety awareness with functional ADL's    SHORT TERM GOAL #3:  Goal 3: The patient will demonstrate functional problem solving and safety awareness with min verbal cues at 80% accuracy for daily living tasks in order to increase safe interaction with environment and decreased assistance from caregivers. Swallowing Short Term Goals  Short-term Goals  Goal 1: The patient will tolerate a regular diet wtih thin liquids with minimal overt s/s of aspiration. Goal 2: SLP will return to reassess swallow function and insure safety with all oral intake. Goal 3: SLP will complete full speech/language cognitive evaluation. Goal 4: Patient/staff will complete daily oral hygiene to prevent aspriation of oral bacteria. Goal 5: Patient will complete pharyngeal/laryngeal exercises given verbal prompts and written instructions. Goal 6: Patient will verbalize and demonstrate compensatory swallow strategies 100% of opportunitites.          ASSESSMENT:  Assessment: [x]Progressing towards goals          []Not Progressing towards goals    Patient Tolerance of Treatment:   [x]Tolerated well []Tolerated fair []Required rest breaks []Fatigued    Education:  Learner:  [x]Patient          []Significant Other          []Other  Education provided regarding:  [x]Goals and POC   []Diet and swallowing precautions    []Home Exercise Program  []Progress and/or discharge information  Method of Education:  [x]Discussion          []Demonstration          []Handout          []Other  Evaluation of Education:   [x]Verbalized understanding   []Demonstrates without assistance []Demonstrates with assistance  []Needs further instruction     []No evidence of learning                  []No family present      Plan: [x]Continue with current plan of care    []Modify current plan of care as follows:    []Discharge patient    Discharge Location:    Services/Supervision Recommended:      [x]Patient continues to require treatment by a licensed therapist to address functional deficits as outlined in the established plan of care.     Electronically signed by Christian Donaldson Graduate Clinician on 2/8/21 at 4:07 PM CST

## 2021-02-09 LAB
GLUCOSE BLD-MCNC: 131 MG/DL (ref 70–99)
GLUCOSE BLD-MCNC: 195 MG/DL (ref 70–99)
GLUCOSE BLD-MCNC: 241 MG/DL (ref 70–99)
GLUCOSE BLD-MCNC: 83 MG/DL (ref 70–99)
PERFORMED ON: ABNORMAL
PERFORMED ON: NORMAL

## 2021-02-09 PROCEDURE — 97110 THERAPEUTIC EXERCISES: CPT

## 2021-02-09 PROCEDURE — 97116 GAIT TRAINING THERAPY: CPT

## 2021-02-09 PROCEDURE — 97535 SELF CARE MNGMENT TRAINING: CPT

## 2021-02-09 PROCEDURE — 6360000002 HC RX W HCPCS: Performed by: NEUROLOGICAL SURGERY

## 2021-02-09 PROCEDURE — 82947 ASSAY GLUCOSE BLOOD QUANT: CPT

## 2021-02-09 PROCEDURE — 6370000000 HC RX 637 (ALT 250 FOR IP): Performed by: NEUROLOGICAL SURGERY

## 2021-02-09 PROCEDURE — 92526 ORAL FUNCTION THERAPY: CPT

## 2021-02-09 PROCEDURE — 97129 THER IVNTJ 1ST 15 MIN: CPT

## 2021-02-09 PROCEDURE — 6370000000 HC RX 637 (ALT 250 FOR IP): Performed by: PSYCHIATRY & NEUROLOGY

## 2021-02-09 PROCEDURE — 97530 THERAPEUTIC ACTIVITIES: CPT

## 2021-02-09 PROCEDURE — 1180000000 HC REHAB R&B

## 2021-02-09 PROCEDURE — 97130 THER IVNTJ EA ADDL 15 MIN: CPT

## 2021-02-09 PROCEDURE — 99233 SBSQ HOSP IP/OBS HIGH 50: CPT | Performed by: PSYCHIATRY & NEUROLOGY

## 2021-02-09 RX ADMIN — PANTOPRAZOLE SODIUM 40 MG: 40 TABLET, DELAYED RELEASE ORAL at 06:08

## 2021-02-09 RX ADMIN — SUCRALFATE 1 G: 1 TABLET ORAL at 21:41

## 2021-02-09 RX ADMIN — LISINOPRIL 5 MG: 5 TABLET ORAL at 08:56

## 2021-02-09 RX ADMIN — GABAPENTIN 300 MG: 300 CAPSULE ORAL at 21:41

## 2021-02-09 RX ADMIN — ONDANSETRON 4 MG: 4 TABLET, FILM COATED ORAL at 06:09

## 2021-02-09 RX ADMIN — ENOXAPARIN SODIUM 40 MG: 40 INJECTION SUBCUTANEOUS at 16:16

## 2021-02-09 RX ADMIN — DOCUSATE SODIUM 50 MG AND SENNOSIDES 8.6 MG 1 TABLET: 8.6; 5 TABLET, FILM COATED ORAL at 08:56

## 2021-02-09 RX ADMIN — CYANOCOBALAMIN TAB 500 MCG 500 MCG: 500 TAB at 08:56

## 2021-02-09 RX ADMIN — Medication: at 08:56

## 2021-02-09 RX ADMIN — SUCRALFATE 1 G: 1 TABLET ORAL at 12:22

## 2021-02-09 RX ADMIN — Medication 10 UNITS: at 21:41

## 2021-02-09 RX ADMIN — CETIRIZINE HYDROCHLORIDE 10 MG: 10 TABLET, FILM COATED ORAL at 08:55

## 2021-02-09 RX ADMIN — Medication: at 21:48

## 2021-02-09 RX ADMIN — ONDANSETRON 4 MG: 4 TABLET, FILM COATED ORAL at 16:16

## 2021-02-09 RX ADMIN — SUCRALFATE 1 G: 1 TABLET ORAL at 16:16

## 2021-02-09 RX ADMIN — DOCUSATE SODIUM 50 MG AND SENNOSIDES 8.6 MG 1 TABLET: 8.6; 5 TABLET, FILM COATED ORAL at 21:41

## 2021-02-09 RX ADMIN — INSULIN LISPRO 1 UNITS: 100 INJECTION, SOLUTION INTRAVENOUS; SUBCUTANEOUS at 21:42

## 2021-02-09 RX ADMIN — ROSUVASTATIN CALCIUM 20 MG: 20 TABLET, FILM COATED ORAL at 21:41

## 2021-02-09 RX ADMIN — INSULIN LISPRO 4 UNITS: 100 INJECTION, SOLUTION INTRAVENOUS; SUBCUTANEOUS at 12:23

## 2021-02-09 RX ADMIN — DILTIAZEM HYDROCHLORIDE 240 MG: 240 CAPSULE, COATED, EXTENDED RELEASE ORAL at 08:55

## 2021-02-09 RX ADMIN — ACETAMINOPHEN 650 MG: 325 TABLET ORAL at 21:41

## 2021-02-09 RX ADMIN — SUCRALFATE 1 G: 1 TABLET ORAL at 08:56

## 2021-02-09 RX ADMIN — ONDANSETRON 4 MG: 4 TABLET, FILM COATED ORAL at 12:22

## 2021-02-09 ASSESSMENT — PAIN SCALES - GENERAL: PAINLEVEL_OUTOF10: 0

## 2021-02-09 NOTE — PROGRESS NOTES
[facility]  INPATIENT SPEECH THERAPY  [facility] 8 REHAB UNIT  TIME    1100   1200  60 minutes    [x]Daily Note  []Progress Note    Date: 2021  Patient Name: Iram Ramos        MRN: [id number]    Account #: [de-identified]  : 1933  (80 y.o.)  Gender: female   Primary Provider: Breanna Woody MD  Precautions: fall/aspiration  Swallowing Status/Diet: minced and moist with thin liquids      Subjective:   Pt alert laying partially reclined in bed. Pt verbalized readiness for skilled ST, \"because I don't have to get out of bed. \" Pt was interested in PO intake this date and said ,\"it smells good today. \"     Objective:  Functional recall of swallowing strategies, compensatory swallow techniques, safe swallow and aspiration precautions, and novel swallow instructions were completed. Pt verbalized recall of effortful swallow and postural techniques 100% independently. Pt demonstrated effortful swallow independently over 3 trials. Delayed recall of novel swallow instructions were recalled after 5 minute delay with 50% accuracy, increasing to 100% with verbal cues and a model. Executive functioning targeted with self-monitoring during PO intake, planning for use of compensatory strategies, and organizing use of strategies to increase quality of PO intake. Pt required verbal prompts to self-monitor use of strategies for safe swallowing 100%. Minced and moist trials presented via meal tray. Pt required assistance for meal set-up. Pt required verbal cues for appropriate size bite and use of liquid wash. SLP suggested head turn for mid-end of meal to increase UES residue clearance. Pt consumed 10-20% of meal tray. SHORT TERM GOAL #1:  Goal 1: The patient will demonstrate recall of functional information following a/an (immediate, shortterm,long-term) delay with min cues in order to increase functional integration into environment.  NOT MET    SHORT TERM GOAL #2: Goal 2: The patient will complete executive function tasks (planning, self-monitoring, organizing, etc) with min verbal cues at 80% accuracy to improve safety awareness with functional ADL's NOT MET    SHORT TERM GOAL #3:  Goal 3: The patient will demonstrate functional problem solving and safety awareness with min verbal cues at 80% accuracy for daily living tasks in order to increase safe interaction with environment and decreased assistance from caregivers. MET       Swallowing Short Term Goals  Short-term Goals  Goal 1: The patient will tolerate a regular diet wtih thin liquids with minimal overt s/s of aspiration. NOT MET    Goal 2: SLP will return to reassess swallow function and insure safety with all oral intake. MET    Goal 3: SLP will complete full speech/language cognitive evaluation. MET    Goal 4: Patient/staff will complete daily oral hygiene to prevent aspriation of oral bacteria. NOT MET  Goal 5: Patient will complete pharyngeal/laryngeal exercises given verbal prompts and written instructions. MET    Goal 6: Patient will verbalize and demonstrate compensatory swallow strategies 100% of opportunitites.  NOT MET       ASSESSMENT:  Assessment: [x]Progressing towards goals          []Not Progressing towards goals    Patient Tolerance of Treatment:   []Tolerated well [x]Tolerated fair []Required rest breaks []Fatigued    Education:  Learner:  [x]Patient          []Significant Other          []Other  Education provided regarding:  [x]Goals and POC   [x]Diet and swallowing precautions    []Home Exercise Program  []Progress and/or discharge information  Method of Education:  [x]Discussion          [x]Demonstration          []Handout          []Other  Evaluation of Education:   []Verbalized understanding   [x]Demonstrates without assistance  []Demonstrates with assistance  []Needs further instruction     []No evidence of learning                  []No family present

## 2021-02-09 NOTE — PROGRESS NOTES
Patient:   Crystal Solis  MR#:    [id number]   Room:    0827/827-01   YOB: 1933  Date of Progress Note: 2/9/2021  Time of Note                           8:59 AM  Consulting Physician:   Kade Servin M.D.   Attending Physician:  Kade Servin MD     Chief complaint S/p lumbar laminectomy S:This 80 y.o. female  with history of diabetes mellitus, hyperlipidemia,known lung mass and HTN. Since Dec. 2020 patient has had difficulty walking, left foot drop and back pain. It had worsened to the point that she hasn't been able to walk for the past couple of weakness or care for herself. Her daughter came in December to stay with her. Prior to this patient lived at home independently. She had an MRI done as outpatient that revealed severe spinal stenosis at L3/4 and L4/5. She was referred to neurosurgeon Dr. Tita Naylor, who recommended L3/4 & L4/5 decompressive laminectomy. She was in agreement and was admitted to [facility] on 1/26/21 for surgery. She tolerated the procedure well. She will be required to wear a TLSO brace when out of bed and follow spine precautions. She continues to have significant lower extremity weakness, worse on the left. In regards to her known lung mass, patient has been seen prior to admit by pulmonologist Dr. Irina Mora who plans to a bronchoscopy with biopsy at some point. He had recommended her getting her back surgery and then rehab to get her stronger prior to having the procedure done. Dr. Tita Naylor ordered a MRI of the head d/t her persistent lower extremity weakness and known lung mass. 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 Dr. Tita Naylor felt this would explain balance difficulty but now her LE weakness. Oncology and Palliative Care have been consulted. Patient at this point is stating she doesn't want any chemo or radiation. She is participating in both PT/OT. She is felt to need a stay on Rehab to work towards her goal of returning home with her daughter.  No acute issues overnight.       REVIEW OF SYSTEMS:  Constitutional: No fevers No chills  Neck:No stiffness  Respiratory: No shortness of breath  Cardiovascular: No chest pain No palpitations  Gastrointestinal: No abdominal pain    Genitourinary: No Dysuria  Neurological: No headache, no confusion Past Medical History:      Diagnosis Date    Diabetes mellitus ([facility] [address]     Hyperlipidemia     Hypertension     Palliative care patient 01/29/2021       Past Surgical History:      Procedure Laterality Date    APPENDECTOMY      CHOLECYSTECTOMY      LUMBAR SPINE SURGERY N/A 1/26/2021    LAMINECTOMY L3-4/ L4-5 performed by Kvng Campbell MD at [address], BILATERAL         Medications in Hospital:      Current Facility-Administered Medications:     Venelex ointment, , Topical, BID, Ebenezer Huber MD, Given at 02/09/21 0856    sucralfate (CARAFATE) tablet 1 g, 1 g, Oral, 4x Daily WC, Ebenezer Huber MD, 1 g at 02/09/21 0856    ondansetron (ZOFRAN) tablet 4 mg, 4 mg, Oral, TID AC, Ebenezer Huber MD, 4 mg at 02/09/21 0609    HYDROcodone-acetaminophen (NORCO)  MG per tablet 1 tablet, 1 tablet, Oral, Q4H PRN, Ebenezer Huber MD, 1 tablet at 02/02/21 2040    simethicone (MYLICON) chewable tablet 80 mg, 80 mg, Oral, Q6H PRN, Kvng Campbell MD, 80 mg at 02/01/21 0738    bisacodyl (DULCOLAX) suppository 10 mg, 10 mg, Rectal, Daily PRN, Kvng Campbell MD    ondansetron (ZOFRAN-ODT) disintegrating tablet 4 mg, 4 mg, Oral, Q8H PRN, 4 mg at 02/01/21 1516 **OR** ondansetron (ZOFRAN) injection 4 mg, 4 mg, Intravenous, Q6H PRN, Kvng Campbell MD    sennosides-docusate sodium (SENOKOT-S) 8.6-50 MG tablet 1 tablet, 1 tablet, Oral, BID, Kvng Campbell MD, 1 tablet at 02/09/21 0856    insulin lispro (HUMALOG) injection vial 4 Units, 0.08 Units/kg, Subcutaneous, TID WC, Kvng Campbell MD, 4 Units at 02/06/21 1229    insulin lispro (HUMALOG) injection vial 0-12 Units, 0-12 Units, Subcutaneous, TID WC, Kvng Campbell MD, 2 Units at 02/06/21 1228    insulin lispro (HUMALOG) injection vial 0-6 Units, 0-6 Units, Subcutaneous, Nightly, Kvng Campbell MD, 1 Units at 02/08/21 2150    cetirizine (ZYRTEC) tablet 10 mg, 10 mg, Oral, Daily, Kvng Campbell MD, 10 mg at 02/09/21 0856 History reviewed. No pertinent family history. PHYSICAL EXAM:  /80   Pulse 68   Temp 96.6 °F (35.9 °C) (Temporal)   Resp 16   Ht 5' (1.524 m)   Wt 112 lb 7 oz (51 kg)   SpO2 94%   BMI 21.96 kg/m²       Constitutional  well developed, well nourished. Eyes  conjunctiva normal.   Ear, nose, throat - No scars, masses, or lesions over external nose or ears, no atrophy of tongue  Neck-symmetric, no masses noted, no jugular vein distension  Respiration- chest wall appears symmetric, good expansion,   normal effort without use of accessory muscles  Musculoskeletal  no significant wasting of muscles noted, no bony deformities  Extremities-no clubbing, cyanosis or edema  Skin  warm, dry, and intact. No rash, erythema, or pallor. Psychiatric  mood, affect, and behavior appear normal.      Neurological exam  Awake, alert, fluent oriented slow  Attention and concentration appear appropriate  Recent and remote memory appears unremarkable  Speech normal without dysarthria  No clear issues with language of fund of knowledge     Cranial Nerve Exam     CN III, IV,VI-EOMI, No nystagmus, conjugate eye movements, no ptosis    CN VII-no facial assymetry       Motor Exam  antigravity throughout upper and lower extremities bilaterally      Tremors- no tremors in hands or head noted     Gait  Not tested      Nursing/pcp notes, imaging,labs and vitals reviewed.      PT,OT and/or speech notes reviewed    Lab Results   Component Value Date    WBC 9.8 02/08/2021    HGB 12.6 02/08/2021    HCT 39.5 02/08/2021    MCV 91.2 02/08/2021     02/08/2021     Lab Results   Component Value Date     02/08/2021    K 3.9 02/08/2021    CL 98 02/08/2021    CO2 31 (H) 02/08/2021    BUN 10 02/08/2021    CREATININE 0.6 02/08/2021    GLUCOSE 74 02/08/2021    CALCIUM 9.6 02/08/2021    PROT 5.3 (L) 02/08/2021    LABALBU 3.4 (L) 02/08/2021    BILITOT 0.3 02/08/2021    ALKPHOS 85 02/08/2021    AST 34 (H) 02/08/2021 predominantly at C5, C6 and C7. Prevertebral soft tissues are normal.   Impression:     No evidence of mass or lymphadenopathy. An atrophic left parotid gland. The etiology is not certain. Atheromatous changes of the carotid arteries bilaterally and a   high-grade stenosis of the left proximal internal carotid artery. Right maxillary sinusitis. Cerebral atrophy. Signed by Dr Frank Luna on 2/3/2021 5:02 PM     [address] [place] [address]   Student Speech and Language Pathologist   Speech Therapy   Progress Notes   Signed   Date of Service:  2021  2:19 PM               Signed             Show:Clear all  [x]Manual[x]Template[x]Copied    Added by:  [x]Mary Luna    []Leisa for details  [facility]  INPATIENT SPEECH THERAPY  [facility] 8 REHAB UNIT  TIME   Time In: 1300  Time Out: 1400  Minutes: 60     [x]? Daily Note  []? Progress Note     Date: 2021  Patient Name: Margarette Bowden        MRN:   [id number]    Account #: [de-identified]  : 1933  (80 y.o.)  Gender: female   Primary Provider: Joy Cox MD  Precautions: Fall/ASpiration  Swallowing Status/Diet: Minced and Moist/Thin Liquids        Subjective:  Patient was seen in her room sitting in an upright position in her bed. Patient was alert and cooperative throughout all therapeutic activities. Upon arrival, when asked how she was feeling, patient reported that she did not feel well due to multiple bowel movements. She stated that she was on a bed pan and needed to get off. Clinician called for a nurse to help assist.      Objective:   Patient completed pharyngeal strengthening exercises in order to improve laryngeal elevation for swallowing functions. Patient did not require any cues in order to follow clinician's instructions.  She completed 8 Kathy exercises this date.     Patient was able to recall her swallowing precautions independently. Patient continues to report decreased appetite. Patient reports continued difficulty with swallow even when she utilizes swallow precautions.      Patient completed an abstract reasoning task with functional picture cards. Patient had to compare and contrast functional items as well as provide the correct category given 4 different pictures. She independently earned a 90% accuracy. Patient independently completed a functional reading task with an 87%. She was given different tv programs and asked to answer related questions in order to target problem solving and working memory.     Recommended patient be upgraded to meds whole in applesauce one at a time if able to tolerate. Patient dislikes crushed meds.        Recommended Diet and Intervention  Diet Solids Recommendation: Dysphagia minced and moist (Dysphagia II)  Liquid Consistency Recommendation: Thin  Recommended Form of Meds: Whole in applesauce/pudding 1 at a time. Therapeutic Interventions: Oral motor exercises; Tongue base strengthening;Patient/Family education;Effortful swallow;Kathy; Laryngeal exercises; Therapeutic PO trials with SLP;Oral care;Diet tolerance monitoring     Compensatory Swallowing Strategies  Compensatory Swallowing Strategies: No straws;Upright as possible for all oral intake;Remain upright for 30-45 minutes after meals     SHORT TERM GOAL #1:  Goal 1: The patient will demonstrate recall of functional information following a/an (immediate, shortterm,long-term) delay with min cues in order to increase functional integration into environment.     SHORT TERM GOAL #2:  Goal 2: The patient will complete executive function tasks (planning, self-monitoring, organizing, etc) with min verbal cues at 80% accuracy to improve safety awareness with functional ADL's     SHORT TERM GOAL #3: Goal 3: The patient will demonstrate functional problem solving and safety awareness with min verbal cues at 80% accuracy for daily living tasks in order to increase safe interaction with environment and decreased assistance from caregivers.        Swallowing Short Term Goals  Short-term Goals  Goal 1: The patient will tolerate a regular diet wtih thin liquids with minimal overt s/s of aspiration. Goal 2: SLP will return to reassess swallow function and insure safety with all oral intake. Goal 3: SLP will complete full speech/language cognitive evaluation. Goal 4: Patient/staff will complete daily oral hygiene to prevent aspriation of oral bacteria. Goal 5: Patient will complete pharyngeal/laryngeal exercises given verbal prompts and written instructions. Goal 6: Patient will verbalize and demonstrate compensatory swallow strategies 100% of opportunitites.        ASSESSMENT:  Assessment: [x]? Progressing towards goals           []? Not Progressing towards goals     Patient Tolerance of Treatment:       [x]? Tolerated well          []? Tolerated fair           []? Required rest breaks          []? Fatigued     Education:  Learner:  [x]? Patient          []? Significant Other          []? Other  Education provided regarding:  [x]? Goals and POC                               [x]? Diet and swallowing precautions                  []?Home Exercise Program                 []?Progress and/or discharge information  Method of Education:  [x]? Discussion          [x]? Demonstration          []? Handout          []? Other  Evaluation of Education:   [x]? Verbalized understanding                           []?Demonstrates without assistance  [x]? Demonstrates with assistance                    []?Needs further instruction                               []?No evidence of learning                              []?No family present                    Plan:   [x]? Continue with current plan of care []?Modify current plan of care as follows:               []?Discharge patient                          Discharge Location:                          Services/Supervision Recommended:       [x]? Patient continues to require treatment by a licensed therapist to address functional deficits as outlined in the established plan of care.        Electronically signed by Tasha Way, Graduate Clinician, on 2/5/2021 at 2:38 PM                        Cosigned by: Lyle Lund, SLP at 2/5/2021  2:47 PM   Revision History                          RECORD REVIEW: Previous medical records, medications were reviewed at today's visit    IMPRESSION:   1. S/P lumbar laminectomy-Pain control/mobilzation  2. HTN-on meds monitor  3. DVT prophylaxis-Lovenox  4. Anemia-on iron   5. Neuropathy-on Neurontin  6. DM-on insulin-monitor BS  7. GI-PPI/bowel regimen  8. Hyperlipidemia-on statin  9. Pain control-Norco PRN  11. Lung/cerebellar mass-monitor   12. PT/OT  13.  UTI-complete abx    Add carafate / Farrah Dun TID before meals    CT soft tissue neck- hi grade stenosis of left proximal internal carotid   Carotid US <50% bilateral carotids      Team conference with PT/OT/speech/nursing/Care coordinator to review in depth patients care and discharge planning    WC 50 ft   Ambulation 0 gait no stairs    ELOS February 20 th       continue present care    Expected duration and frequency therapy: 180 minutes per day, 5 days per week    [address] [facility]  Dr Giovany Lee

## 2021-02-09 NOTE — PROGRESS NOTES
02/09/21 0900   Bed Mobility   Rolling Contact guard assistance  (TO LEFT WITH RAIL)   Supine to Sit Moderate assistance;Minimal assistance  (ASSIST BILAT LES, IMPROVED TRUNK RIGHTING.)   Transfers   Sit to Stand Maximum Assistance; Moderate Assistance  (BILAT UES ON RW)   Stand to sit Moderate Assistance;Minimal Assistance   Bed to Chair Maximum assistance  (STAND PIVOT TO RIGHT. PELVIS POST TO ADELINA. ASST WITH RW)   Ambulation   Ambulation? Yes   More Ambulation? Yes   Ambulation 1   Device Rolling Walker   Assistance Maximum assistance; Moderate assistance   Quality of Gait 4 POINT GAIT, MAXIMUM BLOCKING BILAT KNEES WITH LEFT KNEE BUCKLING AND DECREASED LEFT STANCE TIME. Distance 5 FT   Ambulation 2   Device 2 Rolling Walker   Assistance 2 Moderate assistance   Quality of Gait 2 3 POINT GAIT LEADING WITH LEFT. CONSTANT VCS FOR TECHNIQUE. LEFT HIPD ADDUCTION DURING SWING.  REQUIRED BLOCKING LEFT KNEE FOR RIGHT LE ADVANCMENT. TENDENCY TO FLEX RIGHT KNEE DURING STANCE. POST PELVIC LEAN REQUIRING ANTERIOR FORCE TO CORRECT. Distance 8 FT   Other exercises   Other exercises 1 SITTING BILAT LE HIP FLEX/EXT/ABD/ADD; KNEE EXT. LIMITED FORCE GENERATION BILAT LES. FATIGUED WITH SUCCESSIVE REPS. Conditions Requiring Skilled Therapeutic Intervention   Body structures, Functions, Activity limitations Decreased functional mobility ; Decreased ADL status; Decreased ROM; Decreased strength;Decreased endurance;Decreased balance;Decreased coordination; Increased pain;Decreased posture   Assessment MUCH IMPROVED ORCE THROUGH BILAT LES. BEGAN 3 POINT GAIT TRAINING. REQUIRES ASSIST WITH ADVANCING LEFT LE TO PREVENT ADDUCTION, LEFT KNEE BLOCKING DURING STANCE.

## 2021-02-09 NOTE — PLAN OF CARE
Problem: SAFETY  Goal: Free from accidental physical injury  Outcome: Ongoing  Goal: Free from intentional harm  Outcome: Ongoing     Problem: DAILY CARE  Goal: Daily care needs are met  Outcome: Ongoing     Problem: PAIN  Goal: Patient's pain/discomfort is manageable  Outcome: Ongoing     Problem: SKIN INTEGRITY  Goal: Skin integrity is maintained or improved  Outcome: Ongoing     Problem: KNOWLEDGE DEFICIT  Goal: Patient/S.O. demonstrates understanding of disease process, treatment plan, medications, and discharge instructions.   Outcome: Ongoing     Problem: DISCHARGE BARRIERS  Goal: Patient's continuum of care needs are met  Outcome: Ongoing     Problem: Pain:  Goal: Pain level will decrease  Description: Pain level will decrease  Outcome: Ongoing  Goal: Control of acute pain  Description: Control of acute pain  Outcome: Ongoing  Goal: Control of chronic pain  Description: Control of chronic pain  Outcome: Ongoing     Problem: Falls - Risk of:  Goal: Will remain free from falls  Description: Will remain free from falls  Outcome: Ongoing  Goal: Absence of physical injury  Description: Absence of physical injury  Outcome: Ongoing     Problem: Skin Integrity:  Goal: Will show no infection signs and symptoms  Description: Will show no infection signs and symptoms  Outcome: Ongoing  Goal: Absence of new skin breakdown  Description: Absence of new skin breakdown  Outcome: Ongoing     Problem: Nutrition  Goal: Optimal nutrition therapy  Outcome: Ongoing     Problem: Bleeding:  Goal: Will show no signs and symptoms of excessive bleeding  Description: Will show no signs and symptoms of excessive bleeding  Outcome: Ongoing     Problem: Serum Glucose Level - Abnormal:  Goal: Ability to maintain appropriate glucose levels has stabilized  Description: Ability to maintain appropriate glucose levels has stabilized  Outcome: Ongoing     Problem: Infection - Surgical Site:  Goal: Will show no infection signs and symptoms Description: Will show no infection signs and symptoms  Outcome: Ongoing

## 2021-02-09 NOTE — PROGRESS NOTES
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 has a past medical history of Diabetes mellitus ([facility] [address] Hyperlipidemia, Hypertension, and Palliative care patient. has a past surgical history that includes Appendectomy; Mastectomy, bilateral; Cholecystectomy; and Lumbar spine surgery (N/A, 2021). Restrictions  Restrictions/Precautions  Restrictions/Precautions: Fall Risk, Weight Bearing, Surgical Protocols, Modified Diet  Required Braces or Orthoses?: Yes  Lower Extremity Weight Bearing Restrictions  Right Lower Extremity Weight Bearing: Weight Bearing As Tolerated  Left Lower Extremity Weight Bearing: Weight Bearing As Tolerated  Required Braces or Orthoses  Spinal Other: LSO  Left Lower Extremity Brace: Dee Foot Orthotics  Position Activity Restriction  Spinal Precautions: No Bending, No Lifting, No Twisting  Other position/activity restrictions: AFO in room but has not been sized to patient and it does not fit in current shoe       Objective    Balance  Sitting Balance: Contact guard assistance  Standing Balance:  Moderate assistance(To vertical GB)  Functional Mobility  Functional - Mobility Device: Wheelchair  Activity: Other  Assist Level: Stand by assistance  Functional Mobility Comments: to bathroom     Transfers Sit Pivot Transfers: Maximum assistance  Sit to stand: Maximum assistance  Stand to sit: Maximum assistance  Transfer Comments: w/c>bed        Plan   Plan  Current Treatment Recommendations: Strengthening, Balance Training, Functional Mobility Training, Endurance Training, Safety Education & Training, Patient/Caregiver Education & Training, Equipment Evaluation, Education, & procurement, Self-Care / ADL, Home Management Training       OutComes Score       02/09/21 1000   [address] Needed Dependent   Comment x2 one assist for standing and 2nd person to manage clothing. Finished cleaning up supine in bed. CARE Score 1            02/09/21 1000   Toilet Transfer   Assistance Needed Dependent   Comment x2 for transfer   CARE Score 1             Goals  Short term goals  Time Frame for Short term goals: 1 week  Short term goal 1: Min A with clothing management/hygiene for toileting. Short term goal 2: Mod A with toilet transfers. Short term goal 3: Mod A with LB dressing, using AE. Short term goal 4: Mod A with donning/doffing socks and shoes using AE. Short term goal 5: Min A with bathing hygiene. Short term goal 6: Min A with donning/doffing LSO. Long term goals  Time Frame for Long term goals : 3-4 weeks  Long term goal 1: Min A with clothing management/hygiene for toileting. Long term goal 2: Min A with toilet transfers. Long term goal 3: Min A with LB dressing, using AE. Long term goal 4: Min A with donning/doffing socks and shoes using AE. Long term goal 5: Supervision with bathing hygiene. Long term goals 6: Patient verbalize DME needs. Long term goal 7: Mod A with donning/doffing LSO. Patient Goals   Patient goals : Increase her independence with ADLs.        Therapy Time   Individual Concurrent Group Co-treatment   Time In 1000         Time Out 1100         Minutes 60         Timed Code Treatment Minutes: [address], OT

## 2021-02-09 NOTE — PLAN OF CARE
Problem: SAFETY  Goal: Free from accidental physical injury  2/9/2021 1121 by Be Chavarria RN  Outcome: Ongoing  2/9/2021 0009 by Clive Fernández LPN  Outcome: Ongoing  Goal: Free from intentional harm  2/9/2021 1121 by Be Chavarria RN  Outcome: Ongoing  2/9/2021 0009 by Clive Fernández LPN  Outcome: Ongoing

## 2021-02-10 LAB
GLUCOSE BLD-MCNC: 121 MG/DL (ref 70–99)
GLUCOSE BLD-MCNC: 138 MG/DL (ref 70–99)
GLUCOSE BLD-MCNC: 152 MG/DL (ref 70–99)
GLUCOSE BLD-MCNC: 181 MG/DL (ref 70–99)
PERFORMED ON: ABNORMAL

## 2021-02-10 PROCEDURE — 92526 ORAL FUNCTION THERAPY: CPT

## 2021-02-10 PROCEDURE — 99232 SBSQ HOSP IP/OBS MODERATE 35: CPT | Performed by: PSYCHIATRY & NEUROLOGY

## 2021-02-10 PROCEDURE — 97110 THERAPEUTIC EXERCISES: CPT

## 2021-02-10 PROCEDURE — 97130 THER IVNTJ EA ADDL 15 MIN: CPT

## 2021-02-10 PROCEDURE — 6360000002 HC RX W HCPCS: Performed by: NEUROLOGICAL SURGERY

## 2021-02-10 PROCEDURE — 6370000000 HC RX 637 (ALT 250 FOR IP): Performed by: PSYCHIATRY & NEUROLOGY

## 2021-02-10 PROCEDURE — 97129 THER IVNTJ 1ST 15 MIN: CPT

## 2021-02-10 PROCEDURE — 97535 SELF CARE MNGMENT TRAINING: CPT

## 2021-02-10 PROCEDURE — 97116 GAIT TRAINING THERAPY: CPT

## 2021-02-10 PROCEDURE — 6370000000 HC RX 637 (ALT 250 FOR IP): Performed by: NEUROLOGICAL SURGERY

## 2021-02-10 PROCEDURE — 82947 ASSAY GLUCOSE BLOOD QUANT: CPT

## 2021-02-10 PROCEDURE — 1180000000 HC REHAB R&B

## 2021-02-10 RX ADMIN — GABAPENTIN 300 MG: 300 CAPSULE ORAL at 20:46

## 2021-02-10 RX ADMIN — SUCRALFATE 1 G: 1 TABLET ORAL at 16:28

## 2021-02-10 RX ADMIN — PANTOPRAZOLE SODIUM 40 MG: 40 TABLET, DELAYED RELEASE ORAL at 06:00

## 2021-02-10 RX ADMIN — ONDANSETRON 4 MG: 4 TABLET, FILM COATED ORAL at 16:28

## 2021-02-10 RX ADMIN — DOCUSATE SODIUM 50 MG AND SENNOSIDES 8.6 MG 1 TABLET: 8.6; 5 TABLET, FILM COATED ORAL at 09:08

## 2021-02-10 RX ADMIN — Medication: at 09:13

## 2021-02-10 RX ADMIN — SUCRALFATE 1 G: 1 TABLET ORAL at 20:46

## 2021-02-10 RX ADMIN — SUCRALFATE 1 G: 1 TABLET ORAL at 09:08

## 2021-02-10 RX ADMIN — CYANOCOBALAMIN TAB 500 MCG 500 MCG: 500 TAB at 09:08

## 2021-02-10 RX ADMIN — LISINOPRIL 5 MG: 5 TABLET ORAL at 09:08

## 2021-02-10 RX ADMIN — Medication: at 20:48

## 2021-02-10 RX ADMIN — ONDANSETRON 4 MG: 4 TABLET, FILM COATED ORAL at 12:01

## 2021-02-10 RX ADMIN — Medication 10 UNITS: at 20:48

## 2021-02-10 RX ADMIN — ONDANSETRON 4 MG: 4 TABLET, FILM COATED ORAL at 06:00

## 2021-02-10 RX ADMIN — DILTIAZEM HYDROCHLORIDE 240 MG: 240 CAPSULE, COATED, EXTENDED RELEASE ORAL at 09:08

## 2021-02-10 RX ADMIN — ROSUVASTATIN CALCIUM 20 MG: 20 TABLET, FILM COATED ORAL at 20:46

## 2021-02-10 RX ADMIN — ACETAMINOPHEN 650 MG: 325 TABLET ORAL at 20:47

## 2021-02-10 RX ADMIN — ENOXAPARIN SODIUM 40 MG: 40 INJECTION SUBCUTANEOUS at 16:27

## 2021-02-10 RX ADMIN — SUCRALFATE 1 G: 1 TABLET ORAL at 12:01

## 2021-02-10 ASSESSMENT — PAIN DESCRIPTION - PROGRESSION: CLINICAL_PROGRESSION: GRADUALLY IMPROVING

## 2021-02-10 ASSESSMENT — PAIN DESCRIPTION - DESCRIPTORS: DESCRIPTORS: DISCOMFORT

## 2021-02-10 ASSESSMENT — PAIN DESCRIPTION - FREQUENCY: FREQUENCY: INTERMITTENT

## 2021-02-10 NOTE — PROGRESS NOTES
Speech Therapy  [facility] 8 REHAB UNIT  TIME   11:00  12:00     [x]? Daily Note  []? Progress Note     Date: 02/10/21  Patient Name: Ouida Duane        MRN:  [id number]    Account #: [de-identified]  : 33  (80 y.o.)  Gender: Female   Primary Provider: Denver Croak MD  Precautions: fall/aspiration  Swallowing Status/Diet: minced and moist consistency with thin liquids     Subjective:   Patient considered alert, in wheelchair, upon entry. Patient actively participated during therapeutic activities.      Objective:  Targeted patient's swallowing. With head in straight position, patient attempted 5 reps of effortful swallow 4 times throughout the session. Patient demonstrated ability to complete with audible swallows noted and visible neck tension observed on 0 of opportunities. With head in straight position, patient attempted 5 reps of the Kathy 4 times throughout the session. Patient demonstrated ability to complete with full laryngeal elevation being noted on 12 opportunities. With head in straight position, patient attempted 5 reps of the [place] C 4 times throughout the session. Patient demonstrated ability to complete with sustained laryngeal elevation being observed for 3 seconds on 2 of opportunities. Monitored patient's swallowing function. With minced and moist consistency presentations administered independently, patient exhibited slow oral prep with decreased rotary jaw movement noted. Oral transit of minced and moist consistency primarily measured 1-2 seconds in length and min oral cavity residue was observed post swallows; residue cleared from the mouth with additional dry swallows. Oral transit of thin liquid presentations, administered independently via cup, primarily measured 1-2 seconds in length. Laryngeal elevation during swallow initiation was considered to be sluggish and moderately decreased for swallow airway protection. Even so, no outward S/S penetration/aspiration was noted with any minced and moist consistency presentation or thin liquid presentation administered during treatment session. At this time, would recommend continuation current diet. Also targeted reasoning/problem solving. In structured activity, patient was 80% verbalizing 3 items needed to perform basic activities of daily living at independent level. In structured activity, patient was 90% verbalizing initial steps in performing basic activizes of daily living independently.      SHORT TERM GOAL #1:  Goal 1: The patient will demonstrate recall of functional information following a/an (immediate, short term,long-term) delay with min cues in order to increase functional integration into environment.     SHORT TERM GOAL #2:  Goal 2: The patient will complete executive function tasks (planning, self-monitoring, organizing, etc) with min verbal cues at 80% accuracy to improve safety awareness with functional ADL's      SHORT TERM GOAL #3:  Goal 3: The patient will demonstrate functional problem solving and safety awareness with min verbal cues at 80% accuracy for daily living tasks in order to increase safe interaction with environment and decreased assistance from caregivers.      Swallowing Short Term Goals  Short-term Goals Goal 1: The patient will tolerate a regular diet wtih thin liquids with minimal overt s/s of aspiration.     Goal 2: SLP will return to reassess swallow function and insure safety with all oral intake.     Goal 3: SLP will complete full speech/language cognitive evaluation.      Goal 4: Patient/staff will complete daily oral hygiene to prevent aspriation of oral bacteria. Goal 5: Patient will complete pharyngeal/laryngeal exercises given verbal prompts and written instructions.      Goal 6: Patient will verbalize and demonstrate compensatory swallow strategies 100% of opportunitites.      ASSESSMENT:  Assessment: [x]? Progressing towards goals           []? Not Progressing towards goals     Patient Tolerance of Treatment:       [x]? Tolerated well          []? Tolerated fair           []? Required rest breaks          []? Fatigued     Education:  Learner:  [x]? Patient          []? Significant Other          []? Other  Education provided regarding:  [x]? Goals and POC                               [x]? Diet and swallowing precautions                  []?Home Exercise Program                 []?Progress and/or discharge information  Method of Education:  [x]? Discussion          [x]? Demonstration          []? Handout          []? Other  Evaluation of Education:   []? Verbalized understanding                           []?Demonstrates without assistance  [x]? Demonstrates with assistance                    [x]? Needs further instruction                               []?No evidence of learning                              []?No family present                    Plan:   [x]? Continue with current plan of care                []?Modify current plan of care as follows:               []?Discharge patient                          Discharge Location:                          Services/Supervision Recommended:      [x]?Patient continues to require treatment by a licensed therapist to address functional deficits as outlined in the established plan of care.     Electronically signed by BARBIE Torres on 2/10/2021 at 11:55 AM

## 2021-02-10 NOTE — PROGRESS NOTES
Patient:   Hetal Guardado  MR#:    [id number]   Room:    0827/827-01   YOB: 1933  Date of Progress Note: 2/10/2021  Time of Note                           8:26 AM  Consulting Physician:   Chante García M.D.   Attending Physician:  Chante García MD     Chief complaint S/p lumbar laminectomy S:This 80 y.o. female  with history of diabetes mellitus, hyperlipidemia,known lung mass and HTN. Since Dec. 2020 patient has had difficulty walking, left foot drop and back pain. It had worsened to the point that she hasn't been able to walk for the past couple of weakness or care for herself. Her daughter came in December to stay with her. Prior to this patient lived at home independently. She had an MRI done as outpatient that revealed severe spinal stenosis at L3/4 and L4/5. She was referred to neurosurgeon Dr. Elvia Dao, who recommended L3/4 & L4/5 decompressive laminectomy. She was in agreement and was admitted to [facility] on 1/26/21 for surgery. She tolerated the procedure well. She will be required to wear a TLSO brace when out of bed and follow spine precautions. She continues to have significant lower extremity weakness, worse on the left. In regards to her known lung mass, patient has been seen prior to admit by pulmonologist Dr. Daniele Foreman who plans to a bronchoscopy with biopsy at some point. He had recommended her getting her back surgery and then rehab to get her stronger prior to having the procedure done. Dr. Elvia Dao ordered a MRI of the head d/t her persistent lower extremity weakness and known lung mass. 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 Dr. Elvia Dao felt this would explain balance difficulty but now her LE weakness. Oncology and Palliative Care have been consulted. Patient at this point is stating she doesn't want any chemo or radiation. She is participating in both PT/OT. She is felt to need a stay on Rehab to work towards her goal of returning home with her daughter.  No new issues overnight.       REVIEW OF SYSTEMS:  Constitutional: No fevers No chills  Neck:No stiffness  Respiratory: No shortness of breath  Cardiovascular: No chest pain No palpitations  Gastrointestinal: No abdominal pain    Genitourinary: No Dysuria  Neurological: No headache, no confusion Past Medical History:      Diagnosis Date    Diabetes mellitus ([facility] [address]     Hyperlipidemia     Hypertension     Palliative care patient 01/29/2021       Past Surgical History:      Procedure Laterality Date    APPENDECTOMY      CHOLECYSTECTOMY      LUMBAR SPINE SURGERY N/A 1/26/2021    LAMINECTOMY L3-4/ L4-5 performed by Alex Barlow MD at [address], BILATERAL         Medications in Hospital:      Current Facility-Administered Medications:     Venelex ointment, , Topical, BID, Kade Servin MD, Given at 02/09/21 2148    sucralfate (CARAFATE) tablet 1 g, 1 g, Oral, 4x Daily WC, Kade Servin MD, 1 g at 02/09/21 2141    ondansetron (ZOFRAN) tablet 4 mg, 4 mg, Oral, TID AC, Kade Servin MD, 4 mg at 02/10/21 0600    HYDROcodone-acetaminophen (NORCO)  MG per tablet 1 tablet, 1 tablet, Oral, Q4H PRN, Kade Servin MD, 1 tablet at 02/02/21 2040    simethicone (MYLICON) chewable tablet 80 mg, 80 mg, Oral, Q6H PRN, Alex Barlow MD, 80 mg at 02/01/21 0738    bisacodyl (DULCOLAX) suppository 10 mg, 10 mg, Rectal, Daily PRN, Alex Barlow MD    ondansetron (ZOFRAN-ODT) disintegrating tablet 4 mg, 4 mg, Oral, Q8H PRN, 4 mg at 02/01/21 1516 **OR** ondansetron (ZOFRAN) injection 4 mg, 4 mg, Intravenous, Q6H PRN, Alex Barlow MD    sennosides-docusate sodium (SENOKOT-S) 8.6-50 MG tablet 1 tablet, 1 tablet, Oral, BID, Alex Barlow MD, 1 tablet at 02/09/21 2141    insulin lispro (HUMALOG) injection vial 4 Units, 0.08 Units/kg, Subcutaneous, TID WC, Alex Barlow MD, 4 Units at 02/06/21 1229    insulin lispro (HUMALOG) injection vial 0-12 Units, 0-12 Units, Subcutaneous, TID WC, Alex Barlow MD, 4 Units at 02/09/21 1223    insulin lispro (HUMALOG) injection vial 0-6 Units, 0-6 Units, Subcutaneous, Nightly, Alex Barlow MD, 1 Units at 02/09/21 2142    cetirizine (ZYRTEC) tablet 10 mg, 10 mg, Oral, Daily, Alex aBrlow MD, 10 mg at 02/09/21 0502   cyclobenzaprine (FLEXERIL) tablet 10 mg, 10 mg, Oral, TID PRN, Cyndy Jean MD, 10 mg at 02/03/21 2050    dextrose 5 % solution, 100 mL/hr, Intravenous, PRN, Cyndy Jean MD    dextrose 50 % IV solution, 12.5 g, Intravenous, PRN, Cyndy Jean MD    dilTIAZem (CARDIZEM CD) extended release capsule 240 mg, 240 mg, Oral, Daily, Cyndy Jean MD, 240 mg at 02/09/21 0855    enoxaparin (LOVENOX) injection 40 mg, 40 mg, Subcutaneous, Daily, Cyndy Jean MD, 40 mg at 02/09/21 1616    ferrous sulfate (IRON 325) tablet 325 mg, 325 mg, Oral, Daily with breakfast, Cyndy Jean MD, 325 mg at 02/08/21 0821    gabapentin (NEURONTIN) capsule 300 mg, 300 mg, Oral, Nightly, Cyndy Jean MD, 300 mg at 02/09/21 2141    glucagon (rDNA) injection 1 mg, 1 mg, Intramuscular, PRN, Cyndy Jean MD    glucose (GLUTOSE) 40 % oral gel 15 g, 15 g, Oral, PRN, Cyndy Jean MD    insulin glargine (LANTUS) injection vial 10 Units, 10 Units, Subcutaneous, Nightly, Cyndy Jean MD, 10 Units at 02/09/21 2141    lisinopril (PRINIVIL;ZESTRIL) tablet 5 mg, 5 mg, Oral, Daily, Cyndy Jean MD, 5 mg at 02/09/21 0856    pantoprazole (PROTONIX) tablet 40 mg, 40 mg, Oral, QAM AC, Cyndy Jean MD, 40 mg at 02/10/21 0600    rosuvastatin (CRESTOR) tablet 20 mg, 20 mg, Oral, Nightly, Cyndy Jean MD, 20 mg at 02/09/21 2141    vitamin B-12 (CYANOCOBALAMIN) tablet 500 mcg, 500 mcg, Oral, Daily, Cyndy Jean MD, 500 mcg at 02/09/21 0856    acetaminophen (TYLENOL) tablet 650 mg, 650 mg, Oral, Q4H PRN, Breanna Woody MD, 650 mg at 02/09/21 2141    polyethylene glycol (GLYCOLAX) packet 17 g, 17 g, Oral, Daily PRN, Breanna Woody MD, 17 g at 02/08/21 2122    Allergies: Other    Social History:   TOBACCO:   reports that she quit smoking about 24 years ago. Her smoking use included cigarettes. She has never used smokeless tobacco.  ETOH:   reports no history of alcohol use.     Family History: ALT 28 02/08/2021    LABGLOM >60 02/08/2021    GFRAA >59 02/08/2021     Lab Results   Component Value Date    INR 1.01 01/30/2021    INR 0.93 01/06/2021    PROTIME 13.2 01/30/2021    PROTIME 12.3 01/06/2021       CT SOFT TISSUE NECK W WO CONTRAST [[id number]]    Resulted: 02/03/21 1702    Updated: 02/03/21 1704    Narrative:     Examination. CT SOFT TISSUE NECK W WO CONTRAST 2/3/2021 3:15 PM   History: Swallowing difficulty. DLP: 1213 mGycm. The CT scan of the soft tissues of the neck is performed after   intravenous contrast enhancement. The images are acquired in axial   plane with subsequent reconstruction in coronal and sagittal plane. There is no previous study for comparison. The limited included brain is unremarkable. The visualized   intracranial vessels are unremarkable. There is moderate diffuse   cerebral atrophy. The orbits appear unremarkable. There is a mucous retention cyst in   the right maxillary antrum. The nasopharyngeal soft tissues are normal. The parapharyngeal spaces   are normal.   The left parotid gland is atrophic which may be due to previous   inflammation or surgery? . A normal right parotid gland is seen. Submandibular glands bilaterally are normal.   The oropharyngeal soft tissues are poorly evaluated due to extensive   artifacts from the dental hardware. No discrete mass is seen. No   abnormal enhancement. There is no evidence of superficial or deep cervical lymphadenopathy. The vallecula, the epiglottis, the piriform sinuses, the false and   true vocal cords are normal. The thyroid gland appears normal.   Atheromatous plaques are seen in the common and internal carotid   arteries bilaterally. There is high-grade stenosis of the proximal   left internal carotid artery. There is a 50% stenosis of the proximal   right internal carotid artery.    There are moderate chronic degenerative changes of the cervical spine predominantly at C5, C6 and C7. Prevertebral soft tissues are normal.   Impression:     No evidence of mass or lymphadenopathy. An atrophic left parotid gland. The etiology is not certain. Atheromatous changes of the carotid arteries bilaterally and a   high-grade stenosis of the left proximal internal carotid artery. Right maxillary sinusitis. Cerebral atrophy. Signed by Dr Chanel Barrera on 2/3/2021 5:02 PM     Robel Espinosa, PT   Physical Therapist   Physical Therapy   Progress Notes   Signed   Date of Service:  2/9/2021  9:55 AM               Signed             Show:Clear all  []Manual[x]Template[]Copied    Added by:  [x]Tommy Lopez PT    []Leisa for details       02/09/21 0900   Bed Mobility   Rolling Contact guard assistance  (TO LEFT WITH RAIL)   Supine to Sit Moderate assistance;Minimal assistance  (ASSIST BILAT LES, IMPROVED TRUNK RIGHTING.)   Transfers   Sit to Stand Maximum Assistance; Moderate Assistance  (BILAT UES ON RW)   Stand to sit Moderate Assistance;Minimal Assistance   Bed to Chair Maximum assistance  (STAND PIVOT TO RIGHT. PELVIS POST TO ADELINA. ASST WITH RW)   Ambulation   Ambulation? Yes   More Ambulation? Yes   Ambulation 1   Device Rolling Walker   Assistance Maximum assistance; Moderate assistance   Quality of Gait 4 POINT GAIT, MAXIMUM BLOCKING BILAT KNEES WITH LEFT KNEE BUCKLING AND DECREASED LEFT STANCE TIME. Distance 5 FT   Ambulation 2   Device 2 Rolling Walker   Assistance 2 Moderate assistance   Quality of Gait 2 3 POINT GAIT LEADING WITH LEFT. CONSTANT VCS FOR TECHNIQUE. LEFT HIPD ADDUCTION DURING SWING.  REQUIRED BLOCKING LEFT KNEE FOR RIGHT LE ADVANCMENT. TENDENCY TO FLEX RIGHT KNEE DURING STANCE. POST PELVIC LEAN REQUIRING ANTERIOR FORCE TO CORRECT. Distance 8 FT   Other exercises   Other exercises 1 SITTING BILAT LE HIP FLEX/EXT/ABD/ADD; KNEE EXT. LIMITED FORCE GENERATION BILAT LES. FATIGUED WITH SUCCESSIVE REPS. Conditions Requiring Skilled Therapeutic Intervention   Body structures, Functions, Activity limitations Decreased functional mobility ; Decreased ADL status; Decreased ROM; Decreased strength;Decreased endurance;Decreased balance;Decreased coordination; Increased pain;Decreased posture   Assessment MUCH IMPROVED ORCE THROUGH BILAT LES. BEGAN 3 POINT GAIT TRAINING. REQUIRES ASSIST WITH ADVANCING LEFT LE TO PREVENT ADDUCTION, LEFT KNEE BLOCKING DURING STANCE.                       RECORD REVIEW: Previous medical records, medications were reviewed at today's visit    IMPRESSION:   1. S/P lumbar laminectomy-Pain control/mobilzation  2. HTN-on meds monitor  3. DVT prophylaxis-Lovenox  4. Anemia-on iron   5. Neuropathy-on Neurontin  6. DM-on insulin-monitor BS  7. GI-PPI/bowel regimen  8. Hyperlipidemia-on statin  9. Pain control-Norco PRN  11. Lung/cerebellar mass-monitor   12. PT/OT  13.  UTI-complete abx    Add carafate / zofran TID before meals    CT soft tissue neck- hi grade stenosis of left proximal internal carotid   Carotid US <50% bilateral carotids      WOLF February 20 th       continue current care    Expected duration and frequency therapy: 180 minutes per day, 5 days per week    [address] [facility]  [phone] CELL  Dr Mario Alberto Liriano

## 2021-02-10 NOTE — PLAN OF CARE
Nutrition Problem #1: Moderate malnutrition  Intervention: Food and/or Nutrient Delivery: Continue Current Diet, Modify Oral Nutrition Supplement  Nutritional Goals: Pt will consume >50% of meals and ONS and maintain wt.

## 2021-02-10 NOTE — PLAN OF CARE
Problem: SAFETY  Goal: Free from accidental physical injury  2/10/2021 1126 by Negin Zapata RN  Outcome: Ongoing  2/10/2021 0049 by Pretty Pineda LPN  Outcome: Ongoing  Goal: Free from intentional harm  2/10/2021 1126 by Negin Zapata RN  Outcome: Ongoing  2/10/2021 0049 by Pretty Pineda LPN  Outcome: Ongoing

## 2021-02-10 NOTE — PROGRESS NOTES
02/10/21 1000   Transfers   Sit to Stand Moderate Assistance;Minimal Assistance  (PULL TO STAND PARALLEL BARS)   Stand to sit Minimal Assistance;Contact guard assistance   Ambulation 1   Device Parallel Bars   Assistance Maximum assistance; Moderate assistance   Quality of Gait CONTINUED DECREASED KINESTHETIC AWARENESS OF BILAT KNEES, PELVIC POSITION. TENDENCY TOWARDS MIANTAINING KNEE FLEXTION BILAT DURING STANCE. POST WEIGHT DISPLACEMENT REQURING ANTERIOR FORCE AT PELVIS TO CORRECT. PROPER STEP TO APPROX 25% OF TIME. Distance 8 FT   Comments RECOMMEND PARTIAL STEP THROUGH TO ADDRESS POST LEAN. Ambulation 2   Device 2 Parallel Bars   Assistance 2 Maximum assistance   Quality of Gait 2 attempted partial step through gait. during left le advancment patient flexed right knee causing fall which was arrested by therapist with max assist.  sat in wheelchair. Distance 1 step   Other exercises   Other exercises 1 back to room for brief change in alejo steady. sit to stand x3 for clean up. Conditions Requiring Skilled Therapeutic Intervention   Body structures, Functions, Activity limitations Decreased functional mobility ; Decreased ADL status; Decreased ROM; Decreased strength;Decreased endurance;Decreased balance;Decreased coordination; Increased pain;Decreased posture   Assessment right knee buckle during gait causing fall which was arrested by therapist.  blocking of bilat knees required during gait.

## 2021-02-10 NOTE — PROGRESS NOTES
Nutrition Assessment     Type and Reason for Visit: Reassess    Nutrition Recommendations/Plan: Modify ONS from TID to BID per pt request.     Nutrition Assessment:  Pt continues to be at risk nutritionally AEB poor po intake and sustained wt loss (9%) since admission. Pt drinking <50% ONS and requests less frequency of those. Will reduce ONS from TID to BID. Encouraged improved po intake. Malnutrition Assessment:  Malnutrition Status: Moderate malnutrition    Current Nutrition Therapies:    Dietary Nutrition Supplements: Diabetic Oral Supplement  DIET CARB CONTROL; Carb Control: 4 carb choices (60 gms)/meal; Dysphagia Minced and Moist    Anthropometric Measures:  · Height: 5' (152.4 cm)  · Current Body Wt: 110 lb (49.9 kg)   · BMI: 21.5    Nutrition Diagnosis:   · Moderate malnutrition related to acute injury/trauma as evidenced by weight loss greater than or equal to 2% in 1 week, intake 26-50%, intake 0-25%    Nutrition Interventions:   Food and/or Nutrient Delivery:  Continue Current Diet, Modify Oral Nutrition Supplement  Nutrition Education/Counseling:  No recommendation at this time   Coordination of Nutrition Care:  Continue to monitor while inpatient    Goals:  Pt will consume >50% of meals and ONS and maintain wt.        Nutrition Monitoring and Evaluation:   Behavioral-Environmental Outcomes:  None Identified   Food/Nutrient Intake Outcomes:  Food and Nutrient Intake, Supplement Intake  Physical Signs/Symptoms Outcomes:  Biochemical Data, Chewing or Swallowing, Nutrition Focused Physical Findings, Skin, Weight       Electronically signed by Rachelle Molina MS, RD, LD on 2/10/21 at 1:12 PM CST    Contact: [phone]

## 2021-02-10 NOTE — PLAN OF CARE
Problem: SAFETY  Goal: Free from accidental physical injury  2/10/2021 0049 by Conor Alatorre LPN  Outcome: Ongoing  2/9/2021 1121 by Cristina Vanegas RN  Outcome: Ongoing  Goal: Free from intentional harm  2/10/2021 0049 by Conor Alatorre LPN  Outcome: Ongoing  2/9/2021 1121 by Cristina Vanegas RN  Outcome: Ongoing     Problem: DAILY CARE  Goal: Daily care needs are met  2/10/2021 0049 by Conor Alatorre LPN  Outcome: Ongoing  2/9/2021 1121 by Cristina Vanegas RN  Outcome: Ongoing     Problem: PAIN  Goal: Patient's pain/discomfort is manageable  2/10/2021 0049 by Conor Alatorre LPN  Outcome: Ongoing  2/9/2021 1121 by Cristina Vanegas RN  Outcome: Ongoing     Problem: SKIN INTEGRITY  Goal: Skin integrity is maintained or improved  2/10/2021 0049 by Conor Alatorre LPN  Outcome: Ongoing  2/9/2021 1121 by Cristina Vanegas RN  Outcome: Ongoing     Problem: KNOWLEDGE DEFICIT  Goal: Patient/S.O. demonstrates understanding of disease process, treatment plan, medications, and discharge instructions.   2/10/2021 0049 by Conor Alatorre LPN  Outcome: Ongoing  2/9/2021 1121 by Cristina Vanegas RN  Outcome: Ongoing     Problem: DISCHARGE BARRIERS  Goal: Patient's continuum of care needs are met  2/10/2021 0049 by Conor Alatorre LPN  Outcome: Ongoing  2/9/2021 1121 by Cristina Vanegas RN  Outcome: Ongoing     Problem: Pain:  Goal: Pain level will decrease  Description: Pain level will decrease  2/10/2021 0049 by Cnoor Alatorre LPN  Outcome: Ongoing  2/9/2021 1121 by Cristina Vanegas RN  Outcome: Ongoing  Goal: Control of acute pain  Description: Control of acute pain  2/10/2021 0049 by Conor Alatorre LPN  Outcome: Ongoing  2/9/2021 1121 by Cristina Vanegas RN  Outcome: Ongoing  Goal: Control of chronic pain  Description: Control of chronic pain  2/10/2021 0049 by Conor Alatorre LPN  Outcome: Ongoing  2/9/2021 1121 by Cristina Sprinkle, RN  Outcome: Ongoing     Problem: Falls - Risk of:  Goal: Will remain free from falls Description: Will remain free from falls  2/10/2021 0049 by Neil Ball LPN  Outcome: Ongoing  2/9/2021 1121 by Sidra Andujar RN  Outcome: Ongoing  Goal: Absence of physical injury  Description: Absence of physical injury  2/10/2021 0049 by Neil Ball LPN  Outcome: Ongoing  2/9/2021 1121 by Sidra Andujar RN  Outcome: Ongoing     Problem: Skin Integrity:  Goal: Will show no infection signs and symptoms  Description: Will show no infection signs and symptoms  2/10/2021 0049 by Neil Ball LPN  Outcome: Ongoing  2/9/2021 1121 by Sidra Andujar RN  Outcome: Ongoing  Goal: Absence of new skin breakdown  Description: Absence of new skin breakdown  2/10/2021 0049 by Neil Ball LPN  Outcome: Ongoing  2/9/2021 1121 by Sidra Andujar RN  Outcome: Ongoing     Problem: Nutrition  Goal: Optimal nutrition therapy  2/10/2021 0049 by Neil Ball LPN  Outcome: Ongoing  2/9/2021 1121 by Sidra Andujar RN  Outcome: Ongoing     Problem: Bleeding:  Goal: Will show no signs and symptoms of excessive bleeding  Description: Will show no signs and symptoms of excessive bleeding  2/10/2021 0049 by Neil Ball LPN  Outcome: Ongoing  2/9/2021 1121 by Sidra Andujar RN  Outcome: Ongoing     Problem: Serum Glucose Level - Abnormal:  Goal: Ability to maintain appropriate glucose levels has stabilized  Description: Ability to maintain appropriate glucose levels has stabilized  2/10/2021 0049 by Neil Ball LPN  Outcome: Ongoing  2/9/2021 1121 by Sidra Andujar RN  Outcome: Ongoing     Problem: Infection - Surgical Site:  Goal: Will show no infection signs and symptoms  Description: Will show no infection signs and symptoms  2/10/2021 0049 by Neil Ball LPN  Outcome: Ongoing  2/9/2021 1121 by Sidra Andujar RN  Outcome: Ongoing

## 2021-02-10 NOTE — PROGRESS NOTES
Occupational Therapy  Facility/Department: [facility] 8 REHAB UNIT  Daily Treatment Note  NAME: Rima Armendariz  : 1933  MRN: [id number]    Date of Service: 2/10/2021    Discharge Recommendations:  Continue to assess pending progress       Assessment     Decreased functional mobility ; Decreased strength; Decreased endurance; Decreased balance; Decreased high-level IADLs  Safety Devices  Safety Devices in place: Yes  Type of devices: Call light within reach; Chair alarm in place         Patient Diagnosis(es): There were no encounter diagnoses. has a past medical history of Diabetes mellitus ([facility] [address] Hyperlipidemia, Hypertension, and Palliative care patient. has a past surgical history that includes Appendectomy; Mastectomy, bilateral; Cholecystectomy; and Lumbar spine surgery (N/A, 2021). Restrictions  Restrictions/Precautions  Restrictions/Precautions: Fall Risk, Weight Bearing, Surgical Protocols, Modified Diet  Required Braces or Orthoses?: Yes  Lower Extremity Weight Bearing Restrictions  Right Lower Extremity Weight Bearing: Weight Bearing As Tolerated  Left Lower Extremity Weight Bearing: Weight Bearing As Tolerated  Required Braces or Orthoses  Spinal Other: LSO  Left Lower Extremity Brace: Dee Foot Orthotics  Position Activity Restriction  Spinal Precautions: No Bending, No Lifting, No Twisting  Other position/activity restrictions: AFO in room but has not been sized to patient and it does not fit in current shoe  Subjective   General  Chart Reviewed: Yes  Patient assessed for rehabilitation services?: Yes  Response to previous treatment: Patient with no complaints from previous session  Family / Caregiver Present: No  Diagnosis: L3-L5 decompressive laminectomy  Subjective  Subjective: \"I am really dizzy. \"  Vital Signs  Pulse: 87  BP: 109/69  Oxygen Therapy  SpO2: 93 %  Pulse Oximeter Device Mode: Intermittent  Pulse Oximeter Device Location: Left;Finger  O2 Device: None (Room air)   Objective Balance  Sitting Balance: Minimal assistance(During ADLs, otherwise CGA)  Standing Balance: Moderate assistance     Transfers  Sit Pivot Transfers: Maximum assistance  Transfer Comments: bed>w/c        Plan   Plan  Current Treatment Recommendations: Strengthening, Balance Training, Functional Mobility Training, Endurance Training, Safety Education & Training, Patient/Caregiver Education & Training, Equipment Evaluation, Education, & procurement, Self-Care / ADL, Home Management Training       OutComes Score       02/10/21 0900   Eating   Assistance Needed Setup or clean-up assistance   CARE Score 5   Oral Hygiene   Assistance Needed Setup or clean-up assistance   CARE Score 5   Shower/Bathe Self   Assistance Needed Substantial/maximal assistance   Comment Min/Mod A for sitting balance at EOB with ADLs   CARE Score 2   Upper Body Dressing   Assistance Needed Substantial/maximal assistance   Comment Min/Mod A for sitting balance at EOB with ADLs, max A with donning/doffing LSO   CARE Score 2   Lower Body Dressing   Assistance Needed Substantial/maximal assistance   CARE Score 2   Putting On/Taking Off Footwear   Assistance Needed Dependent   Comment Unable to use sock aide at EOB due to decreased sitting balance requiring Min-Mod A   CARE Score 1        Goals  Short term goals  Time Frame for Short term goals: 1 week  Short term goal 1: Min A with clothing management/hygiene for toileting. Short term goal 2: Mod A with toilet transfers. Short term goal 3: Mod A with LB dressing, using AE. Short term goal 4: Mod A with donning/doffing socks and shoes using AE. Short term goal 5: Min A with bathing hygiene. Short term goal 6: Min A with donning/doffing LSO. Long term goals  Time Frame for Long term goals : 3-4 weeks  Long term goal 1: Min A with clothing management/hygiene for toileting. Long term goal 2: Min A with toilet transfers. Long term goal 3: Min A with LB dressing, using AE. Long term goal 4: Min A with donning/doffing socks and shoes using AE. Long term goal 5: Supervision with bathing hygiene. Long term goals 6: Patient verbalize DME needs. Long term goal 7: Mod A with donning/doffing LSO. Patient Goals   Patient goals : Increase her independence with ADLs.        Therapy Time   Individual Concurrent Group Co-treatment   Time In 0900         Time Out 1000         Minutes 60         Timed Code Treatment Minutes: [address], OT

## 2021-02-11 LAB
ALBUMIN SERPL-MCNC: 3.5 G/DL (ref 3.5–5.2)
ALP BLD-CCNC: 88 U/L (ref 35–104)
ALT SERPL-CCNC: 31 U/L (ref 5–33)
ANION GAP SERPL CALCULATED.3IONS-SCNC: 10 MMOL/L (ref 7–19)
AST SERPL-CCNC: 26 U/L (ref 5–32)
BASOPHILS ABSOLUTE: 0 K/UL (ref 0–0.2)
BASOPHILS RELATIVE PERCENT: 0.4 % (ref 0–1)
BILIRUB SERPL-MCNC: 0.3 MG/DL (ref 0.2–1.2)
BUN BLDV-MCNC: 9 MG/DL (ref 8–23)
CALCIUM SERPL-MCNC: 9.6 MG/DL (ref 8.8–10.2)
CHLORIDE BLD-SCNC: 99 MMOL/L (ref 98–111)
CO2: 30 MMOL/L (ref 22–29)
CREAT SERPL-MCNC: 0.7 MG/DL (ref 0.5–0.9)
EOSINOPHILS ABSOLUTE: 0.1 K/UL (ref 0–0.6)
EOSINOPHILS RELATIVE PERCENT: 1.4 % (ref 0–5)
GFR AFRICAN AMERICAN: >59
GFR NON-AFRICAN AMERICAN: >60
GLUCOSE BLD-MCNC: 105 MG/DL (ref 74–109)
GLUCOSE BLD-MCNC: 108 MG/DL (ref 70–99)
GLUCOSE BLD-MCNC: 146 MG/DL (ref 70–99)
GLUCOSE BLD-MCNC: 244 MG/DL (ref 70–99)
GLUCOSE BLD-MCNC: 80 MG/DL (ref 70–99)
HCT VFR BLD CALC: 39.1 % (ref 37–47)
HEMOGLOBIN: 12.5 G/DL (ref 12–16)
IMMATURE GRANULOCYTES #: 0.1 K/UL
LYMPHOCYTES ABSOLUTE: 1.7 K/UL (ref 1.1–4.5)
LYMPHOCYTES RELATIVE PERCENT: 23.4 % (ref 20–40)
MCH RBC QN AUTO: 29.1 PG (ref 27–31)
MCHC RBC AUTO-ENTMCNC: 32 G/DL (ref 33–37)
MCV RBC AUTO: 91.1 FL (ref 81–99)
MONOCYTES ABSOLUTE: 0.7 K/UL (ref 0–0.9)
MONOCYTES RELATIVE PERCENT: 9.4 % (ref 0–10)
NEUTROPHILS ABSOLUTE: 4.7 K/UL (ref 1.5–7.5)
NEUTROPHILS RELATIVE PERCENT: 64.6 % (ref 50–65)
PDW BLD-RTO: 14.3 % (ref 11.5–14.5)
PERFORMED ON: ABNORMAL
PERFORMED ON: NORMAL
PLATELET # BLD: 329 K/UL (ref 130–400)
PMV BLD AUTO: 10 FL (ref 9.4–12.3)
POTASSIUM REFLEX MAGNESIUM: 3.9 MMOL/L (ref 3.5–5)
RBC # BLD: 4.29 M/UL (ref 4.2–5.4)
SODIUM BLD-SCNC: 139 MMOL/L (ref 136–145)
TOTAL PROTEIN: 5.5 G/DL (ref 6.6–8.7)
WBC # BLD: 7.2 K/UL (ref 4.8–10.8)

## 2021-02-11 PROCEDURE — 99232 SBSQ HOSP IP/OBS MODERATE 35: CPT | Performed by: PSYCHIATRY & NEUROLOGY

## 2021-02-11 PROCEDURE — 6360000002 HC RX W HCPCS: Performed by: NEUROLOGICAL SURGERY

## 2021-02-11 PROCEDURE — 97130 THER IVNTJ EA ADDL 15 MIN: CPT

## 2021-02-11 PROCEDURE — 80053 COMPREHEN METABOLIC PANEL: CPT

## 2021-02-11 PROCEDURE — 92526 ORAL FUNCTION THERAPY: CPT

## 2021-02-11 PROCEDURE — 1180000000 HC REHAB R&B

## 2021-02-11 PROCEDURE — 6370000000 HC RX 637 (ALT 250 FOR IP): Performed by: NEUROLOGICAL SURGERY

## 2021-02-11 PROCEDURE — 36415 COLL VENOUS BLD VENIPUNCTURE: CPT

## 2021-02-11 PROCEDURE — 97535 SELF CARE MNGMENT TRAINING: CPT

## 2021-02-11 PROCEDURE — 85025 COMPLETE CBC W/AUTO DIFF WBC: CPT

## 2021-02-11 PROCEDURE — 97110 THERAPEUTIC EXERCISES: CPT

## 2021-02-11 PROCEDURE — 82947 ASSAY GLUCOSE BLOOD QUANT: CPT

## 2021-02-11 PROCEDURE — 97129 THER IVNTJ 1ST 15 MIN: CPT

## 2021-02-11 PROCEDURE — 6370000000 HC RX 637 (ALT 250 FOR IP): Performed by: PSYCHIATRY & NEUROLOGY

## 2021-02-11 RX ORDER — SENNA AND DOCUSATE SODIUM 50; 8.6 MG/1; MG/1
1 TABLET, FILM COATED ORAL 2 TIMES DAILY PRN
Status: DISCONTINUED | OUTPATIENT
Start: 2021-02-11 | End: 2021-02-20 | Stop reason: HOSPADM

## 2021-02-11 RX ADMIN — Medication: at 09:08

## 2021-02-11 RX ADMIN — SUCRALFATE 1 G: 1 TABLET ORAL at 21:14

## 2021-02-11 RX ADMIN — PANTOPRAZOLE SODIUM 40 MG: 40 TABLET, DELAYED RELEASE ORAL at 05:34

## 2021-02-11 RX ADMIN — Medication: at 21:18

## 2021-02-11 RX ADMIN — ROSUVASTATIN CALCIUM 20 MG: 20 TABLET, FILM COATED ORAL at 21:14

## 2021-02-11 RX ADMIN — CYANOCOBALAMIN TAB 500 MCG 500 MCG: 500 TAB at 09:05

## 2021-02-11 RX ADMIN — ONDANSETRON 4 MG: 4 TABLET, FILM COATED ORAL at 12:15

## 2021-02-11 RX ADMIN — ENOXAPARIN SODIUM 40 MG: 40 INJECTION SUBCUTANEOUS at 15:41

## 2021-02-11 RX ADMIN — DILTIAZEM HYDROCHLORIDE 240 MG: 240 CAPSULE, COATED, EXTENDED RELEASE ORAL at 09:06

## 2021-02-11 RX ADMIN — LISINOPRIL 5 MG: 5 TABLET ORAL at 09:04

## 2021-02-11 RX ADMIN — CETIRIZINE HYDROCHLORIDE 10 MG: 10 TABLET, FILM COATED ORAL at 09:05

## 2021-02-11 RX ADMIN — GABAPENTIN 300 MG: 300 CAPSULE ORAL at 21:14

## 2021-02-11 RX ADMIN — FERROUS SULFATE TAB 325 MG (65 MG ELEMENTAL FE) 325 MG: 325 (65 FE) TAB at 09:04

## 2021-02-11 RX ADMIN — SUCRALFATE 1 G: 1 TABLET ORAL at 12:14

## 2021-02-11 RX ADMIN — INSULIN LISPRO 4 UNITS: 100 INJECTION, SOLUTION INTRAVENOUS; SUBCUTANEOUS at 12:16

## 2021-02-11 RX ADMIN — SUCRALFATE 1 G: 1 TABLET ORAL at 09:04

## 2021-02-11 RX ADMIN — ONDANSETRON 4 MG: 4 TABLET, FILM COATED ORAL at 05:34

## 2021-02-11 RX ADMIN — INSULIN LISPRO 4 UNITS: 100 INJECTION, SOLUTION INTRAVENOUS; SUBCUTANEOUS at 12:19

## 2021-02-11 RX ADMIN — Medication 10 UNITS: at 21:19

## 2021-02-11 RX ADMIN — SUCRALFATE 1 G: 1 TABLET ORAL at 16:49

## 2021-02-11 NOTE — PROGRESS NOTES
02/11/21 1000   Restrictions/Precautions   Restrictions/Precautions Fall Risk;Weight Bearing;Surgical Protocols; Modified Diet   Required Braces or Orthoses? Yes  (LSO)   Lower Extremity Weight Bearing Restrictions   Right Lower Extremity Weight Bearing Weight Bearing As Tolerated   Left Lower Extremity Weight Bearing Weight Bearing As Tolerated   Required Braces or Orthoses   Spinal Other LSO   Position Activity Restriction   Spinal Precautions No Bending; No Lifting; No Twisting   Oxygen Therapy   O2 Device None (Room air)   Bed Mobility   Rolling Minimal assistance;Contact guard assistance  (BILAT LOG ROLL FOR CLEAN UP/CHANGE, BANDAGE)   Sit to Supine Moderate assistance  (LIFT BLE TO GET INTO BED )   Scooting Moderate assistance;Minimal assistance  (DIFFICULTY SCOOTING LIMITED BY FATIGUE/ENDURANCE)   Comment   (pt HAD BANDAGE DRESSING DURING PT SESSION )   Transfers   Sit to Stand Moderate Assistance  (STS SHOWER ZANDER TO RODOLFO STEADY)   Stand to sit Moderate Assistance  (RODOLFO STEADY TO BED )   Comment TF TO/FROM BATHROOM WITH RODOLFO STEADY AFTER SHOWER    Other exercises   Other exercises 1 SUPINE ANKLE PUMPS X20 EA    Other exercises 2 SUPINE QS AND HEEL SLIDES; X10 EACH   (ASSIST TO INITIATE HEEL SLIDES ON R )   Conditions Requiring Skilled Therapeutic Intervention   Body structures, Functions, Activity limitations Decreased functional mobility ; Decreased ADL status; Decreased ROM; Decreased strength;Decreased endurance;Decreased balance;Decreased coordination; Increased pain;Decreased posture   Assessment IMPROVED ABILITY TO COMPLETE THER EX. IN BED. SKILLED PT REQUIRED TO CONTINUE TO ADDRESS TRANSFERS, BED MOBILITY, AND ENDURANCE TO PARTICIPATE IN PT SESSIONS   Activity Tolerance   Activity Tolerance Patient limited by fatigue;Patient limited by endurance  (FATIGUED PROCEEDING SHOWER )   Safety Devices   Type of devices Bed alarm in place;Call light within reach; Left in bed (SLP SAW pt IN ROOM FOLLOWING PT SESSION )

## 2021-02-11 NOTE — PROGRESS NOTES
Occupational Therapy  Facility/Department: [facility] 8 REHAB UNIT  Daily Treatment Note  NAME: Rima Gomez  : 1933  MRN: [id number]    Date of Service: 2021    Discharge Recommendations:  Continue to assess pending progress       Assessment      Safety Devices  Safety Devices in place: Yes(Left with PT)  Type of devices: Left in chair         Patient Diagnosis(es): There were no encounter diagnoses. has a past medical history of Diabetes mellitus ([facility] [address] Hyperlipidemia, Hypertension, and Palliative care patient. has a past surgical history that includes Appendectomy; Mastectomy, bilateral; Cholecystectomy; and Lumbar spine surgery (N/A, 2021). Restrictions  Restrictions/Precautions  Restrictions/Precautions: Fall Risk, Weight Bearing, Surgical Protocols, Modified Diet  Required Braces or Orthoses?: Yes(LSO)  Lower Extremity Weight Bearing Restrictions  Right Lower Extremity Weight Bearing: Weight Bearing As Tolerated  Left Lower Extremity Weight Bearing: Weight Bearing As Tolerated  Required Braces or Orthoses  Spinal Other: LSO  Left Lower Extremity Brace: Dee Foot Orthotics  Position Activity Restriction  Spinal Precautions: No Bending, No Lifting, No Twisting  Other position/activity restrictions: AFO in room but has not been sized to patient and it does not fit in current shoe       Objective             Balance  Sitting Balance: Minimal assistance  Standing Balance:  Moderate assistance     Transfers  Sit Pivot Transfers: Maximum assistance  Transfer Comments: bed>shower [place]           Plan   Plan  Current Treatment Recommendations: Strengthening, Balance Training, Functional Mobility Training, Endurance Training, Safety Education & Training, Patient/Caregiver Education & Training, Equipment Evaluation, Education, & procurement, Self-Care / ADL, Home Management Training  OutComes Score       21 0900   [id number] [facility] Needed Dependent   CARE Score 1   Shower/Bathe Self Assistance Needed Partial/moderate assistance   Comment Mod A, shower   CARE Score 3   Upper Body Dressing   Assistance Needed Substantial/maximal assistance   CARE Score 2   Lower Body Dressing   Assistance Needed Dependent   CARE Score 1   Putting On/Taking Off Footwear   Assistance Needed Dependent   CARE Score 1          02/11/21 0900   Toilet Transfer   Assistance Needed Dependent   CARE Score 1     Goals  Short term goals  Time Frame for Short term goals: 1 week  Short term goal 1: Min A with clothing management/hygiene for toileting. Short term goal 2: Mod A with toilet transfers. Short term goal 3: Mod A with LB dressing, using AE. Short term goal 4: Mod A with donning/doffing socks and shoes using AE. Short term goal 5: Min A with bathing hygiene. Short term goal 6: Min A with donning/doffing LSO. Long term goals  Time Frame for Long term goals : 3-4 weeks  Long term goal 1: Min A with clothing management/hygiene for toileting. Long term goal 2: Min A with toilet transfers. Long term goal 3: Min A with LB dressing, using AE. Long term goal 4: Min A with donning/doffing socks and shoes using AE. Long term goal 5: Supervision with bathing hygiene. Long term goals 6: Patient verbalize DME needs. Long term goal 7: Mod A with donning/doffing LSO. Patient Goals   Patient goals : Increase her independence with ADLs.        Therapy Time   Individual Concurrent Group Co-treatment   Time In 0900         Time Out 1000         Minutes 60         Timed Code Treatment Minutes: [address] OT

## 2021-02-11 NOTE — PROGRESS NOTES
Patient:   Margarette Bowden  MR#:    [id number]   Room:    0827/827-01   YOB: 1933  Date of Progress Note: 2/11/2021  Time of Note                           12:54 PM  Consulting Physician:   Joy Cox M.D.   Attending Physician:  Joy Cox MD     Chief complaint S/p lumbar laminectomy S:This 80 y.o. female  with history of diabetes mellitus, hyperlipidemia,known lung mass and HTN. Since Dec. 2020 patient has had difficulty walking, left foot drop and back pain. It had worsened to the point that she hasn't been able to walk for the past couple of weakness or care for herself. Her daughter came in December to stay with her. Prior to this patient lived at home independently. She had an MRI done as outpatient that revealed severe spinal stenosis at L3/4 and L4/5. She was referred to neurosurgeon Dr. Minoo Yeh, who recommended L3/4 & L4/5 decompressive laminectomy. She was in agreement and was admitted to [facility] on 1/26/21 for surgery. She tolerated the procedure well. She will be required to wear a TLSO brace when out of bed and follow spine precautions. She continues to have significant lower extremity weakness, worse on the left. In regards to her known lung mass, patient has been seen prior to admit by pulmonologist Dr. Jarod Bullock who plans to a bronchoscopy with biopsy at some point. He had recommended her getting her back surgery and then rehab to get her stronger prior to having the procedure done. Dr. Minoo Yeh ordered a MRI of the head d/t her persistent lower extremity weakness and known lung mass. 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 Dr. Minoo Yeh felt this would explain balance difficulty but now her LE weakness. Oncology and Palliative Care have been consulted. Patient at this point is stating she doesn't want any chemo or radiation. She is participating in both PT/OT. She is felt to need a stay on Rehab to work towards her goal of returning home with her daughter.  No acute issues overnight.       REVIEW OF SYSTEMS:  Constitutional: No fevers No chills  Neck:No stiffness  Respiratory: No shortness of breath  Cardiovascular: No chest pain No palpitations  Gastrointestinal: No abdominal pain    Genitourinary: No Dysuria  Neurological: No headache, no confusion Past Medical History:      Diagnosis Date    Diabetes mellitus ([facility] [address]     Hyperlipidemia     Hypertension     Palliative care patient 01/29/2021       Past Surgical History:      Procedure Laterality Date    APPENDECTOMY      CHOLECYSTECTOMY      LUMBAR SPINE SURGERY N/A 1/26/2021    LAMINECTOMY L3-4/ L4-5 performed by Jeniffer Joaquin MD at [facility], BILATERAL         Medications in Hospital:      Current Facility-Administered Medications:     Venelex ointment, , Topical, BID, Wicho Duncan MD, Given at 02/11/21 0908    sucralfate (CARAFATE) tablet 1 g, 1 g, Oral, 4x Daily WC, Wicho Duncan MD, 1 g at 02/11/21 1214    ondansetron (ZOFRAN) tablet 4 mg, 4 mg, Oral, TID AC, Wicho Duncan MD, 4 mg at 02/11/21 1215    HYDROcodone-acetaminophen (NORCO)  MG per tablet 1 tablet, 1 tablet, Oral, Q4H PRN, Wicho Duncan MD, 1 tablet at 02/02/21 2040    simethicone (MYLICON) chewable tablet 80 mg, 80 mg, Oral, Q6H PRN, Jeniffer Joaquin MD, 80 mg at 02/01/21 0738    bisacodyl (DULCOLAX) suppository 10 mg, 10 mg, Rectal, Daily PRN, Jeniffer Joaquin MD    ondansetron (ZOFRAN-ODT) disintegrating tablet 4 mg, 4 mg, Oral, Q8H PRN, 4 mg at 02/01/21 1516 **OR** ondansetron (ZOFRAN) injection 4 mg, 4 mg, Intravenous, Q6H PRN, Jeniffer Joaquin MD    sennosides-docusate sodium (SENOKOT-S) 8.6-50 MG tablet 1 tablet, 1 tablet, Oral, BID, Jeniffer Joaquin MD, 1 tablet at 02/10/21 0908    insulin lispro (HUMALOG) injection vial 4 Units, 0.08 Units/kg, Subcutaneous, TID , Jeniffer Joaquin MD, 4 Units at 02/11/21 1216    insulin lispro (HUMALOG) injection vial 0-12 Units, 0-12 Units, Subcutaneous, TID , Jeniffer Joaquin MD, 4 Units at 02/11/21 1219    insulin lispro (HUMALOG) injection vial 0-6 Units, 0-6 Units, Subcutaneous, Nightly, Jeniffer Joaquin MD, 1 Units at 02/09/21 2744    cetirizine (ZYRTEC) tablet 10 mg, 10 mg, Oral, Daily, Jeniffer Joaquin MD, 10 mg at 02/11/21 4601   cyclobenzaprine (FLEXERIL) tablet 10 mg, 10 mg, Oral, TID PRN, Joyce Acosta MD, 10 mg at 02/03/21 2050    dextrose 5 % solution, 100 mL/hr, Intravenous, PRN, Joyce Acosta MD    dextrose 50 % IV solution, 12.5 g, Intravenous, PRN, Joyce Acosta MD    dilTIAZem (CARDIZEM CD) extended release capsule 240 mg, 240 mg, Oral, Daily, Joyce Acosta MD, 240 mg at 02/11/21 0906    enoxaparin (LOVENOX) injection 40 mg, 40 mg, Subcutaneous, Daily, Joyce Acosta MD, 40 mg at 02/10/21 1627    ferrous sulfate (IRON 325) tablet 325 mg, 325 mg, Oral, Daily with breakfast, Joyce Acosta MD, 325 mg at 02/11/21 7791    gabapentin (NEURONTIN) capsule 300 mg, 300 mg, Oral, Nightly, Joyce Acosta MD, 300 mg at 02/10/21 2046    glucagon (rDNA) injection 1 mg, 1 mg, Intramuscular, PRN, Joyce Acosta MD    glucose (GLUTOSE) 40 % oral gel 15 g, 15 g, Oral, PRN, Joyce Acosta MD    insulin glargine (LANTUS) injection vial 10 Units, 10 Units, Subcutaneous, Nightly, Joyce Acosta MD, 10 Units at 02/10/21 2048    lisinopril (PRINIVIL;ZESTRIL) tablet 5 mg, 5 mg, Oral, Daily, Joyce Acosta MD, 5 mg at 02/11/21 0904    pantoprazole (PROTONIX) tablet 40 mg, 40 mg, Oral, QAM AC, Joyce Acosta MD, 40 mg at 02/11/21 0534    rosuvastatin (CRESTOR) tablet 20 mg, 20 mg, Oral, Nightly, Joyce Acosta MD, 20 mg at 02/10/21 2046    vitamin B-12 (CYANOCOBALAMIN) tablet 500 mcg, 500 mcg, Oral, Daily, Joyce Acosta MD, 500 mcg at 02/11/21 0905    acetaminophen (TYLENOL) tablet 650 mg, 650 mg, Oral, Q4H PRN, Carmen Marie MD, 650 mg at 02/10/21 2047    polyethylene glycol (GLYCOLAX) packet 17 g, 17 g, Oral, Daily PRN, Carmen Marie MD, 17 g at 02/08/21 2122    Allergies: Other    Social History:   TOBACCO:   reports that she quit smoking about 24 years ago. Her smoking use included cigarettes. She has never used smokeless tobacco.  ETOH:   reports no history of alcohol use.     Family History: History reviewed. No pertinent family history. PHYSICAL EXAM:  /70   Pulse 72   Temp 97 °F (36.1 °C) (Temporal)   Resp 18   Ht 5' (1.524 m)   Wt 110 lb 5 oz (50 kg)   SpO2 95%   BMI 21.54 kg/m²       Constitutional  well developed, well nourished. Eyes  conjunctiva normal.   Ear, nose, throat - No scars, masses, or lesions over external nose or ears, no atrophy of tongue  Neck-symmetric, no masses noted, no jugular vein distension  Respiration- chest wall appears symmetric, good expansion,   normal effort without use of accessory muscles  Musculoskeletal  no significant wasting of muscles noted, no bony deformities  Extremities-no clubbing, cyanosis or edema  Skin  warm, dry, and intact. No rash, erythema, or pallor. Psychiatric  mood, affect, and behavior appear normal.      Neurological exam  Awake, alert, fluent oriented slow  Attention and concentration appear appropriate  Recent and remote memory appears unremarkable  Speech normal without dysarthria  No clear issues with language of fund of knowledge     Cranial Nerve Exam     CN III, IV,VI-EOMI, No nystagmus, conjugate eye movements, no ptosis    CN VII-no facial assymetry       Motor Exam  antigravity throughout upper and lower extremities bilaterally      Tremors- no tremors in hands or head noted     Gait  Not tested      Nursing/pcp notes, imaging,labs and vitals reviewed.      PT,OT and/or speech notes reviewed    Lab Results   Component Value Date    WBC 7.2 02/11/2021    HGB 12.5 02/11/2021    HCT 39.1 02/11/2021    MCV 91.1 02/11/2021     02/11/2021     Lab Results   Component Value Date     02/11/2021    K 3.9 02/11/2021    CL 99 02/11/2021    CO2 30 (H) 02/11/2021    BUN 9 02/11/2021    CREATININE 0.7 02/11/2021    GLUCOSE 105 02/11/2021    CALCIUM 9.6 02/11/2021    PROT 5.5 (L) 02/11/2021    LABALBU 3.5 02/11/2021    BILITOT 0.3 02/11/2021    ALKPHOS 88 02/11/2021    AST 26 02/11/2021    ALT 31 02/11/2021 LABGLOM >60 02/11/2021    GFRAA >59 02/11/2021     Lab Results   Component Value Date    INR 1.01 01/30/2021    INR 0.93 01/06/2021    PROTIME 13.2 01/30/2021    PROTIME 12.3 01/06/2021       CT SOFT TISSUE NECK W WO CONTRAST [[id number]]    Resulted: 02/03/21 1702    Updated: 02/03/21 1704    Narrative:     Examination. CT SOFT TISSUE NECK W WO CONTRAST 2/3/2021 3:15 PM   History: Swallowing difficulty. DLP: 1213 mGycm. The CT scan of the soft tissues of the neck is performed after   intravenous contrast enhancement. The images are acquired in axial   plane with subsequent reconstruction in coronal and sagittal plane. There is no previous study for comparison. The limited included brain is unremarkable. The visualized   intracranial vessels are unremarkable. There is moderate diffuse   cerebral atrophy. The orbits appear unremarkable. There is a mucous retention cyst in   the right maxillary antrum. The nasopharyngeal soft tissues are normal. The parapharyngeal spaces   are normal.   The left parotid gland is atrophic which may be due to previous   inflammation or surgery? . A normal right parotid gland is seen. Submandibular glands bilaterally are normal.   The oropharyngeal soft tissues are poorly evaluated due to extensive   artifacts from the dental hardware. No discrete mass is seen. No   abnormal enhancement. There is no evidence of superficial or deep cervical lymphadenopathy. The vallecula, the epiglottis, the piriform sinuses, the false and   true vocal cords are normal. The thyroid gland appears normal.   Atheromatous plaques are seen in the common and internal carotid   arteries bilaterally. There is high-grade stenosis of the proximal   left internal carotid artery. There is a 50% stenosis of the proximal   right internal carotid artery. There are moderate chronic degenerative changes of the cervical spine   predominantly at C5, C6 and C7.  Prevertebral soft tissues are normal. Impression:     No evidence of mass or lymphadenopathy. An atrophic left parotid gland. The etiology is not certain. Atheromatous changes of the carotid arteries bilaterally and a   high-grade stenosis of the left proximal internal carotid artery. Right maxillary sinusitis. Cerebral atrophy. Signed by Dr Patty Elkins on 2/3/2021 5:02 PM     Alex Gray, PT   Physical Therapist   Physical Therapy   Progress Notes   Signed   Date of Service:  2/9/2021  9:55 AM               Signed             Show:Clear all  []Manual[x]Template[]Copied    Added by:  [x]Tommy Vail, PT    []Leisa for details       02/09/21 0900   Bed Mobility   Rolling Contact guard assistance  (TO LEFT WITH RAIL)   Supine to Sit Moderate assistance;Minimal assistance  (ASSIST BILAT LES, IMPROVED TRUNK RIGHTING.)   Transfers   Sit to Stand Maximum Assistance; Moderate Assistance  (BILAT UES ON RW)   Stand to sit Moderate Assistance;Minimal Assistance   Bed to Chair Maximum assistance  (STAND PIVOT TO RIGHT. PELVIS POST TO ADELINA. ASST WITH RW)   Ambulation   Ambulation? Yes   More Ambulation? Yes   Ambulation 1   Device Rolling Walker   Assistance Maximum assistance; Moderate assistance   Quality of Gait 4 POINT GAIT, MAXIMUM BLOCKING BILAT KNEES WITH LEFT KNEE BUCKLING AND DECREASED LEFT STANCE TIME. Distance 5 FT   Ambulation 2   Device 2 Rolling Walker   Assistance 2 Moderate assistance   Quality of Gait 2 3 POINT GAIT LEADING WITH LEFT. CONSTANT VCS FOR TECHNIQUE. LEFT HIPD ADDUCTION DURING SWING.  REQUIRED BLOCKING LEFT KNEE FOR RIGHT LE ADVANCMENT. TENDENCY TO FLEX RIGHT KNEE DURING STANCE. POST PELVIC LEAN REQUIRING ANTERIOR FORCE TO CORRECT. Distance 8 FT   Other exercises   Other exercises 1 SITTING BILAT LE HIP FLEX/EXT/ABD/ADD; KNEE EXT. LIMITED FORCE GENERATION BILAT LES. FATIGUED WITH SUCCESSIVE REPS.    Conditions Requiring Skilled Therapeutic Intervention Body structures, Functions, Activity limitations Decreased functional mobility ; Decreased ADL status; Decreased ROM; Decreased strength;Decreased endurance;Decreased balance;Decreased coordination; Increased pain;Decreased posture   Assessment MUCH IMPROVED ORCE THROUGH BILAT LES. BEGAN 3 POINT GAIT TRAINING. REQUIRES ASSIST WITH ADVANCING LEFT LE TO PREVENT ADDUCTION, LEFT KNEE BLOCKING DURING STANCE.                       RECORD REVIEW: Previous medical records, medications were reviewed at today's visit    IMPRESSION:   1. S/P lumbar laminectomy-Pain control/mobilzation  2. HTN-on meds monitor  3. DVT prophylaxis-Lovenox  4. Anemia-on iron   5. Neuropathy-on Neurontin  6. DM-on insulin-monitor BS  7. GI-PPI/bowel regimen  8. Hyperlipidemia-on statin  9. Pain control-Norco PRN  11. Lung/cerebellar mass-monitor   12. PT/OT  13.  UTI-complete abx    Add carafate / zofran TID before meals    CT soft tissue neck- hi grade stenosis of left proximal internal carotid   Carotid US <50% bilateral carotids      WOLF February 20 th       continue present care    Expected duration and frequency therapy: 180 minutes per day, 5 days per week    [address] [facility]  Dr Carmen Marie

## 2021-02-11 NOTE — PROGRESS NOTES
[facility]  INPATIENT SPEECH THERAPY  [facility] [address] [facility] UNIT  TIME   [id number]    [x]Daily Note  []Progress Note    Date: 2021  Patient Name: Miguel Angel Barillas        MRN: [id number]    Account #: [de-identified]  : 1933  (80 y.o.)  Gender: female   Primary Provider: Jasper Haddad MD  Precautions: Fall/aspiration  Swallowing Status/Diet: upgrade soft and bite sized with thin liquids      Subjective: Patient alert and cooperative with all therapy tasks. Patient seen bedside for treatment session. Objective:   Patient was able to recall events from yesterdays physical therapy session with no difficulty. Patient was able to recall recent/daily events with no difficulty. Patient does continue to have difficulty with recalling staff members names, however she states names have always been difficulty for her. Patient continues to present with increased reasoning/problem solving skills for functional tasks. Patient continues to have difficulty recalling swallowing exercises. Patient required min verbal cues to complete exercises. Patient completed jeremy maneuver to improve base of tongue strength. Patient completing with laryngeal elevation on 70% of opportunities. Mendelsohn maneuver completed as well to improve elevation. Patient able to sustain elevation for approx 3 seconds on 3 of the opportunities. Effortful swallow also completed during session. Patient completing laryngeal elevation on 100% of opportunities. Patient had trials of regular consistency during the session. Patient is disliking current diet consistency. Patient presenting with functional mastication and oral prep for more solid consistencies. Patient continues to have mild/moderate sluggish/decreased laryngeal elevation for airway protection, however no overt s/s of aspiration observed with regular solid. Patient had previously been belching frequently after food and drink. Did not notice this today. Patient also continues to utilize effortful swallow after bites with min verbal cues to help clear residue. Recommend trial upgrade to soft and bite sized with thin liquids. MBSS was completed on 2/2/21  Impressions:  Pt presents with moderate-severe oropharyngeal phase dysphagia. It is noted that pt has moderate valleculae residue that cannot be cleared with additional dry swallows or liquid wash. Pt requires alternation of liquids and solids to assist with residue clearance. However, residue stacking is noted and takes a lot of effort to clear. With thin liquids, pt was observed 1x with flash penetration. The pt has minimal risk for flash penetration after the swallow on thin liquids due to residuals in valleculae and pyriforms. Pt is recommended to downgrade to minced and moist diet texture; use effortful swallow with all solids; do not use additional dry swallows as this appears to fatigue the mechanisms and increase belching and audible swallows. SLP recommends pt consume small, frequent meals and remain upright for at least 30 minutes post meals to reduce aspiration on residue. Recommended Diet and Intervention  Diet Solids Recommendation: Trial upgrade to dysphagia soft and bite sized. Liquid Consistency Recommendation: Thin  Recommended Form of Meds: crushed in puree  Therapeutic Interventions: Oral motor exercises; Tongue base strengthening;Patient/Family education;Effortful swallow;Kathy; Laryngeal exercises; Therapeutic PO trials with SLP;Oral care;Diet tolerance monitoring     Compensatory Swallowing Strategies  Compensatory Swallowing Strategies: No straws;Upright as possible for all oral intake;Remain upright for 30-45 minutes after meals       SHORT TERM GOAL #1:  Goal 1: The patient will demonstrate recall of functional information following a/an (immediate, shortterm,long-term) delay with min cues in order to increase functional integration into environment.     SHORT TERM GOAL #2: []Discharge patient    Discharge Location:    Services/Supervision Recommended:      [x]Patient continues to require treatment by a licensed therapist to address functional deficits as outlined in the established plan of care.

## 2021-02-12 LAB
GLUCOSE BLD-MCNC: 121 MG/DL (ref 70–99)
GLUCOSE BLD-MCNC: 131 MG/DL (ref 70–99)
GLUCOSE BLD-MCNC: 169 MG/DL (ref 70–99)
GLUCOSE BLD-MCNC: 181 MG/DL (ref 70–99)
PERFORMED ON: ABNORMAL

## 2021-02-12 PROCEDURE — 92526 ORAL FUNCTION THERAPY: CPT

## 2021-02-12 PROCEDURE — 97110 THERAPEUTIC EXERCISES: CPT

## 2021-02-12 PROCEDURE — 97530 THERAPEUTIC ACTIVITIES: CPT

## 2021-02-12 PROCEDURE — 97535 SELF CARE MNGMENT TRAINING: CPT

## 2021-02-12 PROCEDURE — 6370000000 HC RX 637 (ALT 250 FOR IP): Performed by: PSYCHIATRY & NEUROLOGY

## 2021-02-12 PROCEDURE — 6370000000 HC RX 637 (ALT 250 FOR IP): Performed by: NEUROLOGICAL SURGERY

## 2021-02-12 PROCEDURE — 82947 ASSAY GLUCOSE BLOOD QUANT: CPT

## 2021-02-12 PROCEDURE — 97130 THER IVNTJ EA ADDL 15 MIN: CPT

## 2021-02-12 PROCEDURE — 6360000002 HC RX W HCPCS: Performed by: NEUROLOGICAL SURGERY

## 2021-02-12 PROCEDURE — 99232 SBSQ HOSP IP/OBS MODERATE 35: CPT | Performed by: PSYCHIATRY & NEUROLOGY

## 2021-02-12 PROCEDURE — 1180000000 HC REHAB R&B

## 2021-02-12 PROCEDURE — 97129 THER IVNTJ 1ST 15 MIN: CPT

## 2021-02-12 RX ADMIN — CETIRIZINE HYDROCHLORIDE 10 MG: 10 TABLET, FILM COATED ORAL at 09:09

## 2021-02-12 RX ADMIN — DILTIAZEM HYDROCHLORIDE 240 MG: 240 CAPSULE, COATED, EXTENDED RELEASE ORAL at 09:09

## 2021-02-12 RX ADMIN — Medication: at 21:48

## 2021-02-12 RX ADMIN — LISINOPRIL 5 MG: 5 TABLET ORAL at 09:09

## 2021-02-12 RX ADMIN — CYANOCOBALAMIN TAB 500 MCG 500 MCG: 500 TAB at 09:09

## 2021-02-12 RX ADMIN — PANTOPRAZOLE SODIUM 40 MG: 40 TABLET, DELAYED RELEASE ORAL at 05:58

## 2021-02-12 RX ADMIN — SUCRALFATE 1 G: 1 TABLET ORAL at 17:07

## 2021-02-12 RX ADMIN — ONDANSETRON 4 MG: 4 TABLET, FILM COATED ORAL at 05:58

## 2021-02-12 RX ADMIN — ENOXAPARIN SODIUM 40 MG: 40 INJECTION SUBCUTANEOUS at 18:08

## 2021-02-12 RX ADMIN — Medication 10 UNITS: at 21:44

## 2021-02-12 RX ADMIN — ROSUVASTATIN CALCIUM 20 MG: 20 TABLET, FILM COATED ORAL at 21:44

## 2021-02-12 RX ADMIN — SUCRALFATE 1 G: 1 TABLET ORAL at 21:44

## 2021-02-12 RX ADMIN — SUCRALFATE 1 G: 1 TABLET ORAL at 10:55

## 2021-02-12 RX ADMIN — INSULIN LISPRO 2 UNITS: 100 INJECTION, SOLUTION INTRAVENOUS; SUBCUTANEOUS at 12:14

## 2021-02-12 RX ADMIN — Medication: at 09:10

## 2021-02-12 RX ADMIN — GABAPENTIN 300 MG: 300 CAPSULE ORAL at 21:44

## 2021-02-12 RX ADMIN — SUCRALFATE 1 G: 1 TABLET ORAL at 09:08

## 2021-02-12 NOTE — PROGRESS NOTES
Nutrition Assessment     Type and Reason for Visit: Reassess    Nutrition Recommendations/Plan: Continue current POC. Nutrition Assessment:  Pt improving from a nutrition standpoint AEB slight wt gain and improving po intake and appetite. Pt reports drinking most of glucerna shakes (50-75%), but there are a few unopened ones on pt table. Will continue current supplementation. Malnutrition Assessment:  Malnutrition Status: Moderate malnutrition    Current Nutrition Therapies:    Dietary Nutrition Supplements: Diabetic Oral Supplement  DIET CARB CONTROL; Carb Control: 4 carb choices (60 gms)/meal; Dysphagia Soft and Bite-Sized    Anthropometric Measures:  · Height: 5' (152.4 cm)  · Current Body Wt: 112 lb (50.8 kg)   · BMI: 21.9    Nutrition Interventions:   Food and/or Nutrient Delivery:  Continue Current Diet, Continue Oral Nutrition Supplement  Nutrition Education/Counseling:  No recommendation at this time   Coordination of Nutrition Care:  Continue to monitor while inpatient    Goals:  Pt will consume >50% of meals and ONS and maintain wt.        Nutrition Monitoring and Evaluation:   Behavioral-Environmental Outcomes:  None Identified   Food/Nutrient Intake Outcomes:  Food and Nutrient Intake, Supplement Intake  Physical Signs/Symptoms Outcomes:  Biochemical Data, Chewing or Swallowing, Skin, Nutrition Focused Physical Findings, Weight     Electronically signed by Azalea Villalpando MS, RD, LD on 2/12/21 at 1:07 PM CST    Contact: [phone]

## 2021-02-12 NOTE — PROGRESS NOTES
[facility]  Follow-up Progress Note       NAME:  Rima Mancia  MEDICAL RECORD NUMBER:  [id number]  AGE:  80 y.o. GENDER:  female  :  1933  TODAY'S DATE:  2021    Subjective:   Wound Identification:  Wound Type: pressure  Contributing Factors: chronic pressure, decreased mobility and incontinence of urine        Patient Goal of Care:  [] Wound Healing  [] Odor Control  [] Palliative Care  [] Pain Control   [] Other:     Objective:    /62   Pulse 73   Temp 97 °F (36.1 °C) (Temporal)   Resp 18   Ht 5' (1.524 m)   Wt 112 lb 9.6 oz (51.1 kg)   SpO2 94%   BMI 21.99 kg/m²   Jalyon Risk Score: Jaylon Scale Score: 16  Assessment:   Measurements:  Wound 21 Sacrum Mid stage 2 ulcer coccyx, 1 cm x 1cm (Active)   Wound Image   21 1131   Wound Etiology Deep tissue/Injury 21 1131   Dressing Status Clean;Dry; Intact 21 1131   Wound Cleansed Soap and water 21 0910   Dressing/Treatment Pharmaceutical agent (see MAR); Silicone border  1131   Wound Length (cm) 5 cm 21 1131   Wound Width (cm) 2.5 cm 21 1131   Wound Surface Area (cm^2) 12.5 cm^2 21 1131   Change in Wound Size % (l*w) -1150 21 1131   Wound Assessment Blood filled blister; Purple/maroon;Fibrin 21 1131   Drainage Amount Scant 21 1131   Drainage Description Serosanguinous 21 1131   Odor None 21 1131   Radha-wound Assessment Blanchable erythema 21 1131   Margins Attached edges 21 1131   Wound Thickness Description not for Pressure Injury Partial thickness 02/10/21 2226   Number of days: 12       Response to treatment:  Well tolerated by patient.      Pain Assessment:  Severity:  0 / 10  Quality of pain: N/A  Wound Pain Timing/Severity: none  Premedicated: No  Plan: Plan of Care: Wound 01/30/21 Sacrum Mid stage 2 ulcer coccyx, 1 cm x 1cm-Dressing/Treatment: Pharmaceutical agent (see MAR), Silicone border    Specialty Bed Required : Yes   [] Low Air Loss   [x] Pressure Redistribution  [] Fluid Immersion  [] Bariatric  [] Total Pressure Relief  [] Other:     Current Diet: Dietary Nutrition Supplements: Diabetic Oral Supplement  DIET CARB CONTROL; Carb Control: 4 carb choices (60 gms)/meal; Dysphagia Soft and Bite-Sized  Dietician consult:  Yes    Discharge Plan:  Placement for patient upon discharge: skilled nursing   Patient appropriate for Outpatient [address]: Yes    Referrals:  []   [] [address] [employer]  [] Supplies  [] Other    Patient/Caregiver Teaching:  Level of patient/caregiver understanding able to:   [] Indicates understanding       [] Needs reinforcement  [] Unsuccessful      [] Verbal Understanding  [] Demonstrated understanding       [] No evidence of learning  [] Refused teaching         [] N/A       Follow up. Deep tissue pressure injury at the coccyx/sacrum continues to demarcate and is opening up. There is still an area of purple to the central portion of the wound bed. I expect pressure injury will continue to progress to full thickness wound. Recommend to continue offloading, Venelex, and sacral foam dressing. Recommend that pressure redistribution mattress/bed and pressure reduction chair cushion after discharge and follow up with wound care center for ongoing treatment.     Electronically signed by Parmjit Corea RN, [address] [place] [address],[address] on 2/12/2021 at 11:34 AM

## 2021-02-12 NOTE — PLAN OF CARE
Problem: Falls - Risk of:  Goal: Will remain free from falls  Description: Will remain free from falls  2/12/2021 0053 by Chelo Wong LPN  Outcome: Ongoing  2/11/2021 1732 by Jovan Guzman RN  Outcome: Ongoing  Goal: Absence of physical injury  Description: Absence of physical injury  2/12/2021 0053 by Chelo Wong LPN  Outcome: Ongoing  2/11/2021 1732 by Jovan Guzman RN  Outcome: Ongoing     Problem: Skin Integrity:  Goal: Will show no infection signs and symptoms  Description: Will show no infection signs and symptoms  2/12/2021 0053 by Chelo Wong LPN  Outcome: Ongoing  2/11/2021 1732 by Jovan Guzman RN  Outcome: Ongoing  Goal: Absence of new skin breakdown  Description: Absence of new skin breakdown  2/12/2021 0053 by Chelo Wong LPN  Outcome: Ongoing  2/11/2021 1732 by Jovan Guzman RN  Outcome: Ongoing     Problem: Nutrition  Goal: Optimal nutrition therapy  2/12/2021 0053 by Chelo Wong LPN  Outcome: Ongoing  2/11/2021 1732 by Jovan Guzman RN  Outcome: Ongoing     Problem: Bleeding:  Goal: Will show no signs and symptoms of excessive bleeding  Description: Will show no signs and symptoms of excessive bleeding  2/12/2021 0053 by Chelo Wong LPN  Outcome: Ongoing  2/11/2021 1732 by Jovan Guzman RN  Outcome: Ongoing     Problem: Serum Glucose Level - Abnormal:  Goal: Ability to maintain appropriate glucose levels has stabilized  Description: Ability to maintain appropriate glucose levels has stabilized  2/12/2021 0053 by Chelo Wong LPN  Outcome: Ongoing  2/11/2021 1732 by Jovan Guzman RN  Outcome: Ongoing     Problem: Infection - Surgical Site:  Goal: Will show no infection signs and symptoms  Description: Will show no infection signs and symptoms  2/12/2021 0053 by Chelo Wong LPN  Outcome: Ongoing  2/11/2021 1732 by Jovan Guzman RN  Outcome: Ongoing

## 2021-02-12 NOTE — PROGRESS NOTES
Patient:   Patience Meza  MR#:    [id number]   Room:    0827/827-01   YOB: 1933  Date of Progress Note: 2/12/2021  Time of Note                           12:08 PM  Consulting Physician:   Margot Merida M.D.   Attending Physician:  Margot Merida MD     Chief complaint S/p lumbar laminectomy S:This 80 y.o. female  with history of diabetes mellitus, hyperlipidemia,known lung mass and HTN. Since Dec. 2020 patient has had difficulty walking, left foot drop and back pain. It had worsened to the point that she hasn't been able to walk for the past couple of weakness or care for herself. Her daughter came in December to stay with her. Prior to this patient lived at home independently. She had an MRI done as outpatient that revealed severe spinal stenosis at L3/4 and L4/5. She was referred to neurosurgeon Dr. Zeeshan Martínez, who recommended L3/4 & L4/5 decompressive laminectomy. She was in agreement and was admitted to [address] [place] [address] on 1/26/21 for surgery. She tolerated the procedure well. She will be required to wear a TLSO brace when out of bed and follow spine precautions. She continues to have significant lower extremity weakness, worse on the left. In regards to her known lung mass, patient has been seen prior to admit by pulmonologist Dr. Krishnan who plans to a bronchoscopy with biopsy at some point. He had recommended her getting her back surgery and then rehab to get her stronger prior to having the procedure done. Dr. Zeeshan Martínez ordered a MRI of the head d/t her persistent lower extremity weakness and known lung mass. 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 Dr. Zeeshan Martínez felt this would explain balance difficulty but now her LE weakness. Oncology and Palliative Care have been consulted. Patient at this point is stating she doesn't want any chemo or radiation. She is participating in both PT/OT. She is felt to need a stay on Rehab to work towards her goal of returning home with her daughter.  No new issues overnight.       REVIEW OF SYSTEMS:  Constitutional: No fevers No chills  Neck:No stiffness  Respiratory: No shortness of breath  Cardiovascular: No chest pain No palpitations  Gastrointestinal: No abdominal pain    Genitourinary: No Dysuria  Neurological: No headache, no confusion   cyclobenzaprine (FLEXERIL) tablet 10 mg, 10 mg, Oral, TID PRN, Alex Barlow MD, 10 mg at 02/03/21 2050    dextrose 5 % solution, 100 mL/hr, Intravenous, PRN, Alex Barlow MD    dextrose 50 % IV solution, 12.5 g, Intravenous, PRN, Alex Barlow MD    dilTIAZem (CARDIZEM CD) extended release capsule 240 mg, 240 mg, Oral, Daily, Alex Barlow MD, 240 mg at 02/12/21 0909    enoxaparin (LOVENOX) injection 40 mg, 40 mg, Subcutaneous, Daily, Alex Barlow MD, 40 mg at 02/11/21 1541    ferrous sulfate (IRON 325) tablet 325 mg, 325 mg, Oral, Daily with breakfast, Alex Barlow MD, 325 mg at 02/11/21 9403    gabapentin (NEURONTIN) capsule 300 mg, 300 mg, Oral, Nightly, Alex Barlow MD, 300 mg at 02/11/21 2114    glucagon (rDNA) injection 1 mg, 1 mg, Intramuscular, PRN, Alex Barlow MD    glucose (GLUTOSE) 40 % oral gel 15 g, 15 g, Oral, PRN, Alex Barlow MD    insulin glargine (LANTUS) injection vial 10 Units, 10 Units, Subcutaneous, Nightly, Alex Barlow MD, 10 Units at 02/11/21 2119    lisinopril (PRINIVIL;ZESTRIL) tablet 5 mg, 5 mg, Oral, Daily, Alex Barlow MD, 5 mg at 02/12/21 0909    pantoprazole (PROTONIX) tablet 40 mg, 40 mg, Oral, QAM AC, Alex Barlow MD, 40 mg at 02/12/21 0558    rosuvastatin (CRESTOR) tablet 20 mg, 20 mg, Oral, Nightly, Alex Barlow MD, 20 mg at 02/11/21 2114    vitamin B-12 (CYANOCOBALAMIN) tablet 500 mcg, 500 mcg, Oral, Daily, Alex Barlow MD, 500 mcg at 02/12/21 0909    acetaminophen (TYLENOL) tablet 650 mg, 650 mg, Oral, Q4H PRN, Kade Servin MD, 650 mg at 02/10/21 2047    polyethylene glycol (GLYCOLAX) packet 17 g, 17 g, Oral, Daily PRN, Kade Servin MD, 17 g at 02/08/21 2122    Allergies: Other    Social History:   TOBACCO:   reports that she quit smoking about 24 years ago. Her smoking use included cigarettes. She has never used smokeless tobacco.  ETOH:   reports no history of alcohol use.     Family History: History reviewed. No pertinent family history. PHYSICAL EXAM:  /62   Pulse 73   Temp 97 °F (36.1 °C) (Temporal)   Resp 18   Ht 5' (1.524 m)   Wt 112 lb 9.6 oz (51.1 kg)   SpO2 94%   BMI 21.99 kg/m²       Constitutional  well developed, well nourished. Eyes  conjunctiva normal.   Ear, nose, throat - No scars, masses, or lesions over external nose or ears, no atrophy of tongue  Neck-symmetric, no masses noted, no jugular vein distension  Respiration- chest wall appears symmetric, good expansion,   normal effort without use of accessory muscles  Musculoskeletal  no significant wasting of muscles noted, no bony deformities  Extremities-no clubbing, cyanosis or edema  Skin  warm, dry, and intact. No rash, erythema, or pallor. Psychiatric  mood, affect, and behavior appear normal.      Neurological exam  Awake, alert, fluent oriented slow  Attention and concentration appear appropriate  Recent and remote memory appears unremarkable  Speech normal without dysarthria  No clear issues with language of fund of knowledge     Cranial Nerve Exam     CN III, IV,VI-EOMI, No nystagmus, conjugate eye movements, no ptosis    CN VII-no facial assymetry       Motor Exam  antigravity throughout upper and lower extremities bilaterally      Tremors- no tremors in hands or head noted     Gait  Not tested      Nursing/pcp notes, imaging,labs and vitals reviewed.      PT,OT and/or speech notes reviewed    Lab Results   Component Value Date    WBC 7.2 02/11/2021    HGB 12.5 02/11/2021    HCT 39.1 02/11/2021    MCV 91.1 02/11/2021     02/11/2021     Lab Results   Component Value Date     02/11/2021    K 3.9 02/11/2021    CL 99 02/11/2021    CO2 30 (H) 02/11/2021    BUN 9 02/11/2021    CREATININE 0.7 02/11/2021    GLUCOSE 105 02/11/2021    CALCIUM 9.6 02/11/2021    PROT 5.5 (L) 02/11/2021    LABALBU 3.5 02/11/2021    BILITOT 0.3 02/11/2021    ALKPHOS 88 02/11/2021    AST 26 02/11/2021    ALT 31 02/11/2021 LABGLOM >60 02/11/2021    GFRAA >59 02/11/2021     Lab Results   Component Value Date    INR 1.01 01/30/2021    INR 0.93 01/06/2021    PROTIME 13.2 01/30/2021    PROTIME 12.3 01/06/2021       CT SOFT TISSUE NECK W WO CONTRAST [[id number]]    Resulted: 02/03/21 1702    Updated: 02/03/21 1704    Narrative:     Examination. CT SOFT TISSUE NECK W WO CONTRAST 2/3/2021 3:15 PM   History: Swallowing difficulty. DLP: 1213 mGycm. The CT scan of the soft tissues of the neck is performed after   intravenous contrast enhancement. The images are acquired in axial   plane with subsequent reconstruction in coronal and sagittal plane. There is no previous study for comparison. The limited included brain is unremarkable. The visualized   intracranial vessels are unremarkable. There is moderate diffuse   cerebral atrophy. The orbits appear unremarkable. There is a mucous retention cyst in   the right maxillary antrum. The nasopharyngeal soft tissues are normal. The parapharyngeal spaces   are normal.   The left parotid gland is atrophic which may be due to previous   inflammation or surgery? . A normal right parotid gland is seen. Submandibular glands bilaterally are normal.   The oropharyngeal soft tissues are poorly evaluated due to extensive   artifacts from the dental hardware. No discrete mass is seen. No   abnormal enhancement. There is no evidence of superficial or deep cervical lymphadenopathy. The vallecula, the epiglottis, the piriform sinuses, the false and   true vocal cords are normal. The thyroid gland appears normal.   Atheromatous plaques are seen in the common and internal carotid   arteries bilaterally. There is high-grade stenosis of the proximal   left internal carotid artery. There is a 50% stenosis of the proximal   right internal carotid artery. There are moderate chronic degenerative changes of the cervical spine   predominantly at C5, C6 and C7.  Prevertebral soft tissues are normal. Body structures, Functions, Activity limitations Decreased functional mobility ; Decreased ADL status; Decreased ROM; Decreased strength;Decreased endurance;Decreased balance;Decreased coordination; Increased pain;Decreased posture   Assessment IMPROVED ABILITY TO COMPLETE THER EX. IN BED. SKILLED PT REQUIRED TO CONTINUE TO ADDRESS TRANSFERS, BED MOBILITY, AND ENDURANCE TO PARTICIPATE IN PT SESSIONS   Activity Tolerance   Activity Tolerance Patient limited by fatigue;Patient limited by endurance  (FATIGUED PROCEEDING SHOWER )   Safety Devices   Type of devices Bed alarm in place;Call light within reach; Left in bed  (SLP SAW pt IN ROOM FOLLOWING PT SESSION )                Cosigned by: Lavinia Meraz PT at 2/12/2021  8:14 AM   Revision History                          RECORD REVIEW: Previous medical records, medications were reviewed at today's visit    IMPRESSION:   1. S/P lumbar laminectomy-Pain control/mobilzation  2. HTN-on meds monitor  3. DVT prophylaxis-Lovenox  4. Anemia-on iron   5. Neuropathy-on Neurontin  6. DM-on insulin-monitor BS  7. GI-PPI/bowel regimen  8. Hyperlipidemia-on statin  9. Pain control-Norco PRN  11. Lung/cerebellar mass-monitor   12. PT/OT  13.  UTI-complete abx    Add carafate / zofran TID before meals    CT soft tissue neck- hi grade stenosis of left proximal internal carotid   Carotid US <50% bilateral carotids      ELOS February 20 th       continue current care    Expected duration and frequency therapy: 180 minutes per day, 5 days per week    [address] [facility]  [phone] CELL  Dr Gareth Mims

## 2021-02-12 NOTE — PROGRESS NOTES
Occupational Therapy  Facility/Department: [facility] 8 REHAB UNIT  Daily Treatment Note  NAME: Rima Conn  : 1933  MRN: [id number]    Date of Service: 2021    Discharge Recommendations:  Continue to assess pending progress       Assessment   Performance deficits / Impairments: Decreased functional mobility ; Decreased strength;Decreased endurance;Decreased balance;Decreased high-level IADLs  Treatment Diagnosis: L3-4, L4-5 decompressive laminectomy, 2 cm lesion on R cerebellum, lung mass  OT Education: ADL Adaptive Strategies  Activity Tolerance  Activity Tolerance: Patient Tolerated treatment well  Safety Devices  Safety Devices in place: Yes  Type of devices: Bed alarm in place;Call light within reach         Patient Diagnosis(es): There were no encounter diagnoses. has a past medical history of Diabetes mellitus ([facility] [address] Hyperlipidemia, Hypertension, and Palliative care patient. has a past surgical history that includes Appendectomy; Mastectomy, bilateral; Cholecystectomy; and Lumbar spine surgery (N/A, 2021). Restrictions  Restrictions/Precautions  Restrictions/Precautions: Fall Risk, Weight Bearing, Surgical Protocols, Modified Diet  Required Braces or Orthoses?: Yes  Lower Extremity Weight Bearing Restrictions  Right Lower Extremity Weight Bearing: Weight Bearing As Tolerated  Left Lower Extremity Weight Bearing: Weight Bearing As Tolerated  Required Braces or Orthoses  Spinal Other: LSO  Left Lower Extremity Brace:  Dee Foot Orthotics  Position Activity Restriction  Spinal Precautions: No Bending, No Lifting, No Twisting  Other position/activity restrictions: AFO in room but has not been sized to patient and it does not fit in current shoe  Objective    Bed mobility  Rolling to Left: Supervision(Multiple occurrences)  Rolling to Right: Supervision(Multiple occurrences)         Plan   Plan Current Treatment Recommendations: Strengthening, Balance Training, Functional Mobility Training, Endurance Training, Safety Education & Training, Patient/Caregiver Education & Training, Equipment Evaluation, Education, & procurement, Self-Care / ADL, Home Management Training  OutComes Score       02/12/21 0900   Eating   Assistance Needed Setup or clean-up assistance   CARE Score 5   Oral Hygiene   Assistance Needed Setup or clean-up assistance   CARE Score 5   [address] Needed Partial/moderate assistance   Comment Supine in bed x2 occurrences   CARE Score 3       Goals  Short term goals  Time Frame for Short term goals: 1 week  Short term goal 1: Min A with clothing management/hygiene for toileting. Short term goal 2: Mod A with toilet transfers. Short term goal 3: Mod A with LB dressing, using AE. Short term goal 4: Mod A with donning/doffing socks and shoes using AE. Short term goal 5: Min A with bathing hygiene. Short term goal 6: Min A with donning/doffing LSO. Long term goals  Time Frame for Long term goals : 3-4 weeks  Long term goal 1: Min A with clothing management/hygiene for toileting. Long term goal 2: Min A with toilet transfers. Long term goal 3: Min A with LB dressing, using AE. Long term goal 4: Min A with donning/doffing socks and shoes using AE. Long term goal 5: Supervision with bathing hygiene. Long term goals 6: Patient verbalize DME needs. Long term goal 7: Mod A with donning/doffing LSO. Patient Goals   Patient goals : Increase her independence with ADLs.        Therapy Time   Individual Concurrent Group Co-treatment   Time In 0900         Time Out 1000         Minutes 60         Timed Code Treatment Minutes: [address], OT

## 2021-02-12 NOTE — PLAN OF CARE
Nutrition Problem #1: Moderate malnutrition  Intervention: Food and/or Nutrient Delivery: Continue Current Diet, Continue Oral Nutrition Supplement  Nutritional Goals: Pt will consume >50% of meals and ONS and maintain wt.

## 2021-02-13 LAB
GLUCOSE BLD-MCNC: 105 MG/DL (ref 70–99)
GLUCOSE BLD-MCNC: 124 MG/DL (ref 70–99)
GLUCOSE BLD-MCNC: 169 MG/DL (ref 70–99)
GLUCOSE BLD-MCNC: 194 MG/DL (ref 70–99)
PERFORMED ON: ABNORMAL

## 2021-02-13 PROCEDURE — 6370000000 HC RX 637 (ALT 250 FOR IP): Performed by: NEUROLOGICAL SURGERY

## 2021-02-13 PROCEDURE — 6360000002 HC RX W HCPCS: Performed by: NEUROLOGICAL SURGERY

## 2021-02-13 PROCEDURE — 6370000000 HC RX 637 (ALT 250 FOR IP): Performed by: PSYCHIATRY & NEUROLOGY

## 2021-02-13 PROCEDURE — 82947 ASSAY GLUCOSE BLOOD QUANT: CPT

## 2021-02-13 PROCEDURE — 99232 SBSQ HOSP IP/OBS MODERATE 35: CPT | Performed by: PSYCHIATRY & NEUROLOGY

## 2021-02-13 PROCEDURE — 1180000000 HC REHAB R&B

## 2021-02-13 RX ADMIN — INSULIN LISPRO 2 UNITS: 100 INJECTION, SOLUTION INTRAVENOUS; SUBCUTANEOUS at 17:13

## 2021-02-13 RX ADMIN — Medication: at 12:04

## 2021-02-13 RX ADMIN — CYANOCOBALAMIN TAB 500 MCG 500 MCG: 500 TAB at 08:53

## 2021-02-13 RX ADMIN — Medication 10 UNITS: at 21:21

## 2021-02-13 RX ADMIN — SUCRALFATE 1 G: 1 TABLET ORAL at 12:03

## 2021-02-13 RX ADMIN — DILTIAZEM HYDROCHLORIDE 240 MG: 240 CAPSULE, COATED, EXTENDED RELEASE ORAL at 08:53

## 2021-02-13 RX ADMIN — SUCRALFATE 1 G: 1 TABLET ORAL at 08:53

## 2021-02-13 RX ADMIN — ROSUVASTATIN CALCIUM 20 MG: 20 TABLET, FILM COATED ORAL at 21:21

## 2021-02-13 RX ADMIN — SUCRALFATE 1 G: 1 TABLET ORAL at 21:20

## 2021-02-13 RX ADMIN — LISINOPRIL 5 MG: 5 TABLET ORAL at 08:54

## 2021-02-13 RX ADMIN — SUCRALFATE 1 G: 1 TABLET ORAL at 17:12

## 2021-02-13 RX ADMIN — ENOXAPARIN SODIUM 40 MG: 40 INJECTION SUBCUTANEOUS at 17:12

## 2021-02-13 RX ADMIN — GABAPENTIN 300 MG: 300 CAPSULE ORAL at 21:20

## 2021-02-13 RX ADMIN — CETIRIZINE HYDROCHLORIDE 10 MG: 10 TABLET, FILM COATED ORAL at 08:53

## 2021-02-13 NOTE — PLAN OF CARE
Problem: SAFETY  Goal: Free from accidental physical injury  2/13/2021 1047 by Yohan Foley RN  Outcome: Ongoing  2/13/2021 0114 by Tamra Garcia LPN  Outcome: Ongoing  Goal: Free from intentional harm  2/13/2021 1047 by Yohan Foley RN  Outcome: Ongoing  2/13/2021 0114 by Tamra Garcia LPN  Outcome: Ongoing     Problem: DAILY CARE  Goal: Daily care needs are met  2/13/2021 1047 by Yohan Foley RN  Outcome: Ongoing  2/13/2021 0114 by Tamra Garcia LPN  Outcome: Ongoing     Problem: PAIN  Goal: Patient's pain/discomfort is manageable  2/13/2021 1047 by Yohan Foley RN  Outcome: Ongoing  2/13/2021 0114 by Tamra Garcia LPN  Outcome: Ongoing     Problem: SKIN INTEGRITY  Goal: Skin integrity is maintained or improved  2/13/2021 1047 by Yohan Foley RN  Outcome: Ongoing  2/13/2021 0114 by Tamra Garcia LPN  Outcome: Ongoing     Problem: KNOWLEDGE DEFICIT  Goal: Patient/S.O. demonstrates understanding of disease process, treatment plan, medications, and discharge instructions.   2/13/2021 1047 by Yohan Foley RN  Outcome: Ongoing  2/13/2021 0114 by Tamra Garcia LPN  Outcome: Ongoing     Problem: Pain:  Goal: Pain level will decrease  Description: Pain level will decrease  2/13/2021 1047 by Yohan Foley RN  Outcome: Ongoing  2/13/2021 0114 by Tamra Garcia LPN  Outcome: Ongoing  Goal: Control of acute pain  Description: Control of acute pain  2/13/2021 1047 by Yohan Foley RN  Outcome: Ongoing  2/13/2021 0114 by Tamra Garcia LPN  Outcome: Ongoing  Goal: Control of chronic pain  Description: Control of chronic pain  2/13/2021 1047 by Yohan Foley RN  Outcome: Ongoing  2/13/2021 0114 by Tamra Garcia LPN  Outcome: Ongoing     Problem: Falls - Risk of:  Goal: Will remain free from falls  Description: Will remain free from falls  2/13/2021 1047 by Yohan Foley RN  Outcome: Ongoing  2/13/2021 0114 by Tamra Garcia LPN  Outcome: Ongoing Goal: Absence of physical injury  Description: Absence of physical injury  2/13/2021 1047 by Carolina Rojas RN  Outcome: Ongoing  2/13/2021 0114 by Ana Maria Humphrey LPN  Outcome: Ongoing     Problem: Skin Integrity:  Goal: Will show no infection signs and symptoms  Description: Will show no infection signs and symptoms  2/13/2021 1047 by Carolina Rojas RN  Outcome: Ongoing  2/13/2021 0114 by Ana Maria Humphrey LPN  Outcome: Ongoing  Goal: Absence of new skin breakdown  Description: Absence of new skin breakdown  2/13/2021 1047 by Carolina Rojas RN  Outcome: Ongoing  2/13/2021 0114 by Ana Maria Humphrey LPN  Outcome: Ongoing     Problem: Nutrition  Goal: Optimal nutrition therapy  2/13/2021 1047 by Carolina Rojas RN  Outcome: Ongoing  2/13/2021 0114 by Ana Maria Humphrey LPN  Outcome: Ongoing     Problem: Bleeding:  Goal: Will show no signs and symptoms of excessive bleeding  Description: Will show no signs and symptoms of excessive bleeding  2/13/2021 1047 by Carolina Rojas RN  Outcome: Ongoing  2/13/2021 0114 by Ana Maria Humphrey LPN  Outcome: Ongoing     Problem: Serum Glucose Level - Abnormal:  Goal: Ability to maintain appropriate glucose levels has stabilized  Description: Ability to maintain appropriate glucose levels has stabilized  2/13/2021 1047 by Carolina Rojas RN  Outcome: Ongoing  2/13/2021 0114 by Ana Maria Humphrey LPN  Outcome: Ongoing     Problem: Infection - Surgical Site:  Goal: Will show no infection signs and symptoms  Description: Will show no infection signs and symptoms  2/13/2021 1047 by Carolina Rojas RN  Outcome: Ongoing  2/13/2021 0114 by Ana Maria Humphrey LPN  Outcome: Ongoing

## 2021-02-13 NOTE — PROGRESS NOTES
Patient:   Isidro Alarcon  MR#:    [id number]   Room:    0827/827-01   YOB: 1933  Date of Progress Note: 2/13/2021  Time of Note                           9:27 AM  Consulting Physician:   Abisai Lux M.D.   Attending Physician:  Abisai Lux MD     Chief complaint S/p lumbar laminectomy S:This 80 y.o. female  with history of diabetes mellitus, hyperlipidemia,known lung mass and HTN. Since Dec. 2020 patient has had difficulty walking, left foot drop and back pain. It had worsened to the point that she hasn't been able to walk for the past couple of weakness or care for herself. Her daughter came in December to stay with her. Prior to this patient lived at home independently. She had an MRI done as outpatient that revealed severe spinal stenosis at L3/4 and L4/5. She was referred to neurosurgeon Dr. Anastasiia Hawkins, who recommended L3/4 & L4/5 decompressive laminectomy. She was in agreement and was admitted to [facility] on 1/26/21 for surgery. She tolerated the procedure well. She will be required to wear a TLSO brace when out of bed and follow spine precautions. She continues to have significant lower extremity weakness, worse on the left. In regards to her known lung mass, patient has been seen prior to admit by pulmonologist Dr. Krishnan who plans to a bronchoscopy with biopsy at some point. He had recommended her getting her back surgery and then rehab to get her stronger prior to having the procedure done. Dr. Anastasiia Hawkins ordered a MRI of the head d/t her persistent lower extremity weakness and known lung mass. 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 Dr. Anastasiia Hawkins felt this would explain balance difficulty but now her LE weakness. Oncology and Palliative Care have been consulted. Patient at this point is stating she doesn't want any chemo or radiation. She is participating in both PT/OT. She is felt to need a stay on Rehab to work towards her goal of returning home with her daughter.  No acute issues overnight.       REVIEW OF SYSTEMS:  Constitutional: No fevers No chills  Neck:No stiffness  Respiratory: No shortness of breath  Cardiovascular: No chest pain No palpitations  Gastrointestinal: No abdominal pain    Genitourinary: No Dysuria  Neurological: No headache, no confusion Past Medical History:      Diagnosis Date    Diabetes mellitus ([facility] [address]     Hyperlipidemia     Hypertension     Palliative care patient 01/29/2021       Past Surgical History:      Procedure Laterality Date    APPENDECTOMY      CHOLECYSTECTOMY      LUMBAR SPINE SURGERY N/A 1/26/2021    LAMINECTOMY L3-4/ L4-5 performed by Kary Belle MD at [address], BILATERAL         Medications in Hospital:      Current Facility-Administered Medications:     sennosides-docusate sodium (SENOKOT-S) 8.6-50 MG tablet 1 tablet, 1 tablet, Oral, BID PRN, Xenia Soni MD    Venelex ointment, , Topical, BID, Xenia Soni MD, Given at 02/12/21 2148    sucralfate (CARAFATE) tablet 1 g, 1 g, Oral, 4x Daily WC, Xenia Soni MD, 1 g at 02/13/21 0853    ondansetron (ZOFRAN) tablet 4 mg, 4 mg, Oral, TID AC, Xenia Soni MD, 4 mg at 02/12/21 0558    HYDROcodone-acetaminophen (NORCO)  MG per tablet 1 tablet, 1 tablet, Oral, Q4H PRN, Xenia Soni MD, 1 tablet at 02/02/21 2040    simethicone (MYLICON) chewable tablet 80 mg, 80 mg, Oral, Q6H PRN, Kary Belle MD, 80 mg at 02/01/21 0738    bisacodyl (DULCOLAX) suppository 10 mg, 10 mg, Rectal, Daily PRN, Kary Belle MD    ondansetron (ZOFRAN-ODT) disintegrating tablet 4 mg, 4 mg, Oral, Q8H PRN, 4 mg at 02/01/21 1516 **OR** ondansetron (ZOFRAN) injection 4 mg, 4 mg, Intravenous, Q6H PRN, Kary Belle MD    insulin lispro (HUMALOG) injection vial 4 Units, 0.08 Units/kg, Subcutaneous, TID WC, Kary Belle MD, 4 Units at 02/11/21 1216    insulin lispro (HUMALOG) injection vial 0-12 Units, 0-12 Units, Subcutaneous, TID , Kary Belle MD, 2 Units at 02/12/21 1214    insulin lispro (HUMALOG) injection vial 0-6 Units, 0-6 Units, Subcutaneous, Nightly, Kary Belle MD, 1 Units at 02/09/21 2142    cetirizine (ZYRTEC) tablet 10 mg, 10 mg, Oral, Daily, Kary Belle MD, 10 mg at 02/13/21 9074   cyclobenzaprine (FLEXERIL) tablet 10 mg, 10 mg, Oral, TID PRN, Jeremi Da Silva MD, 10 mg at 02/03/21 2050    dextrose 5 % solution, 100 mL/hr, Intravenous, PRN, Jeremi Da Silva MD    dextrose 50 % IV solution, 12.5 g, Intravenous, PRN, Jeremi Da Silva MD    dilTIAZem (CARDIZEM CD) extended release capsule 240 mg, 240 mg, Oral, Daily, Jeremi Da Silva MD, 240 mg at 02/13/21 0853    enoxaparin (LOVENOX) injection 40 mg, 40 mg, Subcutaneous, Daily, Jeremi Da Silva MD, 40 mg at 02/12/21 1808    ferrous sulfate (IRON 325) tablet 325 mg, 325 mg, Oral, Daily with breakfast, Jeremi Da Silva MD, 325 mg at 02/11/21 7391    gabapentin (NEURONTIN) capsule 300 mg, 300 mg, Oral, Nightly, Jeremi Da Silva MD, 300 mg at 02/12/21 2144    glucagon (rDNA) injection 1 mg, 1 mg, Intramuscular, PRN, Jeremi Da Silva MD    glucose (GLUTOSE) 40 % oral gel 15 g, 15 g, Oral, PRN, Jeremi Da Silva MD    insulin glargine (LANTUS) injection vial 10 Units, 10 Units, Subcutaneous, Nightly, Jeremi Da Silva MD, 10 Units at 02/12/21 2144    lisinopril (PRINIVIL;ZESTRIL) tablet 5 mg, 5 mg, Oral, Daily, Jeremi Da Silva MD, 5 mg at 02/13/21 0854    pantoprazole (PROTONIX) tablet 40 mg, 40 mg, Oral, QAM AC, Jeremi Da Silva MD, 40 mg at 02/12/21 0558    rosuvastatin (CRESTOR) tablet 20 mg, 20 mg, Oral, Nightly, Jeremi Da Silva MD, 20 mg at 02/12/21 2144    vitamin B-12 (CYANOCOBALAMIN) tablet 500 mcg, 500 mcg, Oral, Daily, Jeremi Da Silva MD, 500 mcg at 02/13/21 0853    acetaminophen (TYLENOL) tablet 650 mg, 650 mg, Oral, Q4H PRN, Mathew Moeller MD, 650 mg at 02/10/21 2047    polyethylene glycol (GLYCOLAX) packet 17 g, 17 g, Oral, Daily PRN, Mathew Moeller MD, 17 g at 02/08/21 2122    Allergies: Other    Social History:   TOBACCO:   reports that she quit smoking about 24 years ago. Her smoking use included cigarettes. She has never used smokeless tobacco.  ETOH:   reports no history of alcohol use.     Family History: History reviewed. No pertinent family history. PHYSICAL EXAM:  /63   Pulse 90   Temp 97.2 °F (36.2 °C) (Temporal)   Resp 16   Ht 5' (1.524 m)   Wt 113 lb 3.2 oz (51.3 kg)   SpO2 95%   BMI 22.11 kg/m²       Constitutional  well developed, well nourished. Eyes  conjunctiva normal.   Ear, nose, throat - No scars, masses, or lesions over external nose or ears, no atrophy of tongue  Neck-symmetric, no masses noted, no jugular vein distension  Respiration- chest wall appears symmetric, good expansion,   normal effort without use of accessory muscles  Musculoskeletal  no significant wasting of muscles noted, no bony deformities  Extremities-no clubbing, cyanosis or edema  Skin  warm, dry, and intact. No rash, erythema, or pallor. Psychiatric  mood, affect, and behavior appear normal.      Neurological exam  Awake, alert, fluent oriented slow  Attention and concentration appear appropriate  Recent and remote memory appears unremarkable  Speech normal without dysarthria  No clear issues with language of fund of knowledge     Cranial Nerve Exam     CN III, IV,VI-EOMI, No nystagmus, conjugate eye movements, no ptosis    CN VII-no facial assymetry       Motor Exam  antigravity throughout upper and lower extremities bilaterally      Tremors- no tremors in hands or head noted     Gait  Not tested      Nursing/pcp notes, imaging,labs and vitals reviewed.      PT,OT and/or speech notes reviewed    Lab Results   Component Value Date    WBC 7.2 02/11/2021    HGB 12.5 02/11/2021    HCT 39.1 02/11/2021    MCV 91.1 02/11/2021     02/11/2021     Lab Results   Component Value Date     02/11/2021    K 3.9 02/11/2021    CL 99 02/11/2021    CO2 30 (H) 02/11/2021    BUN 9 02/11/2021    CREATININE 0.7 02/11/2021    GLUCOSE 105 02/11/2021    CALCIUM 9.6 02/11/2021    PROT 5.5 (L) 02/11/2021    LABALBU 3.5 02/11/2021    BILITOT 0.3 02/11/2021    ALKPHOS 88 02/11/2021    AST 26 02/11/2021 predominantly at C5, C6 and C7. Prevertebral soft tissues are normal.   Impression:     No evidence of mass or lymphadenopathy. An atrophic left parotid gland. The etiology is not certain. Atheromatous changes of the carotid arteries bilaterally and a   high-grade stenosis of the left proximal internal carotid artery. Right maxillary sinusitis. Cerebral atrophy. Signed by Dr Linda Guerra on 2/3/2021 5:02 PM       Shanel Rosenberg   Student Physical Therapist   Physical Therapy   Progress Notes   Signed   Date of Service:  2/11/2021 11:44 AM               Signed             Show:Clear all  []Manual[x]Template[]Copied    Added by:  [x]Gabby Brown    []Leisa for details       02/11/21 1000   Restrictions/Precautions   Restrictions/Precautions Fall Risk;Weight Bearing;Surgical Protocols; Modified Diet   Required Braces or Orthoses? Yes  (LSO)   Lower Extremity Weight Bearing Restrictions   Right Lower Extremity Weight Bearing Weight Bearing As Tolerated   Left Lower Extremity Weight Bearing Weight Bearing As Tolerated   Required Braces or Orthoses   Spinal Other LSO   Position Activity Restriction   Spinal Precautions No Bending; No Lifting; No Twisting   Oxygen Therapy   O2 Device None (Room air)   Bed Mobility   Rolling Minimal assistance;Contact guard assistance  (BILAT LOG ROLL FOR CLEAN UP/CHANGE, BANDAGE)   Sit to Supine Moderate assistance  (LIFT BLE TO GET INTO BED )   Scooting Moderate assistance;Minimal assistance  (DIFFICULTY SCOOTING LIMITED BY FATIGUE/ENDURANCE)   Comment    (pt HAD BANDAGE DRESSING DURING PT SESSION )   Transfers   Sit to Stand Moderate Assistance  (STS SHOWER ZANDER TO RODOLFO STEADY)   Stand to sit Moderate Assistance  (RODOLFO STEADY TO BED )   Comment TF TO/FROM BATHROOM WITH RODOLFO STEADY AFTER SHOWER    Other exercises   Other exercises 1 SUPINE ANKLE PUMPS X20 EA    Other exercises 2 SUPINE QS AND HEEL SLIDES; X10 EACH   (ASSIST TO INITIATE HEEL SLIDES ON R ) Conditions Requiring Skilled Therapeutic Intervention   Body structures, Functions, Activity limitations Decreased functional mobility ; Decreased ADL status; Decreased ROM; Decreased strength;Decreased endurance;Decreased balance;Decreased coordination; Increased pain;Decreased posture   Assessment IMPROVED ABILITY TO COMPLETE THER EX. IN BED. SKILLED PT REQUIRED TO CONTINUE TO ADDRESS TRANSFERS, BED MOBILITY, AND ENDURANCE TO PARTICIPATE IN PT SESSIONS   Activity Tolerance   Activity Tolerance Patient limited by fatigue;Patient limited by endurance  (FATIGUED PROCEEDING SHOWER )   Safety Devices   Type of devices Bed alarm in place;Call light within reach; Left in bed  (SLP SAW pt IN ROOM FOLLOWING PT SESSION )                Cosigned by: Alex Gray PT at 2/12/2021  8:14 AM   Revision History                          RECORD REVIEW: Previous medical records, medications were reviewed at today's visit    IMPRESSION:   1. S/P lumbar laminectomy-Pain control/mobilzation  2. HTN-on meds monitor  3. DVT prophylaxis-Lovenox  4. Anemia-on iron   5. Neuropathy-on Neurontin  6. DM-on insulin-monitor BS  7. GI-PPI/bowel regimen  8. Hyperlipidemia-on statin  9. Pain control-Norco PRN  11. Lung/cerebellar mass-monitor   12. PT/OT  13.  UTI-complete abx    Add carafate / zofran TID before meals    CT soft tissue neck- hi grade stenosis of left proximal internal carotid   Carotid US <50% bilateral carotids      ELOS February 20 th       continue present care    Expected duration and frequency therapy: 180 minutes per day, 5 days per week    [address] CELL  Dr Abisai Lux

## 2021-02-14 LAB
GLUCOSE BLD-MCNC: 113 MG/DL (ref 70–99)
GLUCOSE BLD-MCNC: 115 MG/DL (ref 70–99)
GLUCOSE BLD-MCNC: 244 MG/DL (ref 70–99)
PERFORMED ON: ABNORMAL

## 2021-02-14 PROCEDURE — 6360000002 HC RX W HCPCS: Performed by: NEUROLOGICAL SURGERY

## 2021-02-14 PROCEDURE — 6370000000 HC RX 637 (ALT 250 FOR IP): Performed by: PSYCHIATRY & NEUROLOGY

## 2021-02-14 PROCEDURE — 6370000000 HC RX 637 (ALT 250 FOR IP): Performed by: NEUROLOGICAL SURGERY

## 2021-02-14 PROCEDURE — 82947 ASSAY GLUCOSE BLOOD QUANT: CPT

## 2021-02-14 PROCEDURE — 1180000000 HC REHAB R&B

## 2021-02-14 RX ORDER — ONDANSETRON 4 MG/1
4 TABLET, FILM COATED ORAL 3 TIMES DAILY PRN
Status: DISCONTINUED | OUTPATIENT
Start: 2021-02-14 | End: 2021-02-20 | Stop reason: HOSPADM

## 2021-02-14 RX ORDER — PANTOPRAZOLE SODIUM 40 MG/1
40 TABLET, DELAYED RELEASE ORAL DAILY PRN
Status: DISCONTINUED | OUTPATIENT
Start: 2021-02-14 | End: 2021-02-20 | Stop reason: HOSPADM

## 2021-02-14 RX ADMIN — LISINOPRIL 5 MG: 5 TABLET ORAL at 09:09

## 2021-02-14 RX ADMIN — Medication 10 UNITS: at 21:28

## 2021-02-14 RX ADMIN — INSULIN LISPRO 2 UNITS: 100 INJECTION, SOLUTION INTRAVENOUS; SUBCUTANEOUS at 21:28

## 2021-02-14 RX ADMIN — SUCRALFATE 1 G: 1 TABLET ORAL at 09:09

## 2021-02-14 RX ADMIN — CYANOCOBALAMIN TAB 500 MCG 500 MCG: 500 TAB at 09:09

## 2021-02-14 RX ADMIN — SUCRALFATE 1 G: 1 TABLET ORAL at 21:28

## 2021-02-14 RX ADMIN — FERROUS SULFATE TAB 325 MG (65 MG ELEMENTAL FE) 325 MG: 325 (65 FE) TAB at 09:09

## 2021-02-14 RX ADMIN — ENOXAPARIN SODIUM 40 MG: 40 INJECTION SUBCUTANEOUS at 17:41

## 2021-02-14 RX ADMIN — ACETAMINOPHEN 650 MG: 325 TABLET ORAL at 21:30

## 2021-02-14 RX ADMIN — ROSUVASTATIN CALCIUM 20 MG: 20 TABLET, FILM COATED ORAL at 21:28

## 2021-02-14 RX ADMIN — DILTIAZEM HYDROCHLORIDE 240 MG: 240 CAPSULE, COATED, EXTENDED RELEASE ORAL at 09:09

## 2021-02-14 RX ADMIN — CETIRIZINE HYDROCHLORIDE 10 MG: 10 TABLET, FILM COATED ORAL at 09:09

## 2021-02-14 RX ADMIN — Medication: at 21:47

## 2021-02-14 RX ADMIN — GABAPENTIN 300 MG: 300 CAPSULE ORAL at 21:28

## 2021-02-14 ASSESSMENT — PAIN SCALES - GENERAL: PAINLEVEL_OUTOF10: 4

## 2021-02-14 ASSESSMENT — PAIN DESCRIPTION - LOCATION: LOCATION: BACK

## 2021-02-14 NOTE — PLAN OF CARE
Goal: Will remain free from falls  Description: Will remain free from falls  2/14/2021 0041 by Sandeep Mean, LPN  Outcome: Ongoing  2/13/2021 1047 by Minus Dose, RN  Outcome: Ongoing  Goal: Absence of physical injury  Description: Absence of physical injury  2/14/2021 0041 by Sandeep Mean, LPN  Outcome: Ongoing  2/13/2021 1047 by Minus Dose, RN  Outcome: Ongoing     Problem: Skin Integrity:  Goal: Will show no infection signs and symptoms  Description: Will show no infection signs and symptoms  2/14/2021 0041 by Sandeep Mean, LPN  Outcome: Ongoing  2/13/2021 1047 by Minus Dose, RN  Outcome: Ongoing  Goal: Absence of new skin breakdown  Description: Absence of new skin breakdown  2/14/2021 0041 by Sandeep Mean, LPN  Outcome: Ongoing  2/13/2021 1047 by Minus Dose, RN  Outcome: Ongoing     Problem: Nutrition  Goal: Optimal nutrition therapy  2/14/2021 0041 by Sandeep Mean, LPN  Outcome: Ongoing  2/13/2021 1047 by Minus Dose, RN  Outcome: Ongoing     Problem: Bleeding:  Goal: Will show no signs and symptoms of excessive bleeding  Description: Will show no signs and symptoms of excessive bleeding  2/14/2021 0041 by Sandeep Mean, LPN  Outcome: Ongoing  2/13/2021 1047 by Minus Dose, RN  Outcome: Ongoing     Problem: Serum Glucose Level - Abnormal:  Goal: Ability to maintain appropriate glucose levels has stabilized  Description: Ability to maintain appropriate glucose levels has stabilized  2/14/2021 0041 by Sandeep Mean, LPN  Outcome: Ongoing  2/13/2021 1047 by Minus Dose, RN  Outcome: Ongoing     Problem: Infection - Surgical Site:  Goal: Will show no infection signs and symptoms  Description: Will show no infection signs and symptoms  2/14/2021 0041 by Sandeep Mean, LPN  Outcome: Ongoing  2/13/2021 1047 by Minus Dose, RN  Outcome: Ongoing

## 2021-02-15 LAB
ALBUMIN SERPL-MCNC: 3.1 G/DL (ref 3.5–5.2)
ALP BLD-CCNC: 76 U/L (ref 35–104)
ALT SERPL-CCNC: 23 U/L (ref 5–33)
ANION GAP SERPL CALCULATED.3IONS-SCNC: 10 MMOL/L (ref 7–19)
AST SERPL-CCNC: 28 U/L (ref 5–32)
BASOPHILS ABSOLUTE: 0 K/UL (ref 0–0.2)
BASOPHILS RELATIVE PERCENT: 0.4 % (ref 0–1)
BILIRUB SERPL-MCNC: <0.2 MG/DL (ref 0.2–1.2)
BUN BLDV-MCNC: 10 MG/DL (ref 8–23)
CALCIUM SERPL-MCNC: 9.2 MG/DL (ref 8.8–10.2)
CHLORIDE BLD-SCNC: 103 MMOL/L (ref 98–111)
CO2: 26 MMOL/L (ref 22–29)
CREAT SERPL-MCNC: 0.5 MG/DL (ref 0.5–0.9)
EOSINOPHILS ABSOLUTE: 0.1 K/UL (ref 0–0.6)
EOSINOPHILS RELATIVE PERCENT: 1 % (ref 0–5)
GFR AFRICAN AMERICAN: >59
GFR NON-AFRICAN AMERICAN: >60
GLUCOSE BLD-MCNC: 110 MG/DL (ref 70–99)
GLUCOSE BLD-MCNC: 116 MG/DL (ref 70–99)
GLUCOSE BLD-MCNC: 148 MG/DL (ref 70–99)
GLUCOSE BLD-MCNC: 76 MG/DL (ref 70–99)
GLUCOSE BLD-MCNC: 97 MG/DL (ref 74–109)
HCT VFR BLD CALC: 33.9 % (ref 37–47)
HEMOGLOBIN: 11 G/DL (ref 12–16)
IMMATURE GRANULOCYTES #: 0 K/UL
LYMPHOCYTES ABSOLUTE: 1.6 K/UL (ref 1.1–4.5)
LYMPHOCYTES RELATIVE PERCENT: 22.6 % (ref 20–40)
MCH RBC QN AUTO: 29.5 PG (ref 27–31)
MCHC RBC AUTO-ENTMCNC: 32.4 G/DL (ref 33–37)
MCV RBC AUTO: 90.9 FL (ref 81–99)
MONOCYTES ABSOLUTE: 0.7 K/UL (ref 0–0.9)
MONOCYTES RELATIVE PERCENT: 9.5 % (ref 0–10)
NEUTROPHILS ABSOLUTE: 4.7 K/UL (ref 1.5–7.5)
NEUTROPHILS RELATIVE PERCENT: 65.9 % (ref 50–65)
PDW BLD-RTO: 14.1 % (ref 11.5–14.5)
PERFORMED ON: ABNORMAL
PERFORMED ON: NORMAL
PLATELET # BLD: 308 K/UL (ref 130–400)
PMV BLD AUTO: 9.9 FL (ref 9.4–12.3)
POTASSIUM REFLEX MAGNESIUM: 3.7 MMOL/L (ref 3.5–5)
RBC # BLD: 3.73 M/UL (ref 4.2–5.4)
SODIUM BLD-SCNC: 139 MMOL/L (ref 136–145)
TOTAL PROTEIN: 4.9 G/DL (ref 6.6–8.7)
WBC # BLD: 7.2 K/UL (ref 4.8–10.8)

## 2021-02-15 PROCEDURE — 97110 THERAPEUTIC EXERCISES: CPT

## 2021-02-15 PROCEDURE — 92508 TX SP LANG VOICE COMM GROUP: CPT

## 2021-02-15 PROCEDURE — 6370000000 HC RX 637 (ALT 250 FOR IP): Performed by: NEUROLOGICAL SURGERY

## 2021-02-15 PROCEDURE — 1180000000 HC REHAB R&B

## 2021-02-15 PROCEDURE — 85025 COMPLETE CBC W/AUTO DIFF WBC: CPT

## 2021-02-15 PROCEDURE — 80053 COMPREHEN METABOLIC PANEL: CPT

## 2021-02-15 PROCEDURE — 97129 THER IVNTJ 1ST 15 MIN: CPT

## 2021-02-15 PROCEDURE — 97530 THERAPEUTIC ACTIVITIES: CPT

## 2021-02-15 PROCEDURE — 36415 COLL VENOUS BLD VENIPUNCTURE: CPT

## 2021-02-15 PROCEDURE — 6370000000 HC RX 637 (ALT 250 FOR IP): Performed by: PSYCHIATRY & NEUROLOGY

## 2021-02-15 PROCEDURE — 97535 SELF CARE MNGMENT TRAINING: CPT

## 2021-02-15 PROCEDURE — 97130 THER IVNTJ EA ADDL 15 MIN: CPT

## 2021-02-15 PROCEDURE — 6360000002 HC RX W HCPCS: Performed by: NEUROLOGICAL SURGERY

## 2021-02-15 PROCEDURE — 82947 ASSAY GLUCOSE BLOOD QUANT: CPT

## 2021-02-15 RX ADMIN — ACETAMINOPHEN 650 MG: 325 TABLET ORAL at 21:14

## 2021-02-15 RX ADMIN — ENOXAPARIN SODIUM 40 MG: 40 INJECTION SUBCUTANEOUS at 14:35

## 2021-02-15 RX ADMIN — GABAPENTIN 300 MG: 300 CAPSULE ORAL at 21:14

## 2021-02-15 RX ADMIN — Medication 10 UNITS: at 21:14

## 2021-02-15 RX ADMIN — ROSUVASTATIN CALCIUM 20 MG: 20 TABLET, FILM COATED ORAL at 21:14

## 2021-02-15 RX ADMIN — SUCRALFATE 1 G: 1 TABLET ORAL at 12:07

## 2021-02-15 RX ADMIN — LISINOPRIL 5 MG: 5 TABLET ORAL at 08:09

## 2021-02-15 RX ADMIN — Medication: at 21:19

## 2021-02-15 RX ADMIN — DILTIAZEM HYDROCHLORIDE 240 MG: 240 CAPSULE, COATED, EXTENDED RELEASE ORAL at 08:09

## 2021-02-15 RX ADMIN — CETIRIZINE HYDROCHLORIDE 10 MG: 10 TABLET, FILM COATED ORAL at 08:09

## 2021-02-15 RX ADMIN — INSULIN LISPRO 4 UNITS: 100 INJECTION, SOLUTION INTRAVENOUS; SUBCUTANEOUS at 12:10

## 2021-02-15 RX ADMIN — SUCRALFATE 1 G: 1 TABLET ORAL at 08:09

## 2021-02-15 RX ADMIN — CYANOCOBALAMIN TAB 500 MCG 500 MCG: 500 TAB at 08:09

## 2021-02-15 RX ADMIN — SUCRALFATE 1 G: 1 TABLET ORAL at 16:36

## 2021-02-15 RX ADMIN — DOCUSATE SODIUM 50 MG AND SENNOSIDES 8.6 MG 1 TABLET: 8.6; 5 TABLET, FILM COATED ORAL at 21:14

## 2021-02-15 RX ADMIN — SUCRALFATE 1 G: 1 TABLET ORAL at 21:14

## 2021-02-15 RX ADMIN — INSULIN LISPRO 2 UNITS: 100 INJECTION, SOLUTION INTRAVENOUS; SUBCUTANEOUS at 12:08

## 2021-02-15 ASSESSMENT — PAIN SCALES - GENERAL
PAINLEVEL_OUTOF10: 2
PAINLEVEL_OUTOF10: 0

## 2021-02-15 ASSESSMENT — PAIN DESCRIPTION - LOCATION: LOCATION: ANKLE

## 2021-02-15 ASSESSMENT — PAIN DESCRIPTION - ONSET: ONSET: ON-GOING

## 2021-02-15 ASSESSMENT — PAIN DESCRIPTION - FREQUENCY: FREQUENCY: CONTINUOUS

## 2021-02-15 ASSESSMENT — PAIN DESCRIPTION - PAIN TYPE: TYPE: ACUTE PAIN

## 2021-02-15 ASSESSMENT — PAIN - FUNCTIONAL ASSESSMENT
PAIN_FUNCTIONAL_ASSESSMENT: PREVENTS OR INTERFERES SOME ACTIVE ACTIVITIES AND ADLS
PAIN_FUNCTIONAL_ASSESSMENT: PREVENTS OR INTERFERES SOME ACTIVE ACTIVITIES AND ADLS

## 2021-02-15 NOTE — PROGRESS NOTES
Durable Medical Equipment   Physician Order     Patient Name Rima Mancia  Patient Phone: Call Angela Winchester (son) at [phone] for delivery  [phone] [facility]) *Preferred*   [phone] (Mobile)    Patient Address: [address] [address]    Patient Height Height: 5' (152.4 cm)  Patient Weight 114 lb 8 oz (51.9 kg)   1933     DME NEEDED:  · [address] BED  WITH DAUPHIN MATTRESS FOR STAGE II ULCER    F/O Payor/Plan Precert #   [facility] #   Rochelle Cormier,  [id number]   Address Phone   [address] [phone]       DIAGNOSIS:  Hospital Problem List  Date Reviewed: 2021     ICD-10-CM Priority Class Noted POA    S/P laminectomy Z98.890   2021 Yes   Lumbar spinal stenosis M48.061   2020 Yes   Lumbar stenosis with neurogenic claudication M48.062   2021 Yes   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   2021 Yes   Type 2 diabetes mellitus without complication, without long-term current use of insulin (HCC) E11.9   2021 Yes   Lung mass R91.8   2021 Yes   Dysphagia R13.10   2021 Yes   Acute midline low back pain without sciatica M54.5   2021 Yes   Weakness R53.1   2021 Yes   Essential hypertension I10   2021 Yes   Anemia D64.9   2021 Yes   S/P lumbar laminectomy Z98.890   2021 Yes   Neuropathy G62.9   2021 Yes   Other hyperlipidemia E78.49   2021 Yes   Acute cystitis without hematuria N30.00   2021 Yes   Moderate malnutrition ([address] E44.0   2021 Yes   Admitting diagnosis:Spinal stenosis, lumbar region with neurogenic claudication    Secondary diagnoses:Essential (primary) hypertension,Hyperlipidemia, unspecified,Type 2 diabetes mellitus with hyperglycemia,Unspecified urinary incontinence,Muscle weakness (generalized),Gastro-esophageal reflux disease without esophagitis,Foot drop, left foot,Other nonspecific abnormal finding of lung field,Disorder of brain, unspecified     CHIEF COMPLAINT:  S/p lumbar laminectomy          HISTORY OF PRESENT ILLNESS: This 80 y.o. female with history of diabetes mellitus, hyperlipidemia,known lung mass and HTN. Since Dec. 2020 patient has had difficulty walking, left foot drop and back pain. It had worsened to the point that she hasn't been able to walk for the past couple of weakness or care for herself. Her daughter came in December to stay with her. Prior to this patient lived at home independently. She had an MRI done as outpatient that revealed severe spinal stenosis at L3/4 and L4/5. She was referred to neurosurgeon Dr. Pao Diaz, who recommended L3/4 & L4/5 decompressive laminectomy. She was in agreement and was admitted to [facility] on 1/26/21 for surgery. She tolerated the procedure well. She will be required to wear a TLSO brace when out of bed and follow spine precautions. She continues to have significant lower extremity weakness, worse on the left. In regards to her known lung mass, patient has been seen prior to admit by pulmonologist Dr. Servando Bradley who plans to a bronchoscopy with biopsy at some point. He had recommended her getting her back surgery and then rehab to get her stronger prior to having the procedure done. Dr. Pao Diaz ordered a MRI of the head d/t her persistent lower extremity weakness and known lung mass. 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 Dr. Pao Diaz felt this would explain balance difficulty but now her LE weakness. Oncology and Palliative Care have been consulted. Patient at this point is stating she doesn't want any chemo or radiation. She is participating in both PT/OT.  She is felt to need a stay on Rehab to work towards her goal of returning home with her daughter.            ____________________ _____________________ ________________   Physician Signature      Physician Name (print)   Physician NPI          Date

## 2021-02-15 NOTE — PROGRESS NOTES
[facility]  INPATIENT SPEECH THERAPY  [facility] [address] [facility] UNIT  TIME   [id number]  [x]Daily Note  []Progress Note    Date: 2/15/2021  Patient Name: Annie Berger        MRN: [id number]    Account #: [de-identified]  : 1933  (80 y.o.)  Gender: female   Primary Provider: Jose Degroot MD  Precautions: Fall/aspiration  Swallowing Status/Diet: Soft and bite sized with thin liquids      Subjective: Patient alert and cooperative with all therapy tasks. Patient participating in group activity. Objective:   Patient presenting with increased recall of recent/daily events. Patient recalling events occurring over the weekend. Patient completing executive function task within structured setting. Patient requiring minimal verbal cues to complete. Patient presenting with increased safety awareness for functional ADL's. Continue speech services to monitor swallow function and complete high level cognitive tasks. SHORT TERM GOAL #1:  Goal 1: The patient will demonstrate recall of functional information following a/an (immediate, shortterm,long-term) delay with min cues in order to increase functional integration into environment. SHORT TERM GOAL #2:  Goal 2: The patient will complete executive function tasks (planning, self-monitoring, organizing, etc) with min verbal cues at 80% accuracy to improve safety awareness with functional ADL's    SHORT TERM GOAL #3:  Goal 3: The patient will demonstrate functional problem solving and safety awareness with min verbal cues at 80% accuracy for daily living tasks in order to increase safe interaction with environment and decreased assistance from caregivers. Swallowing Short Term Goals  Short-term Goals  Goal 1: The patient will tolerate a regular diet wtih thin liquids with minimal overt s/s of aspiration. Goal 2: SLP will return to reassess swallow function and insure safety with all oral intake. Goal 3: SLP will complete full speech/language cognitive evaluation. Goal 4: Patient/staff will complete daily oral hygiene to prevent aspriation of oral bacteria. Goal 5: Patient will complete pharyngeal/laryngeal exercises given verbal prompts and written instructions. Goal 6: Patient will verbalize and demonstrate compensatory swallow strategies 100% of opportunitites. ASSESSMENT:  Assessment: [x]Progressing towards goals          []Not Progressing towards goals    Patient Tolerance of Treatment:   [x]Tolerated well []Tolerated fair []Required rest breaks []Fatigued    Education:  Learner:  [x]Patient          []Significant Other          []Other  Education provided regarding:  [x]Goals and POC   []Diet and swallowing precautions    []Home Exercise Program  []Progress and/or discharge information  Method of Education:  []Discussion          []Demonstration          []Handout          []Other  Evaluation of Education:   []Verbalized understanding   []Demonstrates without assistance  [x]Demonstrates with assistance  []Needs further instruction     []No evidence of learning                  [x]No family present      Plan: [x]Continue with current plan of care    []Modify current plan of care as follows:    []Discharge patient    Discharge Location:    Services/Supervision Recommended:      [x]Patient continues to require treatment by a licensed therapist to address functional deficits as outlined in the established plan of care.

## 2021-02-15 NOTE — PROGRESS NOTES
Nutrition Assessment     Type and Reason for Visit: Reassess    Nutrition Recommendations/Plan: Continue current supplementation. Nutrition Assessment:  Pt improving nutritionally AEB slight wt gain since previous assessment. Pt still with low/variable po intake and continues to report early satiety. Drinking about half of glucerna shakes. Accuchecks running 110-244. Will continue current supplementation. Malnutrition Assessment:  Malnutrition Status: Moderate malnutrition    Estimated Daily Nutrient Needs:  Energy (kcal): 7850-3515 kcal/day; Weight Used for Energy Requirements:  Current     Protein (g): 65-76 g/PRO/day; Weight Used for Protein Requirements:  Current(1.2-1.4 g/kg)        Fluid (ml/day): 6698-6501 mL/fluid/day; Weight Used for Fluid Requirements:  1 ml/kcal      Current Nutrition Therapies:    Dietary Nutrition Supplements: Diabetic Oral Supplement  DIET CARB CONTROL; Carb Control: 4 carb choices (60 gms)/meal; Dysphagia Soft and Bite-Sized    Anthropometric Measures:  · Height: 5' (152.4 cm)  · Current Body Wt: 114 lb (51.7 kg)   · BMI: 22.3    Nutrition Diagnosis:   · Moderate malnutrition related to acute injury/trauma as evidenced by weight loss greater than or equal to 2% in 1 week, intake 26-50%, intake 0-25%    Nutrition Interventions:   Food and/or Nutrient Delivery:  Continue Current Diet, Continue Oral Nutrition Supplement  Nutrition Education/Counseling:  No recommendation at this time   Coordination of Nutrition Care:  Continue to monitor while inpatient    Goals:  Pt will consume >50% of meals and ONS and maintain wt.        Nutrition Monitoring and Evaluation:   Behavioral-Environmental Outcomes:  None Identified   Food/Nutrient Intake Outcomes:  Food and Nutrient Intake, Supplement Intake  Physical Signs/Symptoms Outcomes:  Biochemical Data, Chewing or Swallowing, Nutrition Focused Physical Findings, Weight, Skin Electronically signed by Mario Campbell MS, RD, LD on 2/15/21 at 2:13 PM CST    Contact: [phone]

## 2021-02-15 NOTE — PATIENT CARE CONFERENCE
Quality of Gait: CONTINUED DECREASED KINESTHETIC AWARENESS OF BILAT KNEES, PELVIC POSITION. TENDENCY TOWARDS MIANTAINING KNEE FLEXTION BILAT DURING STANCE. POST WEIGHT DISPLACEMENT REQURING ANTERIOR FORCE AT PELVIS TO CORRECT. PROPER STEP TO APPROX 25% OF TIME. Distance: STOOD ONLY X2  Comments: RECOMMEND PARTIAL STEP THROUGH TO ADDRESS POST LEAN. STAIRS     GOALS:  Short term goals  Time Frame for Short term goals: 1 WEEK  Short term goal 1: AMB CGA W/ RW 50FT   Short term goal 2: SBA WITH ALL BED MOBILITY  Short term goal 3: SBA W/ W/C PROPULSION 75 FT W/ TURNS   Short term goal 4: MIN A WITH STAND STEP TF TO/FROM SURFACES W/ RW   Short term goal 5: CAREGIVER SBA FOR ASSISTING PT WITH DONNING/DOFFING TLSO     Long term goals  Time Frame for Long term goals : 2 WEEKS   Long term goal 1: AMB 100FT W/ RW SBA   Long term goal 2: IND WITH STAND STEP TF TO/FROM SURFACES USING RW   Long term goal 3: IND W/ W/C PROPULSION FOR 150FT, INCLUDING TURNS  Long term goal 4: CAREGIVER AND PT IND WITH DONNING/DOFFING TLSO   Long term goal 5: PT IND WITH HOME EX PROGRAM AT D/C     ASSESSMENT:  Assessment: PT INITIALLY DEMONSTRATES TOLERANCE FOR TF AND DEMONSTRATED IMPROVEMENTS IN STANDING TOLERANCE IN R[place]OL[place] STEADY WITH MIN A FROM THERAPISTS. Sarai Profit AS SESSION PROGRESSES AND AS PT IS ASKED TO TF MORE FREQUENTLY FOR DRESSING/DIAPER CHANGES, FATIGUE INCREASES AND PT REQUIRES MORE ASSISTANCE. SPEECH THERAPY  Precautions: Fall/aspiration  Swallowing Status/Diet: Soft and bite sized with thin liquids        Subjective: Patient alert and cooperative with all therapy tasks. Patient participating in group activity.      Objective:   Patient presenting with increased recall of recent/daily events. Patient recalling events occurring over the weekend.      Patient completing executive function task within structured setting.  Patient requiring minimal verbal cues to complete.     Patient presenting with increased safety awareness for functional ADL's.      Continue speech services to monitor swallow function and complete high level cognitive tasks.            SHORT TERM GOAL #1:  Goal 1: The patient will demonstrate recall of functional information following a/an (immediate, shortterm,long-term) delay with min cues in order to increase functional integration into environment. MET     SHORT TERM GOAL #2:  Goal 2: The patient will complete executive function tasks (planning, self-monitoring, organizing, etc) with min verbal cues at 80% accuracy to improve safety awareness with functional ADL's     SHORT TERM GOAL #3:  Goal 3: The patient will demonstrate functional problem solving and safety awareness with min verbal cues at 80% accuracy for daily living tasks in order to increase safe interaction with environment and decreased assistance from caregivers.        Swallowing Short Term Goals  Short-term Goals  Goal 1: The patient will tolerate a regular diet wtih thin liquids with minimal overt s/s of aspiration. Goal 2: SLP will return to reassess swallow function and insure safety with all oral intake. Goal 3: SLP will complete full speech/language cognitive evaluation. MET  Goal 4: Patient/staff will complete daily oral hygiene to prevent aspriation of oral bacteria. MET  Goal 5: Patient will complete pharyngeal/laryngeal exercises given verbal prompts and written instructions. Goal 6: Patient will verbalize and demonstrate compensatory swallow strategies 100% of opportunitites.     Long-term Goals  Goal 1: The patient will develop functional, cognitive linguistic based skills and utilize compensatory strategies to communicate wants and needs effectively to different conversational partners, maintain safety, and participate socially in functional living enviroment.       ASSESSMENT:  Assessment: [x]? Progressing towards goals           []? Not Progressing towards goals GOALS MET: 3STG  0 LTG    OCCUPATIONAL THERAPY    CURRENT IRF-STEVE SCORES  Eating: CARE Score: 5       Oral Hygiene: CARE Score: 5        Toileting: CARE Score: 3  Comment: Supine in bed x2 occurrences      Shower/Bathe: CARE Score: 3  Comment: Mod A, shower        Upper Body Dressing: CARE Score: 2  Comment: Min/Mod A for sitting balance at EOB with ADLs, max A with donning/doffing LSO      Lower Body Dressing: CARE Score: 1  Comment: with 2 assist while standing       Footwear: CARE Score: 1  Comment: Unable to use sock aide at EOB due to decreased sitting balance requiring Min-Mod A       Toilet Transfers: CARE Score: 1  Comment: x2 for transfer       Picking Up Object:  CARE Score: 1          UE Functioning:  BUE AROM WFL     Pain Assessment:  Pain Level: 4  Pain Location: Back    STGs:  Short term goals  Time Frame for Short term goals: 1 week  Short term goal 1: Min A with clothing management/hygiene for toileting. Short term goal 2: Mod A with toilet transfers. Short term goal 3: Mod A with LB dressing, using AE. Short term goal 4: Mod A with donning/doffing socks and shoes using AE. Short term goal 5: Min A with bathing hygiene. Short term goal 6: Min A with donning/doffing LSO. LTGs:  Long term goals  Time Frame for Long term goals : 3-4 weeks  Long term goal 1: Min A with clothing management/hygiene for toileting. Long term goal 2: Min A with toilet transfers. Long term goal 3: Min A with LB dressing, using AE. Long term goal 4: Min A with donning/doffing socks and shoes using AE. Long term goal 5: Supervision with bathing hygiene. Long term goals 6: Patient verbalize DME needs. Long term goal 7: Mod A with donning/doffing LSO.     Assessment:  Performance deficits / Impairments: Decreased functional mobility , Decreased strength, Decreased endurance, Decreased balance, Decreased high-level IADLs                  NUTRITION Current Wt: Weight: 114 lb 8 oz (51.9 kg) / Body mass index is 22.36 kg/m². Admission Wt: Admission Body Weight: 120 lb (54.4 kg)  Oral Diet Orders:Carb control; soft and bite-sized   Oral Nutrition Supplement (ONS) Orders:Glucerna BID    Pt improving nutritionally AEB slight wt gain since previous assessment. Pt still with low/variable po intake and continues to report early satiety. Drinking about half of glucerna shakes. Accuchecks running 110-244. Will continue current supplementation. Please see nutrition notes for further details. NURSING    Wounds/Incisions/Ulcers: Incision healing well to mid lower back. Stage 2 to coccyx applying Venelex ointment and covering with  Mepilex. Jaylon Scale Score: 17    Pain: Patient's pain is currently controlled with Tylenol 650 mg q 4 hrs PRN    Consultations/Labs/X-rays: Dr. Earnest Alpers - pain management, Dr. Yazmin Soto,     Family Education: Family available and participating in education -actively     Fall Risk:  Jessica Branch Score: 28    Fall in the last week? none      Other Nursing Issues: DNR, Alert and oriented, incont of bowel and bladder, assist  x2 with alejo steady, meds whole one at a time with water, Lantus at HS, Contact isolation MDRO in urine, Dolphin mattress, Lovenox, daily wt. Dsyphagia Carb control diet       SOCIAL WORK/CASE MANAGEMENT  Assessment: Has good family support, her daughter was \"sent home\" for her own health for about 3 weeks and to return to her oncologist.  Her son and daughter-in-law are here now to participate in care. Discharge Plan   Estimated [place] of [place] [facility] [place]ination: home health vs home with hospice care    Pass: No    Services at Discharge: maximal supportive care    Equipment at Discharge: Hospital bed, wheelchair, RW, Bedside commode, Tub transfer bench     Progress made in the prior week:  1. Requiring less assist with transfers. 2.  3.  4.  5.      Goals for following week:  1. Discharge planning. 2.   3.   4.   5.     Factors facilitating achievement of predicted outcomes: Cooperative    Barriers to the achievement of predicted outcomes: Pain, Confusion, Impulsivity, Limited safety awareness, Decreased endurance, Upper extremity weakness and Lower extremity weakness    Team Members Present at Conference:  : Gavino Baker [facility]   Occupational Therapist: Kasia Mark OTR/L  Physical Therapist: Leigh Ann Valencia PT,DPT  Speech Therapist: Abdoulaye Hooker MS,CCC-SLP  Nurse: Regan Nguyen RN,BSN   Nurse Manager:  Regan Nguyen RN, BSN  Dietitian:  Nel Galarza MS, RD, LD  Rehab Director:  Aury Correia      I approve the established interdisciplinary plan of care as documented within the medical record of Rima Mancia.

## 2021-02-15 NOTE — PROGRESS NOTES
Assessment PT INITIALLY DEMONSTRATES TOLERANCE FOR TF AND DEMONSTRATED IMPROVEMENTS IN STANDING TOLERANCE IN R[place] CLARA WITH MIN A FROM THERAPISTS. Crow Saab AS SESSION PROGRESSES AND AS PT IS ASKED TO TF MORE FREQUENTLY FOR DRESSING/DIAPER CHANGES, FATIGUE INCREASES AND PT REQUIRES MORE ASSISTANCE. Activity Tolerance   Activity Tolerance Patient limited by fatigue;Patient limited by endurance   Safety Devices   Type of devices Call light within reach; Chair alarm in place;Gait belt;Left in chair  ([facility] )

## 2021-02-15 NOTE — PROGRESS NOTES
Occupational Therapy  Facility/Department: [facility] 8 REHAB UNIT  Daily Treatment Note  NAME: Rima Gomez  : 1933  MRN: [id number]    Date of Service: 2/15/2021    Discharge Recommendations:  24 hour supervision or assist, Home with Home health OT  OT Equipment Recommendations  Equipment Needed: Yes  Mobility Devices: [place] Bed : Electric - Full  (Height of Bed)    Assessment   Performance deficits / Impairments: Decreased functional mobility ; Decreased strength;Decreased endurance;Decreased balance;Decreased high-level IADLs  Assessment: Sent order for hospital bed to [facility] for discharge on 2021. Son will let us know if we need to order anything else. 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 has a past medical history of Diabetes mellitus ([facility] [address] Hyperlipidemia, Hypertension, and Palliative care patient. has a past surgical history that includes Appendectomy; Mastectomy, bilateral; Cholecystectomy; and Lumbar spine surgery (N/A, 2021). Restrictions  Restrictions/Precautions  Restrictions/Precautions: Fall Risk, Weight Bearing, Surgical Protocols, Modified Diet  Required Braces or Orthoses?: Yes  Lower Extremity Weight Bearing Restrictions  Right Lower Extremity Weight Bearing: Weight Bearing As Tolerated  Left Lower Extremity Weight Bearing: Weight Bearing As Tolerated  Required Braces or Orthoses  Spinal Other: LSO  Left Lower Extremity Brace:  Dee Foot Orthotics  Position Activity Restriction  Spinal Precautions: No Bending, No Lifting, No Twisting Other position/activity restrictions: AFO in room but has not been sized to patient and it does not fit in current shoe       Objective       Transfers  Slide Board: Dependent/Total;2 Person assistance  Transfer Comments: w/c<>bed using Sliding board x2, with one person holding board. Attendance  Activity: Rochelle  Participation: Active participation  Time Spent With Patient  Minutes: 15           Plan   Plan  Current Treatment Recommendations: Strengthening, Balance Training, Functional Mobility Training, Endurance Training, Safety Education & Training, Patient/Caregiver Education & Training, Equipment Evaluation, Education, & procurement, Self-Care / ADL, Home Management Training  OutComes Score       02/15/21 1015   Lower Body Dressing   Assistance Needed Dependent   Comment with 2 assist while standing   CARE Score 1       Goals  Short term goals  Time Frame for Short term goals: 1 week  Short term goal 1: Min A with clothing management/hygiene for toileting. Short term goal 2: Mod A with toilet transfers. Short term goal 3: Mod A with LB dressing, using AE. Short term goal 4: Mod A with donning/doffing socks and shoes using AE. Short term goal 5: Min A with bathing hygiene. Short term goal 6: Min A with donning/doffing LSO. Long term goals  Time Frame for Long term goals : 3-4 weeks  Long term goal 1: Min A with clothing management/hygiene for toileting. Long term goal 2: Min A with toilet transfers. Long term goal 3: Min A with LB dressing, using AE. Long term goal 4: Min A with donning/doffing socks and shoes using AE. Long term goal 5: Supervision with bathing hygiene. Long term goals 6: Patient verbalize DME needs. Long term goal 7: Mod A with donning/doffing LSO. Patient Goals   Patient goals : Increase her independence with ADLs.        Therapy Time   Individual Concurrent Group Co-treatment   Time In 1015     1100   Time Out [id number]   [id number] 15     88 Timed Code Treatment Minutes: 75 [facility] Genny, OT

## 2021-02-16 LAB
GLUCOSE BLD-MCNC: 105 MG/DL (ref 70–99)
GLUCOSE BLD-MCNC: 152 MG/DL (ref 70–99)
GLUCOSE BLD-MCNC: 216 MG/DL (ref 70–99)
GLUCOSE BLD-MCNC: 265 MG/DL (ref 70–99)
PERFORMED ON: ABNORMAL

## 2021-02-16 PROCEDURE — 97129 THER IVNTJ 1ST 15 MIN: CPT

## 2021-02-16 PROCEDURE — 82947 ASSAY GLUCOSE BLOOD QUANT: CPT

## 2021-02-16 PROCEDURE — 51798 US URINE CAPACITY MEASURE: CPT

## 2021-02-16 PROCEDURE — 97130 THER IVNTJ EA ADDL 15 MIN: CPT

## 2021-02-16 PROCEDURE — 6360000002 HC RX W HCPCS: Performed by: NEUROLOGICAL SURGERY

## 2021-02-16 PROCEDURE — 97530 THERAPEUTIC ACTIVITIES: CPT

## 2021-02-16 PROCEDURE — 97110 THERAPEUTIC EXERCISES: CPT

## 2021-02-16 PROCEDURE — 6370000000 HC RX 637 (ALT 250 FOR IP): Performed by: NEUROLOGICAL SURGERY

## 2021-02-16 PROCEDURE — 6370000000 HC RX 637 (ALT 250 FOR IP): Performed by: PSYCHIATRY & NEUROLOGY

## 2021-02-16 PROCEDURE — 1180000000 HC REHAB R&B

## 2021-02-16 PROCEDURE — 99233 SBSQ HOSP IP/OBS HIGH 50: CPT | Performed by: PSYCHIATRY & NEUROLOGY

## 2021-02-16 PROCEDURE — 51701 INSERT BLADDER CATHETER: CPT

## 2021-02-16 PROCEDURE — 97535 SELF CARE MNGMENT TRAINING: CPT

## 2021-02-16 RX ADMIN — ACETAMINOPHEN 650 MG: 325 TABLET ORAL at 20:44

## 2021-02-16 RX ADMIN — SUCRALFATE 1 G: 1 TABLET ORAL at 20:45

## 2021-02-16 RX ADMIN — GABAPENTIN 300 MG: 300 CAPSULE ORAL at 20:45

## 2021-02-16 RX ADMIN — ENOXAPARIN SODIUM 40 MG: 40 INJECTION SUBCUTANEOUS at 16:58

## 2021-02-16 RX ADMIN — Medication 10 UNITS: at 20:45

## 2021-02-16 RX ADMIN — SUCRALFATE 1 G: 1 TABLET ORAL at 08:21

## 2021-02-16 RX ADMIN — INSULIN LISPRO 6 UNITS: 100 INJECTION, SOLUTION INTRAVENOUS; SUBCUTANEOUS at 12:22

## 2021-02-16 RX ADMIN — Medication: at 10:22

## 2021-02-16 RX ADMIN — SUCRALFATE 1 G: 1 TABLET ORAL at 12:21

## 2021-02-16 RX ADMIN — Medication: at 20:45

## 2021-02-16 RX ADMIN — DILTIAZEM HYDROCHLORIDE 240 MG: 240 CAPSULE, COATED, EXTENDED RELEASE ORAL at 08:21

## 2021-02-16 RX ADMIN — DOCUSATE SODIUM 50 MG AND SENNOSIDES 8.6 MG 1 TABLET: 8.6; 5 TABLET, FILM COATED ORAL at 20:45

## 2021-02-16 RX ADMIN — LISINOPRIL 5 MG: 5 TABLET ORAL at 08:22

## 2021-02-16 RX ADMIN — SUCRALFATE 1 G: 1 TABLET ORAL at 16:58

## 2021-02-16 RX ADMIN — CETIRIZINE HYDROCHLORIDE 10 MG: 10 TABLET, FILM COATED ORAL at 08:22

## 2021-02-16 RX ADMIN — INSULIN LISPRO 2 UNITS: 100 INJECTION, SOLUTION INTRAVENOUS; SUBCUTANEOUS at 20:47

## 2021-02-16 RX ADMIN — ROSUVASTATIN CALCIUM 20 MG: 20 TABLET, FILM COATED ORAL at 20:44

## 2021-02-16 RX ADMIN — CYANOCOBALAMIN TAB 500 MCG 500 MCG: 500 TAB at 08:22

## 2021-02-16 ASSESSMENT — PAIN DESCRIPTION - LOCATION: LOCATION: ANKLE

## 2021-02-16 ASSESSMENT — PAIN SCALES - GENERAL
PAINLEVEL_OUTOF10: 6
PAINLEVEL_OUTOF10: 2

## 2021-02-16 ASSESSMENT — PAIN DESCRIPTION - ORIENTATION: ORIENTATION: LEFT

## 2021-02-16 NOTE — PROGRESS NOTES
Occupational Therapy  Facility/Department: [facility] 8 REHAB UNIT  Daily Treatment Note  NAME: Rima Concepcion  : 1933  MRN: [id number]    Date of Service: 2021    Discharge Recommendations:  24 hour supervision or assist, Home with Home health OT       Assessment   Performance deficits / Impairments: Decreased functional mobility ; Decreased strength;Decreased endurance;Decreased balance;Decreased high-level IADLs  Assessment: Sent order for hospital bed to [address] [place] [address]  for discharge on 2021. Started script for 16\" wide, olayinka-height wheelchair with Roho cushion for Stage II ulcer and sliding board. Will send tomorrow once  has signed. When pt's son was transferring pt with the sliding board, pt about slid off of sliding board, required assistance from therapist to lay pt down. Will continue FTD with son and daughter in law tomorrow before discharge on Saturday. Treatment Diagnosis: L3-4, L4-5 decompressive laminectomy, 2 cm lesion on R cerebellum, lung mass  OT Education: Family Education;Equipment  Patient Education: Will continue Family training tomorrow. Activity Tolerance  Activity Tolerance: Patient Tolerated treatment well  Safety Devices  Safety Devices in place: Yes  Type of devices: Bed alarm in place;Call light within reach         Patient Diagnosis(es): There were no encounter diagnoses. has a past medical history of Diabetes mellitus ([facility] [address] Hyperlipidemia, Hypertension, and Palliative care patient. has a past surgical history that includes Appendectomy; Mastectomy, bilateral; Cholecystectomy; and Lumbar spine surgery (N/A, 2021).     Restrictions  Restrictions/Precautions  Restrictions/Precautions: Fall Risk, Weight Bearing, Surgical Protocols, Modified Diet  Required Braces or Orthoses?: Yes  Lower Extremity Weight Bearing Restrictions  Right Lower Extremity Weight Bearing: Weight Bearing As Tolerated  Left Lower Extremity Weight Bearing: Weight Bearing As Tolerated Required Braces or Orthoses  Spinal Other: LSO  Left Lower Extremity Brace: Dee Foot Orthotics  Position Activity Restriction  Spinal Precautions: No Bending, No Lifting, No Twisting  Other position/activity restrictions: AFO in room but has not been sized to patient and it does not fit in current shoe       Objective    Balance  Sitting Balance: Minimal assistance  Standing Balance: Moderate assistance     Transfers  Slide Board: Dependent/Total;2 Person assistance  Transfer Comments: When pt's son was transferring pt with the sliding board, pt about slid off of sliding board, required assistance from therapist to lay pt down        Plan   Plan  Current Treatment Recommendations: Strengthening, Balance Training, Functional Mobility Training, Endurance Training, Safety Education & Training, Patient/Caregiver Education & Training, Equipment Evaluation, Education, & procurement, Self-Care / ADL, Home Management Training       OutComes Score       02/16/21 1000   [id number] Needed Dependent   CARE Score 1        02/16/21 1000   Toilet Transfer   Assistance Needed Dependent   Comment x2 for transfer   CARE Score 1        Goals  Short term goals  Time Frame for Short term goals: 1 week  Short term goal 1: Min A with clothing management/hygiene for toileting. Short term goal 2: Mod A with toilet transfers. Short term goal 3: Mod A with LB dressing, using AE. Short term goal 4: Mod A with donning/doffing socks and shoes using AE. Short term goal 5: Min A with bathing hygiene. Short term goal 6: Min A with donning/doffing LSO. Long term goals  Time Frame for Long term goals : 3-4 weeks  Long term goal 1: Min A with clothing management/hygiene for toileting. Long term goal 2: Min A with toilet transfers. Long term goal 3: Min A with LB dressing, using AE. Long term goal 4: Min A with donning/doffing socks and shoes using AE. Long term goal 5: Supervision with bathing hygiene. Long term goals 6: Patient verbalize DME needs. Long term goal 7: Mod A with donning/doffing LSO. Patient Goals   Patient goals : Increase her independence with ADLs.        Therapy Time   Individual Concurrent Group Co-treatment   Time In 1000         Time Out 1100         Minutes 60         Timed Code Treatment Minutes: [address], OT

## 2021-02-16 NOTE — PROGRESS NOTES
Durable Medical Equipment   Physician Order     Patient Name Rima Mancia  Patient Phone: [phone] [facility]) *Preferred*   [phone] (Mobile)    Patient Address: [address] [place] [address]    Patient Height Height: 5' (152.4 cm)  Patient Weight 114 lb 5 oz (51.9 kg)   1933     DME NEEDED:   · 16\" WIDE, RIVERA-HEIGHT WHEELCHAIR, WITH FULL LENGTH/SWING BACK ARM RESTS, ANTI-TIPPERS, B SWING AWAY FOOT RESTS  · 16\" WIDE ROHO CUSHION FOR STAGE II ULCER  · 30\" SLIDING BOARD  · ADJUSTABLE SIDE TABLE   · [address] BED       F/O Payor/Plan Precert #   [facility] [address] [facility] [facility] [employer]    Subscriber Subscriber #   Kimberlee Dao,  [id number]   Address Phone   [address] [phone]       DIAGNOSIS:  Hospital Problem List  Date Reviewed: 2021     ICD-10-CM Priority Class Noted POA    S/P laminectomy Z98.890   2021 Yes   Lumbar spinal stenosis M48.061   2020 Yes   Lumbar stenosis with neurogenic claudication M48.062   2021 Yes   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   2021 Yes   Type 2 diabetes mellitus without complication, without long-term current use of insulin (HCC) E11.9   2021 Yes   Lung mass R91.8   2021 Yes   Dysphagia R13.10   2021 Yes   Acute midline low back pain without sciatica M54.5   2021 Yes   Weakness R53.1   2021 Yes   Essential hypertension I10   2021 Yes   Anemia D64.9   2021 Yes   S/P lumbar laminectomy Z98.890   2021 Yes   Neuropathy G62.9   2021 Yes   Other hyperlipidemia E78.49   2021 Yes   Acute cystitis without hematuria N30.00   2021 Yes   Moderate malnutrition ([address] E44.0   2021 Yes   HISTORY OF PRESENT ILLNESS: This 80 y.o. female with history of diabetes mellitus, hyperlipidemia,known lung mass and HTN. Since Dec. 2020 patient has had difficulty walking, left foot drop and back pain. It had worsened to the point that she hasn't been able to walk for the past couple of weakness or care for herself. Her daughter came in December to stay with her. Prior to this patient lived at home independently. She had an MRI done as outpatient that revealed severe spinal stenosis at L3/4 and L4/5. She was referred to neurosurgeon Dr. Sweta Samuel, who recommended L3/4 & L4/5 decompressive laminectomy. She was in agreement and was admitted to [address] [place] [address] on 1/26/21 for surgery. She tolerated the procedure well. She will be required to wear a TLSO brace when out of bed and follow spine precautions. She continues to have significant lower extremity weakness, worse on the left. In regards to her known lung mass, patient has been seen prior to admit by pulmonologist Dr. Krishnan who plans to a bronchoscopy with biopsy at some point. He had recommended her getting her back surgery and then rehab to get her stronger prior to having the procedure done. Dr. Sweta Samuel ordered a MRI of the head d/t her persistent lower extremity weakness and known lung mass. 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 Dr. Sweta Samuel felt this would explain balance difficulty but now her LE weakness. Oncology and Palliative Care have been consulted. Patient at this point is stating she doesn't want any chemo or radiation. She is participating in both PT/OT. She is felt to need a stay on Rehab to work towards her goal of returning home with her daughter. Assessment and Plan      1. S/P lumbar laminectomy-Pain control/mobilzation  2. HTN-on meds monitor  3. DVT prophylaxis-Lovenox  4. Anemia-on iron   5. Neuropathy-on Neurontin  6. DM-on insulin-monitor BS  7. GI-PPI/bowel regimen  8. Hyperlipidemia-on statin  9.  Pain control-Norco PRN 11. Lung/cerebellar mass-monitor   12. PT/OT  13.  UTI-complete abx       ____________________ _____________________ ________________   Physician Signature      Physician Name (print)   Physician NPI          Date

## 2021-02-16 NOTE — PLAN OF CARE
Problem: SAFETY  Goal: Free from accidental physical injury  2/16/2021 1031 by Morenita Bradley RN  Outcome: Ongoing  2/16/2021 0034 by Yolanda Alamo LPN  Outcome: Ongoing  Goal: Free from intentional harm  2/16/2021 1031 by Morenita Bradley RN  Outcome: Ongoing  2/16/2021 0034 by Yolanda Alamo LPN  Outcome: Ongoing

## 2021-02-16 NOTE — PLAN OF CARE
Problem: SAFETY  Goal: Free from accidental physical injury  2/16/2021 0034 by Joce Duffy LPN  Outcome: Ongoing  2/15/2021 1324 by Adeline Ordoñez RN  Outcome: Ongoing  Goal: Free from intentional harm  2/16/2021 0034 by Joce Duffy LPN  Outcome: Ongoing  2/15/2021 1324 by Adeline Ordoñez RN  Outcome: Ongoing     Problem: DAILY CARE  Goal: Daily care needs are met  2/16/2021 0034 by Joce Duffy LPN  Outcome: Ongoing  2/15/2021 1324 by Adeline Ordoñez RN  Outcome: Ongoing     Problem: PAIN  Goal: Patient's pain/discomfort is manageable  2/16/2021 0034 by Joce Duffy LPN  Outcome: Ongoing  2/15/2021 1324 by Adeline Ordoñez RN  Outcome: Ongoing     Problem: SKIN INTEGRITY  Goal: Skin integrity is maintained or improved  2/16/2021 0034 by Joce Duffy LPN  Outcome: Ongoing  2/15/2021 1324 by Adeline Ordoñez RN  Outcome: Ongoing     Problem: KNOWLEDGE DEFICIT  Goal: Patient/S.O. demonstrates understanding of disease process, treatment plan, medications, and discharge instructions.   2/16/2021 0034 by Joce Duffy LPN  Outcome: Ongoing  2/15/2021 1324 by Adeline Ordoñez RN  Outcome: Ongoing     Problem: DISCHARGE BARRIERS  Goal: Patient's continuum of care needs are met  2/16/2021 0034 by Joce Duffy LPN  Outcome: Ongoing  2/15/2021 1324 by Adeline Ordoñez RN  Outcome: Ongoing     Problem: Pain:  Goal: Pain level will decrease  Description: Pain level will decrease  2/16/2021 0034 by Joce Duffy LPN  Outcome: Ongoing  2/15/2021 1324 by Adeline Ordoñez RN  Outcome: Ongoing  Goal: Control of acute pain  Description: Control of acute pain  2/16/2021 0034 by Joce Duffy LPN  Outcome: Ongoing  2/15/2021 1324 by Adeline Ordoñez RN  Outcome: Ongoing  Goal: Control of chronic pain  Description: Control of chronic pain  2/16/2021 0034 by Joce Duffy LPN  Outcome: Ongoing  2/15/2021 1324 by Adeline Ordoñez RN  Outcome: Ongoing     Problem: Falls - Risk of:  Goal: Will remain free from falls Description: Will remain free from falls  2/16/2021 0034 by Marleni Santoro LPN  Outcome: Ongoing  2/15/2021 1324 by Uma Lange RN  Outcome: Ongoing  Goal: Absence of physical injury  Description: Absence of physical injury  2/16/2021 0034 by Marleni Santoro LPN  Outcome: Ongoing  2/15/2021 1324 by Uma Lange RN  Outcome: Ongoing     Problem: Skin Integrity:  Goal: Will show no infection signs and symptoms  Description: Will show no infection signs and symptoms  2/16/2021 0034 by Marleni Santoro LPN  Outcome: Ongoing  2/15/2021 1324 by Uma Lange RN  Outcome: Ongoing  Goal: Absence of new skin breakdown  Description: Absence of new skin breakdown  2/16/2021 0034 by Marleni Santoro LPN  Outcome: Ongoing  2/15/2021 1324 by Uma Lange RN  Outcome: Ongoing     Problem: Nutrition  Goal: Optimal nutrition therapy  2/16/2021 0034 by Marleni Santoro LPN  Outcome: Ongoing  2/15/2021 1324 by Uma Lange RN  Outcome: Ongoing     Problem: Bleeding:  Goal: Will show no signs and symptoms of excessive bleeding  Description: Will show no signs and symptoms of excessive bleeding  2/16/2021 0034 by Marleni Santoro LPN  Outcome: Ongoing  2/15/2021 1324 by Uma Lange RN  Outcome: Ongoing     Problem: Serum Glucose Level - Abnormal:  Goal: Ability to maintain appropriate glucose levels has stabilized  Description: Ability to maintain appropriate glucose levels has stabilized  2/16/2021 0034 by Marleni Santoro LPN  Outcome: Ongoing  2/15/2021 1324 by Uma Lange RN  Outcome: Ongoing     Problem: Infection - Surgical Site:  Goal: Will show no infection signs and symptoms  Description: Will show no infection signs and symptoms  2/16/2021 0034 by Marleni Santoro LPN  Outcome: Ongoing  2/15/2021 1324 by Uma Lange RN  Outcome: Ongoing

## 2021-02-16 NOTE — PROGRESS NOTES
Durable Medical Equipment   Physician Order     Patient Name Rima Mancia  Patient Phone: [phone] [facility]) *Preferred*   [phone] (Mobile)    Patient Address: [address] [place] [address]    Patient Height Height: 5' (152.4 cm)  Patient Weight 114 lb 5 oz (51.9 kg)   1933     DME NEEDED:   · DARIANA LIFT     F/O Payor/Plan Precert #   [address] [facility] [address] #   Allison Gomez, [id number]   Address Phone   [address] [phone]       DIAGNOSIS:  Hospital Problem List  Date Reviewed: 2021     ICD-10-CM Priority Class Noted POA    S/P laminectomy Z98.890   2021 Yes   Lumbar spinal stenosis M48.061   2020 Yes   Lumbar stenosis with neurogenic claudication M48.062   2021 Yes   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   2021 Yes   Type 2 diabetes mellitus without complication, without long-term current use of insulin ([address] E11.9   2021 Yes   Lung mass R91.8   2021 Yes   Dysphagia R13.10   2021 Yes   Acute midline low back pain without sciatica M54.5   2021 Yes   Weakness R53.1   2021 Yes   Essential hypertension I10   2021 Yes   Anemia D64.9   2021 Yes   S/P lumbar laminectomy Z98.890   2021 Yes   Neuropathy G62.9   2021 Yes   Other hyperlipidemia E78.49   2021 Yes   Acute cystitis without hematuria N30.00   2021 Yes   Moderate malnutrition ([address] E44.0   2021 Yes   Admitting diagnosis:Spinal stenosis, lumbar region with neurogenic claudication     Secondary diagnoses:Essential (primary) hypertension,Hyperlipidemia, unspecified,Type 2 diabetes mellitus with hyperglycemia,Unspecified urinary incontinence,Muscle weakness (generalized),Gastro-esophageal reflux disease without esophagitis,Foot drop, left foot,Other nonspecific abnormal finding of lung field,Disorder of brain, unspecified     CHIEF COMPLAINT:  S/p lumbar laminectomy          HISTORY OF PRESENT ILLNESS: This 80 y.o. female with history of diabetes mellitus, hyperlipidemia,known lung mass and HTN. Since Dec. 2020 patient has had difficulty walking, left foot drop and back pain. It had worsened to the point that she hasn't been able to walk for the past couple of weakness or care for herself. Her daughter came in December to stay with her. Prior to this patient lived at home independently. She had an MRI done as outpatient that revealed severe spinal stenosis at L3/4 and L4/5. She was referred to neurosurgeon Dr. Klarissa Altamirano, who recommended L3/4 & L4/5 decompressive laminectomy. She was in agreement and was admitted to [facility] on 21 for surgery. She tolerated the procedure well. She will be required to wear a TLSO brace when out of bed and follow spine precautions. She continues to have significant lower extremity weakness, worse on the left. In regards to her known lung mass, patient has been seen prior to admit by pulmonologist Dr. Yue Moore who plans to a bronchoscopy with biopsy at some point. He had recommended her getting her back surgery and then rehab to get her stronger prior to having the procedure done. Dr. Klarissa Altamirano ordered a MRI of the head d/t her persistent lower extremity weakness and known lung mass. 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 Dr. Klarissa Altamirano felt this would explain balance difficulty but now her LE weakness. Oncology and Palliative Care have been consulted. Patient at this point is stating she doesn't want any chemo or radiation. She is participating in both PT/OT. She is felt to need a stay on Rehab to work towards her goal of returning home with her daughter.     Assessment and Plan      1. S/P lumbar laminectomy-Pain control/mobilzation  2. HTN-on meds monitor  3. DVT prophylaxis-Lovenox  4. Anemia-on iron   5. Neuropathy-on Neurontin  6. DM-on insulin-monitor BS  7. GI-PPI/bowel regimen  8. Hyperlipidemia-on statin  9.  Pain control-Norco PRN 11. Lung/cerebellar mass-monitor   12. PT/OT  13.  UTI-complete abx    ____________________ _____________________ ________________   Physician Signature      Physician Name (print)   Physician NPI          Date

## 2021-02-16 NOTE — PROGRESS NOTES
Patient:   Terri Sales  MR#:    [id number]   Room:    0827/827-01   YOB: 1933  Date of Progress Note: 2/16/2021  Time of Note                           8:17 AM  Consulting Physician:   Vickie Haynes M.D.   Attending Physician:  Vickie Haynes MD     Chief complaint S/p lumbar laminectomy S:This 80 y.o. female  with history of diabetes mellitus, hyperlipidemia,known lung mass and HTN. Since Dec. 2020 patient has had difficulty walking, left foot drop and back pain. It had worsened to the point that she hasn't been able to walk for the past couple of weakness or care for herself. Her daughter came in December to stay with her. Prior to this patient lived at home independently. She had an MRI done as outpatient that revealed severe spinal stenosis at L3/4 and L4/5. She was referred to neurosurgeon Dr. Isaac Harris, who recommended L3/4 & L4/5 decompressive laminectomy. She was in agreement and was admitted to [facility] on 1/26/21 for surgery. She tolerated the procedure well. She will be required to wear a TLSO brace when out of bed and follow spine precautions. She continues to have significant lower extremity weakness, worse on the left. In regards to her known lung mass, patient has been seen prior to admit by pulmonologist Dr. Tamara Rehman who plans to a bronchoscopy with biopsy at some point. He had recommended her getting her back surgery and then rehab to get her stronger prior to having the procedure done. Dr. Isaac Harris ordered a MRI of the head d/t her persistent lower extremity weakness and known lung mass. 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 Dr. Isaac Harris felt this would explain balance difficulty but now her LE weakness. Oncology and Palliative Care have been consulted. Patient at this point is stating she doesn't want any chemo or radiation. She is participating in both PT/OT. She is felt to need a stay on Rehab to work towards her goal of returning home with her daughter.  No new issues overnight.       REVIEW OF SYSTEMS:  Constitutional: No fevers No chills  Neck:No stiffness  Respiratory: No shortness of breath  Cardiovascular: No chest pain No palpitations  Gastrointestinal: No abdominal pain    Genitourinary: No Dysuria  Neurological: No headache, no confusion Past Medical History:      Diagnosis Date    Diabetes mellitus [facility]     Hyperlipidemia     Hypertension     Palliative care patient 01/29/2021       Past Surgical History:      Procedure Laterality Date    APPENDECTOMY      CHOLECYSTECTOMY      LUMBAR SPINE SURGERY N/A 1/26/2021    LAMINECTOMY L3-4/ L4-5 performed by Yamilka Gongora MD at [address], BILATERAL         Medications in Hospital:      Current Facility-Administered Medications:     ondansetron (ZOFRAN) tablet 4 mg, 4 mg, Oral, TID PRN, Becki Fitzgerald MD    pantoprazole (PROTONIX) tablet 40 mg, 40 mg, Oral, Daily PRN, Becki Fitzgerald MD    sennosides-docusate sodium (SENOKOT-S) 8.6-50 MG tablet 1 tablet, 1 tablet, Oral, BID PRN, Becki Fitzgerald MD, 1 tablet at 02/15/21 2114    Venelex ointment, , Topical, BID, Becki Fitzgerald MD, Given at 02/15/21 2119    sucralfate (CARAFATE) tablet 1 g, 1 g, Oral, 4x Daily WC, Becki Fitzgerald MD, 1 g at 02/15/21 2114    HYDROcodone-acetaminophen (NORCO)  MG per tablet 1 tablet, 1 tablet, Oral, Q4H PRN, Becki Fitzgerald MD, 1 tablet at 02/02/21 2040    simethicone (MYLICON) chewable tablet 80 mg, 80 mg, Oral, Q6H PRN, Yamilka Gongora MD, 80 mg at 02/01/21 0738    bisacodyl (DULCOLAX) suppository 10 mg, 10 mg, Rectal, Daily PRN, Yamilka Gongora MD    ondansetron (ZOFRAN-ODT) disintegrating tablet 4 mg, 4 mg, Oral, Q8H PRN, 4 mg at 02/01/21 1516 **OR** ondansetron (ZOFRAN) injection 4 mg, 4 mg, Intravenous, Q6H PRN, Yamilka Gongora MD    insulin lispro (HUMALOG) injection vial 4 Units, 0.08 Units/kg, Subcutaneous, TID WC, Yamilka Gongora MD, 4 Units at 02/15/21 1210    insulin lispro (HUMALOG) injection vial 0-12 Units, 0-12 Units, Subcutaneous, TID WC, Yamilka Gongora MD, 2 Units at 02/15/21 1208    insulin lispro (HUMALOG) injection vial 0-6 Units, 0-6 Units, Subcutaneous, Nightly, Yamilka Gonogra MD, 2 Units at 02/14/21 2128 History reviewed. No pertinent family history. PHYSICAL EXAM:  /61   Pulse 86   Temp 96.5 °F (35.8 °C) (Temporal)   Resp 20   Ht 5' (1.524 m)   Wt 114 lb 5 oz (51.9 kg)   SpO2 97%   BMI 22.33 kg/m²       Constitutional  well developed, well nourished. Eyes  conjunctiva normal.   Ear, nose, throat - No scars, masses, or lesions over external nose or ears, no atrophy of tongue  Neck-symmetric, no masses noted, no jugular vein distension  Respiration- chest wall appears symmetric, good expansion,   normal effort without use of accessory muscles  Musculoskeletal  no significant wasting of muscles noted, no bony deformities  Extremities-no clubbing, cyanosis or edema  Skin  warm, dry, and intact. No rash, erythema, or pallor. Psychiatric  mood, affect, and behavior appear normal.      Neurological exam  Awake, alert, fluent oriented slow  Attention and concentration appear appropriate  Recent and remote memory appears unremarkable  Speech normal without dysarthria  No clear issues with language of fund of knowledge     Cranial Nerve Exam     CN III, IV,VI-EOMI, No nystagmus, conjugate eye movements, no ptosis    CN VII-no facial assymetry       Motor Exam  antigravity throughout upper and lower extremities bilaterally      Tremors- no tremors in hands or head noted     Gait  Not tested      Nursing/pcp notes, imaging,labs and vitals reviewed.      PT,OT and/or speech notes reviewed    Lab Results   Component Value Date    WBC 7.2 02/15/2021    HGB 11.0 (L) 02/15/2021    HCT 33.9 (L) 02/15/2021    MCV 90.9 02/15/2021     02/15/2021     Lab Results   Component Value Date     02/15/2021    K 3.7 02/15/2021     02/15/2021    CO2 26 02/15/2021    BUN 10 02/15/2021    CREATININE 0.5 02/15/2021    GLUCOSE 97 02/15/2021    CALCIUM 9.2 02/15/2021    PROT 4.9 (L) 02/15/2021    LABALBU 3.1 (L) 02/15/2021    BILITOT <0.2 02/15/2021    ALKPHOS 76 02/15/2021    AST 28 02/15/2021 predominantly at C5, C6 and C7. Prevertebral soft tissues are normal.   Impression:     No evidence of mass or lymphadenopathy. An atrophic left parotid gland. The etiology is not certain. Atheromatous changes of the carotid arteries bilaterally and a   high-grade stenosis of the left proximal internal carotid artery. Right maxillary sinusitis. Cerebral atrophy. Signed by Dr Hailey Lam on 2/3/2021 5:02 PM       Gui Garcia   Student Physical Therapist   Physical Therapy   Progress Notes   Signed   Date of Service:  2/11/2021 11:44 AM               Signed             Show:Clear all  []Manual[x]Template[]Copied    Added by:  [x]Gabby Brown    []Leisa for details       02/11/21 1000   Restrictions/Precautions   Restrictions/Precautions Fall Risk;Weight Bearing;Surgical Protocols; Modified Diet   Required Braces or Orthoses? Yes  (LSO)   Lower Extremity Weight Bearing Restrictions   Right Lower Extremity Weight Bearing Weight Bearing As Tolerated   Left Lower Extremity Weight Bearing Weight Bearing As Tolerated   Required Braces or Orthoses   Spinal Other LSO   Position Activity Restriction   Spinal Precautions No Bending; No Lifting; No Twisting   Oxygen Therapy   O2 Device None (Room air)   Bed Mobility   Rolling Minimal assistance;Contact guard assistance  (BILAT LOG ROLL FOR CLEAN UP/CHANGE, BANDAGE)   Sit to Supine Moderate assistance  (LIFT BLE TO GET INTO BED )   Scooting Moderate assistance;Minimal assistance  (DIFFICULTY SCOOTING LIMITED BY FATIGUE/ENDURANCE)   Comment    (pt HAD BANDAGE DRESSING DURING PT SESSION )   Transfers   Sit to Stand Moderate Assistance  (STS SHOWER ZANDER TO RODOLFO STEADY)   Stand to sit Moderate Assistance  (RODOLFO STEADY TO BED )   Comment TF TO/FROM BATHROOM WITH RODOLFO STEADY AFTER SHOWER    Other exercises   Other exercises 1 SUPINE ANKLE PUMPS X20 EA    Other exercises 2 SUPINE QS AND HEEL SLIDES; X10 EACH   (ASSIST TO INITIATE HEEL SLIDES ON R )

## 2021-02-16 NOTE — PROGRESS NOTES
[facility]  INPATIENT SPEECH THERAPY  [facility] UNIT  TIME   [id number]    [x]Daily Note  []Progress Note    Date: 2021  Patient Name: Emily Wong        MRN: [id number]    Account #: [de-identified]  : 1933  (80 y.o.)  Gender: female   Primary Provider: Mathew Lewis MD  Precautions: Fall/aspiration  Swallowing Status/Diet: Soft and bite sized with thin liquids      Subjective: Patient presenting with increased fatigue in afternoon session. SLP did speak with family members in the am to educate them on diet consistency and mild cognitive deficits. Both family members demonstrated understanding. Objective: Patient completed functional memory task where she was to recall an appointment and details related. Patient immediately recalling 4/4 details. After 10 minute delayed patient recalling 2/4 details with mod verbal cues. Patient able to recall parts of yesterdays session/activity, however increased difficulty with several of the details. Patient also stating she was trying to recall what the OT had discussed with her for transfers this morning. Patient was somewhat frustrated that she could not recall in pm.     Attempted a higher level problem solving/executive function task with patient, however it was discontinued secondary to difficulty and patient continuously requiring cues to stay awake. Swallowing exercises completed. Patient completed effortful swallow during the session. Laryngeal elevation completed on 100% of opportunities. Kathy also completed during session to improve base of tongue strength. Patient completing laryngeal elevation on 70% of opportunities. Patient reports significant improvement for swallow function. Patient stating she is no longer having difficulty with medications or foods. Patient stating she is utilizing hard swallow when she remembers to during meals. She states it is helping. Do recommend trial of regular diet consistency tomorrow for potential upgrade. At this time continue speech services to address swallow function and higher level cognition. Recommended Diet and Intervention  Diet Solids Recommendation: dysphagia soft and bite sized.   Liquid Consistency Recommendation: Thin  Recommended Form of Meds: crushed in puree  Therapeutic Interventions: Oral motor exercises; Tongue base strengthening;Patient/Family education;Effortful swallow;Kathy; Laryngeal exercises; Therapeutic PO trials with SLP;Oral care;Diet tolerance monitoring     Compensatory Swallowing Strategies  Compensatory Swallowing Strategies: No straws;Upright as possible for all oral intake;Remain upright for 30-45 minutes after meals      SHORT TERM GOAL #1:  Goal 1: The patient will demonstrate recall of functional information following a/an (immediate, shortterm,long-term) delay with min cues in order to increase functional integration into environment. SHORT TERM GOAL #2:  Goal 2: The patient will complete executive function tasks (planning, self-monitoring, organizing, etc) with min verbal cues at 80% accuracy to improve safety awareness with functional ADL's    SHORT TERM GOAL #3:  Goal 3: The patient will demonstrate functional problem solving and safety awareness with min verbal cues at 80% accuracy for daily living tasks in order to increase safe interaction with environment and decreased assistance from caregivers. Swallowing Short Term Goals  Short-term Goals  Goal 1: The patient will tolerate a regular diet wtih thin liquids with minimal overt s/s of aspiration. Goal 2: SLP will return to reassess swallow function and insure safety with all oral intake. Goal 3: SLP will complete full speech/language cognitive evaluation. Goal 4: Patient/staff will complete daily oral hygiene to prevent aspriation of oral bacteria. Goal 5: Patient will complete pharyngeal/laryngeal exercises given verbal prompts and written instructions. Goal 6: Patient will verbalize and demonstrate compensatory swallow strategies 100% of opportunitites. ASSESSMENT:  Assessment: [x]Progressing towards goals          []Not Progressing towards goals    Patient Tolerance of Treatment:   [x]Tolerated well []Tolerated fair []Required rest breaks []Fatigued    Education:  Learner:  [x]Patient          []Significant Other          []Other  Education provided regarding:  [x]Goals and POC   [x]Diet and swallowing precautions    []Home Exercise Program  []Progress and/or discharge information  Method of Education:  [x]Discussion          [x]Demonstration          []Handout          []Other  Evaluation of Education:   []Verbalized understanding   []Demonstrates without assistance  [x]Demonstrates with assistance  []Needs further instruction     []No evidence of learning                  []No family present      Plan: [x]Continue with current plan of care    []Modify current plan of care as follows:    []Discharge patient    Discharge Location:    Services/Supervision Recommended:      [x]Patient continues to require treatment by a licensed therapist to address functional deficits as outlined in the established plan of care.

## 2021-02-17 LAB
GLUCOSE BLD-MCNC: 162 MG/DL (ref 70–99)
GLUCOSE BLD-MCNC: 178 MG/DL (ref 70–99)
GLUCOSE BLD-MCNC: 209 MG/DL (ref 70–99)
GLUCOSE BLD-MCNC: 96 MG/DL (ref 70–99)
PERFORMED ON: ABNORMAL
PERFORMED ON: NORMAL

## 2021-02-17 PROCEDURE — 92526 ORAL FUNCTION THERAPY: CPT

## 2021-02-17 PROCEDURE — 1180000000 HC REHAB R&B

## 2021-02-17 PROCEDURE — 97129 THER IVNTJ 1ST 15 MIN: CPT

## 2021-02-17 PROCEDURE — 6360000002 HC RX W HCPCS: Performed by: NEUROLOGICAL SURGERY

## 2021-02-17 PROCEDURE — 99232 SBSQ HOSP IP/OBS MODERATE 35: CPT | Performed by: PSYCHIATRY & NEUROLOGY

## 2021-02-17 PROCEDURE — 97130 THER IVNTJ EA ADDL 15 MIN: CPT

## 2021-02-17 PROCEDURE — 6370000000 HC RX 637 (ALT 250 FOR IP): Performed by: PSYCHIATRY & NEUROLOGY

## 2021-02-17 PROCEDURE — 97530 THERAPEUTIC ACTIVITIES: CPT

## 2021-02-17 PROCEDURE — 97535 SELF CARE MNGMENT TRAINING: CPT

## 2021-02-17 PROCEDURE — 6370000000 HC RX 637 (ALT 250 FOR IP): Performed by: NEUROLOGICAL SURGERY

## 2021-02-17 PROCEDURE — 97110 THERAPEUTIC EXERCISES: CPT

## 2021-02-17 PROCEDURE — 82947 ASSAY GLUCOSE BLOOD QUANT: CPT

## 2021-02-17 RX ADMIN — INSULIN LISPRO 4 UNITS: 100 INJECTION, SOLUTION INTRAVENOUS; SUBCUTANEOUS at 12:16

## 2021-02-17 RX ADMIN — COLLAGENASE SANTYL: 250 OINTMENT TOPICAL at 21:37

## 2021-02-17 RX ADMIN — Medication: at 08:48

## 2021-02-17 RX ADMIN — ACETAMINOPHEN 650 MG: 325 TABLET ORAL at 21:37

## 2021-02-17 RX ADMIN — CYANOCOBALAMIN TAB 500 MCG 500 MCG: 500 TAB at 08:49

## 2021-02-17 RX ADMIN — SUCRALFATE 1 G: 1 TABLET ORAL at 21:34

## 2021-02-17 RX ADMIN — INSULIN LISPRO 2 UNITS: 100 INJECTION, SOLUTION INTRAVENOUS; SUBCUTANEOUS at 16:46

## 2021-02-17 RX ADMIN — Medication 10 UNITS: at 21:37

## 2021-02-17 RX ADMIN — GABAPENTIN 300 MG: 300 CAPSULE ORAL at 21:34

## 2021-02-17 RX ADMIN — ENOXAPARIN SODIUM 40 MG: 40 INJECTION SUBCUTANEOUS at 16:45

## 2021-02-17 RX ADMIN — DILTIAZEM HYDROCHLORIDE 240 MG: 240 CAPSULE, COATED, EXTENDED RELEASE ORAL at 08:49

## 2021-02-17 RX ADMIN — ROSUVASTATIN CALCIUM 20 MG: 20 TABLET, FILM COATED ORAL at 21:34

## 2021-02-17 RX ADMIN — SUCRALFATE 1 G: 1 TABLET ORAL at 16:46

## 2021-02-17 RX ADMIN — SUCRALFATE 1 G: 1 TABLET ORAL at 08:49

## 2021-02-17 RX ADMIN — CETIRIZINE HYDROCHLORIDE 10 MG: 10 TABLET, FILM COATED ORAL at 08:49

## 2021-02-17 RX ADMIN — LISINOPRIL 5 MG: 5 TABLET ORAL at 08:49

## 2021-02-17 ASSESSMENT — PAIN DESCRIPTION - ONSET: ONSET: GRADUAL

## 2021-02-17 ASSESSMENT — PAIN SCALES - GENERAL: PAINLEVEL_OUTOF10: 2

## 2021-02-17 ASSESSMENT — PAIN DESCRIPTION - PROGRESSION: CLINICAL_PROGRESSION: NOT CHANGED

## 2021-02-17 ASSESSMENT — PAIN DESCRIPTION - PAIN TYPE: TYPE: ACUTE PAIN

## 2021-02-17 ASSESSMENT — PAIN - FUNCTIONAL ASSESSMENT: PAIN_FUNCTIONAL_ASSESSMENT: ACTIVITIES ARE NOT PREVENTED

## 2021-02-17 ASSESSMENT — PAIN DESCRIPTION - FREQUENCY: FREQUENCY: INTERMITTENT

## 2021-02-17 NOTE — PROGRESS NOTES
[facility]  Follow-up Progress Note       NAME:  Rima Mancia  MEDICAL RECORD NUMBER:  [id number]  AGE:  80 y.o. GENDER:  female  :  1933  TODAY'S DATE:  2021    Subjective:   Wound Identification:  Wound Type: pressure  Contributing Factors: chronic pressure, decreased mobility, shear force, incontinence of stool and incontinence of urine        Patient Goal of Care:  [] Wound Healing  [] Odor Control  [] Palliative Care  [] Pain Control   [] Other:     Objective:    /71   Pulse 78   Temp 98.2 °F (36.8 °C) (Temporal)   Resp 18   Ht 5' (1.524 m)   Wt 111 lb 8 oz (50.6 kg)   SpO2 97%   BMI 21.78 kg/m²   Jaylon Risk Score: Jaylon Scale Score: 17  Assessment:   Measurements:  Wound 21 Sacrum Mid stage 2 ulcer coccyx, 1 cm x 1cm (Active)   Wound Image   21 155   Wound Etiology Pressure Unstageable 21   Dressing Status New dressing applied 21   Wound Cleansed Soap and water 21   Dressing/Treatment Pharmaceutical agent (see MAR); Silicone border  155   Wound Length (cm) 5 cm 21 155   Wound Width (cm) 2 cm 21   Wound Surface Area (cm^2) 10 cm^2 21   Change in Wound Size % (l*w) -900 21   Wound Assessment Eschar dry;Pink/red 21 155   Drainage Amount Scant 21 155   Drainage Description Serosanguinous 21 1557   Odor None 21   Radha-wound Assessment Blanchable erythema 21 155   Margins Attached edges 21   Wound Thickness Description not for Pressure Injury Partial thickness 02/15/21 0257   Number of days: 18       Response to treatment:  Well tolerated by patient.      Pain Assessment:  Severity:  0 / 10  Quality of pain: N/A  Wound Pain Timing/Severity: none  Premedicated: No  Plan: Plan of Care: Wound 01/30/21 Sacrum Mid stage 2 ulcer coccyx, 1 cm x 1cm-Dressing/Treatment: Pharmaceutical agent (see MAR), Silicone border    Specialty Bed Required : Yes (on Dolphin mattress)  [] Low Air Loss   [x] Pressure Redistribution  [] Fluid Immersion  [] Bariatric  [] Total Pressure Relief  [] Other:     Current Diet: Dietary Nutrition Supplements: Diabetic Oral Supplement  DIET CARB CONTROL; Carb Control: 4 carb choices (60 gms)/meal  Dietician consult:  Yes    Discharge Plan:  Placement for patient upon discharge: skilled nursing   Patient appropriate for Outpatient [address] [place] [address] Road: Yes    Referrals:  []   [] [address] [employer]  [] Supplies  [] Other    Patient/Caregiver Teaching:  Level of patient/caregiver understanding able to:   [] Indicates understanding       [] Needs reinforcement  [] Unsuccessful      [] Verbal Understanding  [] Demonstrated understanding       [] No evidence of learning  [] Refused teaching         [] N/A       Wound care follow up. The deep tissue pressure injury to the sacrum has now evolved into an unstageable pressure injury with eschar and slough covering the wound bed. Patient was cleaned of incontinent stool with primary nurse during assessment. Recommend change from Venelex to [place] daily. Limit patient's time up in chair to 1-2 hours at a time with repositioning every 30 minutes in chair. Strict turns with no lying flat on back. HOB as low as medically appropriate except for meals.     Electronically signed by Ammy Gonzales RN, [facility] on 2/17/2021 at 4:00 PM

## 2021-02-17 NOTE — PROGRESS NOTES
Occupational Therapy  Facility/Department: [facility] 8 REHAB UNIT  Daily Treatment Note  NAME: Rima Martinez  : 1933  MRN: [id number]    Date of Service: 2021    Discharge Recommendations:  24 hour supervision or assist, Home with Home health OT       Assessment     Decreased functional mobility ; Decreased strength; Decreased endurance; Decreased balance; Decreased high-level IADLs      Spoke with family about DME needs, but pt may go home with hospice and then hospice would take care of their DME      Patient Diagnosis(es): There were no encounter diagnoses. has a past medical history of Diabetes mellitus ([facility] [address] Hyperlipidemia, Hypertension, and Palliative care patient. has a past surgical history that includes Appendectomy; Mastectomy, bilateral; Cholecystectomy; and Lumbar spine surgery (N/A, 2021). Restrictions  Restrictions/Precautions  Restrictions/Precautions: Fall Risk, Weight Bearing, Surgical Protocols, Modified Diet  Required Braces or Orthoses?: Yes  Lower Extremity Weight Bearing Restrictions  Right Lower Extremity Weight Bearing: Weight Bearing As Tolerated  Left Lower Extremity Weight Bearing: Weight Bearing As Tolerated  Required Braces or Orthoses  Spinal Other: LSO  Left Lower Extremity Brace:  Dee Foot Orthotics  Position Activity Restriction  Spinal Precautions: No Bending, No Lifting, No Twisting  Other position/activity restrictions: AFO in room but has not been sized to patient and it does not fit in current shoe  Objective    Transfers  Sit Pivot Transfers: Maximum assistance  Transfer Comments: Bed<>w/c        Plan   Plan  Current Treatment Recommendations: Strengthening, Balance Training, Functional Mobility Training, Endurance Training, Safety Education & Training, Patient/Caregiver Education & Training, Equipment Evaluation, Education, & procurement, Self-Care / ADL, Home Management Training       OutComes Score       21 0900   Oral Hygiene

## 2021-02-17 NOTE — PROGRESS NOTES
02/17/21 1300   Restrictions/Precautions   Restrictions/Precautions Fall Risk;Weight Bearing;Surgical Protocols; Modified Diet   Required Braces or Orthoses? Yes   Lower Extremity Weight Bearing Restrictions   Right Lower Extremity Weight Bearing Weight Bearing As Tolerated   Left Lower Extremity Weight Bearing Weight Bearing As Tolerated   Required Braces or Orthoses   Spinal Other LSO   Position Activity Restriction   Spinal Precautions No Bending; No Lifting; No Twisting   Bed Mobility   Rolling Minimal assistance;Contact guard assistance  (BILAT ROLL FOR CLEAN UP AND CHANGE)   Supine to Sit Moderate assistance  (LEFT SIDE )   Scooting Minimal assistance; Moderate assistance   Transfers   Lateral Transfers Moderate Assistance;2 Person Assistance  (R SLIDE BOARD TRANSFER TO WC )   Comment PRACTICE SLIDE BOARD TRANSFERS AS THIS IS SOMETHING PT'S FAMILY WOULD LIKE TO USE MORE OF AT HOME. Conditions Requiring Skilled Therapeutic Intervention   Body structures, Functions, Activity limitations Decreased functional mobility ; Decreased ADL status; Decreased ROM; Decreased strength;Decreased endurance;Decreased balance;Decreased coordination; Increased pain;Decreased posture   Assessment CONTINUE TO BENEFIT FROM SKILLED THERAPY TO ADDRESS MOBILITY DEFICITS AND TF FOR SAFE RETURN TO HOME   Activity Tolerance   Activity Tolerance Patient Tolerated treatment well;Patient limited by fatigue;Patient limited by endurance   Safety Devices   Type of devices All fall risk precautions in place;Call light within reach;Gait belt;Left in chair

## 2021-02-17 NOTE — PROGRESS NOTES
Patient:   Tiara Snell  MR#:    [id number]   Room:    0827/827-01   YOB: 1933  Date of Progress Note: 2/17/2021  Time of Note                           2:59 PM  Consulting Physician:   Mario Alberto Liriano M.D.   Attending Physician:  Mario Alberto Liriano MD     Chief complaint S/p lumbar laminectomy S:This 80 y.o. female  with history of diabetes mellitus, hyperlipidemia,known lung mass and HTN. Since Dec. 2020 patient has had difficulty walking, left foot drop and back pain. It had worsened to the point that she hasn't been able to walk for the past couple of weakness or care for herself. Her daughter came in December to stay with her. Prior to this patient lived at home independently. She had an MRI done as outpatient that revealed severe spinal stenosis at L3/4 and L4/5. She was referred to neurosurgeon Dr. Girish Garza, who recommended L3/4 & L4/5 decompressive laminectomy. She was in agreement and was admitted to [facility] on [facility]/26/[facility] for surgery. She tolerated the procedure well. She will be required to wear a TLSO brace when out of bed and follow spine precautions. She continues to have significant lower extremity weakness, worse on the left. In regards to her known lung mass, patient has been seen prior to admit by pulmonologist Dr. New Flores who plans to a bronchoscopy with biopsy at some point. He had recommended her getting her back surgery and then rehab to get her stronger prior to having the procedure done. Dr. Girish Garza ordered a MRI of the head d/t her persistent lower extremity weakness and known lung mass. 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 Dr. Girish Garza felt this would explain balance difficulty but now her LE weakness. Oncology and Palliative Care have been consulted. Patient at this point is stating she doesn't want any chemo or radiation. She is participating in both PT/OT. She is felt to need a stay on Rehab to work towards her goal of returning home with her daughter.  No acute issues overnight.       REVIEW OF SYSTEMS:  Constitutional: No fevers No chills  Neck:No stiffness  Respiratory: No shortness of breath  Cardiovascular: No chest pain No palpitations  Gastrointestinal: No abdominal pain    Genitourinary: No Dysuria  Neurological: No headache, no confusion Past Medical History:      Diagnosis Date    Diabetes mellitus ([facility] [address]     Hyperlipidemia     Hypertension     Palliative care patient 01/29/2021       Past Surgical History:      Procedure Laterality Date    APPENDECTOMY      CHOLECYSTECTOMY      LUMBAR SPINE SURGERY N/A 1/26/2021    LAMINECTOMY L3-4/ L4-5 performed by Jerry Moeller MD at [address], BILATERAL         Medications in Hospital:      Current Facility-Administered Medications:     ondansetron (ZOFRAN) tablet 4 mg, 4 mg, Oral, TID PRN, Ivory Blount MD    pantoprazole (PROTONIX) tablet 40 mg, 40 mg, Oral, Daily PRN, Ivory Blount MD    sennosides-docusate sodium (SENOKOT-S) 8.6-50 MG tablet 1 tablet, 1 tablet, Oral, BID PRN, Ivory Blount MD, 1 tablet at 02/16/21 2045    Venelex ointment, , Topical, BID, Ivory Blount MD, Given at 02/17/21 0848    sucralfate (CARAFATE) tablet 1 g, 1 g, Oral, 4x Daily WC, Ivory Blount MD, 1 g at 02/17/21 0849    HYDROcodone-acetaminophen (NORCO)  MG per tablet 1 tablet, 1 tablet, Oral, Q4H PRN, Ivory Blount MD, 1 tablet at 02/02/21 2040    simethicone (MYLICON) chewable tablet 80 mg, 80 mg, Oral, Q6H PRN, Jerry Moeller MD, 80 mg at 02/01/21 0738    bisacodyl (DULCOLAX) suppository 10 mg, 10 mg, Rectal, Daily PRN, Jerry Moeller MD    ondansetron (ZOFRAN-ODT) disintegrating tablet 4 mg, 4 mg, Oral, Q8H PRN, 4 mg at 02/01/21 1516 **OR** ondansetron (ZOFRAN) injection 4 mg, 4 mg, Intravenous, Q6H PRN, Jerry Moeller MD    insulin lispro (HUMALOG) injection vial 4 Units, 0.08 Units/kg, Subcutaneous, TID WC, Jerry Moeller MD, 4 Units at 02/17/21 1216    insulin lispro (HUMALOG) injection vial 0-12 Units, 0-12 Units, Subcutaneous, TID WC, Jerry Moeller MD, 6 Units at 02/16/21 1222    insulin lispro (HUMALOG) injection vial 0-6 Units, 0-6 Units, Subcutaneous, Nightly, Jerry Moeller MD, 2 Units at 02/16/21 3278   cetirizine (ZYRTEC) tablet 10 mg, 10 mg, Oral, Daily, Dora Small MD, 10 mg at 02/17/21 0849    cyclobenzaprine (FLEXERIL) tablet 10 mg, 10 mg, Oral, TID PRN, Dora Small MD, 10 mg at 02/03/21 2050    dextrose 5 % solution, 100 mL/hr, Intravenous, PRN, Dora Small MD    dextrose 50 % IV solution, 12.5 g, Intravenous, PRN, Dora Small MD    dilTIAZem (CARDIZEM CD) extended release capsule 240 mg, 240 mg, Oral, Daily, Dora Small MD, 240 mg at 02/17/21 0849    enoxaparin (LOVENOX) injection 40 mg, 40 mg, Subcutaneous, Daily, Dora Small MD, 40 mg at 02/16/21 1658    ferrous sulfate (IRON 325) tablet 325 mg, 325 mg, Oral, Daily with breakfast, Dora Small MD, 325 mg at 02/14/21 0909    gabapentin (NEURONTIN) capsule 300 mg, 300 mg, Oral, Nightly, Dora Small MD, 300 mg at 02/16/21 2045    glucagon (rDNA) injection 1 mg, 1 mg, Intramuscular, PRN, Dora Small MD    glucose (GLUTOSE) 40 % oral gel 15 g, 15 g, Oral, PRN, Dora Small MD    insulin glargine (LANTUS) injection vial 10 Units, 10 Units, Subcutaneous, Nightly, Dora Small MD, 10 Units at 02/16/21 2045    lisinopril (PRINIVIL;ZESTRIL) tablet 5 mg, 5 mg, Oral, Daily, Dora Small MD, 5 mg at 02/17/21 0849    rosuvastatin (CRESTOR) tablet 20 mg, 20 mg, Oral, Nightly, Dora Small MD, 20 mg at 02/16/21 2044    vitamin B-12 (CYANOCOBALAMIN) tablet 500 mcg, 500 mcg, Oral, Daily, Dora Small MD, 500 mcg at 02/17/21 0849    acetaminophen (TYLENOL) tablet 650 mg, 650 mg, Oral, Q4H PRN, Mathew Lewis MD, 650 mg at 02/16/21 2044    polyethylene glycol (GLYCOLAX) packet 17 g, 17 g, Oral, Daily PRN, Mathew Lewis MD, 17 g at 02/08/21 2122    Allergies: Other    Social History:   TOBACCO:   reports that she quit smoking about 24 years ago. Her smoking use included cigarettes. She has never used smokeless tobacco.  ETOH:   reports no history of alcohol use.     Family History: History reviewed. No pertinent family history. PHYSICAL EXAM:  /71   Pulse 78   Temp 98.2 °F (36.8 °C) (Temporal)   Resp 18   Ht 5' (1.524 m)   Wt 111 lb 8 oz (50.6 kg)   SpO2 97%   BMI 21.78 kg/m²       Constitutional  well developed, well nourished. Eyes  conjunctiva normal.   Ear, nose, throat - No scars, masses, or lesions over external nose or ears, no atrophy of tongue  Neck-symmetric, no masses noted, no jugular vein distension  Respiration- chest wall appears symmetric, good expansion,   normal effort without use of accessory muscles  Musculoskeletal  no significant wasting of muscles noted, no bony deformities  Extremities-no clubbing, cyanosis or edema  Skin  warm, dry, and intact. No rash, erythema, or pallor. Psychiatric  mood, affect, and behavior appear normal.      Neurological exam  Awake, alert, fluent oriented slow  Attention and concentration appear appropriate  Recent and remote memory appears unremarkable  Speech normal without dysarthria  No clear issues with language of fund of knowledge     Cranial Nerve Exam     CN III, IV,VI-EOMI, No nystagmus, conjugate eye movements, no ptosis    CN VII-no facial assymetry       Motor Exam  antigravity throughout upper and lower extremities bilaterally      Tremors- no tremors in hands or head noted     Gait  Not tested      Nursing/pcp notes, imaging,labs and vitals reviewed.      PT,OT and/or speech notes reviewed    Lab Results   Component Value Date    WBC 7.2 02/15/2021    HGB 11.0 (L) 02/15/2021    HCT 33.9 (L) 02/15/2021    MCV 90.9 02/15/2021     02/15/2021     Lab Results   Component Value Date     02/15/2021    K 3.7 02/15/2021     02/15/2021    CO2 26 02/15/2021    BUN 10 02/15/2021    CREATININE 0.5 02/15/2021    GLUCOSE 97 02/15/2021    CALCIUM 9.2 02/15/2021    PROT 4.9 (L) 02/15/2021    LABALBU 3.1 (L) 02/15/2021    BILITOT <0.2 02/15/2021    ALKPHOS 76 02/15/2021    AST 28 02/15/2021 ALT 23 02/15/2021    LABGLOM >60 02/15/2021    GFRAA >59 02/15/2021     Lab Results   Component Value Date    INR 1.01 01/30/2021    INR 0.93 01/06/2021    PROTIME 13.2 01/30/2021    PROTIME 12.3 01/06/2021     Dennice Severin   Student Physical Therapist   Physical Therapy   Progress Notes   Cosign Needed   Date of Service:  2/17/2021  2:11 PM               Cosign Needed             Show:Clear all  []Manual[x]Template[]Copied    Added by:  [x]Gabby Brown    []Leisa for details       02/17/21 1300   Restrictions/Precautions   Restrictions/Precautions Fall Risk;Weight Bearing;Surgical Protocols; Modified Diet   Required Braces or Orthoses? Yes   Lower Extremity Weight Bearing Restrictions   Right Lower Extremity Weight Bearing Weight Bearing As Tolerated   Left Lower Extremity Weight Bearing Weight Bearing As Tolerated   Required Braces or Orthoses   Spinal Other LSO   Position Activity Restriction   Spinal Precautions No Bending; No Lifting; No Twisting   Bed Mobility   Rolling Minimal assistance;Contact guard assistance  (BILAT ROLL FOR CLEAN UP AND CHANGE)   Supine to Sit Moderate assistance  (LEFT SIDE )   Scooting Minimal assistance; Moderate assistance   Transfers   Lateral Transfers Moderate Assistance;2 Person Assistance  (R SLIDE BOARD TRANSFER TO WC )   Comment PRACTICE SLIDE BOARD TRANSFERS AS THIS IS SOMETHING PT'S FAMILY WOULD LIKE TO USE MORE OF AT HOME. Conditions Requiring Skilled Therapeutic Intervention   Body structures, Functions, Activity limitations Decreased functional mobility ; Decreased ADL status; Decreased ROM; Decreased strength;Decreased endurance;Decreased balance;Decreased coordination; Increased pain;Decreased posture   Assessment CONTINUE TO BENEFIT FROM SKILLED THERAPY TO ADDRESS MOBILITY DEFICITS AND TF FOR SAFE RETURN TO HOME   Activity Tolerance   Activity Tolerance Patient Tolerated treatment well;Patient limited by fatigue;Patient limited by endurance   Safety Devices Type of devices All fall risk precautions in place;Call light within reach;Gait belt;Left in chair                        RECORD REVIEW: Previous medical records, medications were reviewed at today's visit    IMPRESSION:   1. S/P lumbar laminectomy-Pain control/mobilzation  2. HTN-on meds monitor  3. DVT prophylaxis-Lovenox  4. Anemia-on iron   5. Neuropathy-on Neurontin  6. DM-on insulin-monitor BS  7. GI-PPI/bowel regimen  8. Hyperlipidemia-on statin  9. Pain control-Norco PRN  11. Lung/cerebellar mass-monitor   12. PT/OT  13.  UTI-complete abx    Add carafate / Laina Rocher as needed    CT soft tissue neck- hi grade stenosis of left proximal internal carotid   Carotid US <50% bilateral carotids      ELOS Saturday  Hospice to assess    continue present care    Expected duration and frequency therapy: 180 minutes per day, 5 days per week    [facility]  [phone] CELL  Dr Mathew Moeller

## 2021-02-17 NOTE — PROGRESS NOTES
Patient tolerated thin liquids via straw with no overt s/s of aspiration. Patient tolerated reg solid with no overt s/s of aspiration. Patient exhibited adequate mastication and oral transit time. No residue was observed on the tongue or in the buccal cavities. Recommend that patient be upgraded to regular diet with thin liquids. SHORT TERM GOAL #1:  Goal 1: The patient will demonstrate recall of functional information following a/an (immediate, shortterm,long-term) delay with min cues in order to increase functional integration into environment. SHORT TERM GOAL #2:  Goal 2: The patient will complete executive function tasks (planning, self-monitoring, organizing, etc) with min verbal cues at 80% accuracy to improve safety awareness with functional ADL's    SHORT TERM GOAL #3:  Goal 3: The patient will demonstrate functional problem solving and safety awareness with min verbal cues at 80% accuracy for daily living tasks in order to increase safe interaction with environment and decreased assistance from caregivers. Swallowing Short Term Goals  Short-term Goals  Goal 1: The patient will tolerate a regular diet wtih thin liquids with minimal overt s/s of aspiration. Goal 2: SLP will return to reassess swallow function and insure safety with all oral intake. Goal 3: SLP will complete full speech/language cognitive evaluation. Goal 4: Patient/staff will complete daily oral hygiene to prevent aspriation of oral bacteria. Goal 5: Patient will complete pharyngeal/laryngeal exercises given verbal prompts and written instructions. Goal 6: Patient will verbalize and demonstrate compensatory swallow strategies 100% of opportunitites.          ASSESSMENT:  Assessment: [x]Progressing towards goals          []Not Progressing towards goals    Patient Tolerance of Treatment:   [x]Tolerated well []Tolerated fair []Required rest breaks []Fatigued    Education: Learner:  [x]Patient          []Significant Other          []Other  Education provided regarding:  [x]Goals and POC   [x]Diet and swallowing precautions    []Home Exercise Program  []Progress and/or discharge information  Method of Education:  [x]Discussion          [x]Demonstration          []Handout          []Other  Evaluation of Education:   []Verbalized understanding   []Demonstrates without assistance  [x]Demonstrates with assistance  [x]Needs further instruction     []No evidence of learning                  []No family present      Plan: [x]Continue with current plan of care    []Modify current plan of care as follows:    []Discharge patient    Discharge Location:    Services/Supervision Recommended:      []Patient continues to require treatment by a licensed therapist to address functional deficits as outlined in the established plan of care.       Electronically signed by Romy Meléndez, Graduate Clinician, on 2/17/2021 at 8:53 AM

## 2021-02-17 NOTE — PROGRESS NOTES
02/17/21 1000   Transfers   Car Transfer Maximum Assistance;2 Person Assistance   Other exercises   Other exercises 1 sitting bilat le ex   Conditions Requiring Skilled Therapeutic Intervention   Body structures, Functions, Activity limitations Decreased functional mobility ; Decreased ADL status; Decreased ROM; Decreased strength;Decreased endurance;Decreased balance;Decreased coordination; Increased pain;Decreased posture   Assessment FTD WITH SON AND DIL. PRACTICED SQUAT PIVOT TF TO/FROM CHAIR X1-2. DISCUSSED ENTRANCE INTO HOME WHICH SON SAID IS RAMPED. REVIEWED SAFETY ISSUES. SITTING EX WITH PATIENT.

## 2021-02-17 NOTE — PROGRESS NOTES
Spoke to the pts son per phone. He states he feels he does not have enough info to determine if pt and ot are helping and he wants to talk to the pt and see if she wants to continue these at home. Hospice services were explained. The son states he will talk to the pt and try to make a decision soon. Emotional and sc support provided.

## 2021-02-18 LAB
ALBUMIN SERPL-MCNC: 2.6 G/DL (ref 3.5–5.2)
ALP BLD-CCNC: 68 U/L (ref 35–104)
ALT SERPL-CCNC: 18 U/L (ref 5–33)
ANION GAP SERPL CALCULATED.3IONS-SCNC: 10 MMOL/L (ref 7–19)
AST SERPL-CCNC: 22 U/L (ref 5–32)
BASOPHILS ABSOLUTE: 0 K/UL (ref 0–0.2)
BASOPHILS RELATIVE PERCENT: 0.4 % (ref 0–1)
BILIRUB SERPL-MCNC: <0.2 MG/DL (ref 0.2–1.2)
BUN BLDV-MCNC: 10 MG/DL (ref 8–23)
CALCIUM SERPL-MCNC: 9 MG/DL (ref 8.8–10.2)
CHLORIDE BLD-SCNC: 102 MMOL/L (ref 98–111)
CO2: 25 MMOL/L (ref 22–29)
CREAT SERPL-MCNC: 0.5 MG/DL (ref 0.5–0.9)
EOSINOPHILS ABSOLUTE: 0.1 K/UL (ref 0–0.6)
EOSINOPHILS RELATIVE PERCENT: 0.9 % (ref 0–5)
GFR AFRICAN AMERICAN: >59
GFR NON-AFRICAN AMERICAN: >60
GLUCOSE BLD-MCNC: 100 MG/DL (ref 74–109)
GLUCOSE BLD-MCNC: 112 MG/DL (ref 70–99)
GLUCOSE BLD-MCNC: 131 MG/DL (ref 70–99)
GLUCOSE BLD-MCNC: 163 MG/DL (ref 70–99)
GLUCOSE BLD-MCNC: 98 MG/DL (ref 70–99)
HCT VFR BLD CALC: 34.7 % (ref 37–47)
HEMOGLOBIN: 10.9 G/DL (ref 12–16)
IMMATURE GRANULOCYTES #: 0 K/UL
LYMPHOCYTES ABSOLUTE: 1.4 K/UL (ref 1.1–4.5)
LYMPHOCYTES RELATIVE PERCENT: 20.6 % (ref 20–40)
MCH RBC QN AUTO: 28.7 PG (ref 27–31)
MCHC RBC AUTO-ENTMCNC: 31.4 G/DL (ref 33–37)
MCV RBC AUTO: 91.3 FL (ref 81–99)
MONOCYTES ABSOLUTE: 0.7 K/UL (ref 0–0.9)
MONOCYTES RELATIVE PERCENT: 9.9 % (ref 0–10)
NEUTROPHILS ABSOLUTE: 4.6 K/UL (ref 1.5–7.5)
NEUTROPHILS RELATIVE PERCENT: 67.9 % (ref 50–65)
PDW BLD-RTO: 14.2 % (ref 11.5–14.5)
PERFORMED ON: ABNORMAL
PERFORMED ON: NORMAL
PLATELET # BLD: 254 K/UL (ref 130–400)
PMV BLD AUTO: 9.7 FL (ref 9.4–12.3)
POTASSIUM REFLEX MAGNESIUM: 3.7 MMOL/L (ref 3.5–5)
RBC # BLD: 3.8 M/UL (ref 4.2–5.4)
SODIUM BLD-SCNC: 137 MMOL/L (ref 136–145)
TOTAL PROTEIN: 5.3 G/DL (ref 6.6–8.7)
WBC # BLD: 6.7 K/UL (ref 4.8–10.8)

## 2021-02-18 PROCEDURE — 97535 SELF CARE MNGMENT TRAINING: CPT

## 2021-02-18 PROCEDURE — 85025 COMPLETE CBC W/AUTO DIFF WBC: CPT

## 2021-02-18 PROCEDURE — 6370000000 HC RX 637 (ALT 250 FOR IP): Performed by: NEUROLOGICAL SURGERY

## 2021-02-18 PROCEDURE — 36415 COLL VENOUS BLD VENIPUNCTURE: CPT

## 2021-02-18 PROCEDURE — 97130 THER IVNTJ EA ADDL 15 MIN: CPT

## 2021-02-18 PROCEDURE — 97110 THERAPEUTIC EXERCISES: CPT

## 2021-02-18 PROCEDURE — 80053 COMPREHEN METABOLIC PANEL: CPT

## 2021-02-18 PROCEDURE — 82947 ASSAY GLUCOSE BLOOD QUANT: CPT

## 2021-02-18 PROCEDURE — 6370000000 HC RX 637 (ALT 250 FOR IP): Performed by: PSYCHIATRY & NEUROLOGY

## 2021-02-18 PROCEDURE — 97530 THERAPEUTIC ACTIVITIES: CPT

## 2021-02-18 PROCEDURE — 6360000002 HC RX W HCPCS: Performed by: NEUROLOGICAL SURGERY

## 2021-02-18 PROCEDURE — 99232 SBSQ HOSP IP/OBS MODERATE 35: CPT | Performed by: PSYCHIATRY & NEUROLOGY

## 2021-02-18 PROCEDURE — 92526 ORAL FUNCTION THERAPY: CPT

## 2021-02-18 PROCEDURE — 97129 THER IVNTJ 1ST 15 MIN: CPT

## 2021-02-18 PROCEDURE — 1180000000 HC REHAB R&B

## 2021-02-18 RX ADMIN — COLLAGENASE SANTYL: 250 OINTMENT TOPICAL at 21:02

## 2021-02-18 RX ADMIN — Medication 10 UNITS: at 21:02

## 2021-02-18 RX ADMIN — CETIRIZINE HYDROCHLORIDE 10 MG: 10 TABLET, FILM COATED ORAL at 08:47

## 2021-02-18 RX ADMIN — SUCRALFATE 1 G: 1 TABLET ORAL at 12:02

## 2021-02-18 RX ADMIN — SUCRALFATE 1 G: 1 TABLET ORAL at 21:02

## 2021-02-18 RX ADMIN — INSULIN LISPRO 2 UNITS: 100 INJECTION, SOLUTION INTRAVENOUS; SUBCUTANEOUS at 12:00

## 2021-02-18 RX ADMIN — DILTIAZEM HYDROCHLORIDE 240 MG: 240 CAPSULE, COATED, EXTENDED RELEASE ORAL at 08:48

## 2021-02-18 RX ADMIN — ENOXAPARIN SODIUM 40 MG: 40 INJECTION SUBCUTANEOUS at 16:41

## 2021-02-18 RX ADMIN — LISINOPRIL 5 MG: 5 TABLET ORAL at 08:48

## 2021-02-18 RX ADMIN — ROSUVASTATIN CALCIUM 20 MG: 20 TABLET, FILM COATED ORAL at 21:02

## 2021-02-18 RX ADMIN — SUCRALFATE 1 G: 1 TABLET ORAL at 08:47

## 2021-02-18 RX ADMIN — CYANOCOBALAMIN TAB 500 MCG 500 MCG: 500 TAB at 08:48

## 2021-02-18 RX ADMIN — GABAPENTIN 300 MG: 300 CAPSULE ORAL at 21:02

## 2021-02-18 RX ADMIN — SUCRALFATE 1 G: 1 TABLET ORAL at 16:41

## 2021-02-18 ASSESSMENT — PAIN SCALES - GENERAL: PAINLEVEL_OUTOF10: 3

## 2021-02-18 ASSESSMENT — PAIN DESCRIPTION - PROGRESSION: CLINICAL_PROGRESSION: NOT CHANGED

## 2021-02-18 ASSESSMENT — PAIN DESCRIPTION - DESCRIPTORS: DESCRIPTORS: DISCOMFORT

## 2021-02-18 ASSESSMENT — PAIN DESCRIPTION - ONSET: ONSET: GRADUAL

## 2021-02-18 ASSESSMENT — PAIN DESCRIPTION - LOCATION: LOCATION: LEG

## 2021-02-18 ASSESSMENT — PAIN DESCRIPTION - FREQUENCY: FREQUENCY: INTERMITTENT

## 2021-02-18 ASSESSMENT — PAIN DESCRIPTION - ORIENTATION: ORIENTATION: LEFT

## 2021-02-18 NOTE — PROGRESS NOTES
Occupational Therapy  Facility/Department: [facility] 8 REHAB UNIT  Daily Treatment Note  NAME: Rima Brown  : 1933  MRN: [id number]    Date of Service: 2021    Discharge Recommendations:  24 hour supervision or assist, Home with Home health OT       Assessment   Performance deficits / Impairments: Decreased functional mobility ; Decreased strength;Decreased endurance;Decreased balance;Decreased high-level IADLs  Treatment Diagnosis: L3-4, L4-5 decompressive laminectomy, 2 cm lesion on R cerebellum, lung mass  OT Education: Equipment  Patient Education: Independent with HEP  Activity Tolerance  Activity Tolerance: Patient Tolerated treatment well         Patient Diagnosis(es): There were no encounter diagnoses. has a past medical history of Diabetes mellitus ([facility] [address] Hyperlipidemia, Hypertension, and Palliative care patient. has a past surgical history that includes Appendectomy; Mastectomy, bilateral; Cholecystectomy; and Lumbar spine surgery (N/A, 2021). Restrictions  Restrictions/Precautions  Restrictions/Precautions: Fall Risk, Weight Bearing, Surgical Protocols, Modified Diet  Required Braces or Orthoses?: Yes  Lower Extremity Weight Bearing Restrictions  Right Lower Extremity Weight Bearing: Weight Bearing As Tolerated  Left Lower Extremity Weight Bearing: Weight Bearing As Tolerated  Required Braces or Orthoses  Spinal Other: LSO  Left Lower Extremity Brace: Dee Foot Orthotics  Position Activity Restriction  Spinal Precautions: No Bending, No Lifting, No Twisting  Other position/activity restrictions: AFO in room but has not been sized to patient and it does not fit in current shoe  Objective    Balance  Sitting Balance: Contact guard assistance  Standing Balance:  Moderate assistance     Transfers  Sit Pivot Transfers: Maximum assistance  Transfer Comments: Bed>w/c to R side        Type of ROM/Therapeutic Exercise  Type of ROM/Therapeutic Exercise: Free weights

## 2021-02-18 NOTE — PROGRESS NOTES
Patient:   Rizwana Ricks  MR#:    [id number]   Room:    0827/827-01   YOB: 1933  Date of Progress Note: 2/18/2021  Time of Note                           10:55 AM  Consulting Physician:   Pool Blankenship M.D.   Attending Physician:  Pool Blankenship MD     Chief complaint S/p lumbar laminectomy S:This 80 y.o. female  with history of diabetes mellitus, hyperlipidemia,known lung mass and HTN. Since Dec. 2020 patient has had difficulty walking, left foot drop and back pain. It had worsened to the point that she hasn't been able to walk for the past couple of weakness or care for herself. Her daughter came in December to stay with her. Prior to this patient lived at home independently. She had an MRI done as outpatient that revealed severe spinal stenosis at L3/4 and L4/5. She was referred to neurosurgeon Dr. Charlee Graves, who recommended L3/4 & L4/5 decompressive laminectomy. She was in agreement and was [facility] for surgery. She tolerated the procedure well. She will be required to wear a TLSO brace when out of bed and follow spine precautions. She continues to have significant lower extremity weakness, worse on the left. In regards to her known lung mass, patient has been seen prior to admit by pulmonologist Dr. Levar Christina who plans to a bronchoscopy with biopsy at some point. He had recommended her getting her back surgery and then rehab to get her stronger prior to having the procedure done. Dr. Charlee Graves ordered a MRI of the head d/t her persistent lower extremity weakness and known lung mass. 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 Dr. Charlee Graves felt this would explain balance difficulty but now her LE weakness. Oncology and Palliative Care have been consulted. Patient at this point is stating she doesn't want any chemo or radiation. She is participating in both PT/OT. She is felt to need a stay on Rehab to work towards her goal of returning home with her daughter.  No new issues overnight.       REVIEW OF SYSTEMS:  Constitutional: No fevers No chills  Neck:No stiffness  Respiratory: No shortness of breath  Cardiovascular: No chest pain No palpitations  Gastrointestinal: No abdominal pain    Genitourinary: No Dysuria  Neurological: No headache, no confusion   cetirizine (ZYRTEC) tablet 10 mg, 10 mg, Oral, Daily, Arik Chen MD, 10 mg at 02/18/21 0847    cyclobenzaprine (FLEXERIL) tablet 10 mg, 10 mg, Oral, TID PRN, Arik Chen MD, 10 mg at 02/03/21 2050    dextrose 5 % solution, 100 mL/hr, Intravenous, PRN, Arik Chen MD    dextrose 50 % IV solution, 12.5 g, Intravenous, PRN, Arik Chen MD    dilTIAZem (CARDIZEM CD) extended release capsule 240 mg, 240 mg, Oral, Daily, Arik Chen MD, 240 mg at 02/18/21 0848    enoxaparin (LOVENOX) injection 40 mg, 40 mg, Subcutaneous, Daily, Arik Chen MD, 40 mg at 02/17/21 1645    ferrous sulfate (IRON 325) tablet 325 mg, 325 mg, Oral, Daily with breakfast, Arik Chen MD, 325 mg at 02/14/21 0909    gabapentin (NEURONTIN) capsule 300 mg, 300 mg, Oral, Nightly, Airk Chen MD, 300 mg at 02/17/21 2134    glucagon (rDNA) injection 1 mg, 1 mg, Intramuscular, PRN, Arik Chen MD    glucose (GLUTOSE) 40 % oral gel 15 g, 15 g, Oral, PRN, Arik Chen MD    insulin glargine (LANTUS) injection vial 10 Units, 10 Units, Subcutaneous, Nightly, Arik Chen MD, 10 Units at 02/17/21 2137    lisinopril (PRINIVIL;ZESTRIL) tablet 5 mg, 5 mg, Oral, Daily, Arik Chen MD, 5 mg at 02/18/21 0848    rosuvastatin (CRESTOR) tablet 20 mg, 20 mg, Oral, Nightly, Arik Chen MD, 20 mg at 02/17/21 2134    vitamin B-12 (CYANOCOBALAMIN) tablet 500 mcg, 500 mcg, Oral, Daily, Arik Chen MD, 500 mcg at 02/18/21 0848    acetaminophen (TYLENOL) tablet 650 mg, 650 mg, Oral, Q4H PRN, Sharonda Dyer MD, 650 mg at 02/17/21 2137    polyethylene glycol (GLYCOLAX) packet 17 g, 17 g, Oral, Daily PRN, Sharonda Dyer MD, 17 g at 02/08/21 2122    Allergies: Other    Social History:   TOBACCO:   reports that she quit smoking about 24 years ago. Her smoking use included cigarettes. She has never used smokeless tobacco.  ETOH:   reports no history of alcohol use.     Family History: ALT 18 02/18/2021    LABGLOM >60 02/18/2021    GFRAA >59 02/18/2021     Lab Results   Component Value Date    INR 1.01 01/30/2021    INR 0.93 01/06/2021    PROTIME 13.2 01/30/2021    PROTIME 12.3 01/06/2021     Tim Escobedo   Student Physical Therapist   Physical Therapy   Progress Notes   Cosign Needed   Date of Service:  2/17/2021  2:11 PM               Cosign Needed             Show:Clear all  []Manual[x]Template[]Copied    Added by:  [x]Gabby Brown    []Leisa for details       02/17/21 1300   Restrictions/Precautions   Restrictions/Precautions Fall Risk;Weight Bearing;Surgical Protocols; Modified Diet   Required Braces or Orthoses? Yes   Lower Extremity Weight Bearing Restrictions   Right Lower Extremity Weight Bearing Weight Bearing As Tolerated   Left Lower Extremity Weight Bearing Weight Bearing As Tolerated   Required Braces or Orthoses   Spinal Other LSO   Position Activity Restriction   Spinal Precautions No Bending; No Lifting; No Twisting   Bed Mobility   Rolling Minimal assistance;Contact guard assistance  (BILAT ROLL FOR CLEAN UP AND CHANGE)   Supine to Sit Moderate assistance  (LEFT SIDE )   Scooting Minimal assistance; Moderate assistance   Transfers   Lateral Transfers Moderate Assistance;2 Person Assistance  (R SLIDE BOARD TRANSFER TO WC )   Comment PRACTICE SLIDE BOARD TRANSFERS AS THIS IS SOMETHING PT'S FAMILY WOULD LIKE TO USE MORE OF AT HOME. Conditions Requiring Skilled Therapeutic Intervention   Body structures, Functions, Activity limitations Decreased functional mobility ; Decreased ADL status; Decreased ROM; Decreased strength;Decreased endurance;Decreased balance;Decreased coordination; Increased pain;Decreased posture   Assessment CONTINUE TO BENEFIT FROM SKILLED THERAPY TO ADDRESS MOBILITY DEFICITS AND TF FOR SAFE RETURN TO HOME   Activity Tolerance   Activity Tolerance Patient Tolerated treatment well;Patient limited by fatigue;Patient limited by endurance   Safety Devices Type of devices All fall risk precautions in place;Call light within reach;Gait belt;Left in chair                        RECORD REVIEW: Previous medical records, medications were reviewed at today's visit    IMPRESSION:   1. S/P lumbar laminectomy-Pain control/mobilzation  2. HTN-on meds monitor  3. DVT prophylaxis-Lovenox  4. Anemia-on iron   5. Neuropathy-on Neurontin  6. DM-on insulin-monitor BS  7. GI-PPI/bowel regimen  8. Hyperlipidemia-on statin  9. Pain control-Norco PRN  11. Lung/cerebellar mass-monitor   12. PT/OT  13.  UTI-complete abx    Add carafate / Rexene Minium as needed    CT soft tissue neck- hi grade stenosis of left proximal internal carotid   Carotid US <50% bilateral carotids      ELOS Saturday  Hospice to assess    continue current care    Expected duration and frequency therapy: 180 minutes per day, 5 days per week    [address] [facility]  [phone] CELL  Dr Uma Freeman

## 2021-02-18 NOTE — PROGRESS NOTES
Type of devices All fall risk precautions in place;Call light within reach;Gait belt;Left in chair;Chair alarm in place  (IN CHAIR FOR LUNCH )

## 2021-02-18 NOTE — PROGRESS NOTES
[facility]  INPATIENT SPEECH THERAPY  [facility] 8 REHAB UNIT  -TIME   8:00-9:00    [x]Daily Note  []Progress Note    Date: 2021  Patient Name: Danielle Farah        MRN: [id number]    Account #: [de-identified]  : 1933  (80 y.o.)  Gender: female   Primary Provider: Jazzmine Al MD  Precautions: Fall/aspiration  Swallowing Status/Diet: Regular diet with thin liquids      Subjective: Patient alert and cooperative with all therapy tasks. Patients family members present for portion of therapy sessions. Objective:  Patient presenting with increased recall of recent/daily events. Patient able to recall her discharge date, and some of the details related to her discharge with no difficulty. Patient continues to show improvements for overall cognition. Patient upgraded to regular diet consistency with thin liquids. Swallow function monitored with breakfast meal. Patient presenting with good mastication and oral prep for more solid consistencies. No overt s/s of aspiration observed with any regular solid or thin liquids administereed today. Nursing did report some difficulty with medications last night. Spoke with patient about using applesauce/pudding when medications were to difficult. Patient and family understanding. Medication administration was completed today. Patient did not have any difficulties taking medications whole with water. At this time recommend continuation of current diet consistency regular with thin liquids    SHORT TERM GOAL #1:  Goal 1: The patient will demonstrate recall of functional information following a/an (immediate, shortterm,long-term) delay with min cues in order to increase functional integration into environment.     SHORT TERM GOAL #2:  Goal 2: The patient will complete executive function tasks (planning, self-monitoring, organizing, etc) with min verbal cues at 80% accuracy to improve safety awareness with functional ADL's    SHORT TERM GOAL #3: Goal 3: The patient will demonstrate functional problem solving and safety awareness with min verbal cues at 80% accuracy for daily living tasks in order to increase safe interaction with environment and decreased assistance from caregivers. Swallowing Short Term Goals  Short-term Goals  Goal 1: The patient will tolerate a regular diet wtih thin liquids with minimal overt s/s of aspiration. Goal 2: SLP will return to reassess swallow function and insure safety with all oral intake. Goal 3: SLP will complete full speech/language cognitive evaluation. Goal 4: Patient/staff will complete daily oral hygiene to prevent aspriation of oral bacteria. Goal 5: Patient will complete pharyngeal/laryngeal exercises given verbal prompts and written instructions. Goal 6: Patient will verbalize and demonstrate compensatory swallow strategies 100% of opportunitites.          ASSESSMENT:  Assessment: [x]Progressing towards goals          []Not Progressing towards goals    Patient Tolerance of Treatment:   [x]Tolerated well []Tolerated fair []Required rest breaks []Fatigued    Education:  Learner:  [x]Patient          []Significant Other          []Other  Education provided regarding:  [x]Goals and POC   [x]Diet and swallowing precautions    []Home Exercise Program  []Progress and/or discharge information  Method of Education:  [x]Discussion          [x]Demonstration          []Handout          []Other  Evaluation of Education:   []Verbalized understanding   []Demonstrates without assistance  [x]Demonstrates with assistance  []Needs further instruction     []No evidence of learning                  []No family present      Plan: [x]Continue with current plan of care    []Modify current plan of care as follows:    []Discharge patient    Discharge Location:    Services/Supervision Recommended: [x]Patient continues to require treatment by a licensed therapist to address functional deficits as outlined in the established plan of care.

## 2021-02-18 NOTE — CARE COORDINATION
Asked family, after their discussion with Ms Helen Page, their decision about Hospice - they indicated they had just called [facility] and said \"no more physical therapy\"- I asked for clarification - if that meant accepting Hospice care and explained further the implications of that - they agreed. Contacting Hospice office-notifying of equipment needed, highlighting wound care needs.   Will arrange ambulance transport to home, expecting discharge Saturday 2/20/21

## 2021-02-19 PROBLEM — E43 SEVERE MALNUTRITION (HCC): Status: ACTIVE | Noted: 2021-02-08

## 2021-02-19 LAB
GLUCOSE BLD-MCNC: 127 MG/DL (ref 70–99)
GLUCOSE BLD-MCNC: 135 MG/DL (ref 70–99)
GLUCOSE BLD-MCNC: 203 MG/DL (ref 70–99)
GLUCOSE BLD-MCNC: 94 MG/DL (ref 70–99)
PERFORMED ON: ABNORMAL
PERFORMED ON: NORMAL

## 2021-02-19 PROCEDURE — 97129 THER IVNTJ 1ST 15 MIN: CPT

## 2021-02-19 PROCEDURE — 6360000002 HC RX W HCPCS: Performed by: NEUROLOGICAL SURGERY

## 2021-02-19 PROCEDURE — 97130 THER IVNTJ EA ADDL 15 MIN: CPT

## 2021-02-19 PROCEDURE — 6370000000 HC RX 637 (ALT 250 FOR IP): Performed by: NEUROLOGICAL SURGERY

## 2021-02-19 PROCEDURE — 82947 ASSAY GLUCOSE BLOOD QUANT: CPT

## 2021-02-19 PROCEDURE — 97110 THERAPEUTIC EXERCISES: CPT

## 2021-02-19 PROCEDURE — 1180000000 HC REHAB R&B

## 2021-02-19 PROCEDURE — 97535 SELF CARE MNGMENT TRAINING: CPT

## 2021-02-19 PROCEDURE — 6370000000 HC RX 637 (ALT 250 FOR IP): Performed by: PSYCHIATRY & NEUROLOGY

## 2021-02-19 PROCEDURE — 99232 SBSQ HOSP IP/OBS MODERATE 35: CPT | Performed by: PSYCHIATRY & NEUROLOGY

## 2021-02-19 RX ORDER — CYCLOBENZAPRINE HCL 10 MG
10 TABLET ORAL 3 TIMES DAILY PRN
Qty: 60 TABLET | Refills: 0 | Status: SHIPPED | OUTPATIENT
Start: 2021-02-19 | End: 2021-03-21

## 2021-02-19 RX ORDER — INSULIN GLARGINE 100 [IU]/ML
10 INJECTION, SOLUTION SUBCUTANEOUS NIGHTLY
Qty: 1 VIAL | Refills: 0 | Status: SHIPPED | OUTPATIENT
Start: 2021-02-19 | End: 2021-02-19 | Stop reason: HOSPADM

## 2021-02-19 RX ORDER — CETIRIZINE HYDROCHLORIDE 10 MG/1
10 TABLET ORAL DAILY
Qty: 30 TABLET | Refills: 0 | Status: SHIPPED | OUTPATIENT
Start: 2021-02-19

## 2021-02-19 RX ORDER — CYANOCOBALAMIN (VITAMIN B-12) 500 MCG
500 TABLET ORAL DAILY
Qty: 30 TABLET | Refills: 0 | Status: SHIPPED | OUTPATIENT
Start: 2021-02-19

## 2021-02-19 RX ORDER — DILTIAZEM HYDROCHLORIDE 240 MG/1
240 CAPSULE, COATED, EXTENDED RELEASE ORAL DAILY
Qty: 30 CAPSULE | Refills: 0 | Status: SHIPPED | OUTPATIENT
Start: 2021-02-20

## 2021-02-19 RX ORDER — SIMETHICONE 80 MG
80 TABLET,CHEWABLE ORAL EVERY 6 HOURS PRN
Qty: 90 TABLET | Refills: 0 | Status: SHIPPED | OUTPATIENT
Start: 2021-02-19

## 2021-02-19 RX ORDER — ONDANSETRON 4 MG/1
4 TABLET, ORALLY DISINTEGRATING ORAL EVERY 8 HOURS PRN
Qty: 30 TABLET | Refills: 0 | Status: SHIPPED | OUTPATIENT
Start: 2021-02-19

## 2021-02-19 RX ORDER — ROSUVASTATIN CALCIUM 20 MG/1
20 TABLET, COATED ORAL NIGHTLY
Qty: 30 TABLET | Refills: 0 | Status: SHIPPED | OUTPATIENT
Start: 2021-02-19

## 2021-02-19 RX ORDER — FERROUS SULFATE 325(65) MG
325 TABLET ORAL
Qty: 30 TABLET | Refills: 0 | Status: SHIPPED | OUTPATIENT
Start: 2021-02-19

## 2021-02-19 RX ORDER — PANTOPRAZOLE SODIUM 40 MG/1
40 TABLET, DELAYED RELEASE ORAL DAILY PRN
Qty: 30 TABLET | Refills: 0 | Status: SHIPPED | OUTPATIENT
Start: 2021-02-19

## 2021-02-19 RX ORDER — GABAPENTIN 300 MG/1
300 CAPSULE ORAL NIGHTLY
Qty: 30 CAPSULE | Refills: 0 | Status: SHIPPED | OUTPATIENT
Start: 2021-02-19 | End: 2021-03-21

## 2021-02-19 RX ORDER — SUCRALFATE 1 G/1
1 TABLET ORAL 4 TIMES DAILY PRN
Qty: 120 TABLET | Refills: 0 | Status: SHIPPED | OUTPATIENT
Start: 2021-02-19

## 2021-02-19 RX ORDER — GLIMEPIRIDE 1 MG/1
1 TABLET ORAL EVERY MORNING
Qty: 30 TABLET | Refills: 0 | Status: SHIPPED | OUTPATIENT
Start: 2021-02-19

## 2021-02-19 RX ORDER — LISINOPRIL 5 MG/1
5 TABLET ORAL DAILY
Qty: 30 TABLET | Refills: 0 | Status: SHIPPED | OUTPATIENT
Start: 2021-02-20

## 2021-02-19 RX ADMIN — INSULIN LISPRO 4 UNITS: 100 INJECTION, SOLUTION INTRAVENOUS; SUBCUTANEOUS at 16:56

## 2021-02-19 RX ADMIN — ROSUVASTATIN CALCIUM 20 MG: 20 TABLET, FILM COATED ORAL at 20:47

## 2021-02-19 RX ADMIN — LISINOPRIL 5 MG: 5 TABLET ORAL at 08:40

## 2021-02-19 RX ADMIN — SUCRALFATE 1 G: 1 TABLET ORAL at 20:47

## 2021-02-19 RX ADMIN — CYANOCOBALAMIN TAB 500 MCG 500 MCG: 500 TAB at 08:40

## 2021-02-19 RX ADMIN — ENOXAPARIN SODIUM 40 MG: 40 INJECTION SUBCUTANEOUS at 14:58

## 2021-02-19 RX ADMIN — SUCRALFATE 1 G: 1 TABLET ORAL at 08:40

## 2021-02-19 RX ADMIN — GABAPENTIN 300 MG: 300 CAPSULE ORAL at 20:47

## 2021-02-19 RX ADMIN — COLLAGENASE SANTYL: 250 OINTMENT TOPICAL at 20:47

## 2021-02-19 RX ADMIN — SUCRALFATE 1 G: 1 TABLET ORAL at 16:58

## 2021-02-19 RX ADMIN — Medication 10 UNITS: at 20:49

## 2021-02-19 RX ADMIN — CETIRIZINE HYDROCHLORIDE 10 MG: 10 TABLET, FILM COATED ORAL at 08:40

## 2021-02-19 RX ADMIN — SUCRALFATE 1 G: 1 TABLET ORAL at 13:21

## 2021-02-19 RX ADMIN — DILTIAZEM HYDROCHLORIDE 240 MG: 240 CAPSULE, COATED, EXTENDED RELEASE ORAL at 08:40

## 2021-02-19 ASSESSMENT — PAIN SCALES - GENERAL: PAINLEVEL_OUTOF10: 0

## 2021-02-19 NOTE — PROGRESS NOTES
Patient:   Kalpana Mandel  MR#:    [id number]   Room:    0827/827-01   YOB: 1933  Date of Progress Note: 2/19/2021  Time of Note                           10:26 AM  Consulting Physician:   Giovany Lee M.D.   Attending Physician:  Giovany Lee MD     Chief complaint S/p lumbar laminectomy S:This 80 y.o. female  with history of diabetes mellitus, hyperlipidemia,known lung mass and HTN. Since Dec. 2020 patient has had difficulty walking, left foot drop and back pain. It had worsened to the point that she hasn't been able to walk for the past couple of weakness or care for herself. Her daughter came in December to stay with her. Prior to this patient lived at home independently. She had an MRI done as outpatient that revealed severe spinal stenosis at L3/4 and L4/5. She was referred to neurosurgeon Dr. Bernett Cowden, who recommended L3/4 & L4/5 decompressive laminectomy. She was in agreement and was admitted to [facility] on 1/26/21 for surgery. She tolerated the procedure well. She will be required to wear a TLSO brace when out of bed and follow spine precautions. She continues to have significant lower extremity weakness, worse on the left. In regards to her known lung mass, patient has been seen prior to admit by pulmonologist Dr. Nick Gallegos who plans to a bronchoscopy with biopsy at some point. He had recommended her getting her back surgery and then rehab to get her stronger prior to having the procedure done. Dr. Bernett Cowden ordered a MRI of the head d/t her persistent lower extremity weakness and known lung mass. 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 Dr. Bernett Cowden felt this would explain balance difficulty but now her LE weakness. Oncology and Palliative Care have been consulted. Patient at this point is stating she doesn't want any chemo or radiation. She is participating in both PT/OT. She is felt to need a stay on Rehab to work towards her goal of returning home with her daughter.  No acute issues overnight.       REVIEW OF SYSTEMS:  Constitutional: No fevers No chills  Neck:No stiffness  Respiratory: No shortness of breath  Cardiovascular: No chest pain No palpitations  Gastrointestinal: No abdominal pain    Genitourinary: No Dysuria  Neurological: No headache, no confusion Past Medical History:      Diagnosis Date    Diabetes mellitus ([facility] [address]     Hyperlipidemia     Hypertension     Palliative care patient 01/29/2021       Past Surgical History:      Procedure Laterality Date    APPENDECTOMY      CHOLECYSTECTOMY      LUMBAR SPINE SURGERY N/A 1/26/2021    LAMINECTOMY L3-4/ L4-5 performed by Lori Alcantara MD at [address], BILATERAL         Medications in Hospital:      Current Facility-Administered Medications:     collagenase ointment, , Topical, Daily, Jose Degroot MD, Given at 02/18/21 2102    ondansetron (ZOFRAN) tablet 4 mg, 4 mg, Oral, TID PRN, Jose Degroot MD    pantoprazole (PROTONIX) tablet 40 mg, 40 mg, Oral, Daily PRN, Jose Degroot MD    sennosides-docusate sodium (SENOKOT-S) 8.6-50 MG tablet 1 tablet, 1 tablet, Oral, BID PRN, Jose Degroot MD, 1 tablet at 02/16/21 2045    sucralfate (CARAFATE) tablet 1 g, 1 g, Oral, 4x Daily WC, Jose Degroot MD, 1 g at 02/19/21 0840    HYDROcodone-acetaminophen ([facility])  MG per tablet 1 tablet, 1 tablet, Oral, Q4H PRN, Jose Degroot MD, 1 tablet at 02/02/21 2040    simethicone (MYLICON) chewable tablet 80 mg, 80 mg, Oral, Q6H PRN, Lori Alcantara MD, 80 mg at 02/01/21 0738    bisacodyl (DULCOLAX) suppository 10 mg, 10 mg, Rectal, Daily PRN, Lori Alcantara MD    ondansetron (ZOFRAN-ODT) disintegrating tablet 4 mg, 4 mg, Oral, Q8H PRN, 4 mg at 02/01/21 1516 **OR** ondansetron (ZOFRAN) injection 4 mg, 4 mg, Intravenous, Q6H PRN, Lori Alcantara MD    insulin lispro (HUMALOG) injection vial 4 Units, 0.08 Units/kg, Subcutaneous, TID WC, Lori Alcantara MD, 4 Units at 02/17/21 1216    insulin lispro (HUMALOG) injection vial 0-12 Units, 0-12 Units, Subcutaneous, TID WC, Lori Alcantara MD, 2 Units at 02/18/21 1200    insulin lispro (HUMALOG) injection vial 0-6 Units, 0-6 Units, Subcutaneous, Nightly, Lori Alcantara MD, 2 Units at 02/16/21 2047   cetirizine (ZYRTEC) tablet 10 mg, 10 mg, Oral, Daily, Javi Mckeon MD, 10 mg at 02/19/21 0840    cyclobenzaprine (FLEXERIL) tablet 10 mg, 10 mg, Oral, TID PRN, Javi Mckeon MD, 10 mg at 02/03/21 2050    dextrose 5 % solution, 100 mL/hr, Intravenous, PRN, Javi Mckeon MD    dextrose 50 % IV solution, 12.5 g, Intravenous, PRN, Javi Mckeon MD    dilTIAZem (CARDIZEM CD) extended release capsule 240 mg, 240 mg, Oral, Daily, Javi Mckeon MD, 240 mg at 02/19/21 0840    enoxaparin (LOVENOX) injection 40 mg, 40 mg, Subcutaneous, Daily, Javi Mckeon MD, 40 mg at 02/18/21 1641    ferrous sulfate (IRON 325) tablet 325 mg, 325 mg, Oral, Daily with breakfast, Javi Mckeon MD, 325 mg at 02/14/21 0909    gabapentin (NEURONTIN) capsule 300 mg, 300 mg, Oral, Nightly, Javi Mckeon MD, 300 mg at 02/18/21 2102    glucagon (rDNA) injection 1 mg, 1 mg, Intramuscular, PRN, Javi City, MD    glucose (GLUTOSE) 40 % oral gel 15 g, 15 g, Oral, PRN, Javi City, MD    insulin glargine (LANTUS) injection vial 10 Units, 10 Units, Subcutaneous, Nightly, Javi Mckeon MD, 10 Units at 02/18/21 2102    lisinopril (PRINIVIL;ZESTRIL) tablet 5 mg, 5 mg, Oral, Daily, Javi Mckeon MD, 5 mg at 02/19/21 0840    rosuvastatin (CRESTOR) tablet 20 mg, 20 mg, Oral, Nightly, Javi Mckeon MD, 20 mg at 02/18/21 2102    vitamin B-12 (CYANOCOBALAMIN) tablet 500 mcg, 500 mcg, Oral, Daily, Javi Mckeon MD, 500 mcg at 02/19/21 0840    acetaminophen (TYLENOL) tablet 650 mg, 650 mg, Oral, Q4H PRN, Claude Bologna, MD, 650 mg at 02/17/21 2137    polyethylene glycol (GLYCOLAX) packet 17 g, 17 g, Oral, Daily PRN, Claude Bologna, MD, 17 g at 02/08/21 2122    Allergies: Other    Social History:   TOBACCO:   reports that she quit smoking about 24 years ago. Her smoking use included cigarettes. She has never used smokeless tobacco.  ETOH:   reports no history of alcohol use.     Family History: History reviewed. No pertinent family history. PHYSICAL EXAM:  /76   Pulse 90   Temp 96.9 °F (36.1 °C) (Temporal)   Resp 16   Ht 5' (1.524 m)   Wt 105 lb 1.6 oz (47.7 kg)   SpO2 92%   BMI 20.53 kg/m²       Constitutional  well developed, well nourished. Eyes  conjunctiva normal.   Ear, nose, throat - No scars, masses, or lesions over external nose or ears, no atrophy of tongue  Neck-symmetric, no masses noted, no jugular vein distension  Respiration- chest wall appears symmetric, good expansion,   normal effort without use of accessory muscles  Musculoskeletal  no significant wasting of muscles noted, no bony deformities  Extremities-no clubbing, cyanosis or edema  Skin  warm, dry, and intact. No rash, erythema, or pallor. Psychiatric  mood, affect, and behavior appear normal.      Neurological exam  Awake, alert, fluent oriented slow  Attention and concentration appear appropriate  Recent and remote memory appears unremarkable  Speech normal without dysarthria  No clear issues with language of fund of knowledge     Cranial Nerve Exam     CN III, IV,VI-EOMI, No nystagmus, conjugate eye movements, no ptosis    CN VII-no facial assymetry       Motor Exam  antigravity throughout upper and lower extremities bilaterally      Tremors- no tremors in hands or head noted     Gait  Not tested      Nursing/pcp notes, imaging,labs and vitals reviewed.      PT,OT and/or speech notes reviewed    Lab Results   Component Value Date    WBC 6.7 02/18/2021    HGB 10.9 (L) 02/18/2021    HCT 34.7 (L) 02/18/2021    MCV 91.3 02/18/2021     02/18/2021     Lab Results   Component Value Date     02/18/2021    K 3.7 02/18/2021     02/18/2021    CO2 25 02/18/2021    BUN 10 02/18/2021    CREATININE 0.5 02/18/2021    GLUCOSE 100 02/18/2021    CALCIUM 9.0 02/18/2021    PROT 5.3 (L) 02/18/2021    LABALBU 2.6 (L) 02/18/2021    BILITOT <0.2 02/18/2021    ALKPHOS 68 02/18/2021    AST 22 02/18/2021 ALT 18 02/18/2021    LABGLOM >60 02/18/2021    GFRAA >59 02/18/2021     Lab Results   Component Value Date    INR 1.01 01/30/2021    INR 0.93 01/06/2021    PROTIME 13.2 01/30/2021    PROTIME 12.3 01/06/2021     Lesvia Brower   Student Physical Therapist   Physical Therapy   Progress Notes   Signed   Date of Service:  2/18/2021 11:57 AM               Signed             Show:Clear all  []Manual[x]Template[]Copied    Added by:  [x]Gabby Brown    []Leisa for details       02/18/21 1100   Restrictions/Precautions   Restrictions/Precautions Fall Risk;Weight Bearing;Surgical Protocols; Modified Diet   Lower Extremity Weight Bearing Restrictions   Right Lower Extremity Weight Bearing Weight Bearing As Tolerated   Left Lower Extremity Weight Bearing Weight Bearing As Tolerated   Position Activity Restriction   Spinal Precautions No Bending; No Lifting; No Twisting   Pain Assessment   Pain Assessment 0-10   Pain Level 3   Patient's Stated Pain Goal No pain   Pain Type Acute pain   Pain Location Leg   Pain Orientation Left   Pain Descriptors Discomfort   Pain Frequency Intermittent   Pain Onset Gradual   Clinical Progression Not changed   Functional Pain Assessment Prevents or interferes some active activities and ADLs   Non-Pharmaceutical Pain Intervention(s) Distraction   Response to Pain Intervention Patient Satisfied   Transfers   Sit to Stand Moderate Assistance  (STS TF FROM WC IN PARALLEL BARS )   Stand to sit Minimal Assistance; Moderate Assistance  (TO/FROM WC IN PARALLEL BARS )   Other exercises   Other exercises 1 STS X1 IN PARALLEL BARS; STANDING MOD A X60 SECONDS  (THREE ATTEMPTS TO STAND UPRIGHT )   Other exercises 2 SEATED ANKLE PUMPS X20 EACH SIDE    Other exercises 3 SEATED MARCHES 2X10   Conditions Requiring Skilled Therapeutic Intervention Body structures, Functions, Activity limitations Decreased functional mobility ; Decreased ADL status; Decreased ROM; Decreased strength;Decreased endurance;Decreased balance;Decreased coordination; Increased pain;Decreased posture   Assessment PT WAS LIMITED BY FATIGUE AND HAD DIFFICULTY WITH STS TRANSFERS IN THE PARALLEL BARS. VCS TO MAINTAIN A MORE UPRIGHT POSTURE ONCE STANDING IN THE PARALLEL BARS. CONTINUE TO BENEFIT FROM SKILLED PT TO ADDRESS MOBILITY DEFICITS. Activity Tolerance   Activity Tolerance Patient Tolerated treatment well;Patient limited by fatigue;Patient limited by endurance; Patient limited by pain   Safety Devices   Type of devices All fall risk precautions in place;Call light within reach;Gait belt;Left in chair;Chair alarm in place  (IN CHAIR FOR LUNCH )                Cosigned by: Jeovany Child, PT at 2/18/2021  3:06 PM   Revision History                        RECORD REVIEW: Previous medical records, medications were reviewed at today's visit    IMPRESSION:   1. S/P lumbar laminectomy-Pain control/mobilzation  2. HTN-on meds monitor  3. DVT prophylaxis-Lovenox  4. Anemia-on iron   5. Neuropathy-on Neurontin  6. DM-on insulin-monitor BS  7. GI-PPI/bowel regimen  8. Hyperlipidemia-on statin  9. Pain control-Norco PRN  11. Lung/cerebellar mass-monitor   12. PT/OT  13.  UTI-complete abx    Add carafate / Elfredia Passey as needed    CT soft tissue neck- hi grade stenosis of left proximal internal carotid   Carotid US <50% bilateral carotids      ELOS tomorrow  Hospice to assess    continue current care    Expected duration and frequency therapy: 180 minutes per day, 5 days per week    [address] [facility]  [phone] CELL  Dr Xenia Soni

## 2021-02-19 NOTE — PROGRESS NOTES
02/19/21 1100   Restrictions/Precautions   Restrictions/Precautions Fall Risk;Weight Bearing;Surgical Protocols; Modified Diet   Required Braces or Orthoses? Yes   Lower Extremity Weight Bearing Restrictions   Right Lower Extremity Weight Bearing Weight Bearing As Tolerated   Left Lower Extremity Weight Bearing Weight Bearing As Tolerated   Required Braces or Orthoses   Spinal Other LSO   Position Activity Restriction   Spinal Precautions No Bending; No Lifting; No Twisting   Pain Assessment   Pain Assessment 0-10   Pain Level 0   Patient's Stated Pain Goal No pain   Bed Mobility   Rolling Contact guard assistance;Minimal assistance  (BILAT LOG ROLL WITH HANDRAIL ASSIST )   Comment IMPROVED PARTICIPATION WITH BED MOBILITY   Other exercises   Other exercises 1 ANKLE PUMPS X20    Other exercises 2 QUAD SETS X20   Other exercises 3 SUPINE HEEL SLIDES X20   Conditions Requiring Skilled Therapeutic Intervention   Body structures, Functions, Activity limitations Decreased functional mobility ; Decreased ADL status; Decreased ROM; Decreased strength;Decreased endurance;Decreased balance;Decreased coordination; Increased pain;Decreased posture   Assessment IMPROVED ABILITY TO COMPLETE BILAT LOG ROLL WITH VCS TO MAINTAIN SPINAL PRECAUTIONS. PT LIMITED BY FATIGUE FROM SHOWERING IN EARLIER SESSIONS AND WAS ABLE TO COMPLETE SUPINE ACTIVITIES IN BED WITH GOOD PARTICIPATION   Activity Tolerance   Activity Tolerance Patient Tolerated treatment well;Patient limited by fatigue;Patient limited by endurance; Patient limited by pain   Safety Devices   Type of devices All fall risk precautions in place; Bed alarm in place;Call light within reach; Left in bed

## 2021-02-19 NOTE — PROGRESS NOTES
Consult received. This ch met with the pts son and dtr in law to explain home hospice services. They state they do want the pt to receive home hospice services when the pt is dc'd. The needed equipment has been ordered and will be set up today. It will then be ok to dc the pt when medically ready. The plan is for the pt to dc tomorrow Saturday the 20th. Please call hospice at [phone] when the pt is leaving the hospital.  Emotional and sc support provided.

## 2021-02-19 NOTE — PROGRESS NOTES
Jammie Alejandro, hospice chaplain called nursing unit to inform equipment has been set up in home and ok for discharge tomorrow.

## 2021-02-19 NOTE — PLAN OF CARE
Nutrition Problem #1: Severe malnutrition  Intervention: Food and/or Nutrient Delivery: Continue Current Diet, Continue Oral Nutrition Supplement  Nutritional Goals: Pt will consume >50% of meals and ONS and maintain wt.

## 2021-02-19 NOTE — PROGRESS NOTES
[facility]  INPATIENT SPEECH THERAPY  [facility] 8 REHAB UNIT  TIME   Time In: 1000  Time Out: 1100  Minutes: 60       [x]Daily Note  []Progress Note    Date: 2021  Patient Name: Anselmo Plummer        MRN: [id number]    Account #: [de-identified]  : 1933  (80 y.o.)  Gender: female   Primary Provider: Wicho Duncan MD  Precautions: Fall/Aspiration  Swallowing Status/Diet: Regular/Thin Liquids      Subjective:  Patient was alert and cooperative throughout all therapeutic activites. Objective:  Patient completed a functional reading task to target increased comprehension within a structured setting. She completed independently with no cues or prompts achieving a 80% accuracy. Patient completed pharyngeal strengthening exercises in order to increase laryngeal muscle strength important for hyolarngeal elevation during swallow. Patient completed 10 Kathy maneuvers during the session. Patient completed a planning/sequencing task in order to improve executive functioning skills. Patient earned a 75% and required minimal cues in order to correctly complete. Patient was able to recall her previously taught swallowing strategy. SHORT TERM GOAL #1:  Goal 1: The patient will demonstrate recall of functional information following a/an (immediate, shortterm,long-term) delay with min cues in order to increase functional integration into environment. SHORT TERM GOAL #2:  Goal 2: The patient will complete executive function tasks (planning, self-monitoring, organizing, etc) with min verbal cues at 80% accuracy to improve safety awareness with functional ADL's    SHORT TERM GOAL #3:  Goal 3: The patient will demonstrate functional problem solving and safety awareness with min verbal cues at 80% accuracy for daily living tasks in order to increase safe interaction with environment and decreased assistance from caregivers.       Swallowing Short Term Goals  Short-term Goals Goal 1: The patient will tolerate a regular diet wtih thin liquids with minimal overt s/s of aspiration. Goal 2: SLP will return to reassess swallow function and insure safety with all oral intake. Goal 3: SLP will complete full speech/language cognitive evaluation. Goal 4: Patient/staff will complete daily oral hygiene to prevent aspriation of oral bacteria. Goal 5: Patient will complete pharyngeal/laryngeal exercises given verbal prompts and written instructions. Goal 6: Patient will verbalize and demonstrate compensatory swallow strategies 100% of opportunitites. ASSESSMENT:  Assessment: [x]Progressing towards goals          []Not Progressing towards goals    Patient Tolerance of Treatment:   [x]Tolerated well []Tolerated fair []Required rest breaks []Fatigued    Education:  Learner:  [x]Patient          []Significant Other          []Other  Education provided regarding:  [x]Goals and POC   [x]Diet and swallowing precautions    []Home Exercise Program  []Progress and/or discharge information  Method of Education:  [x]Discussion          [x]Demonstration          []Handout          []Other  Evaluation of Education:   []Verbalized understanding   []Demonstrates without assistance  [x]Demonstrates with assistance  []Needs further instruction     []No evidence of learning                  []No family present      Plan: [x]Continue with current plan of care    []Modify current plan of care as follows:    []Discharge patient    Discharge Location:    Services/Supervision Recommended:      [x]Patient continues to require treatment by a licensed therapist to address functional deficits as outlined in the established plan of care.       Electronically signed by Rodrigo Barcenas, Graduate Clinician, on 2/19/2021 at 12:11 PM

## 2021-02-19 NOTE — DISCHARGE SUMMARY
sucralfate (CARAFATE) 1 GM tablet Take 1 tablet by mouth 4 times daily as needed (reflux)  Qty: 120 tablet, Refills: 0              Details   gabapentin (NEURONTIN) 300 MG capsule Take 1 capsule by mouth nightly for 30 days.   Qty: 30 capsule, Refills: 0    Associated Diagnoses: Neuropathy      cetirizine (ZYRTEC) 10 MG tablet Take 1 tablet by mouth daily  Qty: 30 tablet, Refills: 0      rosuvastatin (CRESTOR) 20 MG tablet Take 1 tablet by mouth nightly  Qty: 30 tablet, Refills: 0      lisinopril (PRINIVIL;ZESTRIL) 5 MG tablet Take 1 tablet by mouth daily  Qty: 30 tablet, Refills: 0      dilTIAZem (CARDIZEM CD) 240 MG extended release capsule Take 1 capsule by mouth daily  Qty: 30 capsule, Refills: 0      ferrous sulfate (IRON 325) 325 (65 Fe) MG tablet Take 1 tablet by mouth daily (with breakfast)  Qty: 30 tablet, Refills: 0      Cyanocobalamin (VITAMIN B 12) 500 MCG TABS Take 500 mcg by mouth daily  Qty: 30 tablet, Refills: 0           Current Discharge Medication List      STOP taking these medications       aspirin 81 MG EC tablet Comments:   Reason for Stopping:         metFORMIN (GLUCOPHAGE-XR) 500 MG extended release tablet Comments:   Reason for Stopping:         glimepiride (AMARYL) 1 MG tablet Comments:   Reason for Stopping:         OMEGA-3 FATTY ACIDS PO Comments:   Reason for Stopping:                 Consults:   none Hospital Course: This 80 y.o. female  with history of diabetes mellitus, hyperlipidemia,known lung mass and HTN. Since Dec. 2020 patient has had difficulty walking, left foot drop and back pain. It had worsened to the point that she hasn't been able to walk for the past couple of weakness or care for herself. Her daughter came in December to stay with her. Prior to this patient lived at home independently. She had an MRI done as outpatient that revealed severe spinal stenosis at L3/4 and L4/5. She was referred to neurosurgeon Dr. Minoo Yeh, who recommended L3/4 & L4/5 decompressive laminectomy. She was in agreement and was admitted to [facility] on 1/26/[facility] for surgery. She tolerated the procedure well. She will be required to wear a TLSO brace when out of bed and follow spine precautions. She continues to have significant lower extremity weakness, worse on the left. In regards to her known lung mass, patient has been seen prior to admit by pulmonologist Dr. Jarod Bullock who plans to a bronchoscopy with biopsy at some point. He had recommended her getting her back surgery and then rehab to get her stronger prior to having the procedure done. Dr. Minoo Yeh ordered a MRI of the head d/t her persistent lower extremity weakness and known lung mass. 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 Dr. Minoo Yeh felt this would explain balance difficulty but now her LE weakness. Oncology and Palliative Care were consulted. The patient was admitted to inpatient rehab with improvement. Plan DC home with hospice.           Discharge Instructions     Patient Instructions:   Home  Therapy orders: hospice   Discharge lab work: none  Code status: DNR-CC   Activity: activity as tolerated  Diet: Dietary Nutrition Supplements: Diabetic Oral Supplement  DIET CARB CONTROL; Carb Control: 4 carb choices (60 gms)/meal    Wound Care: as directed  Equipment: as per therapy      Tai Coleman MD At least 35 minutes were spent in discharging the patient

## 2021-02-19 NOTE — PROGRESS NOTES
Occupational Therapy  Facility/Department: [facility] 8 REHAB UNIT  Daily Treatment Note  NAME: Rima Jara  : 1933  MRN: [id number]    Date of Service: 2021    Discharge Recommendations:  24 hour supervision or assist, Home with Home health OT       Assessment   Performance deficits / Impairments: Decreased functional mobility ; Decreased strength;Decreased endurance;Decreased balance;Decreased high-level IADLs  Treatment Diagnosis: L3-4, L4-5 decompressive laminectomy, 2 cm lesion on R cerebellum, lung mass  Activity Tolerance  Activity Tolerance: Patient Tolerated treatment well  Safety Devices  Safety Devices in place: Yes  Type of devices: Bed alarm in place;Call light within reach         Patient Diagnosis(es): The encounter diagnosis was Neuropathy. has a past medical history of Diabetes mellitus ([facility] [address] Hyperlipidemia, Hypertension, and Palliative care patient. has a past surgical history that includes Appendectomy; Mastectomy, bilateral; Cholecystectomy; and Lumbar spine surgery (N/A, 2021). Restrictions  Restrictions/Precautions  Restrictions/Precautions: Fall Risk, Weight Bearing, Surgical Protocols, Modified Diet  Required Braces or Orthoses?: Yes  Lower Extremity Weight Bearing Restrictions  Right Lower Extremity Weight Bearing: Weight Bearing As Tolerated  Left Lower Extremity Weight Bearing: Weight Bearing As Tolerated  Required Braces or Orthoses  Spinal Other: LSO  Left Lower Extremity Brace: [employer] Orthotics  Position Activity Restriction  Spinal Precautions: No Bending, No Lifting, No Twisting  Other position/activity restrictions: AFO in room but has not been sized to patient and it does not fit in current shoe  Objective    Balance  Sitting Balance: Contact guard assistance  Standing Balance:  Moderate assistance     Transfers  Sit Pivot Transfers: Maximum assistance        Plan   Plan Current Treatment Recommendations: Strengthening, Balance Training, Functional Mobility Training, Endurance Training, Self-Care / ADL, Home Management Training  OutComes Score       02/19/21 0830   Toilet Transfer   Assistance Needed Dependent   Comment x2   CARE Score 1        Goals  Short term goals  Time Frame for Short term goals: 1 week  Short term goal 1: Min A with clothing management/hygiene for toileting. Short term goal 2: Mod A with toilet transfers. Short term goal 3: Mod A with LB dressing, using AE. Short term goal 4: Mod A with donning/doffing socks and shoes using AE. Short term goal 5: Min A with bathing hygiene. Short term goal 6: Min A with donning/doffing LSO. Long term goals  Time Frame for Long term goals : 3-4 weeks  Long term goal 1: Min A with clothing management/hygiene for toileting. Long term goal 2: Min A with toilet transfers. Long term goal 3: Min A with LB dressing, using AE. Long term goal 4: Min A with donning/doffing socks and shoes using AE. Long term goal 5: Supervision with bathing hygiene. Long term goals 6: MET  Long term goal 7: Mod A with donning/doffing LSO. Patient Goals   Patient goals : Increase her independence with ADLs.        Therapy Time   Individual Concurrent Group Co-treatment   Time In 0830         Time Out 1000         Minutes 90         Timed Code Treatment Minutes: 90 [place] [place] [facility],

## 2021-02-19 NOTE — PROGRESS NOTES
Comprehensive Nutrition Assessment    Type and Reason for Visit:  Reassess    Nutrition Recommendations/Plan: Continue current supplementation. Nutrition Assessment:  Pt declining from a nutrition standpoint AEB avg 28% of meals with meets 40% of pt needs with significant wt loss since admission. Pt reports fair to poor appetite and only drinking one glucerna per day at most. Pt now qualifies for Severe Malnutrition per ASPEN guidlines. Will continue current supplementation and encouraged po intake to pt.     Malnutrition Assessment:  Malnutrition Status:  Severe malnutrition    Context:  Acute Illness     Findings of the 6 clinical characteristics of malnutrition:  Energy Intake:  7 - 50% or less of estimated energy requirements for 5 or more days  Weight Loss:  7 - Greater than 2% over 1 week     Body Fat Loss:  Unable to assess     Muscle Mass Loss:  Unable to assess    Fluid Accumulation:  No significant fluid accumulation     Strength:  Not Performed    Estimated Daily Nutrient Needs:  Energy (kcal):  9685-0448 kcal/day; Weight Used for Energy Requirements:  Current     Protein (g):  65-76 g/PRO/day; Weight Used for Protein Requirements:  Current(1.2-1.4 g/kg)        Fluid (ml/day):  6675-8534 mL/fluid/day; Method Used for Fluid Requirements:  1 ml/kcal      Wounds:  Pressure Injury, Unstageable, Surgical Incision       Current Nutrition Therapies:    Dietary Nutrition Supplements: Diabetic Oral Supplement  DIET CARB CONTROL; Carb Control: 4 carb choices (60 gms)/meal    Anthropometric Measures:  · Height: 5' (152.4 cm)  · Current Body Weight: 105 lb (47.6 kg)   · Admission Body Weight: 120 lb (54.4 kg)    · Ideal Body Weight: 100 lbs  · BMI: 20.5  · BMI Categories: Underweight (BMI less than 22) age over 72       Nutrition Diagnosis: · Severe malnutrition related to acute injury/trauma as evidenced by weight loss greater than or equal to 2% in 1 week, intake 26-50%, other (comment)(Intake meeting <50% of pt needs > 1 week)    Nutrition Interventions:   Food and/or Nutrient Delivery:  Continue Current Diet, Continue Oral Nutrition Supplement  Nutrition Education/Counseling:  Education not indicated   Coordination of Nutrition Care:  Continue to monitor while inpatient    Goals:  Pt will consume >50% of meals and ONS and maintain wt.        Nutrition Monitoring and Evaluation:   Behavioral-Environmental Outcomes:  None Identified   Food/Nutrient Intake Outcomes:  Food and Nutrient Intake, Supplement Intake  Physical Signs/Symptoms Outcomes:  Biochemical Data, Nutrition Focused Physical Findings, Weight, Skin     Electronically signed by Kosta Puga MS, RD, LD on 2/19/21 at 11:37 AM CST    Contact: [phone]

## 2021-02-20 LAB
GLUCOSE BLD-MCNC: 113 MG/DL (ref 70–99)
GLUCOSE BLD-MCNC: 176 MG/DL (ref 70–99)
PERFORMED ON: ABNORMAL
PERFORMED ON: ABNORMAL

## 2021-02-20 PROCEDURE — 6370000000 HC RX 637 (ALT 250 FOR IP): Performed by: NEUROLOGICAL SURGERY

## 2021-02-20 PROCEDURE — 99239 HOSP IP/OBS DSCHRG MGMT >30: CPT | Performed by: PSYCHIATRY & NEUROLOGY

## 2021-02-20 PROCEDURE — 6370000000 HC RX 637 (ALT 250 FOR IP): Performed by: PSYCHIATRY & NEUROLOGY

## 2021-02-20 PROCEDURE — 82947 ASSAY GLUCOSE BLOOD QUANT: CPT

## 2021-02-20 RX ADMIN — CYANOCOBALAMIN TAB 500 MCG 500 MCG: 500 TAB at 09:03

## 2021-02-20 RX ADMIN — LISINOPRIL 5 MG: 5 TABLET ORAL at 09:03

## 2021-02-20 RX ADMIN — CETIRIZINE HYDROCHLORIDE 10 MG: 10 TABLET, FILM COATED ORAL at 09:03

## 2021-02-20 RX ADMIN — SUCRALFATE 1 G: 1 TABLET ORAL at 09:03

## 2021-02-20 RX ADMIN — DILTIAZEM HYDROCHLORIDE 240 MG: 240 CAPSULE, COATED, EXTENDED RELEASE ORAL at 09:03

## 2021-02-20 NOTE — PLAN OF CARE
Problem: SAFETY  Goal: Free from accidental physical injury  2/19/2021 2319 by Jeral Spurling, RN  Outcome: Ongoing  2/19/2021 1117 by Nina Faith RN  Outcome: Ongoing  Goal: Free from intentional harm  2/19/2021 2319 by Jeral Spurling, RN  Outcome: Ongoing  2/19/2021 1117 by Nina Faith RN  Outcome: Ongoing     Problem: PAIN  Goal: Patient's pain/discomfort is manageable  2/19/2021 2319 by Jeral Spurling, RN  Outcome: Ongoing  2/19/2021 1117 by Nina Faith RN  Outcome: Ongoing     Problem: SKIN INTEGRITY  Goal: Skin integrity is maintained or improved  2/19/2021 2319 by Jeral Spurling, RN  Outcome: Ongoing  2/19/2021 1117 by Nina Faith RN  Outcome: Ongoing     Problem: Pain:  Goal: Pain level will decrease  Description: Pain level will decrease  2/19/2021 2319 by Jeral Spurling, RN  Outcome: Ongoing  2/19/2021 1117 by Nina Faith RN  Outcome: Ongoing  Goal: Control of acute pain  Description: Control of acute pain  2/19/2021 2319 by Jeral Spurling, RN  Outcome: Ongoing  2/19/2021 1117 by Nina Faith RN  Outcome: Ongoing  Goal: Control of chronic pain  Description: Control of chronic pain  2/19/2021 2319 by Jeral Spurling, RN  Outcome: Ongoing  2/19/2021 1117 by Nina Faith RN  Outcome: Ongoing

## 2021-02-20 NOTE — PROGRESS NOTES
Patient:   Rizwana Ricks  MR#:    [id number]   Room:    0827/827-01   YOB: 1933  Date of Progress Note: 2/20/2021  Time of Note                           9:28 AM  Consulting Physician:   Pool Blankenship M.D.   Attending Physician:  Pool Blankenship MD     Chief complaint S/p lumbar laminectomy S:This 80 y.o. female  with history of diabetes mellitus, hyperlipidemia,known lung mass and HTN. Since Dec. 2020 patient has had difficulty walking, left foot drop and back pain. It had worsened to the point that she hasn't been able to walk for the past couple of weakness or care for herself. Her daughter came in December to stay with her. Prior to this patient lived at home independently. She had an MRI done as outpatient that revealed severe spinal stenosis at L3/4 and L4/5. She was referred to neurosurgeon Dr. Pao Diaz, who recommended L3/4 & L4/5 decompressive laminectomy. She was in agreement and was admitted to [facility] on 1/26/21 for surgery. She tolerated the procedure well. She will be required to wear a TLSO brace when out of bed and follow spine precautions. She continues to have significant lower extremity weakness, worse on the left. In regards to her known lung mass, patient has been seen prior to admit by pulmonologist Dr. Krishnan who plans to a bronchoscopy with biopsy at some point. He had recommended her getting her back surgery and then rehab to get her stronger prior to having the procedure done. Dr. Pao Diaz ordered a MRI of the head d/t her persistent lower extremity weakness and known lung mass. 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 Dr. Pao Diaz felt this would explain balance difficulty but now her LE weakness. Oncology and Palliative Care have been consulted. Patient at this point is stating she doesn't want any chemo or radiation. She is participating in both PT/OT. She is felt to need a stay on Rehab to work towards her goal of returning home with her daughter.  No acute issues overnight.       REVIEW OF SYSTEMS:  Constitutional: No fevers No chills  Neck:No stiffness  Respiratory: No shortness of breath  Cardiovascular: No chest pain No palpitations  Gastrointestinal: No abdominal pain    Genitourinary: No Dysuria  Neurological: No headache, no confusion   cetirizine (ZYRTEC) tablet 10 mg, 10 mg, Oral, Daily, Alexandra Church MD, 10 mg at 02/20/21 4857    cyclobenzaprine (FLEXERIL) tablet 10 mg, 10 mg, Oral, TID PRN, Alexandra Church MD, 10 mg at 02/03/21 2050    dextrose 5 % solution, 100 mL/hr, Intravenous, PRN, Alexandra Church MD    dextrose 50 % IV solution, 12.5 g, Intravenous, PRN, Alexandra Church MD    dilTIAZem (CARDIZEM CD) extended release capsule 240 mg, 240 mg, Oral, Daily, Alexandra Church MD, 240 mg at 02/20/21 0903    enoxaparin (LOVENOX) injection 40 mg, 40 mg, Subcutaneous, Daily, Alexandra Church MD, 40 mg at 02/19/21 1458    ferrous sulfate (IRON 325) tablet 325 mg, 325 mg, Oral, Daily with breakfast, Alexandra Church MD, 325 mg at 02/14/21 0909    gabapentin (NEURONTIN) capsule 300 mg, 300 mg, Oral, Nightly, Alexandra Church MD, 300 mg at 02/19/21 2047    glucagon (rDNA) injection 1 mg, 1 mg, Intramuscular, PRN, Alexandra Church MD    glucose (GLUTOSE) 40 % oral gel 15 g, 15 g, Oral, PRN, Alexandra Church MD    insulin glargine (LANTUS) injection vial 10 Units, 10 Units, Subcutaneous, Nightly, Alexandra Church MD, 10 Units at 02/19/21 2049    lisinopril (PRINIVIL;ZESTRIL) tablet 5 mg, 5 mg, Oral, Daily, Alexandra Church MD, 5 mg at 02/20/21 5584    rosuvastatin (CRESTOR) tablet 20 mg, 20 mg, Oral, Nightly, Alexandra Church MD, 20 mg at 02/19/21 2047    vitamin B-12 (CYANOCOBALAMIN) tablet 500 mcg, 500 mcg, Oral, Daily, Alexandra Church MD, 500 mcg at 02/20/21 0903    acetaminophen (TYLENOL) tablet 650 mg, 650 mg, Oral, Q4H PRN, Michael Boehringer, MD, 650 mg at 02/17/21 2137    polyethylene glycol (GLYCOLAX) packet 17 g, 17 g, Oral, Daily PRN, Michael Mahmood MD, 17 g at 02/08/21 2122    Allergies: Other    Social History:   TOBACCO:   reports that she quit smoking about 24 years ago. Her smoking use included cigarettes. She has never used smokeless tobacco.  ETOH:   reports no history of alcohol use.     Family History: ALT 18 02/18/2021    LABGLOM >60 02/18/2021    GFRAA >59 02/18/2021     Lab Results   Component Value Date    INR 1.01 01/30/2021    INR 0.93 01/06/2021    PROTIME 13.2 01/30/2021    PROTIME 12.3 01/06/2021     Ephraim Carmen   Student Physical Therapist   Physical Therapy   Progress Notes   Signed   Date of Service:  2/18/2021 11:57 AM               Signed             Show:Clear all  []Manual[x]Template[]Copied    Added by:  [x]Gabby Brown    []Leisa for details       02/18/21 1100   Restrictions/Precautions   Restrictions/Precautions Fall Risk;Weight Bearing;Surgical Protocols; Modified Diet   Lower Extremity Weight Bearing Restrictions   Right Lower Extremity Weight Bearing Weight Bearing As Tolerated   Left Lower Extremity Weight Bearing Weight Bearing As Tolerated   Position Activity Restriction   Spinal Precautions No Bending; No Lifting; No Twisting   Pain Assessment   Pain Assessment 0-10   Pain Level 3   Patient's Stated Pain Goal No pain   Pain Type Acute pain   Pain Location Leg   Pain Orientation Left   Pain Descriptors Discomfort   Pain Frequency Intermittent   Pain Onset Gradual   Clinical Progression Not changed   Functional Pain Assessment Prevents or interferes some active activities and ADLs   Non-Pharmaceutical Pain Intervention(s) Distraction   Response to Pain Intervention Patient Satisfied   Transfers   Sit to Stand Moderate Assistance  (STS TF FROM WC IN PARALLEL BARS )   Stand to sit Minimal Assistance; Moderate Assistance  (TO/FROM WC IN PARALLEL BARS )   Other exercises   Other exercises 1 STS X1 IN PARALLEL BARS; STANDING MOD A X60 SECONDS  (THREE ATTEMPTS TO STAND UPRIGHT )   Other exercises 2 SEATED ANKLE PUMPS X20 EACH SIDE    Other exercises 3 SEATED MARCHES 2X10   Conditions Requiring Skilled Therapeutic Intervention Body structures, Functions, Activity limitations Decreased functional mobility ; Decreased ADL status; Decreased ROM; Decreased strength;Decreased endurance;Decreased balance;Decreased coordination; Increased pain;Decreased posture   Assessment PT WAS LIMITED BY FATIGUE AND HAD DIFFICULTY WITH STS TRANSFERS IN THE PARALLEL BARS. VCS TO MAINTAIN A MORE UPRIGHT POSTURE ONCE STANDING IN THE PARALLEL BARS. CONTINUE TO BENEFIT FROM SKILLED PT TO ADDRESS MOBILITY DEFICITS. Activity Tolerance   Activity Tolerance Patient Tolerated treatment well;Patient limited by fatigue;Patient limited by endurance; Patient limited by pain   Safety Devices   Type of devices All fall risk precautions in place;Call light within reach;Gait belt;Left in chair;Chair alarm in place  (IN CHAIR FOR LUNCH )                Cosigned by: Garcia Mayes PT at 2/18/2021  3:06 PM   Revision History                        RECORD REVIEW: Previous medical records, medications were reviewed at today's visit    IMPRESSION:   1. S/P lumbar laminectomy-Pain control/mobilzation  2. HTN-on meds monitor  3. DVT prophylaxis-Lovenox  4. Anemia-on iron   5. Neuropathy-on Neurontin  6. DM-on insulin-monitor BS  7. GI-PPI/bowel regimen  8. Hyperlipidemia-on statin  9. Pain control-Norco PRN  11. Lung/cerebellar mass-monitor   12. PT/OT  13.  UTI-complete abx    Add carafate / Jennifer Snowman as needed    CT soft tissue neck- hi grade stenosis of left proximal internal carotid   Carotid US <50% bilateral carotids    DC home with hospice  Expected duration and frequency therapy: 180 minutes per day, 5 days per week    [address]  [facility]  Dr Roland Lynch

## 2021-02-20 NOTE — PROGRESS NOTES
Patient discharged per doctors orders. Discharge instructions, follow up appointments and prescriptions reviewed with patient and son who verbalized understanding. Patient stable and agreeable to discharge. Personal belongings and paper scripts packed and taken by patients son. Patient transported home by [facility] ambulance. [facility] notified of patient discharge.  Electronically signed by Sherin Renteria RN on 2/20/21 at 11:30 AM

## 2021-02-22 NOTE — DISCHARGE SUMMARY
Occupational Therapy Discharge Summary         Date: 2021  Patient Name: Anthony Vicente        MRN: [id number]    : 1933  (80 y.o.)  Gender: female      Diagnosis: S/P L3-4 AND L4-5 DECOMPRESSIVE LAMINECTOMY ON 2021  Restrictions/Precautions  Restrictions/Precautions: Fall Risk, Weight Bearing, Surgical Protocols, Modified Diet  Required Braces or Orthoses?: Yes      Discharge Date: 21      UE Functioning:  WFLs    Home Management:  Functional Mobility  Functional - Mobility Device: Wheelchair  Activity: Other  Assist Level: Stand by assistance  Functional Mobility Comments: to bathroom    Adaptive Equipment/DME Status:  Bathroom Equipment: 3-in-1 commode  Home Equipment: Rolling walker, Lift chair(Transport wheelchair that was her husbands)  Hospice to arrange DME    Pain Assessment:  Pain Level: 4  Pain Location: Back    Remaining Problems:  Decreased functional mobility ; Decreased strength; decreased ADLs, Decreased endurance; Decreased balance; Decreased high-level IADLs    STGs:  Short term goals  Time Frame for Short term goals: 1 week  Short term goal 1: Min A with clothing management/hygiene for toileting. Short term goal 2: Mod A with toilet transfers. Short term goal 3: Mod A with LB dressing, using AE. Short term goal 4: Mod A with donning/doffing socks and shoes using AE. Short term goal 5: Min A with bathing hygiene. Short term goal 6: Min A with donning/doffing LSO.  0/6 met  LTGs:  Long term goals  Time Frame for Long term goals : 3-4 weeks  Long term goal 1: Min A with clothing management/hygiene for toileting. Long term goal 2: Min A with toilet transfers. Long term goal 3: Min A with LB dressing, using AE. Long term goal 4: Min A with donning/doffing socks and shoes using AE. Long term goal 5: Supervision with bathing hygiene. Long term goals 6: Patient verbalize DME needs. Long term goal 7:  Mod A with donning/doffing LSO.  1/ met

## 2021-02-22 NOTE — DISCHARGE SUMMARY
Physical Therapy Discharge Note    DATE:  2021  NAME:  Terri Sales  :  1933  (87 y.o.,female)  MRN:  [id number]    HEIGHT:  Height: 5' (152.4 cm)  WEIGHT:  Weight: 103 lb 1.6 oz (46.8 kg)    PATIENT DIAGNOSIS(ES):    Diagnosis: S/P L3-4 AND L4-5 DECOMPRESSIVE LAMINECTOMY ON 2021    Additional Pertinent Hx: HTN; HYPERLIPIDEMIA; TYPE 2 DM; GERD; L FOOT DROP; RECENTLY FOUND LUNG MASS     RESTRICTIONS/PRECAUTIONS:    Restrictions/Precautions  Restrictions/Precautions: Fall Risk, Weight Bearing, Surgical Protocols, Modified Diet  Required Braces or Orthoses?: Yes  Position Activity Restriction  Spinal Precautions: No Bending, No Lifting, No Twisting  Other position/activity restrictions: AFO in room but has not been sized to patient and it does not fit in current shoe     PAIN:  Pain Level: 0  Pain Type: Acute pain    Pain Location: Leg     Pain Orientation: Left        STRENGTH  Strength RLE  Comment: GROSSLY 3 TO 3-/5   Strength LLE  Comment: HIP AND KNEE GROSSLY 3 TO 3-/5, ANKLE DF 1/5      ROM  AROM RLE (degrees)  RLE General AROM: HIP AND ANKLE WFL, MIN DEFICIT IN RLE KNEE EXT AROM/UNABLE TO ATTAIN FULL ROM AGAINST GRAVITY    AROM LLE (degrees)  LLE General AROM: NO L ANKLE DF AGAINST GRAVITY, HIP WFL, AND L KNEE MIN DEFICITS/UNABLE TO ATTAIN FULL KNEE EXT AGAINST GRAVITY      POSTURE/BALANCE  Balance  Sitting - Static: Fair  Sitting - Dynamic: Fair, -  Standing - Static: Fair, -  Standing - Dynamic: Fair, -         ACTIVITY TOLERANCE  Activity Tolerance  Activity Tolerance: Patient Tolerated treatment well, Patient limited by fatigue, Patient limited by endurance, Patient limited by pain        BED MOBILITY  Bed Mobility  Rolling: Contact guard assistance, Minimal assistance(BILAT LOG ROLL WITH HANDRAIL ASSIST )  Supine to Sit: Moderate assistance(LEFT SIDE )  Sit to Supine:  Moderate assistance  Scooting: Minimal assistance, Moderate assistance  Comment: IMPROVED PARTICIPATION WITH BED MOBILITY TRANSFERS  Sit to Stand: Moderate Assistance(STS TF FROM WC IN PARALLEL BARS )      Bed to Chair: Maximum assistance   Car Transfer: Maximum Assistance       WHEELCHAIR PROPULSION 1  Propulsion 1  Propulsion: Manual  Level: Level Tile  Method: ADRIA CHAO  Level of Assistance: Stand by assistance  Description/ Details: NO TURNS; VC FOR TECHNIQUE  Distance: 50 FT     AMBULATION 1  Ambulation 1  Device: Parallel Bars  Assistance: Maximum assistance  Quality of Gait: CONTINUED DECREASED KINESTHETIC AWARENESS OF BILAT KNEES, PELVIC POSITION. TENDENCY TOWARDS MIANTAINING KNEE FLEXTION BILAT DURING STANCE. POST WEIGHT DISPLACEMENT REQURING ANTERIOR FORCE AT PELVIS TO CORRECT. PROPER STEP TO APPROX 25% OF TIME. Distance: STOOD ONLY X2  Comments: RECOMMEND PARTIAL STEP THROUGH TO ADDRESS POST LEAN. STAIRS  Patient did not perform. GOALS:  Short term goals  Time Frame for Short term goals: 1 WEEK  Short term goal 1: AMB CGA W/ RW 50FT   Short term goal 2: SBA WITH ALL BED MOBILITY  Short term goal 3: SBA W/ W/C PROPULSION 75 FT W/ TURNS   Short term goal 4: MIN A WITH STAND STEP TF TO/FROM SURFACES W/ RW   Short term goal 5: CAREGIVER SBA FOR ASSISTING PT WITH DONNING/DOFFING TLSO     Long term goals  Time Frame for Long term goals : 2 WEEKS   Long term goal 1: AMB 100FT W/ RW SBA   Long term goal 2: IND WITH STAND STEP TF TO/FROM SURFACES USING RW   Long term goal 3: IND W/ W/C PROPULSION FOR 150FT, INCLUDING TURNS  Long term goal 4: CAREGIVER AND PT IND WITH DONNING/DOFFING TLSO   Long term goal 5: PT IND WITH HOME EX PROGRAM AT D/C      HOME LIVING:   Type of Home: House  Home Layout: Two level, Able to Live on Main level with bedroom/bathroom(Elevator to go upstairs and down to basement. Her bedroom is on main floor.)  Home Access: Ramped entrance        ASSESSMENT (IMPAIRMENTS/BARRIERS):  Patient's remaining impairments are as follows:  Decreased strength, endurance, balance, and safety awareness. Decreased bed mobility, transfers, gait, ability with steps, and lack of motivation. PLAN:  Plan  Times per week: 5-6  Times per day: (1-2)  Plan weeks: 2  Current Treatment Recommendations: Strengthening, ROM, Balance Training, Functional Mobility Training, Transfer Training, Endurance Training, Wheelchair Mobility Training, Gait Training, Stair training, Neuromuscular Re-education, Home Exercise Program, Positioning, Safety Education & Training, Patient/Caregiver Education & Training, Equipment Evaluation, Education, & procurement  Plan Comment: CONTINUE TO ASSESS PENDING PT PROGRESS   Discharge Recommendations: Continue to assess pending progress, Patient would benefit from continued therapy after discharge  PATIENT REQUIRES AND IS REASONABLY EXPECTED TO ACTIVELY PARTICIPATE IN AT LEAST 3 HOURS OF INTENSIVE THERAPY PER DAY AT LEAST 5 DAYS PER WEEK, [address] [facility].        PATIENT GOAL FOR REHAB:  RETURN TO PRIOR LEVEL OF FUNCTION       IRF/STEVE  Roll Left and Right  Assistance Needed: Partial/moderate assistance  CARE Score: 3  Discharge Goal: Independent    Sit to Lying  Assistance Needed: Partial/moderate assistance  Reason if not Attempted: Not attempted due to medical condition or safety concerns  CARE Score: 3  Discharge Goal: Independent    Lying to Sitting on Side of Bed  Assistance Needed: Partial/moderate assistance  CARE Score: 3  Discharge Goal: Independent    Sit to Stand  Assistance Needed: Partial/moderate assistance  CARE Score: 3  Discharge Goal: Independent    Chair/Bed-to-Chair Transfer  Assistance Needed: Substantial/maximal assistance  CARE Score: 2  Discharge Goal: Independent    Car Transfer  Assistance Needed: Substantial/maximal assistance  Reason if not Attempted: Not attempted due to medical condition or safety concerns  CARE Score: 2 Discharge Goal: Independent    Walk 10 Feet  Reason if not Attempted: Not attempted due to medical condition or safety concerns  CARE Score: 88  Discharge Goal: Independent    Walk 50 Feet with Two Turns  Reason if not Attempted: Not attempted due to medical condition or safety concerns  CARE Score: 88  Discharge Goal: Independent    Walk 150 Feet  Reason if not Attempted: Not attempted due to medical condition or safety concerns  CARE Score: 88  Discharge Goal: Supervision or touching assistance    Walking 10 Feet on Uneven Surfaces  Reason if not Attempted: Not attempted due to medical condition or safety concerns  CARE Score: 88  Discharge Goal: Supervision or touching assistance    1 Step (Curb)  Reason if not Attempted: Not attempted due to medical condition or safety concerns  CARE Score: 88  Discharge Goal: Independent    4 Steps  Reason if not Attempted: Not attempted due to medical condition or safety concerns  CARE Score: 88  Discharge Goal: Independent    12 Steps  Reason if not Attempted: Not attempted due to medical condition or safety concerns  CARE Score: 88  Discharge Goal: Supervision or touching assistance    Wheel 50 Feet with Two Turns  Assistance Needed: Supervision or touching assistance  CARE Score: 4  Discharge Goal: Independent    Wheel 150 Feet  Reason if not Attempted: Not attempted due to medical condition or safety concerns  CARE Score: 88  Discharge Goal: Independent      LAST TREATMENT TIME  PT Individual Minutes  Time In: 1100  Time Out: 1200  Minutes: 60      Electronically signed by Arnel Weiss PTA on 2/22/2021 at 3:21 PM

## 2021-02-22 NOTE — PROGRESS NOTES
Goal 1: The patient will demonstrate recall of functional information following a/an (immediate, shortterm,long-term) delay with min cues in order to increase functional integration into environment. Met     SHORT TERM GOAL #2:  Goal 2: The patient will complete executive function tasks (planning, self-monitoring, organizing, etc) with min verbal cues at 80% accuracy to improve safety awareness with functional ADL's. Not Met     SHORT TERM GOAL #3:  Goal 3: The patient will demonstrate functional problem solving and safety awareness with min verbal cues at 80% accuracy for daily living tasks in order to increase safe interaction with environment and decreased assistance from caregivers. Not Met        Swallowing Short Term Goals  Short-term Goals  Goal 1: The patient will tolerate a regular diet wtih thin liquids with minimal overt s/s of aspiration. Met  Goal 2: SLP will return to reassess swallow function and insure safety with all oral intake. Met  Goal 3: SLP will complete full speech/language cognitive evaluation. Met  Goal 4: Patient/staff will complete daily oral hygiene to prevent aspriation of oral bacteria. Met  Goal 5: Patient will complete pharyngeal/laryngeal exercises given verbal prompts and written instructions. Met  Goal 6: Patient will verbalize and demonstrate compensatory swallow strategies 100% of opportunitites. Not Met       The patient will develop functional, cognitive linguistic based skills and utilize compensatory strategies to communicate wants and needs effectively to different conversational partners, maintain safety, and participate socially in functional living enviroment. Met  The patient will tolerate least restrictive diet with min/no overt s/s of aspiration.  Met      Goals Met: ____6___ STG's     ___2_____  LTG's      Plan:  Discharge patient: yes             Discharge Location: [place] home with hospice Further Speech treatment/recommendations: No further speech therapy services are recommended            Electronically Signed By:  Reina Mishra M.S., CCC-SLP  2/22/2021,1:40 PM.

## 2021-02-23 NOTE — H&P
[facility] Neurosurgery  History and Physical                      Chief Complaint   Patient presents with    Back pain, difficulty walking                 HISTORY OF PRESENT ILLNESS:       The patient is a 80 y.o. female who presented for neurosurgical evaluation of leg pain and weakness, difficulty walking and back pain.  This was getting worse for some time.  She had great difficulty walking without a walker and had significant difficulty lifting her left leg.  Her left leg was numb as well.  She has some difficulty with her right leg as well, but her left leg was far more symptomatic.  She attempted home health physical therapy but she had difficulty completing the exercises due to pain and weakness.  She denies any bowel or bladder incontinence.       She had an MRI that showed significant multilevel spinal stenosis and I offered her L3-5 laminectomy. During her preoperative workup, a pulmonary mass was found on CXR that has been further evaluated by CT and she has seen Dr. Roberto Chung with pulmonology and a bronchoscopy and biopsy is planned, however she remained quite debilitated from her spinal stenosis and all providers agreed that she would benefit from the proposed surgery to improve functional mobility and activity tolerance before considering next steps for her lung mass. She has requested that we proceed with surgery.        MEDICAL HISTORY:                     Past Medical History:   Diagnosis Date    Diabetes mellitus ([facility] [address]      Hyperlipidemia      Hypertension                     Past Surgical History:   Procedure Laterality Date    APPENDECTOMY        GALLBLADDER SURGERY        MASTECTOMY, BILATERAL                Current Outpatient Medications:     polyethylene glycol (GLYCOLAX) 17 GM/SCOOP powder, Take 17 g by mouth daily as needed (constipation), Disp: 510 g, Rfl: 0    gabapentin (NEURONTIN) 300 MG capsule, Take 1 capsule by mouth nightly for 30 days. , Disp: 30 capsule, Rfl: 2   dilTIAZem (CARDIZEM CD) 240 MG extended release capsule, Take 1 capsule by mouth daily, Disp: 90 capsule, Rfl: 3    metFORMIN (GLUCOPHAGE-XR) 500 MG extended release tablet, TAKE 1 TABLET DAILY WITH BREAKFAST, Disp: 90 tablet, Rfl: 3    glimepiride (AMARYL) 1 MG tablet, TAKE 1 TABLET EVERY MORNING BEFORE BREAKFAST, Disp: 90 tablet, Rfl: 3    lisinopril (PRINIVIL;ZESTRIL) 5 MG tablet, TAKE 1 TABLET DAILY, Disp: 90 tablet, Rfl: 3    rosuvastatin (CRESTOR) 20 MG tablet, Take 1 tablet by mouth daily, Disp: 90 tablet, Rfl: 1    ferrous sulfate (IRON 325) 325 (65 Fe) MG tablet, Take 325 mg by mouth daily (with breakfast), Disp: , Rfl:     OMEGA-3 FATTY ACIDS PO, Take by mouth, Disp: , Rfl:     aspirin 325 MG EC tablet, Take 325 mg by mouth every 6 hours as needed, Disp: , Rfl:     Iron Combinations (IRON COMPLEX PO), Take by mouth, Disp: , Rfl:     Cyanocobalamin (VITAMIN B 12) 500 MCG TABS, Take 500 mcg by mouth daily , Disp: , Rfl:     cetirizine (ZYRTEC) 10 MG tablet, Take 10 mg by mouth daily, Disp: , Rfl:      Allergies:  Other     Social History:   Social History              Tobacco Use   Smoking Status Former Smoker    Types: Cigarettes   Smokeless Tobacco Never Used   Tobacco Comment     pt has not smoked in 25 years      Social History              Substance and Sexual Activity   Alcohol Use Never    Frequency: Never            Family History:   History reviewed. No pertinent family history.     REVIEW OF SYSTEMS:     Constitutional: Negative.    HENT: Negative.    Eyes: Negative.    Respiratory: Negative.    Cardiovascular: Negative.    Gastrointestinal: Negative.    Genitourinary: Negative.    Musculoskeletal: Positive for back pain. Skin: Negative.    Neurological: Negative.    Endo/Heme/Allergies: Negative.    Psychiatric/Behavioral: Negative.             PHYSICAL EXAM:        Constitutional: Appears well-developed and well-nourished.    Eyes  conjunctiva normal.  Pupils react to light Ear, nose,throat -Normal pinna and tragus, No scars, or lesions over external nose or ears, no obvious atrophy of tongue  Neck- symmetric, trachea midline, no jugular vein distension  Respiration- chest wall symmetric, normal effort without use of accessory muscles  Musculoskeletal  no significant wasting of muscles noted, no bony deformities, symmetric bulk  Extremities- no clubbing, cyanosis or edema  Skin  warm, dry, and intact.  No rash,erythema, or pallor.   Psychiatric  mood, affect, and behavior appear normal.         NEUROLOGIC EXAM:     MENTAL STATUS: Alert, oriented, thought content appropriate     CRANIAL NERVES: PERRL, EOMI, symmetric facies, tongue midline     MOTOR:      Right Upper Extremity:     Deltoid: 5/5  Biceps: 5/5  Triceps: 5/5  Wrist Extension: 5/5  Finger Abduction: 5/5     Left Upper Extremity:     Deltoid: 5/5  Biceps: 5/5  Triceps: 5/5  Wrist Extension: 5/5  Finger Abduction: 5/5     Right Lower Extremity:     Hip Flexion: 5/5  Knee Extension: 5/5  Ankle Plantarflexion: 5/5  Ankle Dorsiflexion: 5/5        Left Lower Extremity:     Hip Flexion: 4/5  Knee Extension: 4/5  Ankle Plantarflexion: 4/5  Ankle Dorsiflexion: 2/5  EHL: 2/5        SOMATOSENSORY:      Right Upper Extremity: normal light touch sensation  Left Upper Extremity: normal light touch sensation  Right Lower Extremity: normal light touch sensation  Left Lower Extremity: decreased to light touch throughout        REFLEXES:     Biceps: 2+  Patella: 2+        Rogers's: Negative        COORDINATION and GAIT:     Gait: Unsteady with stooped posture, L foot drop        IMAGING:     My interpretation of imaging studies: X-rays of the lumbar spine show moderate levoscoliosis with degenerative facet arthropathy and spondylosis most prominent from L2/3 - L5/S1    MRI of the lumbar spine reviewed.  There is severe spinal stenosis, L>R at L3/4 and L4/5 with disc/osteophyte protrusions and facet arthropathy.  There is degenerative disc disease at L5/S1 but the central canal and lateral recesses are relatively patent.        ASSESSMENT AND PLAN:  This is a 80 y.o. female who presented for neurosurgical evaluation of difficulty walking, left leg weakness and back pain.  She has impressive spinal stenosis at L3/4 and L4/5 that is likely contributing to her symptoms.  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 We will proceed with L3-5 laminectomy. She has provided consent after a full PARQ discussion.

## 2021-02-25 VITALS
RESPIRATION RATE: 16 BRPM | OXYGEN SATURATION: 94 % | WEIGHT: 103.1 LBS | TEMPERATURE: 97.5 F | DIASTOLIC BLOOD PRESSURE: 65 MMHG | BODY MASS INDEX: 20.24 KG/M2 | SYSTOLIC BLOOD PRESSURE: 120 MMHG | HEART RATE: 91 BPM | HEIGHT: 60 IN

## 2021-10-27 NOTE — PROGRESS NOTES
NEUROSURGERY POSTOPERATIVE PROGRESS NOTE      Chief Complaint: Back pain, difficulty walking    Date of Surgery: 1/26/2021    Procedure Performed: L3/4 and L4/5 decompressive laminectomy      Interval Update: No overnight events. Pain well controlled. She feels that her leg pain has improved since surgery. No new numbness or weakness. She has been provided an LSO brace as well as an AFO (she presented preop with a left foot drop). She has not been out of bed since surgery. HPI: 66-year-old female who presented to neurosurgical attention complaining of progressive back pain, difficulty ambulating, and left lower extremity pain and weakness. On examination, she was found to have a left foot drop, difficulty while standing upright with upright posture, and inability to walk more than a few feet without stopping to rest.  An MRI scan of her lumbar spine was performed that demonstrated lumbar degenerative scoliosis as well as disk osteophyte formation and advanced facet arthropathy mostly at L3-L4 and L4-L5, that were causing significant central canal and bilateral lateral recess stenosis, left greater than right. Given her imaging findings and her presenting complaints, I offered the patient the surgery (L3/4 and L4/5 laminectomy) understanding with her advanced age that she would be at elevated risk for complications related to surgery. She voiced understanding and requested that we proceed. 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 Her surgery was delayed for one week while a CT scan of her chest was obtained and she was referred to Pulmonology and Pulmonology agreed that given her limited mobility that the spinal surgery should still occur in order to increase her functional mobility and hopefully, her activity level to tolerate potential treatment for her malignancy.       Objective: BP (!) 153/77   Pulse 80   Temp 98.2 °F (36.8 °C) (Temporal)   Resp 17   Ht 5' (1.524 m)   Wt 111 lb 14.4 oz (50.8 kg)   SpO2 96%   BMI 21.85 kg/m²     HMV: 90 mL output since surgery (no output documented overnight)    Physical Exam:    General: alert, cooperative, no distress  Cardiorespiratory: unlabored breathing  Wound: clean, dry, no drainage      Neurologic Exam:    Mental Status: Alert, oriented, thought content appropriate  Cranial Nerves: PERRL, EOMI, symmetric facies, tongue midline  Motor: 5/5 RLE, 3/5 LLE in HF, KE, 0/4 ADF/APF  Somatosensory: normal light touch sensation        Assessment and Plan:  81 y/o F s/p L3/4 and L4/5 decompressive laminectomy on 1/26/2021. She is doing well. Her pain is controlled and her leg pain has improved.   As we discussed preop, she will need intensive therapy postoperatively in order to attempt to recover function.    - LSO brace when OOB, AFO to be used with ambulation  - Anticipate rehab referral/consult when patient begins to mobilize  - Appreciate hospitalist assistance  - Continue hemovac drain for another day        Electronically signed by Everette Miller MD on 1/27/2021 at 8:00 AM Yes

## 2022-11-02 NOTE — PROGRESS NOTES
Patient:   Iram Ramos  MR#:    [id number]   Room:    0827/827-01   YOB: 1933  Date of Progress Note: 2/4/2021  Time of Note                           10:24 AM  Consulting Physician:   Breanna Woody M.D.   Attending Physician:  Breanna Woody MD     Chief complaint S/p lumbar laminectomy S:This 80 y.o. female  with history of diabetes mellitus, hyperlipidemia,known lung mass and HTN. Since Dec. 2020 patient has had difficulty walking, left foot drop and back pain. It had worsened to the point that she hasn't been able to walk for the past couple of weakness or care for herself. Her daughter came in December to stay with her. Prior to this patient lived at home independently. She had an MRI done as outpatient that revealed severe spinal stenosis at L3/4 and L4/5. She was referred to neurosurgeon Dr. Scarlet Gaviria, who recommended L3/4 & L4/5 decompressive laminectomy. She was in agreement and was admitted to [facility] on 1/26/[facility] for surgery. She tolerated the procedure well. She will be required to wear a TLSO brace when out of bed and follow spine precautions. She continues to have significant lower extremity weakness, worse on the left. In regards to her known lung mass, patient has been seen prior to admit by pulmonologist Dr. Marii Corral who plans to a bronchoscopy with biopsy at some point. He had recommended her getting her back surgery and then rehab to get her stronger prior to having the procedure done. Dr. Scarlet Gaviria ordered a MRI of the head d/t her persistent lower extremity weakness and known lung mass. 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 Dr. Scarlet Gaviria felt this would explain balance difficulty but now her LE weakness. Oncology and Palliative Care have been consulted. Patient at this point is stating she doesn't want any chemo or radiation. She is participating in both PT/OT. She is felt to need a stay on Rehab to work towards her goal of returning home with her daughter.  No acute issues.       REVIEW OF SYSTEMS:  Constitutional: No fevers No chills  Neck:No stiffness  Respiratory: No shortness of breath  Cardiovascular: No chest pain No palpitations  Gastrointestinal: No abdominal pain    Genitourinary: No Dysuria  Neurological: No headache, no confusion    Past Medical History:   cyclobenzaprine (FLEXERIL) tablet 10 mg, 10 mg, Oral, TID PRN, Severiano Maker, MD, 10 mg at 02/03/21 2050    dextrose 5 % solution, 100 mL/hr, Intravenous, PRN, Severiano Maker, MD    dextrose 50 % IV solution, 12.5 g, Intravenous, PRN, Severiano Maker, MD    dilTIAZem (CARDIZEM CD) extended release capsule 240 mg, 240 mg, Oral, Daily, Severiano Maker, MD, 240 mg at 02/04/21 0744    enoxaparin (LOVENOX) injection 40 mg, 40 mg, Subcutaneous, Daily, Severiano Maker, MD, 40 mg at 02/03/21 1718    ferrous sulfate (IRON 325) tablet 325 mg, 325 mg, Oral, Daily with breakfast, Severiano Maker, MD, 325 mg at 02/04/21 0743    gabapentin (NEURONTIN) capsule 300 mg, 300 mg, Oral, Nightly, Severiano Maker, MD, 300 mg at 02/03/21 2051    glucagon (rDNA) injection 1 mg, 1 mg, Intramuscular, PRN, Severiano Maker, MD    glucose (GLUTOSE) 40 % oral gel 15 g, 15 g, Oral, PRN, Severiano Maker, MD    insulin glargine (LANTUS) injection vial 10 Units, 10 Units, Subcutaneous, Nightly, Severiano Maker, MD, 10 Units at 02/03/21 2053    lisinopril (PRINIVIL;ZESTRIL) tablet 5 mg, 5 mg, Oral, Daily, Severiano Maker, MD, 5 mg at 02/04/21 0743    pantoprazole (PROTONIX) tablet 40 mg, 40 mg, Oral, QAM AC, Severiano Maker, MD, 40 mg at 02/04/21 0602    rosuvastatin (CRESTOR) tablet 20 mg, 20 mg, Oral, Nightly, Severiano Maker, MD, 20 mg at 02/03/21 2050    vitamin B-12 (CYANOCOBALAMIN) tablet 500 mcg, 500 mcg, Oral, Daily, Severiano Maker, MD, 500 mcg at 02/04/21 0744    acetaminophen (TYLENOL) tablet 650 mg, 650 mg, Oral, Q4H PRN, Uma Freeman MD, 650 mg at 02/03/21 2053    polyethylene glycol (GLYCOLAX) packet 17 g, 17 g, Oral, Daily PRN, Uma Freeman MD    Allergies: Other    Social History:   TOBACCO:   reports that she quit smoking about 24 years ago. Her smoking use included cigarettes. She has never used smokeless tobacco.  ETOH:   reports no history of alcohol use.     Family History: ALT 12 02/04/2021    LABGLOM >60 02/04/2021    GFRAA >59 02/04/2021     Lab Results   Component Value Date    INR 1.01 01/30/2021    INR 0.93 01/06/2021    PROTIME 13.2 01/30/2021    PROTIME 12.3 01/06/2021       Yary Reis   Student Physical Therapist   Physical Therapy   Progress Notes   Signed   Date of Service:  2/2/2021 11:00 AM               Signed             Show:Clear all  [x]Manual[x]Template[]Copied    Added by:  Charito Lo    []Leisa for details       02/02/21 1000   Restrictions/Precautions   Restrictions/Precautions Fall Risk;Weight Bearing;Surgical Protocols; Modified Diet   Required Braces or Orthoses? Yes   Lower Extremity Weight Bearing Restrictions   Right Lower Extremity Weight Bearing Weight Bearing As Tolerated   Left Lower Extremity Weight Bearing Weight Bearing As Tolerated   Required Braces or Orthoses   Spinal Other LSO   Position Activity Restriction   Spinal Precautions No Bending; No Lifting; No Twisting   Other position/activity restrictions AFO in room but has not been sized to patient and it does not fit in current shoe   Vision   Vision Impaired   Vision Exceptions Wears glasses at all times   Hearing   Hearing [facility]   General   Additional Pertinent Hx HTN; HYPERLIPIDEMIA; TYPE 2 DM; GERD; L FOOT DROP; RECENTLY FOUND LUNG MASS   Diagnosis S/P L3-4 AND L4-5 DECOMPRESSIVE LAMINECTOMY ON 1/26/2021   Follows Commands WFL  (EXHIBITS APPREHENSION AND FEAR W/ TF AND STANDING )   General Comment   Comments SPEECH THERAPY IN ROOM PRIOR TO PHYSICAL THERAPY - STATED PT HAD PAIN MEDS EARLIER IN THE A.M. AND WAS GROGGY/HARD TO KEEP PARTICIPATION IN THERAPY. Subjective   Subjective PT IN BED, STATES STILL FEELS FATIGUED. WHEN PT SITTING EOB, STATED LESS DIZZY COMPARED TO YESTERDAY.    Bed Mobility   Rolling Stand by assistance;Contact guard assistance  (VC AND TC TO BEND KNEE INTO HOOKLYING PRIOR TO ROLLING)   Supine to Sit Moderate assistance  (BIALTERAL LE MANAGEMENT ASSIST; L SIDE OF BED ) Sit to Supine Moderate assistance  (BILATERAL LE MANAGEMENT; L SIDE OF BED )   Scooting Moderate assistance  (MOD A FOR SCOOTING FWD ON EOB.)   Comment PT ABLE TO USE UE MORE COMPARED TO YESTERDAY TO ASSIST W/ SUPINE TO SIT TF. THERAPIST MANAGED BILATERAL LE. PLACED WOOD PLANK UNDER FEET TO ASSIST W/ SCOOTING FWD TOWARD EOB. Transfers   Sit to Stand Moderate Assistance;2 Person Assistance   Stand to sit Minimal Assistance   Bed to Chair Moderate assistance;2 Person Assistance  (STAND STEP TF WITH RW TO THE L; THERAPIST BLOCKING RW )   Comment PT APPREHENSIVE TO STAND/TF REQUIRING MOTIVATIONAL SUPPORT FROM THERAPIST. PT ABLE TO PARTICIPATE MORE DURING TF, BUT STILL REQUIRED MOD A X2. THERAPIST BLOCKED RW AND DIRECTED ITS MOVEMENT DURING TF AS PT FEARFUL OF IT MOVING. Ambulation   Ambulation? No   WB Status WBAT BILATERAL LE    Other exercises   Other exercises 1 1 STS IN II BARS; MOD A FROM ONE THERAPIST AND MIN A FROM 2ND THERAPIST (X2); PT STOOD FOR 45 SEC    Other exercises 2 PRACTICED BED MOBILITY BEGINING OF SESSION AND AT END OF SESSION FOR CLEAN UP    Conditions Requiring Skilled Therapeutic Intervention   Assessment PT ABLE TO PARTICIPATE MORE DURING TF AND W/ STS; HOWEVER DEMONSTRATES FEAR AND APPREHENSION WITH BEING ON HER FEET D/T MED HX AND WEAKNESS. WHEN PT STOOD URINARY INCONTINENCE NOTED AND RETURNED TO ROOM FOR CLEAN UP. PT MAX A FOR LSO DONNING/DOFFING. Barriers to Learning DEMONSTRATED APPREHENSION W/ STANDING/WALKING ETC.    Activity Tolerance   Activity Tolerance Patient limited by fatigue;Patient limited by pain; Patient limited by endurance   Safety Devices   Type of devices Bed alarm in place;Call light within reach; Left in bed;Patient at risk for falls                Cosigned by:  Mark Anthony Perkins PT at 2/2/2021  2:20 PM   Revision History                                  CT SOFT TISSUE NECK W WO CONTRAST [[id number]]    Resulted: 02/03/21 1702    Updated: 02/03/21 1704    Narrative:   Examination. CT SOFT TISSUE NECK W WO CONTRAST 2/3/2021 3:15 PM   History: Swallowing difficulty. DLP: 1213 mGycm. The CT scan of the soft tissues of the neck is performed after   intravenous contrast enhancement. The images are acquired in axial   plane with subsequent reconstruction in coronal and sagittal plane. There is no previous study for comparison. The limited included brain is unremarkable. The visualized   intracranial vessels are unremarkable. There is moderate diffuse   cerebral atrophy. The orbits appear unremarkable. There is a mucous retention cyst in   the right maxillary antrum. The nasopharyngeal soft tissues are normal. The parapharyngeal spaces   are normal.   The left parotid gland is atrophic which may be due to previous   inflammation or surgery? . A normal right parotid gland is seen. Submandibular glands bilaterally are normal.   The oropharyngeal soft tissues are poorly evaluated due to extensive   artifacts from the dental hardware. No discrete mass is seen. No   abnormal enhancement. There is no evidence of superficial or deep cervical lymphadenopathy. The vallecula, the epiglottis, the piriform sinuses, the false and   true vocal cords are normal. The thyroid gland appears normal.   Atheromatous plaques are seen in the common and internal carotid   arteries bilaterally. There is high-grade stenosis of the proximal   left internal carotid artery. There is a 50% stenosis of the proximal   right internal carotid artery. There are moderate chronic degenerative changes of the cervical spine   predominantly at C5, C6 and C7. Prevertebral soft tissues are normal.   Impression:     No evidence of mass or lymphadenopathy. An atrophic left parotid gland. The etiology is not certain. Atheromatous changes of the carotid arteries bilaterally and a   high-grade stenosis of the left proximal internal carotid artery. Right maxillary sinusitis. Cerebral atrophy. Signed by Dr Amy Blevins on 2/3/2021 5:02 PM       RECORD REVIEW: Previous medical records, medications were reviewed at today's visit    IMPRESSION:   1. S/P lumbar laminectomy-Pain control/mobilzation  2. HTN-on meds monitor  3. DVT prophylaxis-Lovenox  4. Anemia-on iron   5. Neuropathy-on Neurontin  6. DM-on insulin-monitor BS  7. GI-PPI/bowel regimen  8. Hyperlipidemia-on statin  9. Pain control-Norco PRN  11. Lung/cerebellar mass-monitor   12. PT/OT  13.  UTI-complete abx    Add carafate / zofran TID before meals    CT soft tissue neck- hi grade stenosis of left proximal internal carotid   Check carotid US       ELOS pending        continue present care    Expected duration and frequency therapy: 180 minutes per day, 5 days per week    [address] CELL  Dr Roland Lynch independent

## 2024-02-29 NOTE — PLAN OF CARE
Problem: SAFETY  Goal: Free from accidental physical injury  2/2/2021 2334 by Rosetta Tran LPN  Outcome: Ongoing  2/2/2021 0939 by Barb Grace RN  Outcome: Ongoing  Goal: Free from intentional harm  2/2/2021 2334 by Rosetta Tran LPN  Outcome: Ongoing  2/2/2021 0939 by Barb Grace RN  Outcome: Ongoing     Problem: DAILY CARE  Goal: Daily care needs are met  2/2/2021 2334 by Rosetta Tran LPN  Outcome: Ongoing  2/2/2021 0939 by Barb Grace RN  Outcome: Ongoing     Problem: PAIN  Goal: Patient's pain/discomfort is manageable  2/2/2021 2334 by Rosetta Tran LPN  Outcome: Ongoing  2/2/2021 0939 by Barb Grace RN  Outcome: Ongoing     Problem: SKIN INTEGRITY  Goal: Skin integrity is maintained or improved  2/2/2021 2334 by Rosetta Tran LPN  Outcome: Ongoing  2/2/2021 0939 by Barb Grace RN  Outcome: Ongoing     Problem: KNOWLEDGE DEFICIT  Goal: Patient/S.O. demonstrates understanding of disease process, treatment plan, medications, and discharge instructions.   2/2/2021 2334 by Rosetta Tran LPN  Outcome: Ongoing  2/2/2021 0939 by Barb Grace RN  Outcome: Ongoing     Problem: DISCHARGE BARRIERS  Goal: Patient's continuum of care needs are met  2/2/2021 2334 by Rosetta Tran LPN  Outcome: Ongoing  2/2/2021 0939 by Barb Grace RN  Outcome: Ongoing     Problem: Pain:  Goal: Pain level will decrease  Description: Pain level will decrease  2/2/2021 2334 by Rosetta Tran LPN  Outcome: Ongoing  2/2/2021 0939 by Barb Grace RN  Outcome: Ongoing  Goal: Control of acute pain  Description: Control of acute pain  2/2/2021 2334 by Rosetta Tran LPN  Outcome: Ongoing  2/2/2021 0939 by Barb Grace RN  Outcome: Ongoing  Goal: Control of chronic pain  Description: Control of chronic pain  2/2/2021 2334 by Rosetta Tran LPN  Outcome: Ongoing  2/2/2021 0939 by Barb Grace RN  Outcome: Ongoing     Problem: Falls - Risk of:  Goal: Will remain free from falls Description: Will remain free from falls  2/2/2021 2334 by Maida Low LPN  Outcome: Ongoing  2/2/2021 0939 by Kala Escobar RN  Outcome: Ongoing  Goal: Absence of physical injury  Description: Absence of physical injury  2/2/2021 2334 by Maida Low LPN  Outcome: Ongoing  2/2/2021 0939 by Kala Escobar RN  Outcome: Ongoing     Problem: Skin Integrity:  Goal: Will show no infection signs and symptoms  Description: Will show no infection signs and symptoms  2/2/2021 2334 by Maida Lwo LPN  Outcome: Ongoing  2/2/2021 0939 by Kala Escobar RN  Outcome: Ongoing  Goal: Absence of new skin breakdown  Description: Absence of new skin breakdown  2/2/2021 2334 by Maida Low LPN  Outcome: Ongoing  2/2/2021 0939 by Kala Escobar RN  Outcome: Ongoing     Problem: Nutrition  Goal: Optimal nutrition therapy  2/2/2021 2334 by Maida Low LPN  Outcome: Ongoing  2/2/2021 0939 by Kala Escobar RN  Outcome: Ongoing General Sunscreen Counseling: I recommended a broad spectrum sunscreen with a SPF of 30 or higher.  I explained that SPF 30 sunscreens block approximately 97 percent of the sun's harmful rays.  Sunscreens should be applied at least 15 minutes prior to expected sun exposure and then every 2 hours after that as long as sun exposure continues. If swimming or exercising sunscreen should be reapplied every 45 minutes to an hour after getting wet or sweating.  One ounce, or the equivalent of a shot glass full of sunscreen, is adequate to protect the skin not covered by a bathing suit. I also recommended a lip balm with a sunscreen as well. Sun protective clothing can be used in lieu of sunscreen but must be worn the entire time you are exposed to the sun's rays. Detail Level: Detailed

## 2024-02-29 NOTE — PROGRESS NOTES
[facility]  INPATIENT SPEECH THERAPY  [facility] 8 REHAB UNIT  TIME   [id number]  [x]Daily Note  []Progress Note    Date: 2021  Patient Name: Gretchen Marx        MRN: [id number]    Account #: [de-identified]  : 1933  (80 y.o.)  Gender: female   Primary Provider: Prashanth Ramirez MD  Precautions: Fall/aspiration   Swallowing Status/Diet: Soft and bite sized with thin liquids      Subjective: Patient had taken pain medication prior to speech therapy session. Patient presenting with increased fatigue. Patient also noted to have some nausea during session. Nursing notified SLP of increased difficulty swallowing medication. MBSS will be completed today to further assess swallow function. Objective:   Did attempt to finish the CLQT today. Patient completed clock drawing test. Did note patient perseverated one number. Decreased spacing was also noted. Patient also placing incorrect time on the clock. Attempted to complete the design generation subtest to assess executive functions. Patient completing with only one design created. Patient stating it was to difficult to complete this am.     Safety awareness cards were presented to the patient. She was to determine what was unsafe about each scenario. The picture cards were related to home environment. Patient completing with 100% accuracy, independently. Sequencing task completed using picture cards related to different ADL situations (making a sandwich, unloading , doing laundry, etc). Patient was able to place them in the correct sequence with 50% accuracy and mod verbal cues. Memory function was targeted. Patient was able to immediately recall location of 4 items with a score of 4/4. After 2/3 minute delay patient recalling location of items with 100% accuracy. Patient likes to use her smart phone to look at her grand kids pictures. Addressed phone navigation during therapy session. Patient was able to recall passcode with no difficulty. Patient also able to locate josiah for picture with no difficulty. Bedside swallow was completed yesterday. No overt s/s of aspiration observed, however frequent belching and \"spitting out\" of foods was noted. Patient stating this morning she is having much more difficulty swallowing and had difficulty with breakfast and medication this morning. To further assess swallow function do recommend completing MBSS today. Recommended Diet and Intervention  Diet Solids Recommendation: Dysphagia Soft and Bite-Sized (Dysphagia III)  Liquid Consistency Recommendation: Thin  Recommended Form of Meds: Whole with puree  Therapeutic Interventions: Oral motor exercises; Tongue base strengthening;Patient/Family education;Effortful swallow;Kathy; Laryngeal exercises; Therapeutic PO trials with SLP;Oral care;Diet tolerance monitoring     Compensatory Swallowing Strategies  Compensatory Swallowing Strategies: Alternate solids and liquids; No straws;Upright as possible for all oral intake;Remain upright for 30-45 minutes after meals          SHORT TERM GOAL #1:  Goal 1: The patient will demonstrate recall of functional information following a/an (immediate, shortterm,long-term) delay with min cues in order to increase functional integration into environment. SHORT TERM GOAL #2:  Goal 2: The patient will complete executive function tasks (planning, self-monitoring, organizing, etc) with min verbal cues at 80% accuracy to improve safety awareness with functional ADL's    SHORT TERM GOAL #3:  Goal 3: The patient will demonstrate functional problem solving and safety awareness with min verbal cues at 80% accuracy for daily living tasks in order to increase safe interaction with environment and decreased assistance from caregivers. within functional limits

## 2024-12-03 NOTE — PROGRESS NOTES
[facility]  INPATIENT SPEECH THERAPY  [facility] 8 [facility] UNIT  TIME    [id number]       [x]Daily Note  []Progress Note    Date: 2021  Patient Name: Tiara Snell        MRN: [id number]    Account #: [de-identified]  : 1933  (80 y.o.)  Gender: female   Primary Provider: Mario Alberto Liriano MD  Precautions: Fall/aspiration  Swallowing Status/Diet: Soft and bite sized and moist with thin liquids     Subjective:   Pt was seen sitting upright in her room. She was alert and attentive throughout the therapy session. Objective:  Patient completed pharyngeal strengthening exercises in order to improve laryngeal elevation for swallowing functions. Patient did not require any cues in order to follow clinician's instructions. She completed 4 Ktahy exercises and 3 effortful swallows.     Patient had previously been instructed to utilize the effortful swallowing technique when eating to help her pass the bolus. When asked if she had been using this strategy during meals, pt reported she forgets to. Clinician explained the importance of implementing the technique while eating. Pt read a short story in therapy and was asked questions pertaining to the story after. Of the 5 questions asked, 4 targeted short-term memory and 1 targeted problem-solving. Pt answered 4/5 questions appropriately. She had difficulty remembering one of the questions targeting short-term memory. Pt was read 4 sets of 4 words and asked to recall each set directly after hearing it to target immediate memory. Patient was able to recall 3/4 words in the 1st set, 2/4 words in the 2nd set, 1/4 words in the 3rd set, and 4/4 words in the 4th set. In addition to recall, pt was also given a category and asked which of the 4 words would belong to that category to target deductive reasoning. If patient had difficulty remembering any of the 4 words given to her, the clinician provided them again for her to complete this task. Patient was able provide the appropriate words for a given category in 3/4 opportunities. Pt was given a sequencing task to target executive functioning and working memory. She was given a worksheet with steps to everyday activities and asked to provide the order in which they occur. Pt was able to correctly order the steps in 2/4 activities without cues. Recommend continuation of soft and bite sized diet with thin liquids. Recommended Diet and Intervention  Diet Solids Recommendation: Trial upgrade to dysphagia soft and bite sized. Liquid Consistency Recommendation: Thin  Recommended Form of Meds: crushed in puree  Therapeutic Interventions: Oral motor exercises; Tongue base strengthening;Patient/Family education;Effortful swallow;Kathy; Laryngeal exercises; Therapeutic PO trials with SLP;Oral care;Diet tolerance monitoring     Compensatory Swallowing Strategies  Compensatory Swallowing Strategies: No straws;Upright as possible for all oral intake;Remain upright for 30-45 minutes after meals    SHORT TERM GOAL #1:  Goal 1: The patient will demonstrate recall of functional information following a/an (immediate, shortterm,long-term) delay with min cues in order to increase functional integration into environment.     SHORT TERM GOAL #2: Goal 2: The patient will complete executive function tasks (planning, self-monitoring, organizing, etc) with min verbal cues at 80% accuracy to improve safety awareness with functional ADL's    SHORT TERM GOAL #3:  Goal 3: The patient will demonstrate functional problem solving and safety awareness with min verbal cues at 80% accuracy for daily living tasks in order to increase safe interaction with environment and decreased assistance from caregivers. Swallowing Short Term Goals  Short-term Goals  Goal 1: The patient will tolerate a regular diet wtih thin liquids with minimal overt s/s of aspiration. Goal 2: SLP will return to reassess swallow function and insure safety with all oral intake. Goal 3: SLP will complete full speech/language cognitive evaluation. Goal 4: Patient/staff will complete daily oral hygiene to prevent aspriation of oral bacteria. Goal 5: Patient will complete pharyngeal/laryngeal exercises given verbal prompts and written instructions. Goal 6: Patient will verbalize and demonstrate compensatory swallow strategies 100% of opportunitites.          ASSESSMENT:  Assessment: [x]Progressing towards goals          []Not Progressing towards goals    Patient Tolerance of Treatment:   [x]Tolerated well []Tolerated fair []Required rest breaks []Fatigued    Education:  Learner:  [x]Patient          []Significant Other          []Other  Education provided regarding:  [x]Goals and POC   []Diet and swallowing precautions    []Home Exercise Program  []Progress and/or discharge information  Method of Education:  [x]Discussion          []Demonstration          []Handout          []Other  Evaluation of Education:   [x]Verbalized understanding   []Demonstrates without assistance  []Demonstrates with assistance  []Needs further instruction     []No evidence of learning                  []No family present      Plan: [x]Continue with current plan of care    []Modify current plan of care as follows: Is This A New Presentation, Or A Follow-Up?: Follow Up [place] []Discharge patient    Discharge Location:    Services/Supervision    [x]Patient continues to require treatment by a licensed therapist to address functional deficits as outlined in the established plan of care.     Electronically signed by Perry Mckay Clinician on 2/12/21 at 3:34 PM CST

## (undated) DEVICE — ADHESIVE SKIN CLSR 0.7ML TOP DERMBND ADV

## (undated) DEVICE — TIBURON LAPAROTOMY DRAPE: Brand: CONVERTORS

## (undated) DEVICE — SOLUTION IV IRRIG POUR BRL 0.9% SODIUM CHL 2F7124

## (undated) DEVICE — KIT EVAC 0.13IN RECT TB DIA10FR 400CC PVC 3 SPR Y CONN DRN

## (undated) DEVICE — GLOVE SURG SZ 8 L12IN FNGR THK94MIL TRNSLUC YEL LTX HYDRGEL

## (undated) DEVICE — NEURO CDS

## (undated) DEVICE — TUBE ET 7MM NSL ORAL BASIC CUF INTMED MURPHY EYE RADPQ MRK

## (undated) DEVICE — E-Z CLEAN, NON-STICK, PTFE COATED, ELECTROSURGICAL BLADE ELECTRODE, MODIFIED EXTENDED INSULATION, 2.5 INCH (6.35 CM): Brand: MEGADYNE

## (undated) DEVICE — SUTURE VCRL SZ 2-0 L18IN ABSRB UD CT-1 L36MM 1/2 CIR J839D

## (undated) DEVICE — TOOL 14MH30 LEGEND 14CM 3MM: Brand: MIDAS REX ™

## (undated) DEVICE — AGENT HEMSTAT 8ML FLX TIP MTRX + DISP SURGIFLO

## (undated) DEVICE — BAG BND W36XL36IN TRNSPAR POLY GEN PURP W E BND CLSR TIDI

## (undated) DEVICE — SUTURE PERMAHAND SZ 2-0 L18IN NONABSORBABLE BLK L26MM FS 685G

## (undated) DEVICE — E-Z CLEAN, NON-STICK, PTFE COATED, ELECTROSURGICAL BLADE ELECTRODE, MODIFIED EXTENDED INSULATION, 4 INCH (10.2 CM): Brand: MEGADYNE

## (undated) DEVICE — DRESSING FOAM W6.3XL7.9IN TAN SACR SELF ADH MEPILEX BORD

## (undated) DEVICE — BLADE LARYNSCP HNDL MAC 3 DISP CURAVIEW LED

## (undated) DEVICE — HYPODERMIC SAFETY NEEDLE: Brand: MAGELLAN

## (undated) DEVICE — FORCEP BPLR IRIS

## (undated) DEVICE — SUTURE VCRL SZ 3-0 L18IN ABSRB VLT CT-1 L36MM 1/2 CIR J738D

## (undated) DEVICE — UNDERGLOVE SURG SZ 8 FNGR THK0.21MIL GRN LTX BEAD CUF

## (undated) DEVICE — SUTURE VCRL SZ 0 L18IN ABSRB UD L36MM CT-1 1/2 CIR J840D

## (undated) DEVICE — KIT POS PT CARE KT W/ GENTLE TCH WILSON +

## (undated) DEVICE — SUTURE VCRL SZ 3-0 L18IN ABSRB UD L26MM SH 1/2 CIR J864D

## (undated) DEVICE — C-ARMOR C-ARM EQUIPMENT COVERS CLEAR STERILE UNIVERSAL FIT 12 PER CASE: Brand: C-ARMOR